# Patient Record
Sex: MALE | Race: WHITE | NOT HISPANIC OR LATINO | Employment: OTHER | URBAN - METROPOLITAN AREA
[De-identification: names, ages, dates, MRNs, and addresses within clinical notes are randomized per-mention and may not be internally consistent; named-entity substitution may affect disease eponyms.]

---

## 2017-01-04 ENCOUNTER — OFFICE VISIT (OUTPATIENT)
Dept: CARDIAC REHAB | Facility: HOSPITAL | Age: 82
End: 2017-01-04

## 2017-01-04 DIAGNOSIS — Z95.1 S/P CABG X 5: Primary | ICD-10-CM

## 2017-01-09 ENCOUNTER — OFFICE VISIT (OUTPATIENT)
Dept: CARDIAC REHAB | Facility: HOSPITAL | Age: 82
End: 2017-01-09

## 2017-01-09 DIAGNOSIS — Z95.1 S/P CABG X 5: Primary | ICD-10-CM

## 2017-01-11 ENCOUNTER — OFFICE VISIT (OUTPATIENT)
Dept: CARDIAC REHAB | Facility: HOSPITAL | Age: 82
End: 2017-01-11

## 2017-01-11 DIAGNOSIS — Z95.1 S/P CABG X 5: Primary | ICD-10-CM

## 2017-01-13 ENCOUNTER — TRANSCRIBE ORDERS (OUTPATIENT)
Dept: CARDIAC REHAB | Facility: HOSPITAL | Age: 82
End: 2017-01-13

## 2017-01-13 ENCOUNTER — OFFICE VISIT (OUTPATIENT)
Dept: CARDIAC REHAB | Facility: HOSPITAL | Age: 82
End: 2017-01-13

## 2017-01-13 DIAGNOSIS — Z95.1 S/P CABG X 5: Primary | ICD-10-CM

## 2017-01-16 ENCOUNTER — OFFICE VISIT (OUTPATIENT)
Dept: CARDIAC REHAB | Facility: HOSPITAL | Age: 82
End: 2017-01-16

## 2017-01-16 DIAGNOSIS — Z95.1 S/P CABG X 5: Primary | ICD-10-CM

## 2017-01-16 DIAGNOSIS — C34.91 ADENOCARCINOMA OF RIGHT LUNG (HCC): Primary | ICD-10-CM

## 2017-01-18 ENCOUNTER — OFFICE VISIT (OUTPATIENT)
Dept: CARDIAC REHAB | Facility: HOSPITAL | Age: 82
End: 2017-01-18

## 2017-01-18 DIAGNOSIS — Z95.1 S/P CABG X 5: Primary | ICD-10-CM

## 2017-01-20 ENCOUNTER — APPOINTMENT (OUTPATIENT)
Dept: CARDIAC REHAB | Facility: HOSPITAL | Age: 82
End: 2017-01-20

## 2017-01-20 ENCOUNTER — OFFICE VISIT (OUTPATIENT)
Dept: FAMILY MEDICINE CLINIC | Facility: CLINIC | Age: 82
End: 2017-01-20

## 2017-01-20 VITALS
OXYGEN SATURATION: 95 % | TEMPERATURE: 98.1 F | SYSTOLIC BLOOD PRESSURE: 110 MMHG | HEART RATE: 58 BPM | WEIGHT: 207.6 LBS | BODY MASS INDEX: 29.06 KG/M2 | HEIGHT: 71 IN | DIASTOLIC BLOOD PRESSURE: 68 MMHG

## 2017-01-20 DIAGNOSIS — E78.2 MIXED HYPERLIPIDEMIA: ICD-10-CM

## 2017-01-20 DIAGNOSIS — I48.20 CHRONIC ATRIAL FIBRILLATION (HCC): ICD-10-CM

## 2017-01-20 DIAGNOSIS — N40.1 BENIGN NODULAR PROSTATIC HYPERPLASIA WITH LOWER URINARY TRACT SYMPTOMS: ICD-10-CM

## 2017-01-20 DIAGNOSIS — Z13.1 DM (DIABETES MELLITUS SCREEN): ICD-10-CM

## 2017-01-20 DIAGNOSIS — E11.9 TYPE 2 DIABETES MELLITUS WITHOUT COMPLICATION, WITHOUT LONG-TERM CURRENT USE OF INSULIN (HCC): ICD-10-CM

## 2017-01-20 DIAGNOSIS — E03.4 HYPOTHYROIDISM DUE TO ACQUIRED ATROPHY OF THYROID: Primary | ICD-10-CM

## 2017-01-20 PROCEDURE — 99213 OFFICE O/P EST LOW 20 MIN: CPT | Performed by: FAMILY MEDICINE

## 2017-01-20 NOTE — PROGRESS NOTES
Chief Complaint and HPI    I have reviewed the patient's medical history in detail and updated the computerized patient record.    Subjective: Bhaskar Ibarra is a 81 y.o. male presents   here today for     Hyperlipidemia (-no muscle pain with statin); Benign Prostatic Hypertrophy (freq and urgency); and Depression (happy with Lexapro)      Review of Systems   Constitutional: Negative.    HENT: Positive for congestion, rhinorrhea and sneezing.    Eyes: Negative.    Respiratory: Negative.    Cardiovascular: Negative.    Gastrointestinal: Negative.    Endocrine: Negative.    Genitourinary: Negative.    Musculoskeletal: Negative.    Skin: Negative.    Allergic/Immunologic: Negative.    Neurological: Negative.    Hematological: Negative.    Psychiatric/Behavioral: Negative.        Physical Exam   Constitutional: He is oriented to person, place, and time. He appears well-developed and well-nourished.   Cardiovascular: Normal rate, regular rhythm, normal heart sounds and intact distal pulses.    Pulmonary/Chest: Effort normal and breath sounds normal.   Neurological: He is alert and oriented to person, place, and time.   Psychiatric: He has a normal mood and affect. His behavior is normal.   Vitals reviewed.      Procedures    Assessment:   Diagnosis Plan   1. Hypothyroidism due to acquired atrophy of thyroid  PSA    TSH    Lipid Panel With LDL / HDL Ratio    Hemoglobin A1c    CBC & Differential    Comprehensive Metabolic Panel   2. Mixed hyperlipidemia  PSA    TSH    Lipid Panel With LDL / HDL Ratio    Hemoglobin A1c    CBC & Differential    Comprehensive Metabolic Panel   3. Chronic atrial fibrillation  PSA    TSH    Lipid Panel With LDL / HDL Ratio    Hemoglobin A1c    CBC & Differential    Comprehensive Metabolic Panel   4. Benign nodular prostatic hyperplasia with lower urinary tract symptoms  PSA    TSH    Lipid Panel With LDL / HDL Ratio    Hemoglobin A1c    CBC & Differential    Comprehensive Metabolic  Panel   5. DM (diabetes mellitus screen)  PSA    TSH    Lipid Panel With LDL / HDL Ratio    Hemoglobin A1c    CBC & Differential    Comprehensive Metabolic Panel   6. Type 2 diabetes mellitus without complication, without long-term current use of insulin   Hemoglobin A1c       Plan:  Orders Placed This Encounter   Procedures   • PSA   • TSH   • Lipid Panel With LDL / HDL Ratio   • Hemoglobin A1c   • Comprehensive Metabolic Panel       Requested Prescriptions      No prescriptions requested or ordered in this encounter       All tests and consults since last visit reviewed with patient    No Follow-up on file.

## 2017-01-20 NOTE — MR AVS SNAPSHOT
Bhaskar Ibarra   1/20/2017 10:45 AM   Office Visit    Dept Phone:  [phone]   Encounter #:  [id number]    Provider:  Garry Joya MD   Department:  [facility] FAMILY AND INTERNAL MEDICINE                Your Full Care Plan              Your Updated Medication List          This list is accurate as of: 1/20/17 11:57 AM.  Always use your most recent med list.                apixaban 2.5 MG tablet tablet   Commonly known as:  ELIQUIS   Take 1 tablet by mouth every 12 (twelve) hours.       aspirin 81 MG tablet       atorvastatin 20 MG tablet   Commonly known as:  LIPITOR   TAKE 1 TABLET DAILY       carvedilol 3.125 MG tablet   Commonly known as:  COREG   Take 1 tablet by mouth 2 (Two) Times a Day.       escitalopram 20 MG tablet   Commonly known as:  LEXAPRO   TAKE 1 TABLET DAILY       furosemide 40 MG tablet   Commonly known as:  LASIX   TAKE 1 TABLET DAILY. HOLD IF BLOOD PRESSURE IS LESS THAN 100/60       levothyroxine 88 MCG tablet   Commonly known as:  SYNTHROID, LEVOTHROID   TAKE 1 TABLET DAILY       omeprazole 40 MG capsule   Commonly known as:  priLOSEC   TAKE 1 CAPSULE DAILY       sotalol 80 MG tablet   Commonly known as:  BETAPACE               We Performed the Following     CBC & Differential     Comprehensive Metabolic Panel     Hemoglobin A1c     Lipid Panel With LDL / HDL Ratio     PSA     TSH       You Were Diagnosed With        Codes Comments    Hypothyroidism due to acquired atrophy of thyroid    -  Primary ICD-10-CM: E03.4  ICD-9-CM: 244.8, 246.8     Mixed hyperlipidemia     ICD-10-CM: E78.2  ICD-9-CM: 272.2     Chronic atrial fibrillation     ICD-10-CM: I48.2  ICD-9-CM: 427.31     Benign nodular prostatic hyperplasia with lower urinary tract symptoms     ICD-10-CM: N40.1  ICD-9-CM: 600.10     DM (diabetes mellitus screen)     ICD-10-CM: Z13.1  ICD-9-CM: V77.1     Type 2 diabetes mellitus without complication, without long-term current use of insulin      ICD-10-CM: E11.9  ICD-9-CM: 250.00       Instructions     None    Patient Instructions History      Upcoming Appointments     Visit Type Date Time Department    FOLLOW UP 1/20/2017 10:45 AM [facility]    PHASE III - CARD 1/20/2017 11:15 AM [facility] CARD REHAB    PHASE III - CARD 1/23/2017 11:15 AM [facility] CARD REHAB    PHASE III - CARD 1/25/2017 11:15 AM [facility] CARD REHAB    PHASE III - CARD 1/27/2017 11:15 AM [facility] CARD REHAB    PHASE III - CARD 1/30/2017 11:15 AM [facility] CARD REHAB    PHASE III - CARD 2/1/2017 11:15 AM [facility] CARD REHAB    PHASE III - CARD 2/3/2017 11:15 AM [facility] CARD REHAB    PHASE III - CARD 2/6/2017 11:15 AM [facility] CARD REHAB    PHASE III - CARD 2/8/2017 11:15 AM [facility] CARD REHAB    PHASE III - CARD 2/10/2017 11:15 AM [facility] CARD REHAB    PHASE III - CARD 2/13/2017 11:15 AM [facility] CARD REHAB    PHASE III - CARD 2/15/2017 11:15 AM [facility] CARD REHAB    PHASE III - CARD 2/17/2017 11:15 AM [facility] CARD REHAB    PHASE III - CARD 2/20/2017 11:15 AM [facility] CARD REHAB    PHASE III - CARD 2/22/2017 11:15 AM [facility] CARD REHAB    PHASE III - CARD 2/24/2017 11:15 AM [facility] CARD REHAB    PHASE III - CARD 2/27/2017 11:15 AM [facility] CARD REHAB    PHASE III - CARD 3/1/2017 11:15 AM [facility] CARD REHAB    PHASE III - CARD 3/3/2017 11:15 AM [facility] CARD REHAB    PHASE III - CARD 3/6/2017 11:15 AM [facility] CARD REHAB    PHASE III - CARD 3/8/2017 11:15 AM [facility] CARD REHAB    PHASE III - CARD 3/10/2017 11:15 AM [facility] CARD REHAB    PHASE III - CARD 3/13/2017 11:15 AM [facility] CARD REHAB    PHASE III - CARD 3/15/2017 11:15 AM [facility] CARD REHAB    PHASE III - CARD 3/17/2017 11:15 AM  [facility] CARD REHAB    PHASE III - CARD 3/20/2017 11:15 AM  [facility] CARD REHAB    PHASE III - CARD 3/22/2017 11:15 AM  [facility] CARD REHAB    PHASE III - CARD 3/24/2017 11:15 AM  [facility] CARD REHAB    PHASE III - CARD 3/27/2017 11:15 AM  [facility] CARD REHAB    PHASE III - CARD 3/29/2017 11:15 AM  [facility] CARD REHAB    PHASE III - CARD 3/31/2017 11:15  AM [facility] CARD REHAB    PHASE III - CARD 4/3/2017 11:15 AM [facility] CARD REHAB    PHASE III - CARD 4/5/2017 11:15 AM [facility] CARD REHAB    PHASE III - CARD 4/7/2017 11:15 AM [facility] CARD REHAB    PHASE III - CARD 4/10/2017 11:15 AM [facility] CARD REHAB    PHASE III - CARD 4/12/2017 11:15 AM [facility] CARD REHAB    PHASE III - CARD 4/14/2017 11:15 AM [facility] CARD REHAB    PHASE III - CARD 4/17/2017 11:15 AM [facility] CARD REHAB    PHASE III - CARD 4/19/2017 11:15 AM [facility] CARD REHAB    PHASE III - CARD 4/21/2017 11:15 AM [facility] CARD REHAB    PHASE III - CARD 4/24/2017 11:15 AM [facility] CARD REHAB    PHASE III - CARD 4/26/2017 11:15 AM [facility] CARD REHAB    PHASE III - CARD 4/28/2017 11:15 AM [facility] CARD REHAB    PHASE III - CARD 5/1/2017 11:15 AM [facility] CARD REHAB    PHASE III - CARD 5/3/2017 11:15 AM [facility] CARD REHAB    PHASE III - CARD 5/5/2017 11:15 AM [facility] CARD REHAB    PHASE III - CARD 5/8/2017 11:15 AM [facility] CARD REHAB    PHASE III - CARD 5/10/2017 11:15 AM [facility] CARD REHAB    PHASE III - CARD 5/12/2017 11:15 AM [facility] CARD REHAB    PHASE III - CARD 5/15/2017 11:15 AM [facility] CARD REHAB    PHASE III - CARD 5/17/2017 11:15 AM [facility] CARD REHAB    PHASE III - CARD 5/19/2017 11:15 AM [facility] CARD REHAB    PHASE III - CARD 5/22/2017 11:15 AM [facility] CARD REHAB    PHASE III - CARD 5/24/2017 11:15 AM [facility] CARD REHAB    PHASE III - CARD 5/26/2017 11:15 AM [facility] CARD REHAB    PHASE III - CARD 5/31/2017 11:15 AM [facility] CARD REHAB    PHASE III - CARD 6/2/2017 11:15 AM [facility] CARD REHAB    PHASE III - CARD 6/5/2017 11:15 AM  [facility] CARD REHAB    PHASE III - CARD 6/7/2017 11:15 AM  [facility] CARD REHAB    PHASE III - CARD 6/9/2017 11:15 AM  [facility] CARD REHAB    PHASE III - CARD 6/12/2017 11:15 AM  [facility] CARD REHAB    PHASE III - CARD 6/14/2017 11:15 AM  [facility] CARD REHAB    PHASE III - CARD 6/16/2017 11:15 AM  [facility] CARD REHAB    PHASE III - CARD 6/19/2017 11:15 AM  [facility] CARD REHAB    PHASE III - CARD 6/21/2017 11:15 AM  [facility] CARD REHAB     PHASE III - CARD 2017 11:15 AM [facility] CARD REHAB    PHASE III - CARD 2017 11:15 AM [facility] CARD REHAB    PHASE III - CARD 2017 11:15 AM [facility] CARD REHAB    PHASE III - CARD 2017 11:15 AM [facility] CARD REHAB    PHASE III - CARD 7/3/2017 11:15 AM [facility] CARD REHAB    PHASE III - CARD 2017 11:15 AM [facility] CARD REHAB    PHASE III - CARD 2017 11:15 AM [facility] CARD REHAB    PHASE III - CARD 7/10/2017 11:15 AM [facility] CARD REHAB    PHASE III - CARD 2017 11:15 AM [facility] CARD REHAB    PHASE III - CARD 2017 11:15 AM [facility] CARD REHAB    PHASE III - CARD 2017 11:15 AM [facility] CARD REHAB    PHASE III - CARD 2017 11:15 AM [facility] CARD REHAB    FOLLOW UP 2017 11:05 AM [facility]    CT [facility] ABDOMEN WO CONTRAST 10/27/2017 11:30 AM BH [facility] CT      MyChart Signup     [facility] ScaleMP allows you to send messages to your doctor, view your test results, renew your prescriptions, schedule appointments, and more. To sign up, go to [employer] and click on the Sign Up Now link in the New User? box. Enter your ScaleMP Activation Code exactly as it appears below along with the last four digits of your Social Security Number and your Date of Birth () to complete the sign-up process. If you do not sign up before the expiration date, you must request a new code.    ScaleMP Activation Code: [email or web address]  Expires: 2017  5:34 AM    If you have questions, you can email [email or web address] or call [phone] to talk to our ScaleMP staff. Remember, [employer] is NOT to be used for urgent needs. For medical emergencies, dial 911.               Other Info from Your Visit           Your Appointments     2017 11:15 AM EST   PHASE III CARDIAC REHAB with GYM - CARDIAC REHAB   [facility] CARD REHAB ([place])    [address] [facility] [address]   [phone]            2017 11:15 AM EST   PHASE III CARDIAC REHAB with GYM  "- CARDIAC REHAB   [facility] REHAB ([place])    [address] [address]   [phone]            Jan 27, 2017 11:15 AM EST   PHASE III CARDIAC REHAB with GYM - CARDIAC REHAB   [facility] REHAB ([place])    [address] [address]   [phone]            Jan 30, 2017 11:15 AM EST   PHASE III CARDIAC REHAB with GYM - CARDIAC REHAB   [facility] REHAB ([place])    [address] [address]   [phone]            Feb 01, 2017 11:15 AM EST   PHASE III CARDIAC REHAB with GYM - CARDIAC REHAB   [facility] REHAB ([place])    [address] [address]   [phone]              Allergies     Latex Allergy Itching      Reason for Visit     Hyperlipidemia -no muscle pain with statin    Benign Prostatic Hypertrophy freq and urgency    Depression happy with Lexapro      Vital Signs     Blood Pressure Pulse Temperature Height Weight Oxygen Saturation    110/68 (BP Location: Left arm, Patient Position: Sitting, Cuff Size: Adult) 58 98.1 °F (36.7 °C) (Oral) 71\" (180.3 cm) 207 lb 9.6 oz (94.2 kg) 95%    Body Mass Index Smoking Status                28.95 kg/m2 Former Smoker          Problems and Diagnoses Noted     Atrial fibrillation (irregular heartbeat)    Benign nodular prostatic hyperplasia with lower urinary tract symptoms    Screening for diabetes mellitus    High cholesterol or triglycerides    Underactive thyroid    Type 2 diabetes mellitus without complication, without long-term current use of insulin            "

## 2017-01-21 LAB
ALBUMIN SERPL-MCNC: 4.3 G/DL (ref 3.5–5.2)
ALBUMIN/GLOB SERPL: 1.5 G/DL
ALP SERPL-CCNC: 108 U/L (ref 39–117)
ALT SERPL-CCNC: 15 U/L (ref 1–41)
AST SERPL-CCNC: 18 U/L (ref 1–40)
BASOPHILS # BLD AUTO: 0.01 10*3/MM3 (ref 0–0.2)
BASOPHILS NFR BLD AUTO: 0.1 % (ref 0–1.5)
BILIRUB SERPL-MCNC: 0.4 MG/DL (ref 0.1–1.2)
BUN SERPL-MCNC: 22 MG/DL (ref 8–23)
BUN/CREAT SERPL: 15 (ref 7–25)
CALCIUM SERPL-MCNC: 9.5 MG/DL (ref 8.6–10.5)
CHLORIDE SERPL-SCNC: 100 MMOL/L (ref 98–107)
CHOLEST SERPL-MCNC: 195 MG/DL (ref 0–200)
CO2 SERPL-SCNC: 27.8 MMOL/L (ref 22–29)
CREAT SERPL-MCNC: 1.47 MG/DL (ref 0.76–1.27)
DIFFERENTIAL COMMENT: NORMAL
EOSINOPHIL # BLD AUTO: 0.24 10*3/MM3 (ref 0–0.7)
EOSINOPHIL NFR BLD AUTO: 3.5 % (ref 0.3–6.2)
ERYTHROCYTE [DISTWIDTH] IN BLOOD BY AUTOMATED COUNT: 14.8 % (ref 11.5–14.5)
GLOBULIN SER CALC-MCNC: 2.9 GM/DL
GLUCOSE SERPL-MCNC: 184 MG/DL (ref 65–99)
HBA1C MFR BLD: 6.15 % (ref 4.8–5.6)
HCT VFR BLD AUTO: 43.6 % (ref 40.4–52.2)
HDLC SERPL-MCNC: 29 MG/DL (ref 40–60)
HGB BLD-MCNC: 13.7 G/DL (ref 13.7–17.6)
IMM GRANULOCYTES # BLD: 0.02 10*3/MM3 (ref 0–0.03)
IMM GRANULOCYTES NFR BLD: 0.3 % (ref 0–0.5)
LDLC SERPL CALC-MCNC: 98 MG/DL (ref 0–100)
LDLC/HDLC SERPL: 3.37 {RATIO}
LYMPHOCYTES # BLD AUTO: 2.26 10*3/MM3 (ref 0.9–4.8)
LYMPHOCYTES NFR BLD AUTO: 33.1 % (ref 19.6–45.3)
MCH RBC QN AUTO: 33.3 PG (ref 27–32.7)
MCHC RBC AUTO-ENTMCNC: 31.4 G/DL (ref 32.6–36.4)
MCV RBC AUTO: 105.8 FL (ref 79.8–96.2)
MONOCYTES # BLD AUTO: 0.57 10*3/MM3 (ref 0.2–1.2)
MONOCYTES NFR BLD AUTO: 8.4 % (ref 5–12)
NEUTROPHILS # BLD AUTO: 3.72 10*3/MM3 (ref 1.9–8.1)
NEUTROPHILS NFR BLD AUTO: 54.6 % (ref 42.7–76)
PLATELET # BLD AUTO: 295 10*3/MM3 (ref 140–500)
PLATELET BLD QL SMEAR: NORMAL
POTASSIUM SERPL-SCNC: 5.1 MMOL/L (ref 3.5–5.2)
PROT SERPL-MCNC: 7.2 G/DL (ref 6–8.5)
PSA SERPL-MCNC: 0.26 NG/ML (ref 0–4)
RBC # BLD AUTO: 4.12 10*6/MM3 (ref 4.6–6)
RBC MORPH BLD: NORMAL
SODIUM SERPL-SCNC: 142 MMOL/L (ref 136–145)
TRIGL SERPL-MCNC: 341 MG/DL (ref 0–150)
TSH SERPL DL<=0.005 MIU/L-ACNC: 4.14 MIU/ML (ref 0.27–4.2)
VLDLC SERPL CALC-MCNC: 68.2 MG/DL (ref 5–40)
WBC # BLD AUTO: 6.82 10*3/MM3 (ref 4.5–10.7)

## 2017-01-23 ENCOUNTER — OFFICE VISIT (OUTPATIENT)
Dept: CARDIAC REHAB | Facility: HOSPITAL | Age: 82
End: 2017-01-23

## 2017-01-23 ENCOUNTER — TELEPHONE (OUTPATIENT)
Dept: FAMILY MEDICINE CLINIC | Facility: CLINIC | Age: 82
End: 2017-01-23

## 2017-01-23 DIAGNOSIS — E78.3 HYPERCHYLOMICRONEMIA: ICD-10-CM

## 2017-01-23 DIAGNOSIS — Z95.1 S/P CABG X 5: Primary | ICD-10-CM

## 2017-01-23 RX ORDER — ATORVASTATIN CALCIUM 40 MG/1
40 TABLET, FILM COATED ORAL DAILY
Qty: 90 TABLET | Refills: 3 | Status: SHIPPED | OUTPATIENT
Start: 2017-01-23 | End: 2018-01-01 | Stop reason: SDUPTHER

## 2017-01-23 NOTE — TELEPHONE ENCOUNTER
----- Message from Donna Wahl sent at 1/23/2017 11:58 AM EST -----  Regarding: LAB RESULTS   Contact: [phone]  PLEASE CALL MR. MALONE ABOUT HIS LAB RESULTS FROM 01/20/2017 CAN BE FOUND IN HIS CHART.      INFORMED PT HE WILL RECEIVE A CALL WITHIN 23-48 HOURS.

## 2017-01-25 ENCOUNTER — OFFICE VISIT (OUTPATIENT)
Dept: CARDIAC REHAB | Facility: HOSPITAL | Age: 82
End: 2017-01-25

## 2017-01-25 DIAGNOSIS — Z95.1 S/P CABG X 5: Primary | ICD-10-CM

## 2017-01-26 ENCOUNTER — HOSPITAL ENCOUNTER (OUTPATIENT)
Dept: CT IMAGING | Facility: HOSPITAL | Age: 82
Discharge: HOME | End: 2017-01-26

## 2017-01-26 DIAGNOSIS — C34.91 ADENOCARCINOMA OF RIGHT LUNG (HCC): ICD-10-CM

## 2017-01-26 LAB — CREAT BLDA-MCNC: 1.4 MG/DL (ref 0.6–1.3)

## 2017-01-26 PROCEDURE — 71260 CT THORAX DX C+: CPT

## 2017-01-26 PROCEDURE — 82565 ASSAY OF CREATININE: CPT

## 2017-01-26 PROCEDURE — 0 IOPAMIDOL 61 % SOLUTION

## 2017-01-26 RX ADMIN — IOPAMIDOL 75 ML: 612 INJECTION, SOLUTION INTRAVENOUS at 09:50

## 2017-01-30 ENCOUNTER — OFFICE VISIT (OUTPATIENT)
Dept: CARDIAC REHAB | Facility: HOSPITAL | Age: 82
End: 2017-01-30

## 2017-01-30 DIAGNOSIS — Z95.1 S/P CABG X 5: Primary | ICD-10-CM

## 2017-01-31 ENCOUNTER — OFFICE VISIT (OUTPATIENT)
Dept: RADIATION ONCOLOGY | Facility: HOSPITAL | Age: 82
End: 2017-01-31

## 2017-01-31 VITALS
SYSTOLIC BLOOD PRESSURE: 121 MMHG | WEIGHT: 208 LBS | BODY MASS INDEX: 29.01 KG/M2 | OXYGEN SATURATION: 94 % | DIASTOLIC BLOOD PRESSURE: 73 MMHG | HEART RATE: 58 BPM | TEMPERATURE: 97 F | RESPIRATION RATE: 16 BRPM

## 2017-01-31 DIAGNOSIS — C34.91 ADENOCARCINOMA OF RIGHT LUNG (HCC): Primary | ICD-10-CM

## 2017-01-31 PROCEDURE — 99024 POSTOP FOLLOW-UP VISIT: CPT

## 2017-01-31 NOTE — MR AVS SNAPSHOT
Bhaskar Ibarra   2017 10:00 AM   Office Visit    Dept Phone:  [phone]   Encounter #:  [id number]    Provider:  Gilbert Whitaker MD   Department:  [facility] RADIATION ONCOLOGY                Your Full Care Plan              Your Updated Medication List          This list is accurate as of: 17 10:25 AM.  Always use your most recent med list.                apixaban 2.5 MG tablet tablet   Commonly known as:  ELIQUIS   Take 1 tablet by mouth every 12 (twelve) hours.       aspirin 81 MG tablet       atorvastatin 40 MG tablet   Commonly known as:  LIPITOR   Take 1 tablet by mouth Daily.       carvedilol 3.125 MG tablet   Commonly known as:  COREG   Take 1 tablet by mouth 2 (Two) Times a Day.       escitalopram 20 MG tablet   Commonly known as:  LEXAPRO   TAKE 1 TABLET DAILY       furosemide 40 MG tablet   Commonly known as:  LASIX   TAKE 1 TABLET DAILY. HOLD IF BLOOD PRESSURE IS LESS THAN 100/60       levothyroxine 88 MCG tablet   Commonly known as:  SYNTHROID, LEVOTHROID   TAKE 1 TABLET DAILY       omeprazole 40 MG capsule   Commonly known as:  priLOSEC   TAKE 1 CAPSULE DAILY       sotalol 80 MG tablet   Commonly known as:  BETAPACE               Instructions    Hospital scheduling will call you to schedule your CT scan.  Call the office here once this is scheduled so that we can schedule you with Dr. Whitaker 3-5 days later. [phone] ([place])     Patient Instructions History      [employer] Signup     [facility] [employer]ene[employer]ill allows you to send messages to your doctor, view your test results, renew your prescriptions, schedule appointments, and more. To sign up, go to [employer] and click on the Sign Up Now link in the New User? box. Enter your [employer]ene[employer] Activation Code exactly as it appears below along with the last four digits of your Social Security Number and your Date of Birth () to complete the sign-up process. If you do not sign up before the  expiration date, you must request a new code.    MyChart Activation Code: [email or web address]  Expires: 2/14/2017 10:25 AM    If you have questions, you can email [email or web address] or call [phone] to talk to our MyChart staff. Remember, MyChart is NOT to be used for urgent needs. For medical emergencies, dial 911.               Other Info from Your Visit           Your appointments     Date & Time Provider Appointment Department    Feb 01, 2017 11:15 AM EST [facility] - CARDIAC REHAB PHASE III CARDIAC REHAB [facility] CARD REHAB    Feb 03, 2017 11:15 AM EST [facility] - CARDIAC REHAB PHASE III CARDIAC REHAB [facility] CARD REHAB    Feb 06, 2017 11:15 AM EST [facility] - CARDIAC REHAB PHASE III CARDIAC REHAB [facility] CARD REHAB    Feb 08, 2017 11:15 AM EST [facility] - CARDIAC REHAB PHASE III CARDIAC REHAB [facility] CARD REHAB    Feb 10, 2017 11:15 AM EST [facility] - CARDIAC REHAB PHASE III CARDIAC REHAB [facility] CARD REHAB    Feb 13, 2017 11:15 AM EST [facility] - CARDIAC REHAB PHASE III CARDIAC REHAB [facility] CARD REHAB    Feb 15, 2017 11:15 AM EST [facility] - CARDIAC REHAB PHASE III CARDIAC REHAB [facility] CARD REHAB    Feb 17, 2017 11:15 AM EST [facility] - CARDIAC REHAB PHASE III CARDIAC REHAB [facility] CARD REHAB    Feb 20, 2017 11:15 AM EST [facility] - CARDIAC REHAB PHASE III CARDIAC REHAB [facility] CARD REHAB    Feb 22, 2017 11:15 AM EST [facility] - CARDIAC REHAB PHASE III CARDIAC REHAB [facility] CARD REHAB    Feb 24, 2017 11:15 AM EST [facility] - CARDIAC REHAB PHASE III CARDIAC REHAB [facility] CARD REHAB    Feb 27, 2017 11:15 AM EST [facility] - CARDIAC REHAB PHASE III CARDIAC REHAB [facility] CARD REHAB    Mar 01, 2017 11:15 AM EST [facility] - CARDIAC REHAB PHASE III CARDIAC REHAB [facility] CARD REHAB    Mar 03, 2017 11:15 AM EST [facility] - CARDIAC REHAB PHASE III CARDIAC REHAB  [facility] CARD REHAB    Mar 06, 2017 11:15 AM EST [facility] - CARDIAC REHAB PHASE III CARDIAC REHAB [facility] CARD REHAB    Mar 08, 2017 11:15 AM EST [facility] - CARDIAC REHAB PHASE III CARDIAC REHAB [facility] CARD REHAB    Mar 10, 2017 11:15 AM EST [facility] - CARDIAC REHAB PHASE III CARDIAC REHAB [facility] CARD REHAB    Mar 13, 2017 11:15 AM EDT [facility] - CARDIAC REHAB PHASE III CARDIAC REHAB [facility] CARD REHAB    Mar 15, 2017 11:15 AM EDT [facility] - CARDIAC REHAB PHASE III CARDIAC REHAB [facility] CARD REHAB    Mar 17, 2017 11:15 AM EDT [facility] - CARDIAC REHAB PHASE III CARDIAC REHAB [facility] CARD REHAB    Mar 20, 2017 11:15 AM EDT [facility] - CARDIAC REHAB PHASE III CARDIAC REHAB [facility] CARD REHAB    Mar 22, 2017 11:15 AM EDT [facility] - CARDIAC REHAB PHASE III CARDIAC REHAB [facility] CARD REHAB    Mar 24, 2017 11:15 AM EDT [facility] - CARDIAC REHAB PHASE III CARDIAC REHAB [facility] CARD REHAB    Mar 27, 2017 11:15 AM EDT [facility] - CARDIAC REHAB PHASE III CARDIAC REHAB [facility] CARD REHAB    Mar 29, 2017 11:15 AM EDT [facility] - CARDIAC REHAB PHASE III CARDIAC REHAB [facility] CARD REHAB    Mar 31, 2017 11:15 AM EDT [facility] - CARDIAC REHAB PHASE III CARDIAC REHAB [facility] CARD REHAB    Apr 03, 2017 11:15 AM EDT [facility] - CARDIAC REHAB PHASE III CARDIAC REHAB [facility] CARD REHAB    Apr 05, 2017 11:15 AM EDT [facility] - CARDIAC REHAB PHASE III CARDIAC REHAB [facility] CARD REHAB    Apr 07, 2017 11:15 AM EDT [facility] - CARDIAC REHAB PHASE III CARDIAC REHAB [facility] CARD REHAB    Apr 10, 2017 11:15 AM EDT [facility] - CARDIAC REHAB PHASE III CARDIAC REHAB [facility] CARD REHAB    Apr 12, 2017 11:15 AM EDT [facility] - CARDIAC REHAB PHASE III CARDIAC REHAB [facility] CARD REHAB    Apr 14, 2017 11:15 AM EDT G[facility] - CARDIAC  REHAB PHASE III CARDIAC REHAB [facility] CARD REHAB    Apr 17, 2017 11:15 AM EDT G[facility] - CARDIAC REHAB PHASE III CARDIAC REHAB [facility] CARD REHAB    Apr 19, 2017 11:15 AM EDT [facility] - CARDIAC REHAB PHASE III CARDIAC REHAB [facility] REHAB    Apr 21, 2017 11:15 AM EDT GYM - CARDIAC REHAB PHASE III CARDIAC REHAB [facility] CARD REHAB    Apr 24, 2017 11:15 AM EDT G[facility] - CARDIAC REHAB PHASE III CARDIAC REHAB [facility] CARD REHAB    Apr 26, 2017 11:15 AM EDT [facility] - CARDIAC REHAB PHASE III CARDIAC REHAB [facility] CARD REHAB    Apr 28, 2017 11:15 AM EDT [facility] - CARDIAC REHAB PHASE III CARDIAC REHAB [facility] CARD REHAB    May 01, 2017 11:15 AM EDT GYM - CARDIAC REHAB PHASE III CARDIAC REHAB [facility] CARD REHAB    May 03, 2017 11:15 AM EDT G[facility] - CARDIAC REHAB PHASE III CARDIAC REHAB [facility] CARD REHAB    May 05, 2017 11:15 AM EDT G[facility] - CARDIAC REHAB PHASE III CARDIAC REHAB [facility] CARD REHAB    May 08, 2017 11:15 AM EDT G[facility] - CARDIAC REHAB PHASE III CARDIAC REHAB [facility] REHAB    May 10, 2017 11:15 AM EDT GYM - CARDIAC REHAB PHASE III CARDIAC REHAB [facility] CARD REHAB    May 12, 2017 11:15 AM EDT G[facility] - CARDIAC REHAB PHASE III CARDIAC REHAB [facility] CARD REHAB    May 15, 2017 11:15 AM EDT G[facility] - CARDIAC REHAB PHASE III CARDIAC REHAB [facility] CARD REHAB    May 17, 2017 11:15 AM EDT G[facility] - CARDIAC REHAB PHASE III CARDIAC REHAB [facility] CARD REHAB    May 19, 2017 11:15 AM EDT G[facility] - CARDIAC REHAB PHASE III CARDIAC REHAB [facility] CARD REHAB    May 22, 2017 11:15 AM EDT GYM - CARDIAC REHAB PHASE III CARDIAC REHAB [facility] CARD REHAB    May 24, 2017 11:15 AM EDT [facility] - CARDIAC REHAB PHASE III CARDIAC REHAB [facility] CARD REHAB    May 26,  2017 11:15 AM EDT [facility] - CARDIAC REHAB PHASE III CARDIAC REHAB [facility] CARD REHAB    May 31, 2017 11:15 AM EDT GYM - CARDIAC REHAB PHASE III CARDIAC REHAB [facility] CARD REHAB    Jun 02, 2017 11:15 AM EDT GYM - CARDIAC REHAB PHASE III CARDIAC REHAB [facility] CARD REHAB    Jun 05, 2017 11:15 AM EDT GYM - CARDIAC REHAB PHASE III CARDIAC REHAB [facility] CARD REHAB    Jun 07, 2017 11:15 AM EDT GYM - CARDIAC REHAB PHASE III CARDIAC REHAB [facility] CARD REHAB    Jun 09, 2017 11:15 AM EDT GYM - CARDIAC REHAB PHASE III CARDIAC REHAB [facility] CARD REHAB    Jun 12, 2017 11:15 AM EDT GYM - CARDIAC REHAB PHASE III CARDIAC REHAB [facility] CARD REHAB    Jun 14, 2017 11:15 AM EDT GYM - CARDIAC REHAB PHASE III CARDIAC REHAB [facility] CARD REHAB    Jun 16, 2017 11:15 AM EDT GYM - CARDIAC REHAB PHASE III CARDIAC REHAB [facility] CARD REHAB    Jun 19, 2017 11:15 AM EDT GYM - CARDIAC REHAB PHASE III CARDIAC REHAB [facility] CARD REHAB    Jun 21, 2017 11:15 AM EDT GYM - CARDIAC REHAB PHASE III CARDIAC REHAB [facility] CARD REHAB    Jun 23, 2017 11:15 AM EDT GYM - CARDIAC REHAB PHASE III CARDIAC REHAB [facility] CARD REHAB    Jun 26, 2017 11:15 AM EDT GYM - CARDIAC REHAB PHASE III CARDIAC REHAB [facility] CARD REHAB    Jun 28, 2017 11:15 AM EDT GYM - CARDIAC REHAB PHASE III CARDIAC REHAB [facility] CARD REHAB    Jun 30, 2017 11:15 AM EDT GYM - CARDIAC REHAB PHASE III CARDIAC REHAB [facility] CARD REHAB    Jul 03, 2017 11:15 AM EDT GYM - CARDIAC REHAB PHASE III CARDIAC REHAB [facility] CARD REHAB    Jul 05, 2017 11:15 AM EDT GYM - CARDIAC REHAB PHASE III CARDIAC REHAB [facility] CARD REHAB    Jul 07, 2017 11:15 AM EDT GYM - CARDIAC REHAB PHASE III CARDIAC REHAB [facility] CARD REHAB    Jul 10, 2017 11:15 AM EDT GYM - CARDIAC REHAB PHASE III CARDIAC REHAB [facility] CARD REHAB    Jul 12, 2017 11:15 AM EDT GYM - CARDIAC REHAB PHASE III CARDIAC REHAB [facility] REHAB    Jul 14, 2017 11:15 AM EDT GYM - CARDIAC REHAB PHASE III CARDIAC REHAB [facility] REHAB    Jul 17, 2017 11:15 AM EDT GYM - CARDIAC REHAB PHASE III CARDIAC REHAB [facility] REHAB    Jul 19, 2017 11:15 AM EDT GYM - CARDIAC REHAB PHASE III CARDIAC REHAB [facility] REHAB    Jul 28, 2017 11:05 AM EDT Garry Joya MD Follow Up [facility] FAMILY AND INTERNAL MEDICINE    Arrive 15 minutes prior to appointment.    Oct 27, 2017 11:30 AM EDT SAR[facility] CT 2 CT sarai abdomen wo contrast [facility] CT    Patient may only have liquids 4 hrs prior to exam. Please arrive 60 minutes prior to exam to drink barium contrast.        [facility] REHAB  [place]  [address] [address]  [phone] [facility]  [address] [address]  [phone] [facility] FAMILY AND INTERNAL MEDICINE  [address] [address]  [phone]              Vital Signs     Blood Pressure Pulse Temperature Respirations Weight Oxygen Saturation    121/73 58 97 °F (36.1 °C) (Oral) 16 208 lb (94.3 kg) 94%    Body Mass Index Smoking Status                29.01 kg/m2 Former Smoker          Problems and Diagnoses Noted     Adenocarcinoma of right lung

## 2017-01-31 NOTE — PATIENT INSTRUCTIONS
Hospital scheduling will call you to schedule your CT scan.  Call the office here once this is scheduled so that we can schedule you with Dr. Whitaker 3-5 days later. [phone] ([facility]

## 2017-01-31 NOTE — PROGRESS NOTES
1/31/2017     Post-radiation therapy follow-up visit;  Mr. Bhaskar Ibarra   ( 81 y.o. , 1935 )   is here for follow-up at 39 days  post treatment of his T1 cancer of the RULss. His recent CT scan was done at 34 days post treatment.    Evaluation:        Subjective:  No change in his health as a result of his SBRT        Objecive:             Vitals:    01/31/17 1006   BP: 121/73   Pulse: 58   Resp: 16   Temp: 97 °F (36.1 °C)   TempSrc: Oral   SpO2: 94%   Weight: 208 lb (94.3 kg)   PainSc: 0-No pain              Physical findings of note: None           Imaging findings of note: When measured in three dimensions, the residual abnormality is now 0.5-0.7 cc vs. 1.0 cc before treatment. Good progress for only 34 days post treatment.        Recommendation: Next CT of the chest to be done at 3 months post treatment and see me 3-4 days later to discuss the report.          It was a pleasure to see Bhaskar Ibarra in follow-up today.       Gilbert Whitaker MD       No charge

## 2017-01-31 NOTE — LETTER
January 31, 2017    Bhaskar Ibarra  [address] [facility] [address]    1/31/2017     Post-radiation therapy follow-up visit;  Mr. Bhaskar Ibarra   ( 81 y.o. , 1935 )   is here for follow-up at 39 days  post treatment of his T1 cancer of the RULss. His recent CT scan was done at 34 days post treatment.    Evaluation:        Subjective:  No change in his health as a result of his SBRT         Vitals:    01/31/17 1006   BP: 121/73   Pulse: 58   Resp: 16   Temp: 97 °F (36.1 °C)   TempSrc: Oral   SpO2: 94%   Weight: 208 lb (94.3 kg)   PainSc: 0-No pain            Physical findings of note: None           Imaging findings of note: When measured in three dimensions, the residual abnormality is now 0.5-0.7 cc vs. 1.0 cc before treatment. Good progress for only 34 days post treatment.    Recommendation: Next CT of the chest to be done at 3 months post treatment and see me 3-4 days later to discuss the report.    It was a pleasure to see Bhaskar Ibarra in follow-up today.      Gilbert Whitaker MD

## 2017-02-01 ENCOUNTER — OFFICE VISIT (OUTPATIENT)
Dept: CARDIAC REHAB | Facility: HOSPITAL | Age: 82
End: 2017-02-01

## 2017-02-01 DIAGNOSIS — Z95.1 S/P CABG X 5: Primary | ICD-10-CM

## 2017-02-03 ENCOUNTER — OFFICE VISIT (OUTPATIENT)
Dept: CARDIAC REHAB | Facility: HOSPITAL | Age: 82
End: 2017-02-03

## 2017-02-03 DIAGNOSIS — Z95.1 S/P CABG X 5: Primary | ICD-10-CM

## 2017-02-06 ENCOUNTER — OFFICE VISIT (OUTPATIENT)
Dept: CARDIAC REHAB | Facility: HOSPITAL | Age: 82
End: 2017-02-06

## 2017-02-06 DIAGNOSIS — Z95.1 S/P CABG X 5: Primary | ICD-10-CM

## 2017-02-08 ENCOUNTER — OFFICE VISIT (OUTPATIENT)
Dept: CARDIAC REHAB | Facility: HOSPITAL | Age: 82
End: 2017-02-08

## 2017-02-08 DIAGNOSIS — Z95.1 S/P CABG X 5: Primary | ICD-10-CM

## 2017-02-10 ENCOUNTER — OFFICE VISIT (OUTPATIENT)
Dept: CARDIAC REHAB | Facility: HOSPITAL | Age: 82
End: 2017-02-10

## 2017-02-10 DIAGNOSIS — Z95.1 S/P CABG X 5: Primary | ICD-10-CM

## 2017-02-13 ENCOUNTER — OFFICE VISIT (OUTPATIENT)
Dept: CARDIAC REHAB | Facility: HOSPITAL | Age: 82
End: 2017-02-13

## 2017-02-13 DIAGNOSIS — Z95.1 S/P CABG X 5: Primary | ICD-10-CM

## 2017-02-15 ENCOUNTER — OFFICE VISIT (OUTPATIENT)
Dept: CARDIAC REHAB | Facility: HOSPITAL | Age: 82
End: 2017-02-15

## 2017-02-15 DIAGNOSIS — Z95.1 S/P CABG X 5: Primary | ICD-10-CM

## 2017-02-17 ENCOUNTER — OFFICE VISIT (OUTPATIENT)
Dept: CARDIAC REHAB | Facility: HOSPITAL | Age: 82
End: 2017-02-17

## 2017-02-17 DIAGNOSIS — Z95.1 S/P CABG X 5: Primary | ICD-10-CM

## 2017-02-21 RX ORDER — OMEPRAZOLE 40 MG/1
CAPSULE, DELAYED RELEASE ORAL
Qty: 90 CAPSULE | Refills: 1 | Status: SHIPPED | OUTPATIENT
Start: 2017-02-21 | End: 2017-07-23 | Stop reason: SDUPTHER

## 2017-02-28 RX ORDER — APIXABAN 2.5 MG/1
TABLET, FILM COATED ORAL
Qty: 360 TABLET | Refills: 2 | OUTPATIENT
Start: 2017-02-28

## 2017-03-01 ENCOUNTER — OFFICE VISIT (OUTPATIENT)
Dept: CARDIAC REHAB | Facility: HOSPITAL | Age: 82
End: 2017-03-01

## 2017-03-01 DIAGNOSIS — Z95.1 S/P CABG X 5: Primary | ICD-10-CM

## 2017-03-03 ENCOUNTER — OFFICE VISIT (OUTPATIENT)
Dept: CARDIAC REHAB | Facility: HOSPITAL | Age: 82
End: 2017-03-03

## 2017-03-03 ENCOUNTER — TELEPHONE (OUTPATIENT)
Dept: CARDIOLOGY | Facility: CLINIC | Age: 82
End: 2017-03-03

## 2017-03-03 DIAGNOSIS — Z95.1 S/P CABG X 5: Primary | ICD-10-CM

## 2017-03-03 NOTE — TELEPHONE ENCOUNTER
----- Message from Ebony Grossman sent at 3/2/2017 10:57 AM EST -----  Regarding: ELIQUIS RX   Contact: [phone]  Needs a rx for Eliquis bid needs 90 day supply   Send to [facility]

## 2017-03-06 ENCOUNTER — OFFICE VISIT (OUTPATIENT)
Dept: CARDIAC REHAB | Facility: HOSPITAL | Age: 82
End: 2017-03-06

## 2017-03-06 DIAGNOSIS — Z95.1 S/P CABG X 5: Primary | ICD-10-CM

## 2017-03-06 RX ORDER — LEVOTHYROXINE SODIUM 88 UG/1
TABLET ORAL
Qty: 90 TABLET | Refills: 0 | Status: SHIPPED | OUTPATIENT
Start: 2017-03-06 | End: 2017-06-02 | Stop reason: SDUPTHER

## 2017-03-08 ENCOUNTER — OFFICE VISIT (OUTPATIENT)
Dept: CARDIAC REHAB | Facility: HOSPITAL | Age: 82
End: 2017-03-08

## 2017-03-08 DIAGNOSIS — Z95.1 S/P CABG X 5: Primary | ICD-10-CM

## 2017-03-10 RX ORDER — CARVEDILOL 3.12 MG/1
TABLET ORAL
Qty: 180 TABLET | Refills: 0 | Status: SHIPPED | OUTPATIENT
Start: 2017-03-10 | End: 2017-06-08 | Stop reason: SDUPTHER

## 2017-03-11 RX ORDER — FUROSEMIDE 40 MG/1
TABLET ORAL
Qty: 90 TABLET | Refills: 1 | Status: SHIPPED | OUTPATIENT
Start: 2017-03-11 | End: 2017-07-23 | Stop reason: SDUPTHER

## 2017-03-14 ENCOUNTER — TELEPHONE (OUTPATIENT)
Dept: FAMILY MEDICINE CLINIC | Facility: CLINIC | Age: 82
End: 2017-03-14

## 2017-03-14 RX ORDER — ZOLPIDEM TARTRATE 5 MG/1
5 TABLET ORAL NIGHTLY PRN
Qty: 30 TABLET | Refills: 0 | OUTPATIENT
Start: 2017-03-14 | End: 2017-07-23

## 2017-03-14 NOTE — TELEPHONE ENCOUNTER
Patient called requesting something to help him sleep advised he may need to come in for an appointment he said it is hard for him to come in uses [facility] in [facility] call patient on cell [phone]

## 2017-03-14 NOTE — TELEPHONE ENCOUNTER
Patient called requesting something be called in to help him sleep uses [facility] in [facility] call on cell # [phone]

## 2017-03-15 ENCOUNTER — OFFICE VISIT (OUTPATIENT)
Dept: CARDIAC REHAB | Facility: HOSPITAL | Age: 82
End: 2017-03-15

## 2017-03-15 DIAGNOSIS — Z95.1 S/P CABG X 5: Primary | ICD-10-CM

## 2017-03-20 ENCOUNTER — OFFICE VISIT (OUTPATIENT)
Dept: CARDIAC REHAB | Facility: HOSPITAL | Age: 82
End: 2017-03-20

## 2017-03-20 DIAGNOSIS — Z95.1 S/P CABG X 5: Primary | ICD-10-CM

## 2017-03-22 ENCOUNTER — OFFICE VISIT (OUTPATIENT)
Dept: CARDIAC REHAB | Facility: HOSPITAL | Age: 82
End: 2017-03-22

## 2017-03-22 DIAGNOSIS — Z95.1 S/P CABG X 5: Primary | ICD-10-CM

## 2017-03-24 ENCOUNTER — OFFICE VISIT (OUTPATIENT)
Dept: CARDIAC REHAB | Facility: HOSPITAL | Age: 82
End: 2017-03-24

## 2017-03-24 DIAGNOSIS — Z95.1 S/P CABG X 5: Primary | ICD-10-CM

## 2017-03-27 ENCOUNTER — OFFICE VISIT (OUTPATIENT)
Dept: CARDIAC REHAB | Facility: HOSPITAL | Age: 82
End: 2017-03-27

## 2017-03-27 DIAGNOSIS — Z95.1 S/P CABG X 5: Primary | ICD-10-CM

## 2017-03-27 RX ORDER — ESCITALOPRAM OXALATE 20 MG/1
TABLET ORAL
Qty: 90 TABLET | Refills: 0 | Status: SHIPPED | OUTPATIENT
Start: 2017-03-27 | End: 2017-06-25 | Stop reason: SDUPTHER

## 2017-03-29 ENCOUNTER — OFFICE VISIT (OUTPATIENT)
Dept: CARDIAC REHAB | Facility: HOSPITAL | Age: 82
End: 2017-03-29

## 2017-03-29 DIAGNOSIS — Z95.1 S/P CABG X 5: Primary | ICD-10-CM

## 2017-04-03 ENCOUNTER — OFFICE VISIT (OUTPATIENT)
Dept: CARDIAC REHAB | Facility: HOSPITAL | Age: 82
End: 2017-04-03

## 2017-04-03 DIAGNOSIS — Z95.1 S/P CABG X 5: Primary | ICD-10-CM

## 2017-04-04 ENCOUNTER — HOSPITAL ENCOUNTER (OUTPATIENT)
Dept: CT IMAGING | Facility: HOSPITAL | Age: 82
Discharge: HOME | End: 2017-04-04

## 2017-04-04 DIAGNOSIS — C34.91 ADENOCARCINOMA OF RIGHT LUNG (HCC): ICD-10-CM

## 2017-04-04 LAB — CREAT BLDA-MCNC: 1.7 MG/DL (ref 0.6–1.3)

## 2017-04-04 PROCEDURE — 71260 CT THORAX DX C+: CPT

## 2017-04-04 PROCEDURE — 82565 ASSAY OF CREATININE: CPT

## 2017-04-04 PROCEDURE — 0 IOPAMIDOL 61 % SOLUTION

## 2017-04-04 RX ADMIN — IOPAMIDOL 75 ML: 612 INJECTION, SOLUTION INTRAVENOUS at 09:40

## 2017-04-05 ENCOUNTER — OFFICE VISIT (OUTPATIENT)
Dept: CARDIAC REHAB | Facility: HOSPITAL | Age: 82
End: 2017-04-05

## 2017-04-05 DIAGNOSIS — Z95.1 S/P CABG X 5: Primary | ICD-10-CM

## 2017-04-10 ENCOUNTER — OFFICE VISIT (OUTPATIENT)
Dept: RADIATION ONCOLOGY | Facility: HOSPITAL | Age: 82
End: 2017-04-10

## 2017-04-10 VITALS
WEIGHT: 208 LBS | BODY MASS INDEX: 29.01 KG/M2 | RESPIRATION RATE: 16 BRPM | DIASTOLIC BLOOD PRESSURE: 73 MMHG | TEMPERATURE: 97.7 F | HEART RATE: 57 BPM | OXYGEN SATURATION: 94 % | SYSTOLIC BLOOD PRESSURE: 125 MMHG

## 2017-04-10 DIAGNOSIS — C34.91 ADENOCARCINOMA OF RIGHT LUNG (HCC): Primary | ICD-10-CM

## 2017-04-10 PROCEDURE — 99213 OFFICE O/P EST LOW 20 MIN: CPT

## 2017-04-10 RX ORDER — OSELTAMIVIR PHOSPHATE 75 MG/1
CAPSULE ORAL
COMMUNITY
Start: 2017-02-19 | End: 2017-07-06

## 2017-04-10 NOTE — PROGRESS NOTES
4/10/2017     Post-radiation therapy follow-up visit;  Mr. Bhaskar Ibarra   ( 81 y.o. , 1935 )   is here for follow-up at 3.5 months from treatment for a small cancer of the RUL ss.  Evaluation:        Subjective: No change in his health.        Objecive:             Vitals:    04/10/17 0957   BP: 125/73   Pulse: 57   Resp: 16   Temp: 97.7 °F (36.5 °C)   TempSrc: Oral   SpO2: 94%   Weight: 208 lb (94.3 kg)   PainSc: 0-No pain              Physical findings of note: None pertinent to the purpose of this visit.           Imaging findings of note: CT of chest unchanged from last one showing stability of the nodule.    Impression: Continue remission    Recommendation: PET/CT on July 05, 2017 and return here a few days later to discuss results.        It was a pleasure to see Bhaskar Ibarra in follow-up today.       Gilbert Whitaker MD         Today, I was with Mr.Robert LINUS Ibarra  from  10:00am   until  10:17 am   of which  15  minutes was face to face  Of that face to face time, 15  minutes  was spent in counseling and coordination of care as indicated in Recommendation section, above.

## 2017-04-12 ENCOUNTER — APPOINTMENT (OUTPATIENT)
Dept: GENERAL RADIOLOGY | Facility: HOSPITAL | Age: 82
End: 2017-04-12

## 2017-04-12 ENCOUNTER — APPOINTMENT (OUTPATIENT)
Dept: CT IMAGING | Facility: HOSPITAL | Age: 82
End: 2017-04-12

## 2017-04-12 ENCOUNTER — HOSPITAL ENCOUNTER (EMERGENCY)
Facility: HOSPITAL | Age: 82
Discharge: HOME | End: 2017-04-12
Attending: EMERGENCY MEDICINE | Admitting: EMERGENCY MEDICINE

## 2017-04-12 VITALS
HEIGHT: 71 IN | HEART RATE: 51 BPM | BODY MASS INDEX: 28 KG/M2 | RESPIRATION RATE: 18 BRPM | TEMPERATURE: 99 F | SYSTOLIC BLOOD PRESSURE: 119 MMHG | WEIGHT: 200 LBS | DIASTOLIC BLOOD PRESSURE: 81 MMHG | OXYGEN SATURATION: 98 %

## 2017-04-12 DIAGNOSIS — J01.30 ACUTE SPHENOIDAL SINUSITIS, RECURRENCE NOT SPECIFIED: Primary | ICD-10-CM

## 2017-04-12 DIAGNOSIS — R51.9 SINUS HEADACHE: ICD-10-CM

## 2017-04-12 LAB
ALBUMIN SERPL-MCNC: 4.3 G/DL (ref 3.5–5.2)
ALBUMIN/GLOB SERPL: 1.1 G/DL
ALP SERPL-CCNC: 120 U/L (ref 39–117)
ALT SERPL W P-5'-P-CCNC: 16 U/L (ref 1–41)
ANION GAP SERPL CALCULATED.3IONS-SCNC: 17.5 MMOL/L
AST SERPL-CCNC: 17 U/L (ref 1–40)
BASOPHILS # BLD AUTO: 0.02 10*3/MM3 (ref 0–0.2)
BASOPHILS NFR BLD AUTO: 0.1 % (ref 0–1.5)
BILIRUB SERPL-MCNC: 0.7 MG/DL (ref 0.1–1.2)
BUN BLD-MCNC: 22 MG/DL (ref 8–23)
BUN/CREAT SERPL: 15 (ref 7–25)
CALCIUM SPEC-SCNC: 10 MG/DL (ref 8.6–10.5)
CHLORIDE SERPL-SCNC: 97 MMOL/L (ref 98–107)
CO2 SERPL-SCNC: 23.5 MMOL/L (ref 22–29)
CREAT BLD-MCNC: 1.47 MG/DL (ref 0.76–1.27)
DEPRECATED RDW RBC AUTO: 51.2 FL (ref 37–54)
EOSINOPHIL # BLD AUTO: 0.03 10*3/MM3 (ref 0–0.7)
EOSINOPHIL NFR BLD AUTO: 0.2 % (ref 0.3–6.2)
ERYTHROCYTE [DISTWIDTH] IN BLOOD BY AUTOMATED COUNT: 14.1 % (ref 11.5–14.5)
GFR SERPL CREATININE-BSD FRML MDRD: 46 ML/MIN/1.73
GLOBULIN UR ELPH-MCNC: 3.8 GM/DL
GLUCOSE BLD-MCNC: 189 MG/DL (ref 65–99)
HCT VFR BLD AUTO: 42 % (ref 40.4–52.2)
HGB BLD-MCNC: 14.1 G/DL (ref 13.7–17.6)
IMM GRANULOCYTES # BLD: 0.04 10*3/MM3 (ref 0–0.03)
IMM GRANULOCYTES NFR BLD: 0.3 % (ref 0–0.5)
INR PPP: 1.2 (ref 0.9–1.1)
LYMPHOCYTES # BLD AUTO: 1.41 10*3/MM3 (ref 0.9–4.8)
LYMPHOCYTES NFR BLD AUTO: 10.1 % (ref 19.6–45.3)
MCH RBC QN AUTO: 33.3 PG (ref 27–32.7)
MCHC RBC AUTO-ENTMCNC: 33.6 G/DL (ref 32.6–36.4)
MCV RBC AUTO: 99.3 FL (ref 79.8–96.2)
MONOCYTES # BLD AUTO: 1.06 10*3/MM3 (ref 0.2–1.2)
MONOCYTES NFR BLD AUTO: 7.6 % (ref 5–12)
NEUTROPHILS # BLD AUTO: 11.43 10*3/MM3 (ref 1.9–8.1)
NEUTROPHILS NFR BLD AUTO: 81.7 % (ref 42.7–76)
PLATELET # BLD AUTO: 259 10*3/MM3 (ref 140–500)
PMV BLD AUTO: 10.7 FL (ref 6–12)
POTASSIUM BLD-SCNC: 4 MMOL/L (ref 3.5–5.2)
PROT SERPL-MCNC: 8.1 G/DL (ref 6–8.5)
PROTHROMBIN TIME: 14.7 SECONDS (ref 11.7–14.2)
RBC # BLD AUTO: 4.23 10*6/MM3 (ref 4.6–6)
SODIUM BLD-SCNC: 138 MMOL/L (ref 136–145)
WBC NRBC COR # BLD: 13.99 10*3/MM3 (ref 4.5–10.7)

## 2017-04-12 PROCEDURE — 85025 COMPLETE CBC W/AUTO DIFF WBC: CPT | Performed by: PHYSICIAN ASSISTANT

## 2017-04-12 PROCEDURE — 70450 CT HEAD/BRAIN W/O DYE: CPT

## 2017-04-12 PROCEDURE — 99284 EMERGENCY DEPT VISIT MOD MDM: CPT

## 2017-04-12 PROCEDURE — 85610 PROTHROMBIN TIME: CPT | Performed by: PHYSICIAN ASSISTANT

## 2017-04-12 PROCEDURE — 25010000002 METHYLPREDNISOLONE PER 125 MG: Performed by: EMERGENCY MEDICINE

## 2017-04-12 PROCEDURE — 25010000002 ONDANSETRON PER 1 MG: Performed by: EMERGENCY MEDICINE

## 2017-04-12 PROCEDURE — 25010000002 MORPHINE PER 10 MG: Performed by: EMERGENCY MEDICINE

## 2017-04-12 PROCEDURE — 80053 COMPREHEN METABOLIC PANEL: CPT | Performed by: PHYSICIAN ASSISTANT

## 2017-04-12 PROCEDURE — 71020 HC CHEST PA AND LATERAL: CPT

## 2017-04-12 PROCEDURE — 96375 TX/PRO/DX INJ NEW DRUG ADDON: CPT

## 2017-04-12 PROCEDURE — 96374 THER/PROPH/DIAG INJ IV PUSH: CPT

## 2017-04-12 RX ORDER — SODIUM CHLORIDE 0.9 % (FLUSH) 0.9 %
10 SYRINGE (ML) INJECTION AS NEEDED
Status: DISCONTINUED | OUTPATIENT
Start: 2017-04-12 | End: 2017-04-12 | Stop reason: HOSPADM

## 2017-04-12 RX ORDER — ONDANSETRON 2 MG/ML
4 INJECTION INTRAMUSCULAR; INTRAVENOUS ONCE
Status: COMPLETED | OUTPATIENT
Start: 2017-04-12 | End: 2017-04-12

## 2017-04-12 RX ORDER — METHYLPREDNISOLONE SODIUM SUCCINATE 125 MG/2ML
125 INJECTION, POWDER, LYOPHILIZED, FOR SOLUTION INTRAMUSCULAR; INTRAVENOUS ONCE
Status: COMPLETED | OUTPATIENT
Start: 2017-04-12 | End: 2017-04-12

## 2017-04-12 RX ORDER — AMOXICILLIN AND CLAVULANATE POTASSIUM 875; 125 MG/1; MG/1
1 TABLET, FILM COATED ORAL 2 TIMES DAILY
Qty: 14 TABLET | Refills: 0 | Status: SHIPPED | OUTPATIENT
Start: 2017-04-12 | End: 2017-04-19

## 2017-04-12 RX ADMIN — METHYLPREDNISOLONE SODIUM SUCCINATE 125 MG: 125 INJECTION, POWDER, FOR SOLUTION INTRAMUSCULAR; INTRAVENOUS at 12:26

## 2017-04-12 RX ADMIN — MORPHINE SULFATE 4 MG: 4 INJECTION, SOLUTION INTRAMUSCULAR; INTRAVENOUS at 12:24

## 2017-04-12 RX ADMIN — ONDANSETRON 4 MG: 2 INJECTION INTRAMUSCULAR; INTRAVENOUS at 12:22

## 2017-04-12 RX ADMIN — SODIUM CHLORIDE 1000 ML: 9 INJECTION, SOLUTION INTRAVENOUS at 12:20

## 2017-04-12 NOTE — ED PROVIDER NOTES
Pt presents to the ED c/o moderate to severe frontal headache onset yesterday. He is currently in remission for lung cancer, which was treated with chemo. Upon exam, pt has mild sinus tenderness. CT Head showed nothing acute, CXR result is pending. I agree with the plan for symptom management prior to discharge. Will treat for sinusitis.    I supervised care provided by the midlevel provider.    We have discussed this patient's history, physical exam, and treatment plan.   I have reviewed the note and personally saw and examined the patient and agree with the plan of care.    Documentation assistance provided by juan Driver for Dr. Junior.  Information recorded by the scribe was done at my direction and has been verified and validated by me.     Liam Driver  04/12/17 6658       Tian Junoir MD  04/12/17 4295

## 2017-04-12 NOTE — ED PROVIDER NOTES
"EMERGENCY DEPARTMENT ENCOUNTER    CHIEF COMPLAINT  Chief Complaint: headache  History given by: patient  History limited by: N/A  Room Number: 08/08  PMD: Garry Joya MD      HPI:  Pt is a 81 y.o. male who presents with a \"pulsating\" frontal headache which was gradual in onset yesterday and radiates into his eyes. It was initially mild in intensity but is now moderate. He has taken ASA and tylenol without sx relief. He has also had nasal congestion and photophobia and denies head trauma, confusion, focal weakness, numbness, dizziness, vision changes, trouble swallowing, trouble with speech, syncope, nausea, vomiting, fevers, chills, neck pain, neck stiffness, chest pain, trouble breathing, sore throat, cough, abd pain, and pain and difficulty with urination. He states that he had similar headaches in the past before he underwent \"heart surgery\" 2 years ago. Pt has no other complaints at this time.     Duration: started yesterday  Timing: constant  Location: frontal head  Radiation: to bilateral eyes  Quality: \"pulsating\"  Intensity/Severity: moderate  Progression: worsened  Associated Symptoms: nasal congestion, photophobia  Aggravating Factors: bright lights  Alleviating Factors: none  Previous Episodes: pt had similar headaches in the past before he underwent \"heart surgery\" 2 years ago  Treatment before arrival: pt took ASA and tylenol without sx relief    MEDICAL RECORD REVIEW  Pt has hx of lung cancer, atrial fibrillation, AAA, and HTN. He is on eliquis. 2 months ago, WBC count was 6.82, BUN was 22, and creatinine was 1.47.    PAST MEDICAL HISTORY  Active Ambulatory Problems     Diagnosis Date Noted   • Coronary arteriosclerosis in native artery 03/15/2016   • Atrial fibrillation 03/15/2016   • Acute non-Q wave ST elevation myocardial infarction (STEMI) involving left anterior descending (LAD) coronary artery 03/15/2016   • Mitral valve insufficiency 03/15/2016   • S/P CABG (coronary artery bypass graft) " 03/25/2016   • S/P mitral valve repair 03/25/2016   • Depression 07/01/2016   • Hypothyroidism 07/01/2016   • Hyperlipidemia 07/01/2016   • Adenocarcinoma of right lung 12/08/2016   • Benign nodular prostatic hyperplasia with lower urinary tract symptoms 01/20/2017   • DM (diabetes mellitus screen) 01/20/2017     Resolved Ambulatory Problems     Diagnosis Date Noted   • Solitary pulmonary nodule 11/16/2016     Past Medical History:   Diagnosis Date   • Abdominal aortic aneurysm    • Acute myocardial infarction    • Acute renal failure    • Anemia    • Aneurysm    • Atrial fibrillation    • Cardiomyopathy    • Chest pain    • Coronary artery disease    • Depression    • GERD (gastroesophageal reflux disease)    • Hyperlipidemia    • Hypertension    • Hypothyroidism    • Lung cancer    • Mitral regurgitation    • Myocardial infarction    • Pleural effusion    • Pleural effusion, bilateral    • RLS (restless legs syndrome)    • Sleep apnea    • Ulcerative colitis    • Ulcerative colitis        PAST SURGICAL HISTORY  Past Surgical History:   Procedure Laterality Date   • BACK SURGERY      lumbar   • CARDIAC CATHETERIZATION     • CORONARY ANGIOPLASTY     • CORONARY ARTERY BYPASS GRAFT  07/27/2015    X 5  Dr Louis   • HERNIA REPAIR      inguinal - left   • MITRAL VALVE REPAIR/REPLACEMENT  07/27/2015    Dr Louis   • OTHER SURGICAL HISTORY      Cardiovasc Endosc W/ Video-Assist Harv Veins Coron Art Byp   • SKIN BIOPSY      benign       FAMILY HISTORY  Family History   Problem Relation Age of Onset   • Coronary artery disease Brother    • Heart attack Brother        SOCIAL HISTORY  Social History     Social History   • Marital status:      Spouse name: N/A   • Number of children: N/A   • Years of education: N/A     Occupational History   • Not on file.     Social History Main Topics   • Smoking status: Former Smoker     Years: 5.00     Types: Cigarettes   • Smokeless tobacco: Not on file      Comment: 1 cig day x  5 years   • Alcohol use No   • Drug use: No   • Sexual activity: Defer     Other Topics Concern   • Not on file     Social History Narrative       ALLERGIES  Latex    REVIEW OF SYSTEMS  Review of Systems   Constitutional: Negative for chills and fever.   HENT: Positive for congestion (nasal congestion). Negative for rhinorrhea and sore throat.    Eyes: Positive for photophobia. Negative for pain and visual disturbance.   Respiratory: Negative for cough and shortness of breath.    Cardiovascular: Negative for chest pain and palpitations.   Gastrointestinal: Negative for abdominal pain, diarrhea, nausea and vomiting.   Endocrine: Negative.    Genitourinary: Negative for difficulty urinating and dysuria.   Musculoskeletal: Negative for myalgias, neck pain and neck stiffness.   Skin: Negative.    Neurological: Positive for headaches. Negative for dizziness, syncope, facial asymmetry, speech difficulty, weakness and numbness.   Psychiatric/Behavioral: Negative.    All other systems reviewed and are negative.      PHYSICAL EXAM  ED Triage Vitals   Temp Heart Rate Resp BP SpO2   04/12/17 1023 04/12/17 1018 04/12/17 1018 04/12/17 1018 04/12/17 1018   97 °F (36.1 °C) 56 15 148/86 100 % WNL      Temp src Heart Rate Source Patient Position BP Location FiO2 (%)   04/12/17 1023 04/12/17 1018 04/12/17 1037 04/12/17 1037 --   Tympanic Monitor Lying Right arm        Physical Exam   Constitutional: He is oriented to person, place, and time and well-developed, well-nourished, and in no distress. No distress.   HENT:   Head: Normocephalic and atraumatic.   Mouth/Throat: Oropharynx is clear and moist.   Photophobia, no sinus tenderness, no temporal artery tenderness   Eyes: EOM are normal. Pupils are equal, round, and reactive to light.   Neck: Normal range of motion. Neck supple.   No meningeal signs, no nuchal rigidity   Cardiovascular: Normal rate, regular rhythm and normal heart sounds.    Pulmonary/Chest: Effort normal and breath  sounds normal. No respiratory distress. He has no wheezes. He exhibits no tenderness.   Abdominal: Soft. He exhibits no distension. There is no tenderness. There is no rebound and no guarding.   Musculoskeletal: Normal range of motion. He exhibits no edema.   Lymphadenopathy:     He has no cervical adenopathy.   Neurological: He is alert and oriented to person, place, and time. He has normal sensation and normal strength. He displays facial symmetry and normal speech. He has a normal Cerebellar Exam. He shows no pronator drift.   No visual field deficit, normal speech, normal finger to nose, normal heel to shin, no focal neuro deficits, see NIHSS for further details    Skin: Skin is warm and dry. No rash noted. No pallor.   Psychiatric: Mood, memory, affect and judgment normal.   Nursing note and vitals reviewed.      LAB RESULTS  Recent Results (from the past 24 hour(s))   Comprehensive Metabolic Panel    Collection Time: 04/12/17 11:39 AM   Result Value Ref Range    Glucose 189 (H) 65 - 99 mg/dL    BUN 22 8 - 23 mg/dL    Creatinine 1.47 (H) 0.76 - 1.27 mg/dL    Sodium 138 136 - 145 mmol/L    Potassium 4.0 3.5 - 5.2 mmol/L    Chloride 97 (L) 98 - 107 mmol/L    CO2 23.5 22.0 - 29.0 mmol/L    Calcium 10.0 8.6 - 10.5 mg/dL    Total Protein 8.1 6.0 - 8.5 g/dL    Albumin 4.30 3.50 - 5.20 g/dL    ALT (SGPT) 16 1 - 41 U/L    AST (SGOT) 17 1 - 40 U/L    Alkaline Phosphatase 120 (H) 39 - 117 U/L    Total Bilirubin 0.7 0.1 - 1.2 mg/dL    eGFR Non African Amer 46 (L) >60 mL/min/1.73    Globulin 3.8 gm/dL    A/G Ratio 1.1 g/dL    BUN/Creatinine Ratio 15.0 7.0 - 25.0    Anion Gap 17.5 mmol/L   Protime-INR    Collection Time: 04/12/17 11:39 AM   Result Value Ref Range    Protime 14.7 (H) 11.7 - 14.2 Seconds    INR 1.20 (H) 0.90 - 1.10   CBC Auto Differential    Collection Time: 04/12/17 11:39 AM   Result Value Ref Range    WBC 13.99 (H) 4.50 - 10.70 10*3/mm3    RBC 4.23 (L) 4.60 - 6.00 10*6/mm3    Hemoglobin 14.1 13.7 - 17.6  g/dL    Hematocrit 42.0 40.4 - 52.2 %    MCV 99.3 (H) 79.8 - 96.2 fL    MCH 33.3 (H) 27.0 - 32.7 pg    MCHC 33.6 32.6 - 36.4 g/dL    RDW 14.1 11.5 - 14.5 %    RDW-SD 51.2 37.0 - 54.0 fl    MPV 10.7 6.0 - 12.0 fL    Platelets 259 140 - 500 10*3/mm3    Neutrophil % 81.7 (H) 42.7 - 76.0 %    Lymphocyte % 10.1 (L) 19.6 - 45.3 %    Monocyte % 7.6 5.0 - 12.0 %    Eosinophil % 0.2 (L) 0.3 - 6.2 %    Basophil % 0.1 0.0 - 1.5 %    Immature Grans % 0.3 0.0 - 0.5 %    Neutrophils, Absolute 11.43 (H) 1.90 - 8.10 10*3/mm3    Lymphocytes, Absolute 1.41 0.90 - 4.80 10*3/mm3    Monocytes, Absolute 1.06 0.20 - 1.20 10*3/mm3    Eosinophils, Absolute 0.03 0.00 - 0.70 10*3/mm3    Basophils, Absolute 0.02 0.00 - 0.20 10*3/mm3    Immature Grans, Absolute 0.04 (H) 0.00 - 0.03 10*3/mm3       I ordered the above labs and reviewed the results    RADIOLOGY  CT Head Without Contrast         XR Chest 2 View   Final Result   No acute infiltrate. COPD change. Tortuous aorta. Follow-up   as clinical indications persist.       This report was finalized on 4/12/2017 12:57 PM by Dr. Al Vo MD.            CT head- extensive sphenoid sinus disease which is chronic (similar to 2002), no acute process, no obvious signs of metastasis       I ordered the above noted radiological studies and reviewed the images on the PACS system.  Spoke with Dr. Blanchard (radiologist) regarding CT scan results        PROCEDURES    Performed NIHSS at 1107  Interval: baseline (at presentation)  1a. Level Of Consciousness: 0-->Alert: keenly responsive  1b. LOC Questions: 0-->Answers both questions correctly  1c. LOC Commands: 0-->Performs both tasks correctly  2. Best Gaze: 0-->Normal  3. Visual: 0-->No visual loss  4. Facial Palsy: 0-->Normal symmetrical movements  5a. Motor Arm, Left: 0-->No drift: limb holds 90 (or 45) degrees for full 10 secs  5b. Motor Arm, Right: 0-->No drift: limb holds 90 (or 45) degrees for full 10 secs  6a. Motor Leg, Left: 0-->No drift:  leg holds 30 degree position for full 5 secs  6b. Motor Leg, Right: 0-->No drift: leg holds 30 degree position for full 5 secs  7. Limb Ataxia: 0-->Absent  8. Sensory: 0-->Normal: no sensory loss  9. Best Language: 0-->No aphasia: normal  10. Dysarthria: 0-->Normal  11. Extinction and Inattention (formerly Neglect): 0-->No abnormality    Total (NIH Stroke Scale): 0      COURSE & MEDICAL DECISION MAKING  Pertinent Labs and Imaging studies that were ordered and reviewed are noted above.  Results were reviewed/discussed with the patient and they were also made aware of online assess.  Pt also made aware that some labs, such as cultures, will not be resulted during ER visit and follow up with PMD is necessary.       PROGRESS AND CONSULTS    Progress Notes:    1109- Ordered blood work, PT with INR, and CT head for further evaluation. TPA not indicated. NIHSS score is 0.     1209- Pt has hx of lung cancer. Ordered CXR for further evaluation.     1210- Ordered morphine, solumedrol, zofran, and IV fluids for headache.     1214- Reviewed pt's history and workup with Dr. Junior.  After a bedside evaluation; Dr Junior agrees with the plan of care.     1325- Family now at bedside. Rechecked pt. He is feeling better after ED treatments. Discussed with pt and family about all pertinent results including CT head findings and CXR findings. Informed pt that sx are possibly due to sinusitis for which he will be prescribed nasal spray and abx. RTER warnings given.    The patient's history, physical exam, and lab findings were discussed with the physician, who also performed a face to face history and physical exam.  I discussed all results and noted any abnormalities with patient and family.  Discussed absoute need to recheck abnormalities with their family physician and referred ENT specialist if sx persist.  I answered any of the patient/family's questions.  Discussed plan for discharge, as there is no emergent indication for  "admission.  Pt and family are agreeable and understand need for follow up and repeat testing. They are aware that discharge does not mean that nothing is wrong but it indicates no emergency is present and they must continue care with their family physician.  Pt is discharged with instructions to follow up with primary care doctor to have their blood pressure rechecked.       MEDICATIONS GIVEN IN ER  Medications   sodium chloride 0.9 % flush 10 mL (not administered)   morphine injection 4 mg (4 mg Intravenous Given 4/12/17 1224)   ondansetron (ZOFRAN) injection 4 mg (4 mg Intravenous Given 4/12/17 1222)   sodium chloride 0.9 % bolus 1,000 mL (0 mL Intravenous Stopped 4/12/17 1250)   methylPREDNISolone sodium succinate (SOLU-Medrol) injection 125 mg (125 mg Intravenous Given 4/12/17 1226)       /73 (BP Location: Right arm, Patient Position: Lying)  Pulse 52  Temp 97 °F (36.1 °C) (Tympanic)   Resp 18  Ht 71\" (180.3 cm)  Wt 200 lb (90.7 kg)  SpO2 98%  BMI 27.89 kg/m2      DIAGNOSIS  Final diagnoses:   Acute sphenoidal sinusitis, recurrence not specified   Sinus headache       FOLLOW UP   Garry Joya MD  [address] [facility] [address] [facility] [address]  [facility] [id number] [phone]          Timo Nixon MD  [address]  [facility] [address]  [phone]            RX     Medication List      New Prescriptions          amoxicillin-clavulanate 875-125 MG per tablet   Commonly known as:  AUGMENTIN   Take 1 tablet by mouth 2 (Two) Times a Day for 7 days.       budesonide 32 MCG/ACT nasal spray   Commonly known as:  RINOCORT AQUA   2 sprays into each nostril Daily.         Stop          oseltamivir 75 MG capsule   Commonly known as:  TAMIFLU               I personally scribed for Trisha Worthington PA-C on 4/12/2017 at 1:49 PM.  Electronically signed by Carlos Mckeon on 4/12/2017 at time 1:49 PM       Carlos Mckeon  04/12/17 7879       Trisha Worthington PA-C  04/12/17 4683    "

## 2017-04-12 NOTE — ED TRIAGE NOTES
Chief Complaint   Patient presents with   • Headache     onset yesterday worse last night c/o across the forehead headache

## 2017-04-12 NOTE — DISCHARGE INSTRUCTIONS
PLEASE READ AND REVIEW ALL DISCHARGE INSTRUCTIONS.     Please follow up with your primary care physician for any further evaluation/treatment and further management of your blood pressure.     Recheck in emergency department for any worsening and/or concerning symptoms including facial droop, confusion, weakness, increased headache.     Take all prescribed medicine as written and continue chronic medication.    Follow up with ENT, Dr. Nixon, if symptoms persist.

## 2017-04-13 ENCOUNTER — TELEPHONE (OUTPATIENT)
Dept: SOCIAL WORK | Facility: HOSPITAL | Age: 82
End: 2017-04-13

## 2017-04-24 ENCOUNTER — OFFICE VISIT (OUTPATIENT)
Dept: CARDIAC REHAB | Facility: HOSPITAL | Age: 82
End: 2017-04-24

## 2017-04-24 DIAGNOSIS — Z95.1 S/P CABG X 5: Primary | ICD-10-CM

## 2017-04-26 ENCOUNTER — OFFICE VISIT (OUTPATIENT)
Dept: CARDIAC REHAB | Facility: HOSPITAL | Age: 82
End: 2017-04-26

## 2017-04-26 DIAGNOSIS — Z95.1 S/P CABG X 5: Primary | ICD-10-CM

## 2017-05-16 ENCOUNTER — OFFICE VISIT (OUTPATIENT)
Dept: CARDIAC REHAB | Facility: HOSPITAL | Age: 82
End: 2017-05-16

## 2017-05-16 DIAGNOSIS — Z95.1 S/P CABG X 5: Primary | ICD-10-CM

## 2017-06-01 ENCOUNTER — OFFICE VISIT (OUTPATIENT)
Dept: CARDIAC REHAB | Facility: HOSPITAL | Age: 82
End: 2017-06-01

## 2017-06-01 DIAGNOSIS — Z95.1 S/P CABG X 5: Primary | ICD-10-CM

## 2017-06-02 RX ORDER — LEVOTHYROXINE SODIUM 88 UG/1
TABLET ORAL
Qty: 90 TABLET | Refills: 0 | Status: SHIPPED | OUTPATIENT
Start: 2017-06-02 | End: 2017-07-23 | Stop reason: SDUPTHER

## 2017-06-08 ENCOUNTER — OFFICE VISIT (OUTPATIENT)
Dept: CARDIAC REHAB | Facility: HOSPITAL | Age: 82
End: 2017-06-08

## 2017-06-08 DIAGNOSIS — Z95.1 S/P CABG X 5: Primary | ICD-10-CM

## 2017-06-08 RX ORDER — CARVEDILOL 3.12 MG/1
TABLET ORAL
Qty: 180 TABLET | Refills: 0 | Status: SHIPPED | OUTPATIENT
Start: 2017-06-08 | End: 2017-07-23 | Stop reason: SDUPTHER

## 2017-06-09 ENCOUNTER — APPOINTMENT (OUTPATIENT)
Dept: CARDIAC REHAB | Facility: HOSPITAL | Age: 82
End: 2017-06-09

## 2017-06-12 ENCOUNTER — APPOINTMENT (OUTPATIENT)
Dept: CARDIAC REHAB | Facility: HOSPITAL | Age: 82
End: 2017-06-12

## 2017-06-13 ENCOUNTER — OFFICE VISIT (OUTPATIENT)
Dept: CARDIAC REHAB | Facility: HOSPITAL | Age: 82
End: 2017-06-13

## 2017-06-13 DIAGNOSIS — Z95.1 S/P CABG X 5: Primary | ICD-10-CM

## 2017-06-14 ENCOUNTER — APPOINTMENT (OUTPATIENT)
Dept: CARDIAC REHAB | Facility: HOSPITAL | Age: 82
End: 2017-06-14

## 2017-06-16 ENCOUNTER — APPOINTMENT (OUTPATIENT)
Dept: CARDIAC REHAB | Facility: HOSPITAL | Age: 82
End: 2017-06-16

## 2017-06-19 ENCOUNTER — APPOINTMENT (OUTPATIENT)
Dept: CARDIAC REHAB | Facility: HOSPITAL | Age: 82
End: 2017-06-19

## 2017-06-20 ENCOUNTER — APPOINTMENT (OUTPATIENT)
Dept: CARDIAC REHAB | Facility: HOSPITAL | Age: 82
End: 2017-06-20

## 2017-06-20 ENCOUNTER — OFFICE VISIT (OUTPATIENT)
Dept: CARDIAC REHAB | Facility: HOSPITAL | Age: 82
End: 2017-06-20

## 2017-06-20 DIAGNOSIS — Z95.1 S/P CABG X 5: Primary | ICD-10-CM

## 2017-06-21 ENCOUNTER — APPOINTMENT (OUTPATIENT)
Dept: CARDIAC REHAB | Facility: HOSPITAL | Age: 82
End: 2017-06-21

## 2017-06-22 ENCOUNTER — APPOINTMENT (OUTPATIENT)
Dept: CARDIAC REHAB | Facility: HOSPITAL | Age: 82
End: 2017-06-22

## 2017-06-23 ENCOUNTER — APPOINTMENT (OUTPATIENT)
Dept: CARDIAC REHAB | Facility: HOSPITAL | Age: 82
End: 2017-06-23

## 2017-06-26 ENCOUNTER — APPOINTMENT (OUTPATIENT)
Dept: CARDIAC REHAB | Facility: HOSPITAL | Age: 82
End: 2017-06-26

## 2017-06-26 RX ORDER — ESCITALOPRAM OXALATE 20 MG/1
TABLET ORAL
Qty: 90 TABLET | Refills: 0 | Status: SHIPPED | OUTPATIENT
Start: 2017-06-26 | End: 2017-07-23 | Stop reason: SDUPTHER

## 2017-06-27 ENCOUNTER — APPOINTMENT (OUTPATIENT)
Dept: CARDIAC REHAB | Facility: HOSPITAL | Age: 82
End: 2017-06-27

## 2017-06-28 ENCOUNTER — APPOINTMENT (OUTPATIENT)
Dept: CARDIAC REHAB | Facility: HOSPITAL | Age: 82
End: 2017-06-28

## 2017-06-29 ENCOUNTER — APPOINTMENT (OUTPATIENT)
Dept: CARDIAC REHAB | Facility: HOSPITAL | Age: 82
End: 2017-06-29

## 2017-06-30 ENCOUNTER — APPOINTMENT (OUTPATIENT)
Dept: CARDIAC REHAB | Facility: HOSPITAL | Age: 82
End: 2017-06-30

## 2017-07-03 ENCOUNTER — APPOINTMENT (OUTPATIENT)
Dept: CARDIAC REHAB | Facility: HOSPITAL | Age: 82
End: 2017-07-03

## 2017-07-05 ENCOUNTER — HOSPITAL ENCOUNTER (OUTPATIENT)
Dept: PET IMAGING | Facility: HOSPITAL | Age: 82
Discharge: HOME | End: 2017-07-05

## 2017-07-05 ENCOUNTER — APPOINTMENT (OUTPATIENT)
Dept: CARDIAC REHAB | Facility: HOSPITAL | Age: 82
End: 2017-07-05

## 2017-07-05 DIAGNOSIS — C34.91 ADENOCARCINOMA OF RIGHT LUNG (HCC): ICD-10-CM

## 2017-07-05 PROCEDURE — 0 FLUDEOXYGLUCOSE F18 SOLUTION

## 2017-07-05 PROCEDURE — 78815 PET IMAGE W/CT SKULL-THIGH: CPT

## 2017-07-05 PROCEDURE — A9552 F18 FDG: HCPCS

## 2017-07-05 RX ADMIN — FLUDEOXYGLUCOSE F18 1 DOSE: 300 INJECTION INTRAVENOUS at 08:27

## 2017-07-07 ENCOUNTER — APPOINTMENT (OUTPATIENT)
Dept: CARDIAC REHAB | Facility: HOSPITAL | Age: 82
End: 2017-07-07

## 2017-07-10 ENCOUNTER — APPOINTMENT (OUTPATIENT)
Dept: CARDIAC REHAB | Facility: HOSPITAL | Age: 82
End: 2017-07-10

## 2017-07-12 ENCOUNTER — APPOINTMENT (OUTPATIENT)
Dept: CARDIAC REHAB | Facility: HOSPITAL | Age: 82
End: 2017-07-12

## 2017-07-13 ENCOUNTER — TELEPHONE (OUTPATIENT)
Dept: RADIATION ONCOLOGY | Facility: HOSPITAL | Age: 82
End: 2017-07-13

## 2017-07-14 ENCOUNTER — APPOINTMENT (OUTPATIENT)
Dept: CARDIAC REHAB | Facility: HOSPITAL | Age: 82
End: 2017-07-14

## 2017-07-17 ENCOUNTER — APPOINTMENT (OUTPATIENT)
Dept: CARDIAC REHAB | Facility: HOSPITAL | Age: 82
End: 2017-07-17

## 2017-07-17 DIAGNOSIS — C34.31 MALIGNANT NEOPLASM OF LOWER LOBE OF RIGHT LUNG (HCC): Primary | ICD-10-CM

## 2017-07-19 ENCOUNTER — TELEPHONE (OUTPATIENT)
Dept: FAMILY MEDICINE CLINIC | Facility: CLINIC | Age: 82
End: 2017-07-19

## 2017-07-19 ENCOUNTER — APPOINTMENT (OUTPATIENT)
Dept: CARDIAC REHAB | Facility: HOSPITAL | Age: 82
End: 2017-07-19

## 2017-07-19 NOTE — TELEPHONE ENCOUNTER
----- Message from Orquidea Robles sent at 7/19/2017  9:07 AM EDT -----  LSD   =NOT IN EPIC     -    WOULD LIKE TO SPEAK WITH DR. LIZZ COOL- NECK PAIN, has apt. 7-28-17 -  His wife having surgery today unable to come in     [phone]  - [facility]  [phone]

## 2017-07-21 ENCOUNTER — TELEPHONE (OUTPATIENT)
Dept: FAMILY MEDICINE CLINIC | Facility: CLINIC | Age: 82
End: 2017-07-21

## 2017-07-21 NOTE — TELEPHONE ENCOUNTER
Tried to call and inform patient that the PA for his orphenadrine was approved but there was no answer and no voicemail set up

## 2017-07-23 ENCOUNTER — APPOINTMENT (OUTPATIENT)
Dept: CT IMAGING | Facility: HOSPITAL | Age: 82
End: 2017-07-23

## 2017-07-23 ENCOUNTER — APPOINTMENT (OUTPATIENT)
Dept: GENERAL RADIOLOGY | Facility: HOSPITAL | Age: 82
End: 2017-07-23

## 2017-07-23 ENCOUNTER — HOSPITAL ENCOUNTER (OUTPATIENT)
Facility: HOSPITAL | Age: 82
Setting detail: OBSERVATION
Discharge: HOME | End: 2017-07-28
Attending: EMERGENCY MEDICINE | Admitting: FAMILY MEDICINE

## 2017-07-23 DIAGNOSIS — S60.222A CONTUSION OF LEFT HAND, INITIAL ENCOUNTER: ICD-10-CM
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
## 2017-07-23 DIAGNOSIS — S80.02XA CONTUSION OF LEFT KNEE, INITIAL ENCOUNTER: ICD-10-CM

## 2017-07-23 DIAGNOSIS — R53.1 GENERALIZED WEAKNESS: ICD-10-CM

## 2017-07-23 DIAGNOSIS — R41.0 CONFUSION: Primary | ICD-10-CM

## 2017-07-23 DIAGNOSIS — R27.0 ATAXIA: ICD-10-CM

## 2017-07-23 DIAGNOSIS — S00.93XA CONTUSION OF HEAD, UNSPECIFIED PART OF HEAD, INITIAL ENCOUNTER: ICD-10-CM

## 2017-07-23 LAB
ALBUMIN SERPL-MCNC: 3.9 G/DL (ref 3.5–5.2)
ALBUMIN/GLOB SERPL: 1 G/DL
ALP SERPL-CCNC: 103 U/L (ref 39–117)
ALT SERPL W P-5'-P-CCNC: 22 U/L (ref 1–41)
AMPHET+METHAMPHET UR QL: NEGATIVE
ANION GAP SERPL CALCULATED.3IONS-SCNC: 18.3 MMOL/L
AST SERPL-CCNC: 16 U/L (ref 1–40)
BARBITURATES UR QL SCN: NEGATIVE
BASOPHILS # BLD AUTO: 0.02 10*3/MM3 (ref 0–0.2)
BASOPHILS NFR BLD AUTO: 0.2 % (ref 0–1.5)
BENZODIAZ UR QL SCN: NEGATIVE
BILIRUB SERPL-MCNC: 0.6 MG/DL (ref 0.1–1.2)
BILIRUB UR QL STRIP: NEGATIVE
BUN BLD-MCNC: 23 MG/DL (ref 8–23)
BUN/CREAT SERPL: 14.8 (ref 7–25)
CALCIUM SPEC-SCNC: 9.4 MG/DL (ref 8.6–10.5)
CANNABINOIDS SERPL QL: NEGATIVE
CHLORIDE SERPL-SCNC: 98 MMOL/L (ref 98–107)
CK MB SERPL-CCNC: 2.6 NG/ML
CK SERPL-CCNC: 191 U/L (ref 20–200)
CLARITY UR: CLEAR
CO2 SERPL-SCNC: 23.7 MMOL/L (ref 22–29)
COCAINE UR QL: NEGATIVE
COLOR UR: YELLOW
CREAT BLD-MCNC: 1.55 MG/DL (ref 0.76–1.27)
DEPRECATED RDW RBC AUTO: 51 FL (ref 37–54)
EOSINOPHIL # BLD AUTO: 0.14 10*3/MM3 (ref 0–0.7)
EOSINOPHIL NFR BLD AUTO: 1.4 % (ref 0.3–6.2)
ERYTHROCYTE [DISTWIDTH] IN BLOOD BY AUTOMATED COUNT: 13.7 % (ref 11.5–14.5)
GFR SERPL CREATININE-BSD FRML MDRD: 43 ML/MIN/1.73
GLOBULIN UR ELPH-MCNC: 4.1 GM/DL
GLUCOSE BLD-MCNC: 171 MG/DL (ref 65–99)
GLUCOSE UR STRIP-MCNC: NEGATIVE MG/DL
HCT VFR BLD AUTO: 39.8 % (ref 40.4–52.2)
HGB BLD-MCNC: 12.7 G/DL (ref 13.7–17.6)
HGB UR QL STRIP.AUTO: NEGATIVE
IMM GRANULOCYTES # BLD: 0.03 10*3/MM3 (ref 0–0.03)
IMM GRANULOCYTES NFR BLD: 0.3 % (ref 0–0.5)
INR PPP: 1.27 (ref 0.9–1.1)
KETONES UR QL STRIP: NEGATIVE
LEUKOCYTE ESTERASE UR QL STRIP.AUTO: NEGATIVE
LYMPHOCYTES # BLD AUTO: 2.02 10*3/MM3 (ref 0.9–4.8)
LYMPHOCYTES NFR BLD AUTO: 20.2 % (ref 19.6–45.3)
MAGNESIUM SERPL-MCNC: 2.3 MG/DL (ref 1.6–2.4)
MCH RBC QN AUTO: 32.7 PG (ref 27–32.7)
MCHC RBC AUTO-ENTMCNC: 31.9 G/DL (ref 32.6–36.4)
MCV RBC AUTO: 102.6 FL (ref 79.8–96.2)
METHADONE UR QL SCN: NEGATIVE
MONOCYTES # BLD AUTO: 0.82 10*3/MM3 (ref 0.2–1.2)
MONOCYTES NFR BLD AUTO: 8.2 % (ref 5–12)
NEUTROPHILS # BLD AUTO: 6.99 10*3/MM3 (ref 1.9–8.1)
NEUTROPHILS NFR BLD AUTO: 69.7 % (ref 42.7–76)
NITRITE UR QL STRIP: NEGATIVE
NRBC BLD MANUAL-RTO: 0 /100 WBC (ref 0–0)
OPIATES UR QL: NEGATIVE
OXYCODONE UR QL SCN: NEGATIVE
PH UR STRIP.AUTO: 5.5 [PH] (ref 5–8)
PLATELET # BLD AUTO: 359 10*3/MM3 (ref 140–500)
PMV BLD AUTO: 9.5 FL (ref 6–12)
POTASSIUM BLD-SCNC: 4 MMOL/L (ref 3.5–5.2)
PROT SERPL-MCNC: 8 G/DL (ref 6–8.5)
PROT UR QL STRIP: NEGATIVE
PROTHROMBIN TIME: 15.4 SECONDS (ref 11.7–14.2)
RBC # BLD AUTO: 3.88 10*6/MM3 (ref 4.6–6)
SODIUM BLD-SCNC: 140 MMOL/L (ref 136–145)
SP GR UR STRIP: 1.02 (ref 1–1.03)
T4 FREE SERPL-MCNC: 1.67 NG/DL (ref 0.93–1.7)
TROPONIN T SERPL-MCNC: <0.01 NG/ML (ref 0–0.03)
TSH SERPL DL<=0.05 MIU/L-ACNC: 5.94 MIU/ML (ref 0.27–4.2)
UROBILINOGEN UR QL STRIP: NORMAL
WBC NRBC COR # BLD: 10.02 10*3/MM3 (ref 4.5–10.7)

## 2017-07-23 PROCEDURE — 71020 HC CHEST PA AND LATERAL: CPT

## 2017-07-23 PROCEDURE — 85025 COMPLETE CBC W/AUTO DIFF WBC: CPT | Performed by: EMERGENCY MEDICINE

## 2017-07-23 PROCEDURE — 70450 CT HEAD/BRAIN W/O DYE: CPT

## 2017-07-23 PROCEDURE — 80307 DRUG TEST PRSMV CHEM ANLYZR: CPT | Performed by: EMERGENCY MEDICINE

## 2017-07-23 PROCEDURE — 72125 CT NECK SPINE W/O DYE: CPT

## 2017-07-23 PROCEDURE — G0378 HOSPITAL OBSERVATION PER HR: HCPCS

## 2017-07-23 PROCEDURE — 85610 PROTHROMBIN TIME: CPT | Performed by: EMERGENCY MEDICINE

## 2017-07-23 PROCEDURE — 83735 ASSAY OF MAGNESIUM: CPT | Performed by: EMERGENCY MEDICINE

## 2017-07-23 PROCEDURE — 93005 ELECTROCARDIOGRAM TRACING: CPT | Performed by: EMERGENCY MEDICINE

## 2017-07-23 PROCEDURE — 82553 CREATINE MB FRACTION: CPT | Performed by: EMERGENCY MEDICINE

## 2017-07-23 PROCEDURE — 80053 COMPREHEN METABOLIC PANEL: CPT | Performed by: EMERGENCY MEDICINE

## 2017-07-23 PROCEDURE — 93010 ELECTROCARDIOGRAM REPORT: CPT | Performed by: INTERNAL MEDICINE

## 2017-07-23 PROCEDURE — 81003 URINALYSIS AUTO W/O SCOPE: CPT | Performed by: EMERGENCY MEDICINE

## 2017-07-23 PROCEDURE — 82550 ASSAY OF CK (CPK): CPT | Performed by: EMERGENCY MEDICINE

## 2017-07-23 PROCEDURE — 84484 ASSAY OF TROPONIN QUANT: CPT | Performed by: EMERGENCY MEDICINE

## 2017-07-23 PROCEDURE — 73560 X-RAY EXAM OF KNEE 1 OR 2: CPT

## 2017-07-23 PROCEDURE — 99285 EMERGENCY DEPT VISIT HI MDM: CPT

## 2017-07-23 PROCEDURE — 84443 ASSAY THYROID STIM HORMONE: CPT | Performed by: EMERGENCY MEDICINE

## 2017-07-23 PROCEDURE — 84439 ASSAY OF FREE THYROXINE: CPT | Performed by: EMERGENCY MEDICINE

## 2017-07-23 PROCEDURE — 73130 X-RAY EXAM OF HAND: CPT

## 2017-07-23 RX ORDER — OMEPRAZOLE 40 MG/1
40 CAPSULE, DELAYED RELEASE ORAL DAILY
COMMUNITY
End: 2017-08-20 | Stop reason: SDUPTHER

## 2017-07-23 RX ORDER — SOTALOL HYDROCHLORIDE 80 MG/1
40 TABLET ORAL 2 TIMES DAILY
COMMUNITY
End: 2017-10-30 | Stop reason: SDUPTHER

## 2017-07-23 RX ORDER — PANTOPRAZOLE SODIUM 40 MG/1
40 TABLET, DELAYED RELEASE ORAL EVERY MORNING
Status: DISCONTINUED | OUTPATIENT
Start: 2017-07-24 | End: 2017-07-28 | Stop reason: HOSPADM

## 2017-07-23 RX ORDER — SOTALOL HYDROCHLORIDE 80 MG/1
80 TABLET ORAL
Status: DISCONTINUED | OUTPATIENT
Start: 2017-07-23 | End: 2017-07-28 | Stop reason: HOSPADM

## 2017-07-23 RX ORDER — FUROSEMIDE 40 MG/1
40 TABLET ORAL DAILY
COMMUNITY
End: 2017-10-11 | Stop reason: SDUPTHER

## 2017-07-23 RX ORDER — LEVOTHYROXINE SODIUM 88 UG/1
88 TABLET ORAL DAILY
COMMUNITY
End: 2017-08-31 | Stop reason: SDUPTHER

## 2017-07-23 RX ORDER — HYDROCODONE BITARTRATE AND ACETAMINOPHEN 5; 325 MG/1; MG/1
1 TABLET ORAL EVERY 4 HOURS PRN
Status: DISCONTINUED | OUTPATIENT
Start: 2017-07-23 | End: 2017-07-28 | Stop reason: HOSPADM

## 2017-07-23 RX ORDER — ESCITALOPRAM OXALATE 20 MG/1
20 TABLET ORAL DAILY
COMMUNITY
End: 2017-10-18 | Stop reason: SDUPTHER

## 2017-07-23 RX ORDER — CARVEDILOL 3.12 MG/1
3.12 TABLET ORAL EVERY 12 HOURS SCHEDULED
Status: DISCONTINUED | OUTPATIENT
Start: 2017-07-23 | End: 2017-07-28 | Stop reason: HOSPADM

## 2017-07-23 RX ORDER — CARVEDILOL 3.12 MG/1
3.12 TABLET ORAL 2 TIMES DAILY WITH MEALS
COMMUNITY
End: 2017-10-14 | Stop reason: SDUPTHER

## 2017-07-23 RX ADMIN — HYDROCODONE BITARTRATE AND ACETAMINOPHEN 1 TABLET: 5; 325 TABLET ORAL at 22:53

## 2017-07-23 RX ADMIN — SOTALOL HYDROCHLORIDE 80 MG: 80 TABLET ORAL at 19:58

## 2017-07-23 RX ADMIN — APIXABAN 2.5 MG: 2.5 TABLET, FILM COATED ORAL at 21:32

## 2017-07-23 RX ADMIN — CARVEDILOL 3.12 MG: 3.12 TABLET, FILM COATED ORAL at 21:32

## 2017-07-23 NOTE — PROGRESS NOTES
Clinical Pharmacy Services: Medication History    Bhaskar Ibarra is a 81 y.o. male presenting to [facility] Emergency Department with chief complaint of: Fall, altered mental status     Past Medical History:  Past Medical History:   Diagnosis Date   • Abdominal aortic aneurysm    • Acute myocardial infarction    • Acute renal failure    • Anemia    • Aneurysm    • Atrial fibrillation    • Cardiomyopathy    • Chest pain    • Coronary artery disease     July 2015-    • Depression    • GERD (gastroesophageal reflux disease)    • Hyperlipidemia    • Hypertension    • Hypothyroidism    • Lung cancer    • Mitral regurgitation    • Myocardial infarction    • Pleural effusion    • Pleural effusion, bilateral    • RLS (restless legs syndrome)    • Sleep apnea    • Ulcerative colitis    • Ulcerative colitis        Allergies   Allergen Reactions   • Latex Itching       Medication information was obtained from: Patients reported medication list (unable to verify with pharmacy since it is closed)     Pharmacy and Phone Number: [facility] [phone]    Medication notes: All information was gathered from patient provided medication list. Patient list stated he was taking atorvastatin 20, however, documentation shows an increase of 20mg to 40mg on 1/23/2017.     Current Outpatient Medications:    Prior to Admission medications    Medication Sig Start Date End Date Taking? Authorizing Provider   apixaban (ELIQUIS) 2.5 MG tablet tablet Take 1 tablet by mouth Every 12 (Twelve) Hours. 3/2/17  Yes Garry Joya MD   aspirin 81 MG tablet Take 81 mg by mouth Daily. 1/5/16  Yes Historical Provider, MD   atorvastatin (LIPITOR) 40 MG tablet Take 1 tablet by mouth Daily. 1/23/17  Yes Garry Joya MD   carvedilol (COREG) 3.125 MG tablet Take 3.125 mg by mouth 2 (Two) Times a Day With Meals.   Yes Historical Provider, MD   escitalopram (LEXAPRO) 20 MG tablet Take 20 mg by mouth Daily.   Yes Historical Provider, MD   furosemide  (LASIX) 40 MG tablet Take 40 mg by mouth Daily. Hold if BP is below 100/60   Yes Historical Provider, MD   levothyroxine (SYNTHROID, LEVOTHROID) 88 MCG tablet Take 88 mcg by mouth Daily.   Yes Historical Provider, MD   NON FORMULARY Pt states he takes a muscle relaxer but is unsure of the name   Yes Historical Provider, MD   omeprazole (priLOSEC) 40 MG capsule Take 40 mg by mouth Daily.   Yes Historical Provider, MD   sotalol (BETAPACE) 80 MG tablet Take 40 mg by mouth 2 (Two) Times a Day. Pt takes 1/2 tablet (40mg) by mouth twice daily   Yes Historical Provider, MD       This medication list is complete to the best of my knowledge as of 7/23/2017    Please call if questions.     Walter Ac, Student Pharmacist

## 2017-07-23 NOTE — PLAN OF CARE
Problem: Patient Care Overview (Adult)  Goal: Plan of Care Review  Outcome: Ongoing (interventions implemented as appropriate)    07/23/17 1848   Coping/Psychosocial Response Interventions   Plan Of Care Reviewed With patient;family   Outcome Evaluation   Outcome Summary/Follow up Plan On unit at 1646.  notified, orders in system for meds and labs. Doctor will see in the morning, MRI ordered of head, Daughter will sign MRI sheet. No pain or sycmope noted. No confusion noted.        Goal: Adult Individualization and Mutuality  Outcome: Ongoing (interventions implemented as appropriate)  Goal: Discharge Needs Assessment  Outcome: Ongoing (interventions implemented as appropriate)    07/23/17 1700 07/23/17 1848   Discharge Needs Assessment   Concerns To Be Addressed --  no discharge needs identified   Self-Care   Equipment Currently Used at Home cane, quad --          Problem: Fall Risk (Adult)  Goal: Identify Related Risk Factors and Signs and Symptoms  Outcome: Ongoing (interventions implemented as appropriate)    07/23/17 1848   Fall Risk   Fall Risk: Related Risk Factors age-related changes   Fall Risk: Signs and Symptoms presence of risk factors       Goal: Absence of Falls  Outcome: Ongoing (interventions implemented as appropriate)    07/23/17 1848   Fall Risk (Adult)   Absence of Falls making progress toward outcome

## 2017-07-23 NOTE — ED NOTES
Report called and given Omer SHAH on [facility] room 586, updated on pt current status, discussed completed ER orders, and admitting diagnosis.  Rn had no questions at this time.  Pt and daughter updated. Pt had no questions for this RN at this time.     Yarely Cuba RN  07/23/17 5475

## 2017-07-23 NOTE — NURSING NOTE
Dr. oJya was contacted by nurse:  JANINE 1.)Regular diet, 2.)MRI of brain, 3.)BMP and CBC in morning, 4.)Give Pt's current meds of Coreg, Eliquis, Prilosec, and Betapace and all others will be added in the morning, 5.)He will see patient in the morning.

## 2017-07-23 NOTE — ED NOTES
Family member states that pt got up around 2300 last night and fell several times. Pt went back to bed and this am caregiver noticed that he was staggering when ambulating and also c/o left knee pain, head pain and finger pain. Family member also noticed that pt was confused.      Zoey Cardenas RN  07/23/17 3998

## 2017-07-23 NOTE — ED PROVIDER NOTES
"EMERGENCY DEPARTMENT ENCOUNTER       CHIEF COMPLAINT  Chief Complaint: Altered Mental Status  History given by: Patient, Family (daughter)  History limited by: Patient's mental status change   Room Number: 23/23  PMD: Garry Joya MD      HPI:  HPI Comments: Per family, pt was last known at his mental/neurological baseline at about 14:00 yesterday. Pt is currently on muscle relaxers to treat for neck pain. Pt reportedly lost his balance and fell multiple times last night at about 23:00. Pt sustained blow to head during the falls. Pt's caretaker and daughter noticed this AM that pt has been confused and has had a \"staggering gait\". It is unknown when pt's sx began. Pt is currently on Eliquis and 81 mg ASA due to h/o A-Fib. Pt denies LOC during the falls, nausea, vomiting, CP, dyspnea, and abd pain. There are no other complaints at this time.     Patient is a 81 y.o. male presenting with altered mental status.   Altered Mental Status   Presenting symptoms: confusion    Severity:  Moderate  Most recent episode:  Today  Episode history:  Multiple  Duration: time of onset of sx is unknown; however, pt was last known well at about  14:00 yesterday.  Timing:  Intermittent  Progression:  Waxing and waning  Context: head injury (with recent falls last night)    Associated symptoms: no abdominal pain, no nausea and no vomiting          PAST MEDICAL HISTORY  Active Ambulatory Problems     Diagnosis Date Noted   • Coronary arteriosclerosis in native artery 03/15/2016   • Atrial fibrillation 03/15/2016   • Acute non-Q wave ST elevation myocardial infarction (STEMI) involving left anterior descending (LAD) coronary artery 03/15/2016   • Mitral valve insufficiency 03/15/2016   • S/P CABG (coronary artery bypass graft) 03/25/2016   • S/P mitral valve repair 03/25/2016   • Depression 07/01/2016   • Hypothyroidism 07/01/2016   • Hyperlipidemia 07/01/2016   • Adenocarcinoma of right lung 12/08/2016   • Benign nodular prostatic " hyperplasia with lower urinary tract symptoms 01/20/2017   • DM (diabetes mellitus screen) 01/20/2017     Resolved Ambulatory Problems     Diagnosis Date Noted   • Solitary pulmonary nodule 11/16/2016     Past Medical History:   Diagnosis Date   • Abdominal aortic aneurysm    • Acute myocardial infarction    • Acute renal failure    • Anemia    • Aneurysm    • Atrial fibrillation    • Cardiomyopathy    • Chest pain    • Coronary artery disease    • Depression    • GERD (gastroesophageal reflux disease)    • Hyperlipidemia    • Hypertension    • Hypothyroidism    • Lung cancer    • Mitral regurgitation    • Myocardial infarction    • Pleural effusion    • Pleural effusion, bilateral    • RLS (restless legs syndrome)    • Sleep apnea    • Ulcerative colitis    • Ulcerative colitis          PAST SURGICAL HISTORY  Past Surgical History:   Procedure Laterality Date   • BACK SURGERY      lumbar   • CARDIAC CATHETERIZATION     • CORONARY ANGIOPLASTY     • CORONARY ARTERY BYPASS GRAFT  07/27/2015    X 5  Dr Louis   • HERNIA REPAIR      inguinal - left   • MITRAL VALVE REPAIR/REPLACEMENT  07/27/2015    Dr Louis   • OTHER SURGICAL HISTORY      Cardiovasc Endosc W/ Video-Assist Harv Veins Coron Art Byp   • SKIN BIOPSY      benign         FAMILY HISTORY  Family History   Problem Relation Age of Onset   • Coronary artery disease Brother    • Heart attack Brother          SOCIAL HISTORY  Social History     Social History   • Marital status:      Spouse name: N/A   • Number of children: N/A   • Years of education: N/A     Occupational History   • Not on file.     Social History Main Topics   • Smoking status: Former Smoker     Years: 5.00     Types: Cigarettes   • Smokeless tobacco: Not on file      Comment: 1 cig day x 5 years   • Alcohol use No   • Drug use: No   • Sexual activity: Defer     Other Topics Concern   • Not on file     Social History Narrative         ALLERGIES  Latex        REVIEW OF SYSTEMS  Review of  "Systems   Unable to perform ROS: Mental status change   Respiratory: Negative for shortness of breath.    Cardiovascular: Negative for chest pain.   Gastrointestinal: Negative for abdominal pain, nausea and vomiting.   Musculoskeletal: Positive for neck pain (pt is currently on muscle relaxers).        Head injury   Neurological: Negative for syncope.        \"staggering gait\"   Psychiatric/Behavioral: Positive for confusion.            PHYSICAL EXAM  ED Triage Vitals   Temp Heart Rate Resp BP SpO2   07/23/17 1140 07/23/17 1140 07/23/17 1140 07/23/17 1149 07/23/17 1140   98.1 °F (36.7 °C) 64 16 119/72 96 % WNL      Temp src Heart Rate Source Patient Position BP Location FiO2 (%)   07/23/17 1140 07/23/17 1149 07/23/17 1149 07/23/17 1149 --   Tympanic Monitor Sitting Right arm        Physical Exam   Constitutional: He is oriented to person, place, and time. No distress.   HENT:   Mouth/Throat: Mucous membranes are dry.   Eyes: EOM are normal. Pupils are equal, round, and reactive to light.   Pale conjunctiva   Neck: Normal range of motion. Neck supple.   Cardiovascular: Normal rate, regular rhythm and normal heart sounds.    Pulmonary/Chest: Effort normal and breath sounds normal. No respiratory distress. He has no decreased breath sounds. He has no wheezes. He has no rhonchi. He has no rales.   Abdominal: Soft. There is no tenderness. There is no rebound and no guarding.   Musculoskeletal:   No C-Spine tenderness.   Neurological: He is alert and oriented to person, place, and time.   Mild left pronator drift. Pt has normal strength to the BLEs. Pt is alert and oriented x3, but at times, pt is confused and slow to respond.    Skin: Skin is warm and dry. Bruising (with swelling to the left 5th finger) noted.   Contusion to the left forehead. There is contusion and swelling present to the inferior left knee.    Psychiatric: Mood and affect normal.   Nursing note and vitals reviewed.          LAB RESULTS  Recent Results " (from the past 24 hour(s))   Comprehensive Metabolic Panel    Collection Time: 07/23/17 12:04 PM   Result Value Ref Range    Glucose 171 (H) 65 - 99 mg/dL    BUN 23 8 - 23 mg/dL    Creatinine 1.55 (H) 0.76 - 1.27 mg/dL    Sodium 140 136 - 145 mmol/L    Potassium 4.0 3.5 - 5.2 mmol/L    Chloride 98 98 - 107 mmol/L    CO2 23.7 22.0 - 29.0 mmol/L    Calcium 9.4 8.6 - 10.5 mg/dL    Total Protein 8.0 6.0 - 8.5 g/dL    Albumin 3.90 3.50 - 5.20 g/dL    ALT (SGPT) 22 1 - 41 U/L    AST (SGOT) 16 1 - 40 U/L    Alkaline Phosphatase 103 39 - 117 U/L    Total Bilirubin 0.6 0.1 - 1.2 mg/dL    eGFR Non African Amer 43 (L) >60 mL/min/1.73    Globulin 4.1 gm/dL    A/G Ratio 1.0 g/dL    BUN/Creatinine Ratio 14.8 7.0 - 25.0    Anion Gap 18.3 mmol/L   Protime-INR    Collection Time: 07/23/17 12:04 PM   Result Value Ref Range    Protime 15.4 (H) 11.7 - 14.2 Seconds    INR 1.27 (H) 0.90 - 1.10   CK    Collection Time: 07/23/17 12:04 PM   Result Value Ref Range    Creatine Kinase 191 20 - 200 U/L   CK-MB    Collection Time: 07/23/17 12:04 PM   Result Value Ref Range    CKMB 2.60 <=10.40 ng/mL   Troponin    Collection Time: 07/23/17 12:04 PM   Result Value Ref Range    Troponin T <0.010 0.000 - 0.030 ng/mL   Magnesium    Collection Time: 07/23/17 12:04 PM   Result Value Ref Range    Magnesium 2.3 1.6 - 2.4 mg/dL   T4, Free    Collection Time: 07/23/17 12:04 PM   Result Value Ref Range    Free T4 1.67 0.93 - 1.70 ng/dL   TSH    Collection Time: 07/23/17 12:04 PM   Result Value Ref Range    TSH 5.940 (H) 0.270 - 4.200 mIU/mL   CBC Auto Differential    Collection Time: 07/23/17 12:04 PM   Result Value Ref Range    WBC 10.02 4.50 - 10.70 10*3/mm3    RBC 3.88 (L) 4.60 - 6.00 10*6/mm3    Hemoglobin 12.7 (L) 13.7 - 17.6 g/dL    Hematocrit 39.8 (L) 40.4 - 52.2 %    .6 (H) 79.8 - 96.2 fL    MCH 32.7 27.0 - 32.7 pg    MCHC 31.9 (L) 32.6 - 36.4 g/dL    RDW 13.7 11.5 - 14.5 %    RDW-SD 51.0 37.0 - 54.0 fl    MPV 9.5 6.0 - 12.0 fL    Platelets  359 140 - 500 10*3/mm3    Neutrophil % 69.7 42.7 - 76.0 %    Lymphocyte % 20.2 19.6 - 45.3 %    Monocyte % 8.2 5.0 - 12.0 %    Eosinophil % 1.4 0.3 - 6.2 %    Basophil % 0.2 0.0 - 1.5 %    Immature Grans % 0.3 0.0 - 0.5 %    Neutrophils, Absolute 6.99 1.90 - 8.10 10*3/mm3    Lymphocytes, Absolute 2.02 0.90 - 4.80 10*3/mm3    Monocytes, Absolute 0.82 0.20 - 1.20 10*3/mm3    Eosinophils, Absolute 0.14 0.00 - 0.70 10*3/mm3    Basophils, Absolute 0.02 0.00 - 0.20 10*3/mm3    Immature Grans, Absolute 0.03 0.00 - 0.03 10*3/mm3    nRBC 0.0 0.0 - 0.0 /100 WBC   Urinalysis With / Culture If Indicated    Collection Time: 07/23/17  1:00 PM   Result Value Ref Range    Color, UA Yellow Yellow, Straw    Appearance, UA Clear Clear    pH, UA 5.5 5.0 - 8.0    Specific Gravity, UA 1.022 1.005 - 1.030    Glucose, UA Negative Negative    Ketones, UA Negative Negative    Bilirubin, UA Negative Negative    Blood, UA Negative Negative    Protein, UA Negative Negative    Leuk Esterase, UA Negative Negative    Nitrite, UA Negative Negative    Urobilinogen, UA 0.2 E.U./dL 0.2 - 1.0 E.U./dL   Urine Drug Screen    Collection Time: 07/23/17  1:00 PM   Result Value Ref Range    Amphet/Methamphet, Screen Negative Negative    Barbiturates Screen, Urine Negative Negative    Benzodiazepine Screen, Urine Negative Negative    Cocaine Screen, Urine Negative Negative    Opiate Screen Negative Negative    THC, Screen, Urine Negative Negative    Methadone Screen, Urine Negative Negative    Oxycodone Screen, Urine Negative Negative       Ordered the above labs and reviewed the results.        RADIOLOGY  XR Chest 2 View   The lungs are somewhat hyperinflated and clear. There is mild  cardiomegaly with a prosthetic mitral valve in place and no acute  abnormality is seen. Interpreted by radiologist. Independently viewed by me.         XR Hand 3+ View Left   There is severe diffuse osteoporosis. There are severe  degenerative changes at the base of the thumb.  No definite fracture is  seen with particular reference to the 5th finger. Interpreted by radiologist. Independently viewed by me.         XR Knee 1 or 2 View Left   There is severe diffuse osteoporosis. Very minimal  degenerative change is present. There is no evidence of acute fracture  or joint effusion. Interpreted by radiologist. Independently viewed by me.         CT Head Without Contrast   Final Result           No acute intracranial hemorrhage or hydrocephalus. No acute cervical   fracture identified; degenerative changes of the cervical spine. If   there is further clinical concern, MRI could be considered for further   evaluation.       Discussed by telephone with Dr. Pierre at time of interpretation, 1987    07/23/2017.       This report was finalized on 7/23/2017 12:36 PM by Dr. Al Vo MD.    Interpreted by radiologist. Discussed with radiologist. Independently viewed by me.         CT Cervical Spine Without Contrast   Final Result           No acute intracranial hemorrhage or hydrocephalus. No acute cervical   fracture identified; degenerative changes of the cervical spine. If   there is further clinical concern, MRI could be considered for further   evaluation.       Discussed by telephone with Dr. Pierre at time of interpretation, [id number]    07/23/2017.       This report was finalized on 7/23/2017 12:36 PM by Dr. Al Vo MD.    Interpreted by radiologist. Discussed with radiologist. Independently viewed by me.                Ordered the above noted radiological studies. Reviewed by me in PACS.       PROCEDURES  Procedures    EKG:    EKG time: 12:05 PM  Rhythm/Rate: NSR rate 59  No Acute Ischemia  Non-Specific ST-T changes    Unchanged compared to prior on 2015    Interpreted Contemporaneously by me.  Independently viewed by me            PROGRESS AND CONSULTS  ED Course     11:57 AM:  Ordered blood work, Free T4/TSH, CK, UDS, UA, left knee X-Ray, left hand X-Ray, CXR, CT  C-Spine, CT Head, and EKG for further evaluation. Pt is not a candidate for tPA as the time of onset of pt's symptoms is unknown. Per daughter/family, pt was last known well/at his neurological/mental baseline at about 14:00 yesterday.    1:58 PM:  Placed call to GUSTAVO Tam, to discuss admission for further evaluation. Decision time to admit: Now.     2:01 PM:  Rechecked pt. Pt is resting comfortably and appears in no acute distress. Informed pt and family that pt's UA is unremarkable. Troponin is negative.  CXR, left hand X-Ray, and left knee X-Ray are all negative acute. CT Head shows no acute intracranial hemorrhage. CT C-Spine shows no acute fracture. Need for admission for further evaluation and treatment. Pt and family agree with plan.     2:20 PM:  Discussed case with GUSTAVO aTm. He will admit pt to a telemetry bed.           MEDICAL DECISION MAKING      MDM  Number of Diagnoses or Management Options  Ataxia:   Confusion:   Contusion of head, unspecified part of head, initial encounter:   Contusion of left hand, initial encounter:   Contusion of left knee, initial encounter:   Fall, initial encounter:      Amount and/or Complexity of Data Reviewed  Clinical lab tests: ordered and reviewed (Troponin is negative. )  Tests in the radiology section of CPT®: reviewed and ordered (CT Head shows no acute intracranial hemorrhage. )  Tests in the medicine section of CPT®: reviewed and ordered (EKG interpreted.   )  Discussion of test results with the performing providers: yes (CT C-Spine and CT Head results d/w radiologist.   )  Obtain history from someone other than the patient: yes (Daughter provides significant hx. )  Discuss the patient with other providers: yes (Case d/w GUSTAVO Tam, who will admit pt to a telemetry bed.   )    Patient Progress  Patient progress: stable             DIAGNOSIS  Final diagnoses:   Confusion   Ataxia   Contusion of head, unspecified part of head, initial  encounter   Contusion of left knee, initial encounter   Contusion of left hand, initial encounter   Fall, initial encounter         DISPOSITION    Pt admitted to telemetry.    ADMISSION    Discussed treatment plan and reason for admission with pt/family and admitting physician.  Pt/family voiced understanding of the plan for admission for further testing/treatment as needed.                 Latest Documented Vital Signs:  As of 3:10 PM  BP- 113/58 HR- 53 Temp- 98.1 °F (36.7 °C) (Tympanic) O2 sat- 94%        --  Documentation assistance provided by juan Mckeon for Dr. Ignacio MD.  Information recorded by the scribe was done at my direction and has been verified and validated by me.                      Valerie Mckeon  07/23/17 5754       Clemente Pierre MD  07/23/17 3745

## 2017-07-23 NOTE — ED NOTES
Pt taken to Ct scan and will go to xray after. Pt advices urine sample needed.     Yarely Cuba RN  07/23/17 1079

## 2017-07-24 ENCOUNTER — APPOINTMENT (OUTPATIENT)
Dept: MRI IMAGING | Facility: HOSPITAL | Age: 82
End: 2017-07-24
Attending: FAMILY MEDICINE

## 2017-07-24 LAB
ANION GAP SERPL CALCULATED.3IONS-SCNC: 17 MMOL/L
BUN BLD-MCNC: 25 MG/DL (ref 8–23)
BUN/CREAT SERPL: 17.1 (ref 7–25)
CALCIUM SPEC-SCNC: 9 MG/DL (ref 8.6–10.5)
CHLORIDE SERPL-SCNC: 98 MMOL/L (ref 98–107)
CO2 SERPL-SCNC: 25 MMOL/L (ref 22–29)
CREAT BLD-MCNC: 1.46 MG/DL (ref 0.76–1.27)
DEPRECATED RDW RBC AUTO: 53 FL (ref 37–54)
ERYTHROCYTE [DISTWIDTH] IN BLOOD BY AUTOMATED COUNT: 13.7 % (ref 11.5–14.5)
GFR SERPL CREATININE-BSD FRML MDRD: 46 ML/MIN/1.73
GLUCOSE BLD-MCNC: 135 MG/DL (ref 65–99)
HCT VFR BLD AUTO: 38.7 % (ref 40.4–52.2)
HGB BLD-MCNC: 12.2 G/DL (ref 13.7–17.6)
MCH RBC QN AUTO: 33.3 PG (ref 27–32.7)
MCHC RBC AUTO-ENTMCNC: 31.5 G/DL (ref 32.6–36.4)
MCV RBC AUTO: 105.7 FL (ref 79.8–96.2)
PLATELET # BLD AUTO: 362 10*3/MM3 (ref 140–500)
PMV BLD AUTO: 10 FL (ref 6–12)
POTASSIUM BLD-SCNC: 3.8 MMOL/L (ref 3.5–5.2)
RBC # BLD AUTO: 3.66 10*6/MM3 (ref 4.6–6)
SODIUM BLD-SCNC: 140 MMOL/L (ref 136–145)
WBC NRBC COR # BLD: 11.21 10*3/MM3 (ref 4.5–10.7)

## 2017-07-24 PROCEDURE — A9577 INJ MULTIHANCE: HCPCS | Performed by: FAMILY MEDICINE

## 2017-07-24 PROCEDURE — 85027 COMPLETE CBC AUTOMATED: CPT | Performed by: FAMILY MEDICINE

## 2017-07-24 PROCEDURE — G0378 HOSPITAL OBSERVATION PER HR: HCPCS

## 2017-07-24 PROCEDURE — 0 GADOBENATE DIMEGLUMINE 529 MG/ML SOLUTION: Performed by: FAMILY MEDICINE

## 2017-07-24 PROCEDURE — 99213 OFFICE O/P EST LOW 20 MIN: CPT | Performed by: FAMILY MEDICINE

## 2017-07-24 PROCEDURE — 70553 MRI BRAIN STEM W/O & W/DYE: CPT

## 2017-07-24 PROCEDURE — 80048 BASIC METABOLIC PNL TOTAL CA: CPT | Performed by: FAMILY MEDICINE

## 2017-07-24 RX ORDER — CARVEDILOL 3.12 MG/1
3.12 TABLET ORAL 2 TIMES DAILY WITH MEALS
Status: DISCONTINUED | OUTPATIENT
Start: 2017-07-24 | End: 2017-07-24 | Stop reason: SDUPTHER

## 2017-07-24 RX ORDER — PANTOPRAZOLE SODIUM 40 MG/1
40 TABLET, DELAYED RELEASE ORAL EVERY MORNING
Status: DISCONTINUED | OUTPATIENT
Start: 2017-07-24 | End: 2017-07-24 | Stop reason: SDUPTHER

## 2017-07-24 RX ORDER — ATORVASTATIN CALCIUM 20 MG/1
40 TABLET, FILM COATED ORAL DAILY
Status: DISCONTINUED | OUTPATIENT
Start: 2017-07-24 | End: 2017-07-28 | Stop reason: HOSPADM

## 2017-07-24 RX ORDER — FUROSEMIDE 40 MG/1
40 TABLET ORAL DAILY
Status: DISCONTINUED | OUTPATIENT
Start: 2017-07-24 | End: 2017-07-28 | Stop reason: HOSPADM

## 2017-07-24 RX ORDER — ASPIRIN 81 MG/1
81 TABLET, CHEWABLE ORAL ONCE
Status: COMPLETED | OUTPATIENT
Start: 2017-07-24 | End: 2017-07-24

## 2017-07-24 RX ORDER — SODIUM CHLORIDE 0.9 % (FLUSH) 0.9 %
1-10 SYRINGE (ML) INJECTION AS NEEDED
Status: DISCONTINUED | OUTPATIENT
Start: 2017-07-24 | End: 2017-07-28 | Stop reason: HOSPADM

## 2017-07-24 RX ORDER — LEVOTHYROXINE SODIUM 88 UG/1
88 TABLET ORAL EVERY MORNING
Status: DISCONTINUED | OUTPATIENT
Start: 2017-07-24 | End: 2017-07-28 | Stop reason: HOSPADM

## 2017-07-24 RX ORDER — SOTALOL HYDROCHLORIDE 80 MG/1
40 TABLET ORAL 2 TIMES DAILY
Status: DISCONTINUED | OUTPATIENT
Start: 2017-07-24 | End: 2017-07-24 | Stop reason: SDUPTHER

## 2017-07-24 RX ORDER — ESCITALOPRAM OXALATE 20 MG/1
20 TABLET ORAL DAILY
Status: DISCONTINUED | OUTPATIENT
Start: 2017-07-24 | End: 2017-07-28 | Stop reason: HOSPADM

## 2017-07-24 RX ADMIN — FUROSEMIDE 40 MG: 40 TABLET ORAL at 16:49

## 2017-07-24 RX ADMIN — GADOBENATE DIMEGLUMINE 18 ML: 529 INJECTION, SOLUTION INTRAVENOUS at 08:51

## 2017-07-24 RX ADMIN — APIXABAN 2.5 MG: 2.5 TABLET, FILM COATED ORAL at 21:04

## 2017-07-24 RX ADMIN — LEVOTHYROXINE SODIUM 88 MCG: 88 TABLET ORAL at 14:56

## 2017-07-24 RX ADMIN — HYDROCODONE BITARTRATE AND ACETAMINOPHEN 1 TABLET: 5; 325 TABLET ORAL at 14:39

## 2017-07-24 RX ADMIN — ATORVASTATIN CALCIUM 40 MG: 20 TABLET, FILM COATED ORAL at 21:04

## 2017-07-24 RX ADMIN — PANTOPRAZOLE SODIUM 40 MG: 40 TABLET, DELAYED RELEASE ORAL at 06:38

## 2017-07-24 RX ADMIN — CARVEDILOL 3.12 MG: 3.12 TABLET, FILM COATED ORAL at 08:17

## 2017-07-24 RX ADMIN — ESCITALOPRAM 20 MG: 20 TABLET, FILM COATED ORAL at 14:56

## 2017-07-24 RX ADMIN — ASPIRIN 81 MG: 81 TABLET, CHEWABLE ORAL at 14:56

## 2017-07-24 RX ADMIN — HYDROCODONE BITARTRATE AND ACETAMINOPHEN 1 TABLET: 5; 325 TABLET ORAL at 03:10

## 2017-07-24 RX ADMIN — SOTALOL HYDROCHLORIDE 80 MG: 80 TABLET ORAL at 08:17

## 2017-07-24 RX ADMIN — APIXABAN 2.5 MG: 2.5 TABLET, FILM COATED ORAL at 08:17

## 2017-07-24 NOTE — PLAN OF CARE
Problem: Fall Risk (Adult)  Goal: Absence of Falls  Outcome: Ongoing (interventions implemented as appropriate)  Goal: Identify Related Risk Factors and Signs and Symptoms  Outcome: Ongoing (interventions implemented as appropriate)  Goal: Absence of Falls  Outcome: Ongoing (interventions implemented as appropriate)

## 2017-07-24 NOTE — PLAN OF CARE
Problem: Fall Risk (Adult)  Goal: Absence of Falls  Patient c/o pain in Left knee and fifth finger, medicated with PO medications. Patient is confused and hallucinating at times. MRI to be done today. Will continue to monitor.

## 2017-07-24 NOTE — PROGRESS NOTES
Discharge Planning Assessment  [facility]     Patient Name: Bhaskar Ibarra  MRN: [id number]  Today's Date: 7/24/2017    Admit Date: 7/23/2017          Discharge Needs Assessment       07/24/17 1613    Living Environment    Lives With spouse   wife with Alzheimer's disease    Living Arrangements house   [place] with a basement. 2 steps to enter            Discharge Plan       07/24/17 1614    Case Management/Social Work Plan    Plan Home    Additional Comments Patient  says it is Okay  to call his daughter to discuss  discharge needs . Spoke with daughterMalka--[phone]. She states that patient  lives with his wife in a house. She hopes he can come home on discharge. Daughter  understands that patient  is  in observation  and that moon notice was given. Murray notice was given on 7-23. Daughter states patient has used [facility] in the past and would be agreeable to using [facility] if HH is indicated on discharge. Daughter has a caregiver who  helps her 4 days a week. She says patient cares for wife at night.   Patient has not been seen by physical therapy. CCP to follow for therapy assessment and will assist with options...........  BRYAN Andrews        Discharge Placement     No information found                Demographic Summary       07/24/17 1610    Referral Information    Admission Type observation    Arrived From home or self-care    Referral Source admission list    Contact Information    Comments Patient states it is OK to contact daughter,Malka Minor--[phone]    Primary Care Physician Information    Name Garry Joya MD            Functional Status       07/24/17 1612    Functional Status Prior    Ambulation 2-->assistive person    Transferring 2-->assistive person    Toileting 2-->assistive person    Bathing 2-->assistive person    Dressing 2-->assistive person    Eating 2-->assistive person    Cognitive/Perceptual/Developmental    Developmental Stage (Eriksson's Stages of Development)  Stage 8 (65 years-death/Late Adulthood) Integrity vs. Despair            Psychosocial     None            Abuse/Neglect     None            Legal     None            Substance Abuse     None            Patient Forms     None          BRYAN Andrews

## 2017-07-24 NOTE — PLAN OF CARE
Problem: Patient Care Overview (Adult)  Goal: Plan of Care Review  Outcome: Ongoing (interventions implemented as appropriate)    07/24/17 1737   Coping/Psychosocial Response Interventions   Plan Of Care Reviewed With patient;son   Outcome Evaluation   Outcome Summary/Follow up Plan VSS Pt has been noted to have delusional behavior, despite being fully oriented when asked orientation questions. Does not grasp concept of calling out to get up. Will continue to monitor.   Patient Care Overview   Progress no change       Goal: Adult Individualization and Mutuality  Outcome: Ongoing (interventions implemented as appropriate)    07/24/17 1737   Individualization   Patient Specific Preferences Daughter can not visit, takes care of pt's wife.       Goal: Discharge Needs Assessment  Outcome: Ongoing (interventions implemented as appropriate)    07/23/17 1700 07/23/17 1848 07/24/17 1737   Discharge Needs Assessment   Concerns To Be Addressed --  no discharge needs identified --    Discharge Disposition --  --  still a patient   Self-Care   Equipment Currently Used at Home cane, quad --  --          Problem: Fall Risk (Adult)  Goal: Identify Related Risk Factors and Signs and Symptoms  Outcome: Ongoing (interventions implemented as appropriate)  Goal: Absence of Falls  Outcome: Ongoing (interventions implemented as appropriate)    07/24/17 1737   Fall Risk (Adult)   Absence of Falls making progress toward outcome       Goal: Identify Related Risk Factors and Signs and Symptoms  Outcome: Ongoing (interventions implemented as appropriate)    07/24/17 1737   Fall Risk   Fall Risk: Related Risk Factors age-related changes;confusion/agitation;gait/mobility problems;history of falls   Fall Risk: Signs and Symptoms presence of risk factors       Goal: Absence of Falls  Outcome: Ongoing (interventions implemented as appropriate)    07/24/17 1737   Fall Risk (Adult)   Absence of Falls making progress toward outcome         Problem:  Confusion, Acute (Adult)  Goal: Identify Related Risk Factors and Signs and Symptoms  Outcome: Ongoing (interventions implemented as appropriate)    07/24/17 1737   Confusion, Acute   Related Risk Factors (Acute Confusion) advanced age;medication effects;mobility decreased;pain   Signs and Symptoms (Acute Confusion) acute onset of symptoms;disorientation;emotional/behavioral disturbances;memory disturbed;perceptions disturbed;thought process diminished/disorganized;understanding disturbed       Goal: Cognitive/Functional Impairments Minimized  Outcome: Ongoing (interventions implemented as appropriate)    07/24/17 1737   Confusion, Acute (Adult)   Cognitive/Functional Impairments Minimized making progress toward outcome       Goal: Safety  Outcome: Ongoing (interventions implemented as appropriate)    07/24/17 1737   Confusion, Acute (Adult)   Safety making progress toward outcome

## 2017-07-25 ENCOUNTER — APPOINTMENT (OUTPATIENT)
Dept: NEUROLOGY | Facility: HOSPITAL | Age: 82
End: 2017-07-25
Attending: PSYCHIATRY & NEUROLOGY

## 2017-07-25 ENCOUNTER — APPOINTMENT (OUTPATIENT)
Dept: MRI IMAGING | Facility: HOSPITAL | Age: 82
End: 2017-07-25
Attending: PSYCHIATRY & NEUROLOGY

## 2017-07-25 LAB
ERYTHROCYTE [SEDIMENTATION RATE] IN BLOOD: 75 MM/HR (ref 0–20)
FOLATE SERPL-MCNC: >20 NG/ML (ref 4.78–24.2)
TSH SERPL DL<=0.05 MIU/L-ACNC: 6.26 MIU/ML (ref 0.27–4.2)
VIT B12 BLD-MCNC: 740 PG/ML (ref 211–946)

## 2017-07-25 PROCEDURE — 82607 VITAMIN B-12: CPT | Performed by: FAMILY MEDICINE

## 2017-07-25 PROCEDURE — 95816 EEG AWAKE AND DROWSY: CPT | Performed by: PSYCHIATRY & NEUROLOGY

## 2017-07-25 PROCEDURE — G0378 HOSPITAL OBSERVATION PER HR: HCPCS

## 2017-07-25 PROCEDURE — 85652 RBC SED RATE AUTOMATED: CPT | Performed by: FAMILY MEDICINE

## 2017-07-25 PROCEDURE — G8978 MOBILITY CURRENT STATUS: HCPCS

## 2017-07-25 PROCEDURE — G8980 MOBILITY D/C STATUS: HCPCS

## 2017-07-25 PROCEDURE — 99204 OFFICE O/P NEW MOD 45 MIN: CPT | Performed by: PSYCHIATRY & NEUROLOGY

## 2017-07-25 PROCEDURE — 82746 ASSAY OF FOLIC ACID SERUM: CPT | Performed by: FAMILY MEDICINE

## 2017-07-25 PROCEDURE — 84443 ASSAY THYROID STIM HORMONE: CPT | Performed by: FAMILY MEDICINE

## 2017-07-25 PROCEDURE — G8979 MOBILITY GOAL STATUS: HCPCS

## 2017-07-25 PROCEDURE — 95816 EEG AWAKE AND DROWSY: CPT

## 2017-07-25 PROCEDURE — 70544 MR ANGIOGRAPHY HEAD W/O DYE: CPT

## 2017-07-25 PROCEDURE — 97161 PT EVAL LOW COMPLEX 20 MIN: CPT

## 2017-07-25 PROCEDURE — 99213 OFFICE O/P EST LOW 20 MIN: CPT | Performed by: FAMILY MEDICINE

## 2017-07-25 RX ADMIN — APIXABAN 2.5 MG: 2.5 TABLET, FILM COATED ORAL at 08:19

## 2017-07-25 RX ADMIN — ATORVASTATIN CALCIUM 40 MG: 20 TABLET, FILM COATED ORAL at 21:24

## 2017-07-25 RX ADMIN — HYDROCODONE BITARTRATE AND ACETAMINOPHEN 1 TABLET: 5; 325 TABLET ORAL at 13:54

## 2017-07-25 RX ADMIN — SOTALOL HYDROCHLORIDE 80 MG: 80 TABLET ORAL at 08:19

## 2017-07-25 RX ADMIN — CARVEDILOL 3.12 MG: 3.12 TABLET, FILM COATED ORAL at 08:19

## 2017-07-25 RX ADMIN — CARVEDILOL 3.12 MG: 3.12 TABLET, FILM COATED ORAL at 23:47

## 2017-07-25 RX ADMIN — FUROSEMIDE 40 MG: 40 TABLET ORAL at 08:19

## 2017-07-25 RX ADMIN — ESCITALOPRAM 20 MG: 20 TABLET, FILM COATED ORAL at 08:19

## 2017-07-25 RX ADMIN — PANTOPRAZOLE SODIUM 40 MG: 40 TABLET, DELAYED RELEASE ORAL at 06:55

## 2017-07-25 RX ADMIN — LEVOTHYROXINE SODIUM 88 MCG: 88 TABLET ORAL at 06:55

## 2017-07-25 NOTE — CONSULTS
"NEUROLOGY CONSULTATION        PATIENT Bhaskar Ibarra    1935   MRN [id number]   ADMIT DATE 2017   LENGTH OF STAY 0 days   ATTENDING Garry Joya MD       CHIEF COMPLAINT: Fall (fall last night around 2300; injury to head, knee, L 5th finger) and Altered Mental Status      DIAGNOSIS: Ataxia [R27.0]  Confusion [R41.0]  Fall, initial encounter [W19.XXXA]  Contusion of left hand, initial encounter [S60.222A]  Contusion of left knee, initial encounter [S80.02XA]  Contusion of head, unspecified part of head, initial encounter [S00.93XA]      HISTORY OF PRESENT ILLNESS:    This is an 81-year-old man who at baseline is cognitively intact according to the family.  I talked to the daughter on the phone and in person eventually.  She says it's patient lives  with his wife who is demented and he spends most this time caring for her.  Saturday morning he got up to go to the bathroom.  He did not turn on the lights because he awakened his wife.  He lost his balance for unclear reasons and fell and he had trouble getting up.  We did get up and shortly after that their caretaker came to the house.  He was confused as to who she was.  What she was his disorder even know she was -American and the daughter is .  Daughter finds this quite surprising.  He was tremulous.  Looking for a credit card had his hand but couldn't find it.  Her came over and brought him to the emergency room.  Says that since then he's been continues quite firmly has no history of confusion.  We does care for his wife.  He pays all the bills as a car or verbal personal hygiene also takes care of his wife with caretaker.    He did fall he had a bump on his head so she's pretty confident and he hit his head.  Came to the ER he was substituting the wrong words utterances.  His son came into the hospital yesterday thought he was at home and was talking about how \"they remodeled his house and \"    As noted he is often quite well " oriented but at other times will be confused as to his situation.  At any significant behavioral problem.  He is weak he complained of neck pain that seems to have resolved.      PAST MEDICAL HISTORY:   Past Medical History:   Diagnosis Date   • Abdominal aortic aneurysm    • Acute myocardial infarction    • Acute renal failure    • Anemia    • Aneurysm    • Atrial fibrillation    • Cardiomyopathy    • Chest pain    • Coronary artery disease     July 2015-    • Depression    • GERD (gastroesophageal reflux disease)    • Hyperlipidemia    • Hypertension    • Hypothyroidism    • Lung cancer    • Mitral regurgitation    • Myocardial infarction    • Pleural effusion    • Pleural effusion, bilateral    • RLS (restless legs syndrome)    • Sleep apnea    • Ulcerative colitis    • Ulcerative colitis        PAST SURGICAL HISTORY:  Past Surgical History:   Procedure Laterality Date   • BACK SURGERY      lumbar   • CARDIAC CATHETERIZATION     • CORONARY ANGIOPLASTY     • CORONARY ARTERY BYPASS GRAFT  07/27/2015    X 5  Dr Louis   • HERNIA REPAIR      inguinal - left   • MITRAL VALVE REPAIR/REPLACEMENT  07/27/2015    Dr Louis   • OTHER SURGICAL HISTORY      Cardiovasc Endosc W/ Video-Assist Harv Veins Coron Art Byp   • SKIN BIOPSY      benign       CURRENT MEDICATIONS:  Scheduled Medications:  atorvastatin 40 mg Oral Daily   carvedilol 3.125 mg Oral Q12H   escitalopram 20 mg Oral Daily   furosemide 40 mg Oral Daily   levothyroxine 88 mcg Oral QAM   pantoprazole 40 mg Oral QAM   sotalol 80 mg Oral Q24H     Infusions:   hold 1 each     PRN Medications:  hold  •  HYDROcodone-acetaminophen  •  sodium chloride    SOCIAL HISTORY:  Social History     Social History   • Marital status:      Spouse name: N/A   • Number of children: N/A   • Years of education: N/A     Social History Main Topics   • Smoking status: Former Smoker     Years: 5.00     Types: Cigarettes   • Smokeless tobacco: None      Comment: 1 cig day x 5 years  "  • Alcohol use No   • Drug use: No   • Sexual activity: Defer     Other Topics Concern   • None     Social History Narrative       FAMILY HISTORY:   I have reviewed this patient's family history and it is not significant to the present illness.      REVIEW OF SYSTEMS: 14 system review performed and all other systems are negative except for Fall (fall last night around 2300; injury to head, knee, L 5th finger) and Altered Mental Status         PHYSICAL EXAM:  GENERAL: Reveals a man/woman who appears his/her stated age, in no acute distress.  VITAL SIGNS: /68 (BP Location: Left arm, Patient Position: Lying)  Pulse 51  Temp 99.6 °F (37.6 °C) (Oral)   Resp 20  Ht 71\" (180.3 cm)  Wt 185 lb (83.9 kg)  SpO2 94%  BMI 25.8 kg/m2  NECK: Carotids are equal without bruits.   HEART: Regular rate and rhythm with no significant murmur or gallop.   EXTREMITIES: Nonedematous. Pulses are intact. There is no rash. There is no hepatosplenomegaly. No respiratory distress. There is no lymphadenopathy. There are no signs of cutaneous embolization.  NEUROLOGIC: Awake, alert and oriented x3.  The president.  The present before trunk.  Simple calculations.  He could repeat sentences and follow commands.  Can name objects.  He was not hospital for a week words only been a couple of days. . There is no aphasia or dysarthria.  Visual fields are full. Disks are flat. Cranial nerves II through XII are intact. Power is normal in all 4 extremities. Tone is normal. Reflexes are 2 and symmetric in the upper and absent in the lower extremities. Toes are downgoing. Sensations intact pinprick and joint position sense in all 4 extremities. Cerebellar function is normal.  Her only gotten up to walk he was unsteady in did have a tendency to fall to the right.    DIAGNOSTIC DATA: Labs reviewed and revealed a mildly elevated TSH at 6.3.  CV was elevated at 105 B12 and folic acid level were normal.  Sedimentation rate was elevated at 75 his " "white count was slightly elevated at 11.2.    Radiology results reviewed and revealed no significant abnormalities.  Review the images of the MRI.  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      IMPRESSION:  Family's chief concern is that he is confused as never been a problem in the past.  It is possible that this is a concussion with long confusion related to his age and hospitalization.  However the family's concern there may be more than that to it.     I am concerned that he is falling to the right and we saw nothing on MRI.  Ulcerative colitis puts him at risk for dural sinus thrombosis which could cause the falling to the right.  Therefore I'm going to get an MRV that will included a repeat DWI to be sure we didn't miss a small cerebral infarct.      Also we should consider the possibility of an unwitnessed seizure and I think an EEG is indicated.      He also have some lab information alightly suggesting an inflammatory or infectious process.  Specifically we have an elevated sedimentation rate for age and a slightly elevated white count. Therefore a Lumbar puncture should be performed.      I am Concerned about the elevated MCV and would consider heme\" consult to evaluate this since no clear etiology is indicated.    I think the above evaluation is reasonable though I suspect we will not find any other neurologic process and I suspect when he leaves the Hospital he will slowly improve.      Thank you for allowing me to see this patient.    Lennox Nascimento MD  7/25/2017  1:37 PM  "

## 2017-07-25 NOTE — PLAN OF CARE
Problem: Patient Care Overview (Adult)  Goal: Plan of Care Review  Outcome: Ongoing (interventions implemented as appropriate)    07/25/17 0648 07/25/17 1728   Coping/Psychosocial Response Interventions   Plan Of Care Reviewed With --  patient;daughter;spouse;son   Outcome Evaluation   Outcome Summary/Follow up Plan --  Pt vss. Pt has had several tests today, including MRA of neck, EEG, and full evaluation with Neuro. Pt will be getting a lumbar puncture on Thursday. Pt is accepting his admission a little more and is less anxious to get out of bed alone. Will continue to monitor.   Patient Care Overview   Progress no change --        Goal: Adult Individualization and Mutuality  Outcome: Ongoing (interventions implemented as appropriate)    07/25/17 1728   Individualization   Patient Specific Preferences Pt has delusions of situation, but can accurately state all orientation questions.       Goal: Discharge Needs Assessment  Outcome: Ongoing (interventions implemented as appropriate)    07/23/17 1848 07/24/17 1737 07/25/17 1600   Discharge Needs Assessment   Concerns To Be Addressed no discharge needs identified --  --    Discharge Disposition --  still a patient --    Self-Care   Equipment Currently Used at Home --  --  cane, quad         Problem: Fall Risk (Adult)  Goal: Identify Related Risk Factors and Signs and Symptoms  Outcome: Ongoing (interventions implemented as appropriate)    07/25/17 1728   Fall Risk   Fall Risk: Related Risk Factors age-related changes;confusion/agitation;gait/mobility problems;history of falls;environment unfamiliar   Fall Risk: Signs and Symptoms presence of risk factors       Goal: Absence of Falls  Outcome: Ongoing (interventions implemented as appropriate)    07/25/17 1728   Fall Risk (Adult)   Absence of Falls making progress toward outcome         Problem: Confusion, Acute (Adult)  Goal: Identify Related Risk Factors and Signs and Symptoms  Outcome: Ongoing (interventions  implemented as appropriate)    07/25/17 1728   Confusion, Acute   Related Risk Factors (Acute Confusion) advanced age;medication effects;cognitive impairment;mobility decreased;pain   Signs and Symptoms (Acute Confusion) emotional/behavioral disturbances;acute onset of symptoms;disorientation;memory disturbed;perceptions disturbed;reasoning/planning disturbed;thought process diminished/disorganized;understanding disturbed       Goal: Cognitive/Functional Impairments Minimized  Outcome: Ongoing (interventions implemented as appropriate)    07/25/17 1728   Confusion, Acute (Adult)   Cognitive/Functional Impairments Minimized making progress toward outcome       Goal: Safety  Outcome: Ongoing (interventions implemented as appropriate)    07/25/17 1728   Confusion, Acute (Adult)   Safety making progress toward outcome

## 2017-07-25 NOTE — PLAN OF CARE
Problem: Patient Care Overview (Adult)  Goal: Plan of Care Review    07/25/17 1633   Coping/Psychosocial Response Interventions   Plan Of Care Reviewed With patient   Outcome Evaluation   Outcome Summary/Follow up Plan pt presents post episode of confusion. Denies any s/s, able to perform bed mobility w/ supervision and short amb w/ CGA, 1 slight LOB while turning but pt able to restabilize, reports slight numbness in B ft, no other significant strength or ROM deficits noted. Pt desires to return home, unsure of HHC or OP PT, encouraged to use FWW and continue some sort of therapy post DC. May benefit from skilled Pt to increase amb safety and functional mobility. Will cont to tx daily until DC.          Problem: Inpatient Physical Therapy  Goal: Bed Mobility Goal LTG- PT    07/25/17 1633   Bed Mobility PT LTG   Bed Mobility PT LTG, Date Established 07/25/17   Bed Mobility PT LTG, Time to Achieve 1 wk   Bed Mobility PT LTG, Activity Type all bed mobility   Bed Mobility PT LTG, Galt Level independent       Goal: Transfer Training Goal 1 LTG- PT    07/25/17 1633   Transfer Training PT LTG   Transfer Training PT LTG, Date Established 07/25/17   Transfer Training PT LTG, Time to Achieve 1 wk   Transfer Training PT LTG, Activity Type all transfers   Transfer Training PT LTG, Galt Level independent   Transfer Training PT LTG, Assist Device walker, rolling       Goal: Gait Training Goal LTG- PT    07/25/17 1633   Gait Training PT LTG   Gait Training Goal PT LTG, Date Established 07/25/17   Gait Training Goal PT LTG, Time to Achieve 1 wk   Gait Training Goal PT LTG, Galt Level conditional independence   Gait Training Goal PT LTG, Assist Device walker, rolling   Gait Training Goal PT LTG, Distance to Achieve 450

## 2017-07-25 NOTE — PROGRESS NOTES
"Bhaskar Ibarra  81 y.o.   LOS: 0 days   Patient Care Team:  Garry Joya MD as PCP - General (Family Medicine)    Chief Complaint: confusion  I have reviewed the patient's medical history in detail and updated the computerized patient record.    Subjective     Patient Complaints: none    History taken from: patient chart    Interval History:  Can't find cbc and sed rate from this am.Neuro consult pending  Review of Systems:     Review of Systems  {Review of systems could not be obtained due to  patient confusion.      Objective     Lab Results (last 24 hours)     Procedure Component Value Units Date/Time    TSH [[id number]]  (Abnormal) Collected:  07/25/17 0510    Specimen:  Blood Updated:  07/25/17 0633     TSH 6.260 (H) mIU/mL     Vitamin B12 [[id number]]  (Normal) Collected:  07/25/17 0510    Specimen:  Blood Updated:  07/25/17 0646     Vitamin B-12 740 pg/mL     Folate [[id number]]  (Normal) Collected:  07/25/17 0510    Specimen:  Blood Updated:  07/25/17 0646     Folate >20.00 ng/mL     Sedimentation Rate [[id number]] Collected:  07/25/17 0839    Specimen:  Blood Updated:  07/25/17 0849          Vital Signs  Temp:  [97.8 °F (36.6 °C)-98.2 °F (36.8 °C)] 98.1 °F (36.7 °C)  Heart Rate:  [50-64] 51  Resp:  [16-18] 16  BP: (103-137)/(56-70) 137/70    Flowsheet Rows         First Filed Value    Admission Height  71\" (180.3 cm) Documented at 07/23/2017 1203    Admission Weight  185 lb (83.9 kg) Documented at 07/23/2017 1203            Intake/Output Summary (Last 24 hours) at 07/25/17 0901  Last data filed at 07/24/17 1825   Gross per 24 hour   Intake                5 ml   Output                0 ml   Net                5 ml     Physical Exam:  General Appearance alert, appears stated age and cooperative  Lungs clear to auscultation, respirations regular, respirations even and respirations unlabored  Heart regular rhythm & normal rate, normal S1, S2, no murmur, no eloina, no rub and no click  Neurologic Mental " Status orientated to person, place, time and situation and poor details     Results Review:    I reviewed the patient's new clinical results.    Medication Review:   Latex  Current Facility-Administered Medications   Medication Dose Route Frequency Provider Last Rate Last Dose   • apixaban (ELIQUIS) tablet 2.5 mg  2.5 mg Oral Q12H Garry Joya MD   2.5 mg at 07/25/17 0819   • atorvastatin (LIPITOR) tablet 40 mg  40 mg Oral Daily Garry Joya MD   40 mg at 07/24/17 2104   • carvedilol (COREG) tablet 3.125 mg  3.125 mg Oral Q12H Garry Joya MD   3.125 mg at 07/25/17 0819   • escitalopram (LEXAPRO) tablet 20 mg  20 mg Oral Daily Garry Joya MD   20 mg at 07/25/17 0819   • furosemide (LASIX) tablet 40 mg  40 mg Oral Daily Garry Joya MD   40 mg at 07/25/17 0819   • HYDROcodone-acetaminophen (NORCO) 5-325 MG per tablet 1 tablet  1 tablet Oral Q4H PRN Garry Joya MD   1 tablet at 07/24/17 1439   • levothyroxine (SYNTHROID, LEVOTHROID) tablet 88 mcg  88 mcg Oral SHAHANA Joya MD   88 mcg at 07/25/17 0655   • pantoprazole (PROTONIX) EC tablet 40 mg  40 mg Oral SHAHANA Joya MD   40 mg at 07/25/17 0655   • sodium chloride 0.9 % flush 1-10 mL  1-10 mL Intravenous PRN Garry Joya MD       • sotalol (BETAPACE) tablet 80 mg  80 mg Oral Q24H Garry Joya MD   80 mg at 07/25/17 0819     Prescriptions Prior to Admission   Medication Sig Dispense Refill Last Dose   • apixaban (ELIQUIS) 2.5 MG tablet tablet Take 1 tablet by mouth Every 12 (Twelve) Hours. 360 tablet 0    • aspirin 81 MG tablet Take 81 mg by mouth Daily.   Taking   • atorvastatin (LIPITOR) 40 MG tablet Take 1 tablet by mouth Daily. 90 tablet 3 Taking   • carvedilol (COREG) 3.125 MG tablet Take 3.125 mg by mouth 2 (Two) Times a Day With Meals.      • escitalopram (LEXAPRO) 20 MG tablet Take 20 mg by mouth Daily.      • furosemide (LASIX) 40 MG tablet Take 40 mg by mouth Daily. Hold if BP is below 100/60      •  levothyroxine (SYNTHROID, LEVOTHROID) 88 MCG tablet Take 88 mcg by mouth Daily.      • omeprazole (priLOSEC) 40 MG capsule Take 40 mg by mouth Daily.      • sotalol (BETAPACE) 80 MG tablet Take 40 mg by mouth 2 (Two) Times a Day. Pt takes 1/2 tablet (40mg) by mouth twice daily          Assessment:    Active Problems:    Confusion          Plan:Continue current treatment plans .Unable to reach [facility] to ndiscuss confudion [place] statue of lung cancer    Garry Joya MD  07/25/17  9:01 AM

## 2017-07-25 NOTE — PLAN OF CARE
Problem: Patient Care Overview (Adult)  Goal: Plan of Care Review  Outcome: Ongoing (interventions implemented as appropriate)    07/25/17 0648   Coping/Psychosocial Response Interventions   Plan Of Care Reviewed With patient   Patient Care Overview   Progress no change         Problem: Fall Risk (Adult)  Goal: Absence of Falls  Outcome: Ongoing (interventions implemented as appropriate)    07/25/17 0648   Fall Risk (Adult)   Absence of Falls making progress toward outcome

## 2017-07-25 NOTE — THERAPY EVALUATION
Acute Care - Physical Therapy Initial Evaluation  [facility]     Patient Name: Bhaskar Ibarra  : 1935  MRN: [id number]  Today's Date: 2017   Onset of Illness/Injury or Date of Surgery Date: (P) 17  Date of Referral to PT: (P) 17  Referring Physician: Joya (P)      Admit Date: 2017     Visit Dx:    ICD-10-CM ICD-9-CM   1. Confusion R41.0 298.9   2. Ataxia R27.0 781.3   3. Contusion of head, unspecified part of head, initial encounter S00.93XA 920   4. Contusion of left knee, initial encounter S80.02XA 924.11   5. Contusion of left hand, initial encounter S60.222A 923.20   6. Fall, initial encounter W19.XXXA E888.9   7. Generalized weakness R53.1 780.79     Patient Active Problem List   Diagnosis   • Coronary arteriosclerosis in native artery   • Atrial fibrillation   • Acute non-Q wave ST elevation myocardial infarction (STEMI) involving left anterior descending (LAD) coronary artery   • Mitral valve insufficiency   • S/P CABG (coronary artery bypass graft)   • S/P mitral valve repair   • Depression   • Hypothyroidism   • Hyperlipidemia   • Adenocarcinoma of right lung   • Benign nodular prostatic hyperplasia with lower urinary tract symptoms   • DM (diabetes mellitus screen)   • Confusion     Past Medical History:   Diagnosis Date   • Abdominal aortic aneurysm    • Acute myocardial infarction    • Acute renal failure    • Anemia    • Aneurysm    • Atrial fibrillation    • Cardiomyopathy    • Chest pain    • Coronary artery disease     2015-    • Depression    • GERD (gastroesophageal reflux disease)    • Hyperlipidemia    • Hypertension    • Hypothyroidism    • Lung cancer    • Mitral regurgitation    • Myocardial infarction    • Pleural effusion    • Pleural effusion, bilateral    • RLS (restless legs syndrome)    • Sleep apnea    • Ulcerative colitis    • Ulcerative colitis      Past Surgical History:   Procedure Laterality Date   • BACK SURGERY      lumbar   • CARDIAC  CATHETERIZATION     • CORONARY ANGIOPLASTY     • CORONARY ARTERY BYPASS GRAFT  07/27/2015    X 5  Dr Louis   • HERNIA REPAIR      inguinal - left   • MITRAL VALVE REPAIR/REPLACEMENT  07/27/2015    Dr Louis   • OTHER SURGICAL HISTORY      Cardiovasc Endosc W/ Video-Assist Harv Veins Coron Art Byp   • SKIN BIOPSY      benign          PT ASSESSMENT (last 72 hours)      PT Evaluation       07/25/17 1600 07/24/17 1613    Rehab Evaluation    Document Type (P)  evaluation  -KS     Subjective Information (P)  no complaints;agree to therapy  -KS     Patient Effort, Rehab Treatment (P)  good  -KS     Symptoms Noted During/After Treatment (P)  none  -KS     General Information    Patient Profile Review (P)  yes  -KS     Onset of Illness/Injury or Date of Surgery Date (P)  07/23/17  -KS     Referring Physician (P)  Mahnaz  -KS     General Observations (P)  pt supine in bed  -KS     Pertinent History Of Current Problem (P)  episode of confusion and fall  -KS     Precautions/Limitations (P)  fall precautions  -KS     Prior Level of Function (P)  independent:  -KS     Equipment Currently Used at Home (P)  cane, quad  -KS     Plans/Goals Discussed With (P)  patient  -KS     Risks Reviewed (P)  patient:  -KS     Benefits Reviewed (P)  patient:  -KS     Living Environment    Lives With (P)  spouse  -KS spouse   wife with Alzheimer's disease  -SG    Living Arrangements (P)  house  -KS house   ranch with a basement. 2 steps to enter  -SG    Home Accessibility (P)  no concerns  -KS     Clinical Impression    Date of Referral to PT (P)  07/25/17  -KS     PT Diagnosis (P)  generalized weakness  -KS     Criteria for Skilled Therapeutic Interventions Met (P)  yes  -KS     Pathology/Pathophysiology Noted (Describe Specifically for Each System) (P)  musculoskeletal  -KS     Impairments Found (describe specific impairments) (P)  gait, locomotion, and balance  -KS     Rehab Potential (P)  good, to achieve stated therapy goals  -KS      Pain Assessment    Pain Assessment (P)  No/denies pain  -KS     Vision Assessment/Intervention    Visual Impairment (P)  WFL  -KS     Cognitive Assessment/Intervention    Current Cognitive/Communication Assessment (P)  functional  -KS     Orientation Status (P)  oriented x 4  -KS     Follows Commands/Answers Questions (P)  100% of the time;needs cueing;able to follow multi-step instructions;needs repetition  -KS     Personal Safety (P)  mild impairment  -KS     Personal Safety Interventions (P)  fall prevention program maintained;gait belt;nonskid shoes/slippers when out of bed  -KS     ROM (Range of Motion)    General ROM (P)  no range of motion deficits identified  -KS     MMT (Manual Muscle Testing)    General MMT Assessment (P)  no strength deficits identified  -KS     Bed Mobility, Assessment/Treatment    Bed Mobility, Assistive Device (P)  bed rails  -KS     Bed Mobility, Roll Right, [place] (P)  nonverbal cues required (demo/gesture);verbal cues required;supervision required  -KS     Bed Mobility, Scoot/Bridge, [place] (P)  supervision required  -KS     Bed Mob, Supine to Sit, [place] (P)  supervision required;nonverbal cues required (demo/gesture);verbal cues required  -KS     Bed Mobility, Comment (P)  impulsive and attempted to reach out and grab therapist's arms to assist in getting OOB  -KS     Transfer Assessment/Treatment    Transfers, Sit-Stand [place] (P)  supervision required;conditional independence;verbal cues required;nonverbal cues required (demo/gesture)  -KS     Transfers, Stand-Sit [place] (P)  supervision required;verbal cues required;nonverbal cues required (demo/gesture);conditional independence  -KS     Transfers, Sit-Stand-Sit, Assist Device (P)  rolling walker  -KS     Gait Assessment/Treatment    Gait, [place] Level (P)  conditional independence;verbal cues required;nonverbal cues required (demo/gesture);contact guard assist  -KS     Gait, Assistive Device  "(P)  rolling walker  -KS     Gait, Distance (Feet) (P)  60  -KS     Gait, Gait Pattern Analysis (P)  swing-through gait  -KS     Gait, Safety Issues (P)  balance decreased during turns  -KS     Gait, Comment (P)  1 slight LOB at while turning, pt able to restabilize   -KS     Stairs Assessment/Treatment    Stairs, Comment (P)  not necessary  -KS     Sensory Assessment/Intervention    Sensory Impairment (P)  numbness   c/o numbness in B ft that \"comes and goes\"  -KS     Positioning and Restraints    Pre-Treatment Position (P)  in bed  -KS     Post Treatment Position (P)  chair  -KS     In Chair (P)  sitting;exit alarm on;encouraged to call for assist;call light within reach  -KS       07/23/17 1700       General Information    Equipment Currently Used at Home cane, quad  -     Living Environment    Lives With spouse  -TS     Living Arrangements house  -TS     Home Accessibility no concerns  -TS     Stair Railings at Home none  -TS       User Key  (r) = Recorded By, (t) = Taken By, (c) = Cosigned By    Initials Name Provider Type     TIARRA AndrewsW      Shawn Henson RN Registered Nurse    DAYAN Ortega, PT Student PT Student          Physical Therapy Education     Title: PT OT SLP Therapies (Done)     Topic: Physical Therapy (Done)     Point: Mobility training (Done)    Learning Progress Summary    Learner Readiness Method Response Comment Documented by Status   Patient Acceptance E [facility] 07/25/17 1632 Done               Point: Home exercise program (Done)    Learning Progress Summary    Learner Readiness Method Response Comment Documented by Status   Patient Acceptance E [facility] 07/25/17 1632 Done               Point: Body mechanics (Done)    Learning Progress Summary    Learner Readiness Method Response Comment Documented by Status   Patient Acceptance E [facility] 07/25/17 1632 Done               Point: Precautions (Done)    Learning Progress Summary    Learner Readiness Method " Response Comment Documented by Status   Patient Acceptance E VIVIAN  KS 07/25/17 1632 Done                      User Key     Initials Effective Dates Name Provider Type Discipline    KS 05/08/17 -  Iris Ortega, PT Student PT Student PT                PT Recommendation and Plan  Anticipated Discharge Disposition: (P) home, home with outpatient services, home with home health (unsure at this point )  Planned Therapy Interventions: (P) balance training, bed mobility training, gait training, home exercise program, patient/family education, strengthening, transfer training  PT Frequency: (P) daily  Plan of Care Review  Plan Of Care Reviewed With: (P) patient  Outcome Summary/Follow up Plan: (P) pt presents post episode of confusion. Denies any s/s, able to perform bed mobility w/ supervision and short amb w/ CGA, 1 slight LOB while turning but pt able to restabilize, reports slight numbness in B ft, no other significant strength or ROM deficits noted. Pt desires to return home, unsure of HHC or OP PT, encouraged to use FWW and continue some sort of therapy post DC. May benefit from skilled Pt to increase amb safety and functional mobility. Will cont to tx daily until DC.           IP PT Goals       07/25/17 1633          Bed Mobility PT LTG    Bed Mobility PT LTG, Date Established (P)  07/25/17  -KS      Bed Mobility PT LTG, Time to Achieve (P)  1 wk  -KS      Bed Mobility PT LTG, Activity Type (P)  all bed mobility  -KS      Bed Mobility PT LTG, [place] Level (P)  independent  -KS      Transfer Training PT LTG    Transfer Training PT LTG, Date Established (P)  07/25/17  -KS      Transfer Training PT LTG, Time to Achieve (P)  1 wk  -KS      Transfer Training PT LTG, Activity Type (P)  all transfers  -KS      Transfer Training PT LTG, [place] Level (P)  independent  -KS      Transfer Training PT LTG, Assist Device (P)  walker, rolling  -KS      Gait Training PT LTG    Gait Training Goal PT LTG, Date  Established (P)  07/25/17  -KS      Gait Training Goal PT LTG, Time to Achieve (P)  1 wk  -KS      Gait Training Goal PT LTG, [place] Level (P)  conditional independence  -KS      Gait Training Goal PT LTG, Assist Device (P)  walker, rolling  -KS      Gait Training Goal PT LTG, Distance to Achieve (P)  450  -KS        User Key  (r) = Recorded By, (t) = Taken By, (c) = Cosigned By    Initials Name Provider Type    DAYAN Ortega PT Student PT Student                Outcome Measures       07/25/17 1600          How much help from another person do you currently need...    Turning from your back to your side while in flat bed without using bedrails? (P)  4  -KS      Moving from lying on back to sitting on the side of a flat bed without bedrails? (P)  4  -KS      Moving to and from a bed to a chair (including a wheelchair)? (P)  4  -KS      Standing up from a chair using your arms (e.g., wheelchair, bedside chair)? (P)  4  -KS      Climbing 3-5 steps with a railing? (P)  3  -KS      To walk in hospital room? (P)  4  -KS      AM-PAC 6 Clicks Score (P)  23  -KS      Functional Assessment    Outcome Measure Options (P)  AM-PAC 6 Clicks Basic Mobility (PT)  -KS        User Key  (r) = Recorded By, (t) = Taken By, (c) = Cosigned By    Initials Name Provider Type    DAYAN Ortega PT Student PT Student           Time Calculation:         PT Charges       07/25/17 1638          Time Calculation    Start Time (P)  1605  -KS      Stop Time (P)  1623  -KS      Time Calculation (min) (P)  18 min  -KS      PT Received On (P)  07/25/17  -KS      PT - Next Appointment (P)  07/26/17  -KS      PT Goal Re-Cert Due Date (P)  08/01/17  -KS        User Key  (r) = Recorded By, (t) = Taken By, (c) = Cosigned By    Initials Name Provider Type    DAYAN Ortega PT Student PT Student          Therapy Charges for Today     Code Description Service Date Service Provider Modifiers Qty    59566487662 HC PT THER SUPP EA  15 MIN 7/25/2017 Iris Ortega PT Student GP 1    17127767583 HC PT EVAL LOW COMPLEXITY 2 7/25/2017 Iris Ortega PT Student GP 1          PT G-Codes  Outcome Measure Options: (P) AM-PAC 6 Clicks Basic Mobility (PT)  Functional Limitation: (P) Mobility: Walking and moving around  Mobility: Walking and Moving Around Current Status (): (P) At least 1 percent but less than 20 percent impaired, limited or restricted  Mobility: Walking and Moving Around Goal Status (): (P) At least 1 percent but less than 20 percent impaired, limited or restricted  Mobility: Walking and Moving Around Discharge Status (): (P) At least 1 percent but less than 20 percent impaired, limited or restricted      Iris Ortega PT Student  7/25/2017

## 2017-07-26 LAB
ALBUMIN SERPL-MCNC: 3.7 G/DL (ref 3.5–5.2)
ALBUMIN/GLOB SERPL: 1 G/DL
ALP SERPL-CCNC: 94 U/L (ref 39–117)
ALT SERPL W P-5'-P-CCNC: 20 U/L (ref 1–41)
ANION GAP SERPL CALCULATED.3IONS-SCNC: 15.3 MMOL/L
AST SERPL-CCNC: 23 U/L (ref 1–40)
BASOPHILS # BLD AUTO: 0.03 10*3/MM3 (ref 0–0.2)
BASOPHILS NFR BLD AUTO: 0.3 % (ref 0–1.5)
BILIRUB SERPL-MCNC: 0.4 MG/DL (ref 0.1–1.2)
BILIRUB UR QL STRIP: NEGATIVE
BUN BLD-MCNC: 26 MG/DL (ref 8–23)
BUN/CREAT SERPL: 17.7 (ref 7–25)
CALCIUM SPEC-SCNC: 8.9 MG/DL (ref 8.6–10.5)
CHLORIDE SERPL-SCNC: 99 MMOL/L (ref 98–107)
CLARITY UR: CLEAR
CO2 SERPL-SCNC: 24.7 MMOL/L (ref 22–29)
COLOR UR: YELLOW
CREAT BLD-MCNC: 1.47 MG/DL (ref 0.76–1.27)
CRP SERPL-MCNC: 5.21 MG/DL (ref 0–0.5)
DEPRECATED RDW RBC AUTO: 51.7 FL (ref 37–54)
EOSINOPHIL # BLD AUTO: 0.27 10*3/MM3 (ref 0–0.7)
EOSINOPHIL NFR BLD AUTO: 2.8 % (ref 0.3–6.2)
ERYTHROCYTE [DISTWIDTH] IN BLOOD BY AUTOMATED COUNT: 13.6 % (ref 11.5–14.5)
ERYTHROCYTE [SEDIMENTATION RATE] IN BLOOD: 65 MM/HR (ref 0–20)
GFR SERPL CREATININE-BSD FRML MDRD: 46 ML/MIN/1.73
GLOBULIN UR ELPH-MCNC: 3.7 GM/DL
GLUCOSE BLD-MCNC: 123 MG/DL (ref 65–99)
GLUCOSE UR STRIP-MCNC: NEGATIVE MG/DL
HCT VFR BLD AUTO: 38.3 % (ref 40.4–52.2)
HGB BLD-MCNC: 12.2 G/DL (ref 13.7–17.6)
HGB UR QL STRIP.AUTO: NEGATIVE
IMM GRANULOCYTES # BLD: 0.03 10*3/MM3 (ref 0–0.03)
IMM GRANULOCYTES NFR BLD: 0.3 % (ref 0–0.5)
KETONES UR QL STRIP: NEGATIVE
LEUKOCYTE ESTERASE UR QL STRIP.AUTO: NEGATIVE
LYMPHOCYTES # BLD AUTO: 2.59 10*3/MM3 (ref 0.9–4.8)
LYMPHOCYTES NFR BLD AUTO: 26.5 % (ref 19.6–45.3)
MCH RBC QN AUTO: 33.3 PG (ref 27–32.7)
MCHC RBC AUTO-ENTMCNC: 31.9 G/DL (ref 32.6–36.4)
MCV RBC AUTO: 104.6 FL (ref 79.8–96.2)
MONOCYTES # BLD AUTO: 0.98 10*3/MM3 (ref 0.2–1.2)
MONOCYTES NFR BLD AUTO: 10 % (ref 5–12)
NEUTROPHILS # BLD AUTO: 5.89 10*3/MM3 (ref 1.9–8.1)
NEUTROPHILS NFR BLD AUTO: 60.1 % (ref 42.7–76)
NITRITE UR QL STRIP: NEGATIVE
PH UR STRIP.AUTO: 5.5 [PH] (ref 5–8)
PLATELET # BLD AUTO: 357 10*3/MM3 (ref 140–500)
PMV BLD AUTO: 10.2 FL (ref 6–12)
POTASSIUM BLD-SCNC: 3.6 MMOL/L (ref 3.5–5.2)
PROT SERPL-MCNC: 7.4 G/DL (ref 6–8.5)
PROT UR QL STRIP: NEGATIVE
RBC # BLD AUTO: 3.66 10*6/MM3 (ref 4.6–6)
SODIUM BLD-SCNC: 139 MMOL/L (ref 136–145)
SP GR UR STRIP: 1.02 (ref 1–1.03)
UROBILINOGEN UR QL STRIP: NORMAL
WBC NRBC COR # BLD: 9.79 10*3/MM3 (ref 4.5–10.7)

## 2017-07-26 PROCEDURE — G0378 HOSPITAL OBSERVATION PER HR: HCPCS

## 2017-07-26 PROCEDURE — 86140 C-REACTIVE PROTEIN: CPT | Performed by: FAMILY MEDICINE

## 2017-07-26 PROCEDURE — 99213 OFFICE O/P EST LOW 20 MIN: CPT | Performed by: FAMILY MEDICINE

## 2017-07-26 PROCEDURE — G8987 SELF CARE CURRENT STATUS: HCPCS | Performed by: OCCUPATIONAL THERAPIST

## 2017-07-26 PROCEDURE — 97110 THERAPEUTIC EXERCISES: CPT

## 2017-07-26 PROCEDURE — 85652 RBC SED RATE AUTOMATED: CPT | Performed by: FAMILY MEDICINE

## 2017-07-26 PROCEDURE — G8988 SELF CARE GOAL STATUS: HCPCS | Performed by: OCCUPATIONAL THERAPIST

## 2017-07-26 PROCEDURE — 85025 COMPLETE CBC W/AUTO DIFF WBC: CPT | Performed by: FAMILY MEDICINE

## 2017-07-26 PROCEDURE — 97165 OT EVAL LOW COMPLEX 30 MIN: CPT | Performed by: OCCUPATIONAL THERAPIST

## 2017-07-26 PROCEDURE — 80053 COMPREHEN METABOLIC PANEL: CPT | Performed by: FAMILY MEDICINE

## 2017-07-26 PROCEDURE — 81003 URINALYSIS AUTO W/O SCOPE: CPT | Performed by: FAMILY MEDICINE

## 2017-07-26 RX ADMIN — ESCITALOPRAM 20 MG: 20 TABLET, FILM COATED ORAL at 08:18

## 2017-07-26 RX ADMIN — LEVOTHYROXINE SODIUM 88 MCG: 88 TABLET ORAL at 06:39

## 2017-07-26 RX ADMIN — FUROSEMIDE 40 MG: 40 TABLET ORAL at 08:18

## 2017-07-26 RX ADMIN — SOTALOL HYDROCHLORIDE 80 MG: 80 TABLET ORAL at 08:18

## 2017-07-26 RX ADMIN — PANTOPRAZOLE SODIUM 40 MG: 40 TABLET, DELAYED RELEASE ORAL at 06:39

## 2017-07-26 RX ADMIN — CARVEDILOL 3.12 MG: 3.12 TABLET, FILM COATED ORAL at 08:18

## 2017-07-26 RX ADMIN — CARVEDILOL 3.12 MG: 3.12 TABLET, FILM COATED ORAL at 20:33

## 2017-07-26 RX ADMIN — ATORVASTATIN CALCIUM 40 MG: 20 TABLET, FILM COATED ORAL at 20:33

## 2017-07-26 NOTE — PLAN OF CARE
Problem: Patient Care Overview (Adult)  Goal: Plan of Care Review    07/26/17 1514   Coping/Psychosocial Response Interventions   Plan Of Care Reviewed With patient   Outcome Evaluation   Outcome Summary/Follow up Plan Pt presents with generalized weakness and balance deficits. Pt may benefit from OT to address deficits.         Problem: Inpatient Occupational Therapy  Goal: Transfer Training Goal 1 LTG- OT    07/26/17 1514   Transfer Training OT LTG   Transfer Training OT LTG, Date Established 07/26/17   Transfer Training OT LTG, Time to Achieve 1 wk   Transfer Training OT LTG, Activity Type sit to stand/stand to sit;toilet   Transfer Training OT LTG, [place] Level supervision required   Transfer Training OT LTG, Assist Device walker, rolling       Goal: Strength Goal LTG- OT    07/26/17 1514   Strength OT LTG   Strength Goal OT LTG, Date Established 07/26/17   Strength Goal OT LTG, Time to Achieve 1 wk   Strength Goal OT LTG, Measure to Achieve Pt to increase UE strenght to 4/5 to assist during functional transfers.       Goal: ADL Goal LTG- OT    07/26/17 1514   ADL OT LTG   ADL OT LTG, Date Established 07/26/17   ADL OT LTG, Time to Achieve 1 wk   ADL OT LTG, Activity Type ADL skills   ADL OT LTG, [place] Level standby assist;assistive device

## 2017-07-26 NOTE — PLAN OF CARE
Problem: Patient Care Overview (Adult)  Goal: Plan of Care Review  Outcome: Ongoing (interventions implemented as appropriate)    07/26/17 1332   Coping/Psychosocial Response Interventions   Plan Of Care Reviewed With patient   Outcome Evaluation   Outcome Summary/Follow up Plan Pt tolerated treatment well w/ mild c/o pain in L knee. Able to ambulate 160ft CGA w/ no LOB. Performed seated ther ex w/ no new concerns. PT will continue to address functional mobility deficits.   Patient Care Overview   Progress progress toward functional goals as expected

## 2017-07-26 NOTE — PROGRESS NOTES
Bhaskar Ibarra  81 y.o.   LOS: 0 days   Patient Care Team:  Garry Joya MD as PCP - General (Family Medicine)    Chief Complaint:  confusion  I have reviewed the patient's medical history in detail and updated the computerized patient record.    Subjective     Patient Complaints: none    History taken from: patient chart RN    Interval History:     Neuro w/u continuing stiil most consistent with dementia                                                                                                                                                                                                                                                                                                                                                                                                                                                                                                                                                                                                                                                                                                                                           Review of Systems:   Review of Systems  {All systems were reviewed and negative except for:  Behavioral/Psych: positive for  sleep disturbance      Objective     Lab Results (last 24 hours)     Procedure Component Value Units Date/Time    CBC & Differential [[id number]] Collected:  07/26/17 0619    Specimen:  Blood Updated:  07/26/17 0711    Narrative:       The following orders were created for panel order CBC & Differential.  Procedure                               Abnormality         Status                     ---------                               -----------         ------                     CBC Auto Differential[[id number]]        Abnormal            Final result                 Please view results for these tests on the individual orders.    CBC Auto Differential [[id number]]  (Abnormal) Collected:   07/26/17 0619    Specimen:  Blood Updated:  07/26/17 0711     WBC 9.79 10*3/mm3      RBC 3.66 (L) 10*6/mm3      Hemoglobin 12.2 (L) g/dL      Hematocrit 38.3 (L) %      .6 (H) fL      MCH 33.3 (H) pg      MCHC 31.9 (L) g/dL      RDW 13.6 %      RDW-SD 51.7 fl      MPV 10.2 fL      Platelets 357 10*3/mm3      Neutrophil % 60.1 %      Lymphocyte % 26.5 %      Monocyte % 10.0 %      Eosinophil % 2.8 %      Basophil % 0.3 %      Immature Grans % 0.3 %      Neutrophils, Absolute 5.89 10*3/mm3      Lymphocytes, Absolute 2.59 10*3/mm3      Monocytes, Absolute 0.98 10*3/mm3      Eosinophils, Absolute 0.27 10*3/mm3      Basophils, Absolute 0.03 10*3/mm3      Immature Grans, Absolute 0.03 10*3/mm3     C-reactive Protein [[id number]]  (Abnormal) Collected:  07/26/17 0619    Specimen:  Blood Updated:  07/26/17 0737     C-Reactive Protein 5.21 (H) mg/dL     Comprehensive Metabolic Panel [[id number]]  (Abnormal) Collected:  07/26/17 0619    Specimen:  Blood Updated:  07/26/17 0737     Glucose 123 (H) mg/dL      BUN 26 (H) mg/dL      Creatinine 1.47 (H) mg/dL      Sodium 139 mmol/L      Potassium 3.6 mmol/L      Chloride 99 mmol/L      CO2 24.7 mmol/L      Calcium 8.9 mg/dL      Total Protein 7.4 g/dL      Albumin 3.70 g/dL      ALT (SGPT) 20 U/L      AST (SGOT) 23 U/L      Alkaline Phosphatase 94 U/L      Total Bilirubin 0.4 mg/dL      eGFR Non African Amer 46 (L) mL/min/1.73      Globulin 3.7 gm/dL      A/G Ratio 1.0 g/dL      BUN/Creatinine Ratio 17.7     Anion Gap 15.3 mmol/L     Narrative:       The MDRD GFR formula is only valid for adults with stable renal function between ages 18 and 70.    Sedimentation Rate [[id number]]  (Abnormal) Collected:  07/26/17 0619    Specimen:  Blood Updated:  07/26/17 0751     Sed Rate 65 (H) mm/hr           Vital Signs  Temp:  [97.8 °F (36.6 °C)-99.6 °F (37.6 °C)] 98 °F (36.7 °C)  Heart Rate:  [49-82] 61  Resp:  [18-20] 18  BP: (120-137)/(68-83) 137/71    Flowsheet Rows         First  "Filed Value    Admission Height  71\" (180.3 cm) Documented at 07/23/2017 1203    Admission Weight  185 lb (83.9 kg) Documented at 07/23/2017 1203            Intake/Output Summary (Last 24 hours) at 07/26/17 0915  Last data filed at 07/25/17 1017   Gross per 24 hour   Intake              240 ml   Output                0 ml   Net              240 ml     Physical Exam:  General Appearance alert, appears stated age and cooperative  Back no kyphosis present, no scoliosis present, no skin lesions, erythema, or scars, no tenderness to percussion or palpation and range of motion normal  Lungs clear  Heart rsr  MS oriented x3 but poor details  Results Review:    I reviewed the patient's new clinical results.    Medication Review:   Latex  Current Facility-Administered Medications   Medication Dose Route Frequency Provider Last Rate Last Dose   • atorvastatin (LIPITOR) tablet 40 mg  40 mg Oral Daily Garry Joya MD   40 mg at 07/25/17 2124   • carvedilol (COREG) tablet 3.125 mg  3.125 mg Oral Q12H Garry Joya MD   3.125 mg at 07/26/17 0818   • escitalopram (LEXAPRO) tablet 20 mg  20 mg Oral Daily Garry Joya MD   20 mg at 07/26/17 0818   • furosemide (LASIX) tablet 40 mg  40 mg Oral Daily Garry Joya MD   40 mg at 07/26/17 0818   • Hold medication  1 each Does not apply Continuous PRN Lennox Nascimento MD       • HYDROcodone-acetaminophen (NORCO) 5-325 MG per tablet 1 tablet  1 tablet Oral Q4H PRN Garry Joya MD   1 tablet at 07/25/17 1354   • levothyroxine (SYNTHROID, LEVOTHROID) tablet 88 mcg  88 mcg Oral QAM Garry Joya MD   88 mcg at 07/26/17 0639   • pantoprazole (PROTONIX) EC tablet 40 mg  40 mg Oral QAM Garry Joya MD   40 mg at 07/26/17 0639   • sodium chloride 0.9 % flush 1-10 mL  1-10 mL Intravenous PRN Garry Joya MD       • sotalol (BETAPACE) tablet 80 mg  80 mg Oral Q24H Garry Joya MD   80 mg at 07/26/17 0818     Prescriptions Prior to Admission "   Medication Sig Dispense Refill Last Dose   • apixaban (ELIQUIS) 2.5 MG tablet tablet Take 1 tablet by mouth Every 12 (Twelve) Hours. 360 tablet 0    • aspirin 81 MG tablet Take 81 mg by mouth Daily.   Taking   • atorvastatin (LIPITOR) 40 MG tablet Take 1 tablet by mouth Daily. 90 tablet 3 Taking   • carvedilol (COREG) 3.125 MG tablet Take 3.125 mg by mouth 2 (Two) Times a Day With Meals.      • escitalopram (LEXAPRO) 20 MG tablet Take 20 mg by mouth Daily.      • furosemide (LASIX) 40 MG tablet Take 40 mg by mouth Daily. Hold if BP is below 100/60      • levothyroxine (SYNTHROID, LEVOTHROID) 88 MCG tablet Take 88 mcg by mouth Daily.      • omeprazole (priLOSEC) 40 MG capsule Take 40 mg by mouth Daily.      • sotalol (BETAPACE) 80 MG tablet Take 40 mg by mouth 2 (Two) Times a Day. Pt takes 1/2 tablet (40mg) by mouth twice daily          Assessment:    Active Problems:    Confusion  probably dementia        Plan:Continue current treatment plans Continue dementia evaluation ? LTC for therapy    Garry Joya MD  07/26/17  9:15 AM

## 2017-07-26 NOTE — THERAPY EVALUATION
Acute Care - Occupational Therapy Initial Evaluation  [facility]     Patient Name: Bhaskar Ibarra  : 1935  MRN: [id number]  Today's Date: 2017  Onset of Illness/Injury or Date of Surgery Date: 17  Date of Referral to OT: 17  Referring Physician: Mahnaz    Admit Date: 2017       ICD-10-CM ICD-9-CM   1. Confusion R41.0 298.9   2. Ataxia R27.0 781.3   3. Contusion of head, unspecified part of head, initial encounter S00.93XA 920   4. Contusion of left knee, initial encounter S80.02XA 924.11   5. Contusion of left hand, initial encounter S60.222A 923.20   6. Fall, initial encounter W19.XXXA E888.9   7. Generalized weakness R53.1 780.79     Patient Active Problem List   Diagnosis   • Coronary arteriosclerosis in native artery   • Atrial fibrillation   • Acute non-Q wave ST elevation myocardial infarction (STEMI) involving left anterior descending (LAD) coronary artery   • Mitral valve insufficiency   • S/P CABG (coronary artery bypass graft)   • S/P mitral valve repair   • Depression   • Hypothyroidism   • Hyperlipidemia   • Adenocarcinoma of right lung   • Benign nodular prostatic hyperplasia with lower urinary tract symptoms   • DM (diabetes mellitus screen)   • Confusion     Past Medical History:   Diagnosis Date   • Abdominal aortic aneurysm    • Acute myocardial infarction    • Acute renal failure    • Anemia    • Aneurysm    • Atrial fibrillation    • Cardiomyopathy    • Chest pain    • Coronary artery disease     2015-    • Depression    • GERD (gastroesophageal reflux disease)    • Hyperlipidemia    • Hypertension    • Hypothyroidism    • Lung cancer    • Mitral regurgitation    • Myocardial infarction    • Pleural effusion    • Pleural effusion, bilateral    • RLS (restless legs syndrome)    • Sleep apnea    • Ulcerative colitis    • Ulcerative colitis      Past Surgical History:   Procedure Laterality Date   • BACK SURGERY      lumbar   • CARDIAC CATHETERIZATION     •  CORONARY ANGIOPLASTY     • CORONARY ARTERY BYPASS GRAFT  07/27/2015    X 5  Dr Louis   • HERNIA REPAIR      inguinal - left   • MITRAL VALVE REPAIR/REPLACEMENT  07/27/2015    Dr Louis   • OTHER SURGICAL HISTORY      Cardiovasc Endosc W/ Video-Assist Harv Veins Coron Art Byp   • SKIN BIOPSY      benign          OT ASSESSMENT FLOWSHEET (last 72 hours)      OT Evaluation       07/26/17 1508 07/26/17 1138 07/26/17 0818 07/25/17 1600 07/24/17 1613    Rehab Evaluation    Document Type evaluation  -SO therapy note (daily note)  -SD  evaluation  -EJ (r) KS (t) EJ (c)     Subjective Information no complaints;agree to therapy  -SO agree to therapy;complains of;pain  -SD  no complaints;agree to therapy  -EJ (r) KS (t) EJ (c)     Patient Effort, Rehab Treatment good  -SO good  -SD  good  -EJ (r) KS (t) EJ (c)     Symptoms Noted During/After Treatment none  -SO   none  -EJ (r) KS (t) EJ (c)     General Information    Patient Profile Review yes  -SO   yes  -EJ (r) KS (t) EJ (c)     Onset of Illness/Injury or Date of Surgery Date    07/23/17  -EJ (r) KS (t) EJ (c)     Referring Physician Mahnaz  -SO   Mahnaz  -EJ (r) KS (t) EJ (c)     General Observations Pt supine in bed asleep  -SO   pt supine in bed  -EJ (r) KS (t) EJ (c)     Pertinent History Of Current Problem Ataxia, fall  -SO   episode of confusion and fall  -EJ (r) KS (t) EJ (c)     Precautions/Limitations fall precautions  -SO fall precautions  -SD  fall precautions  -EJ (r) KS (t) EJ (c)     Prior Level of Function independent:;ADL's  -SO   independent:  -EJ (r) KS (t) EJ (c)     Equipment Currently Used at Home cane, quad  -SO   cane, quad  -EJ (r) KS (t) EJ (c)     Plans/Goals Discussed With patient;agreed upon  -SO   patient  -EJ (r) KS (t) EJ (c)     Risks Reviewed    patient:  -EJ (r) KS (t) EJ (c)     Benefits Reviewed    patient:  -EJ (r) KS (t) EJ (c)     Living Environment    Lives With spouse  -SO   spouse  -EJ (r) KS (t) EJ (c) spouse   wife with  Alzheimer's disease  -SG    Living Arrangements house  -SO   house  -EJ (r) KS (t) EJ (c) house   ranch with a basement. 2 steps to enter  -SG    Home Accessibility    no concerns  -EJ (r) KS (t) EJ (c)     Clinical Impression    Date of Referral to OT 07/26/17  -SO        Criteria for Skilled Therapeutic Interventions Met yes  -SO        Rehab Potential good, to achieve stated therapy goals  -SO        Therapy Frequency 3-5 times/wk  -SO        Anticipated Discharge Disposition skilled nursing facility;assisted living;home with home health   Pending progress  -SO        Pain Assessment    Pain Assessment No/denies pain  -SO 0-10  -SD  No/denies pain  -EJ (r) KS (t) EJ (c)     Pain Score  2  -SD       Pain Type  Acute pain  -SD       Pain Location  Knee  -SD       Pain Orientation  Left  -SD       Vision Assessment/Intervention    Visual Impairment    WFL  -EJ (r) KS (t) EJ (c)     Cognitive Assessment/Intervention    Current Cognitive/Communication Assessment impaired  -SO functional  -SD  functional  -EJ (r) KS (t) EJ (c)     Orientation Status oriented x 4  -SO oriented x 4  -SD  oriented x 4  -EJ (r) KS (t) EJ (c)     Follows Commands/Answers Questions 100% of the time  -% of the time  -SD  100% of the time;needs cueing;able to follow multi-step instructions;needs repetition  -EJ (r) KS (t) EJ (c)     Personal Safety decreased insight to deficits  -SO WNL/WFL  -SD  mild impairment  -EJ (r) KS (t) EJ (c)     Personal Safety Interventions fall prevention program maintained;gait belt  -SO fall prevention program maintained;gait belt;nonskid shoes/slippers when out of bed  -SD  fall prevention program maintained;gait belt;nonskid shoes/slippers when out of bed  -EJ (r) KS (t) EJ (c)     ROM (Range of Motion)    General ROM    no range of motion deficits identified  -EJ (r) KS (t) EJ (c)     General ROM Detail BUE WFL  -SO        MMT (Manual Muscle Testing)    General MMT Assessment    no strength deficits  identified  -EJ (r) KS (t) EJ (c)     General MMT Assessment Detail Generalized weakness BUE 3+/5  -SO        Muscle Tone Assessment    Muscle Tone Assessment   Bilateral Lower Extremities  -OS      Bilateral Lower Extremities Muscle Tone Assessment   mildly decreased tone  -OS      Bed Mobility, Assessment/Treatment    Bed Mobility, Assistive Device    bed rails  -EJ (r) KS (t) EJ (c)     Bed Mobility, Roll Right, [place]    nonverbal cues required (demo/gesture);verbal cues required;supervision required  -EJ (r) KS (t) EJ (c)     Bed Mobility, Scoot/Bridge, [place]    supervision required  -EJ (r) KS (t) EJ (c)     Bed Mob, Supine to Sit, [place] supervision required  -SO supervision required  -SD  nonverbal cues required (demo/gesture);verbal cues required;contact guard assist  -EJ (r) KS (t) EJ (c)     Bed Mob, Sit to Supine, [place] not tested  -SO not tested   up in chair  -SD       Bed Mobility, Comment    impulsive and attempted to reach out and grab therapist's arms to assist in getting OOB  -EJ (r) KS (t) EJ (c)     Transfer Assessment/Treatment    Transfers, Sit-Stand [place] contact guard assist  -SO stand by assist;contact guard assist  -SD  conditional independence;verbal cues required;nonverbal cues required (demo/gesture);contact guard assist  -EJ (r) KS (t) EJ (c)     Transfers, Stand-Sit [place] contact guard assist  -SO stand by assist  -SD  verbal cues required;nonverbal cues required (demo/gesture);conditional independence;contact guard assist  -EJ (r) KS (t) EJ (c)     Transfers, Sit-Stand-Sit, Assist Device rolling walker  -SO rolling walker  -SD  rolling walker  -EJ (r) KS (t) EJ (c)     Toilet Transfer, [place] contact guard assist  -SO        Toilet Transfer, Assistive Device --   Grab bars  -SO        Functional Mobility    Functional Mobility- Ind. Level contact guard assist;minimum assist (75% patient effort);verbal cues required;nonverbal cues required  "(demo/gesture)  -SO        Functional Mobility- Device rolling walker  -SO        Functional Mobility-Distance (Feet) 20  -SO        Functional Mobility- Safety Issues balance decreased during turns  -SO        Functional Mobility- Comment Multiple small LOB walking to bathroom  -SO        Stairs Assessment/Treatment    Stairs, Comment    not necessary  -EJ (r) KS (t) EJ (c)     Lower Body Dressing Assessment/Training    LB Dressing Assess/Train, Clothing Type doffing:;donning:;slipper socks  -SO        LB Dressing Assess/Train, Position sitting;edge of bed  -SO        LB Dressing Assess/Train, [place] minimum assist (75% patient effort);verbal cues required  -SO        LB Dressing Assess/Train, Impairments decreased flexibility  -SO        Therapy Exercises    Bilateral Lower Extremities  AROM:;10 reps;ankle pumps/circles;LAQ;hip flexion  -SD       Sensory Assessment/Intervention    Sensory Impairment    numbness   c/o numbness in B ft that \"comes and goes\"  -EJ (r) KS (t) EJ (c)     Positioning and Restraints    Pre-Treatment Position in bed  -SO in bed  -SD  in bed  -EJ (r) KS (t) EJ (c)     Post Treatment Position chair  -SO chair  -SD  chair  -EJ (r) KS (t) EJ (c)     In Chair sitting;call light within reach;encouraged to call for assist;exit alarm on  -SO reclined;call light within reach;encouraged to call for assist;exit alarm on  -SD  sitting;exit alarm on;encouraged to call for assist;call light within reach  -EJ (r) KS (t) EJ (c)       07/24/17 1612 07/23/17 1700             General Information    Equipment Currently Used at Home  cane, quad  -TS       Living Environment    Lives With  spouse  -TS       Living Arrangements  house  -TS       Home Accessibility  no concerns  -TS       Stair Railings at Home  none  -TS       Functional Level Prior    Ambulation 2-->assistive person  -SG 2-->assistive person  -TS       Transferring 2-->assistive person  -SG 2-->assistive person  -TS       Toileting " 2-->assistive person  -SG 2-->assistive person  -TS       Bathing 2-->assistive person  -SG 2-->assistive person  -TS       Dressing 2-->assistive person  -SG 2-->assistive person  -TS       Eating 2-->assistive person  -SG 2-->assistive person  -TS       Communication  0-->understands/communicates without difficulty  -TS       Swallowing  0-->swallows foods/liquids without difficulty  -TS         User Key  (r) = Recorded By, (t) = Taken By, (c) = Cosigned By    Initials Name Effective Dates    SO Antonieta De La Fuente, OTR 04/13/15 -     OS Cindi Norwood, RN 06/16/16 -     EJ Ruba Arroyo, PT 04/21/17 -     SG Tanika Barrientos, CSW 02/18/16 -     SD Whitney Vasquez, PTA 01/27/16 -     TS Shawn Henson, RN 06/27/16 -     KS Iris Ortega, PT Student 05/08/17 -            Occupational Therapy Education     Title: PT OT SLP Therapies (Active)     Topic: Occupational Therapy (Active)     Point: ADL training (Done)    Description: Instruct learner(s) on proper safety adaptation and remediation techniques during self care or transfers.   Instruct in proper use of assistive devices.    Learning Progress Summary    Learner Readiness Method Response Comment Documented by Status   Patient Acceptance E VU Concern for pt to d/c home with balance deficits SO 07/26/17 1513 Done                      User Key     Initials Effective Dates Name Provider Type Discipline    SO 04/13/15 -  Antonieta De La Fuente, OTR Occupational Therapist OT                  OT Recommendation and Plan  Anticipated Discharge Disposition: skilled nursing facility, assisted living, home with home health (Pending progress)  Therapy Frequency: 3-5 times/wk  Plan of Care Review  Plan Of Care Reviewed With: patient  Outcome Summary/Follow up Plan: Pt presents with generalized weakness and balance deficits. Pt may benefit from OT to address deficits.          OT Goals       07/26/17 1514          Transfer Training OT LTG    Transfer Training  OT LTG, Date Established 07/26/17  -SO      Transfer Training OT LTG, Time to Achieve 1 wk  -SO      Transfer Training OT LTG, Activity Type sit to stand/stand to sit;toilet  -SO      Transfer Training OT LTG, [place] Level supervision required  -SO      Transfer Training OT LTG, Assist Device walker, rolling  -SO      Strength OT LTG    Strength Goal OT LTG, Date Established 07/26/17  -SO      Strength Goal OT LTG, Time to Achieve 1 wk  -SO      Strength Goal OT LTG, Measure to Achieve Pt to increase UE strenght to 4/5 to assist during functional transfers.  -SO      ADL OT LTG    ADL OT LTG, Date Established 07/26/17  -SO      ADL OT LTG, Time to Achieve 1 wk  -SO      ADL OT LTG, Activity Type ADL skills  -SO      ADL OT LTG, [place] Level standby assist;assistive device  -SO        User Key  (r) = Recorded By, (t) = Taken By, (c) = Cosigned By    Initials Name Provider Type    SO Antonieta De La Fuente, OTR Occupational Therapist                Outcome Measures       07/26/17 1500 07/26/17 1300 07/25/17 1600    How much help from another person do you currently need...    Turning from your back to your side while in flat bed without using bedrails?  4  -SD 4  -EJ (r) KS (t) EJ (c)    Moving from lying on back to sitting on the side of a flat bed without bedrails?  4  -SD 4  -EJ (r) KS (t) EJ (c)    Moving to and from a bed to a chair (including a wheelchair)?  3  -SD 4  -EJ (r) KS (t) EJ (c)    Standing up from a chair using your arms (e.g., wheelchair, bedside chair)?  4  -SD 4  -EJ (r) KS (t) EJ (c)    Climbing 3-5 steps with a railing?  3  -SD 3  -EJ (r) KS (t) EJ (c)    To walk in hospital room?  3  -SD 4  -EJ (r) KS (t) EJ (c)    AM-PAC 6 Clicks Score  21  -SD 23  -EJ (r) KS (t)    How much help from another is currently needed...    Putting on and taking off regular lower body clothing? 3  -SO      Bathing (including washing, rinsing, and drying) 3  -SO      Toileting (which includes using toilet  bed pan or urinal) 3  -SO      Putting on and taking off regular upper body clothing 3  -SO      Taking care of personal grooming (such as brushing teeth) 3  -SO      Eating meals 4  -SO      Score 19  -SO      Functional Assessment    Outcome Measure Options AM-PAC 6 Clicks Daily Activity (OT)  -SO AM-PAC 6 Clicks Basic Mobility (PT)  -SD AM-PAC 6 Clicks Basic Mobility (PT)  -EJ (r) KS (t) EJ (c)      User Key  (r) = Recorded By, (t) = Taken By, (c) = Cosigned By    Initials Name Provider Type    SO Antonieta De La Fuente, OTR Occupational Therapist    RUSH Arroyo, PT Physical Therapist    SD Whitney Vasquez, PTA Technician    DAYAN Ortega, PT Student PT Student          Time Calculation:   OT Start Time: 1430  OT Stop Time: 1440  OT Time Calculation (min): 10 min    Therapy Charges for Today     Code Description Service Date Service Provider Modifiers Qty    19124206755 HC OT SELFCARE CURRENT 7/26/2017 JEFFERSON Noonan CK 1    51800378387 HC OT SELFCARE PROJECTED 7/26/2017 JEFFERSON Noonan, CJ 1    28298892011 HC OT EVAL LOW COMPLEXITY 2 7/26/2017 JEFFERSON Noonan 1          OT G-codes  Functional Limitation: Self care  Self Care Current Status (): At least 40 percent but less than 60 percent impaired, limited or restricted  Self Care Goal Status (): At least 20 percent but less than 40 percent impaired, limited or restricted    Antonieta De La Fuente OTR  7/26/2017

## 2017-07-26 NOTE — THERAPY TREATMENT NOTE
Acute Care - Physical Therapy Treatment Note  [facility]     Patient Name: Bhaskar Ibarra  : 1935  MRN: [id number]  Today's Date: 2017  Onset of Illness/Injury or Date of Surgery Date: 17  Date of Referral to PT: 17  Referring Physician: Mahnaz    Admit Date: 2017    Visit Dx:    ICD-10-CM ICD-9-CM   1. Confusion R41.0 298.9   2. Ataxia R27.0 781.3   3. Contusion of head, unspecified part of head, initial encounter S00.93XA 920   4. Contusion of left knee, initial encounter S80.02XA 924.11   5. Contusion of left hand, initial encounter S60.222A 923.20   6. Fall, initial encounter W19.XXXA E888.9   7. Generalized weakness R53.1 780.79     Patient Active Problem List   Diagnosis   • Coronary arteriosclerosis in native artery   • Atrial fibrillation   • Acute non-Q wave ST elevation myocardial infarction (STEMI) involving left anterior descending (LAD) coronary artery   • Mitral valve insufficiency   • S/P CABG (coronary artery bypass graft)   • S/P mitral valve repair   • Depression   • Hypothyroidism   • Hyperlipidemia   • Adenocarcinoma of right lung   • Benign nodular prostatic hyperplasia with lower urinary tract symptoms   • DM (diabetes mellitus screen)   • Confusion               Adult Rehabilitation Note       17 1138          Rehab Assessment/Intervention    Discipline physical therapy assistant  -SD      Document Type therapy note (daily note)  -SD      Subjective Information agree to therapy;complains of;pain  -SD      Patient Effort, Rehab Treatment good  -SD      Precautions/Limitations fall precautions  -SD      Recorded by [SD] Whitney Vasquez PTA      Pain Assessment    Pain Assessment 0-10  -SD      Pain Score 2  -SD      Pain Type Acute pain  -SD      Pain Location Knee  -SD      Pain Orientation Left  -SD      Recorded by [SD] Whitney Vasquez PTA      Cognitive Assessment/Intervention    Current Cognitive/Communication Assessment functional  -SD       Orientation Status oriented x 4  -SD      Follows Commands/Answers Questions 100% of the time  -SD      Personal Safety WNL/WFL  -SD      Personal Safety Interventions fall prevention program maintained;gait belt;nonskid shoes/slippers when out of bed  -SD      Recorded by [SD] Whitney Vasquez PTA      Bed Mobility, Assessment/Treatment    Bed Mob, Supine to Sit, [place] supervision required  -SD      Bed Mob, Sit to Supine, [place] not tested   up in chair  -SD      Recorded by [SD] Whitney Vasquez PTA      Transfer Assessment/Treatment    Transfers, Sit-Stand [place] stand by assist;contact guard assist  -SD      Transfers, Stand-Sit [place] stand by assist  -SD      Transfers, Sit-Stand-Sit, Assist Device rolling walker  -SD      Recorded by [SD] Whitney Vasquez PTA      Gait Assessment/Treatment    Gait, [place] Level contact guard assist  -SD      Gait, Assistive Device rolling walker  -SD      Gait, Distance (Feet) 160  -SD      Gait, Gait Deviations anushka decreased;forward flexed posture;step length decreased  -SD      Gait, Safety Issues step length decreased  -SD      Gait, Impairments strength decreased  -SD      Recorded by [SD] Whitney Vasquez PTA      Therapy Exercises    Bilateral Lower Extremities AROM:;10 reps;ankle pumps/circles;LAQ;hip flexion  -SD      Recorded by [SD] Whitney Vasquez PTA      Positioning and Restraints    Pre-Treatment Position in bed  -SD      Post Treatment Position chair  -SD      In Chair reclined;call light within reach;encouraged to call for assist;exit alarm on  -SD      Recorded by [SD] Whitney Vasquez PTA        User Key  (r) = Recorded By, (t) = Taken By, (c) = Cosigned By    Initials Name Effective Dates    SD Whitney Vasquez PTA 01/27/16 -                 IP PT Goals       07/25/17 1633          Bed Mobility PT LTG    Bed Mobility PT LTG, Date Established 07/25/17  -EJ (r) KS (t) EJ (c)      Bed Mobility PT  LTG, Time to Achieve 1 wk  -EJ (r) KS (t) EJ (c)      Bed Mobility PT LTG, Activity Type all bed mobility  -EJ (r) KS (t) EJ (c)      Bed Mobility PT LTG, [place] Level independent  -EJ (r) KS (t) EJ (c)      Transfer Training PT LTG    Transfer Training PT LTG, Date Established 07/25/17  -EJ (r) KS (t) EJ (c)      Transfer Training PT LTG, Time to Achieve 1 wk  -EJ (r) KS (t) EJ (c)      Transfer Training PT LTG, Activity Type all transfers  -EJ (r) KS (t) EJ (c)      Transfer Training PT LTG, [place] Level independent  -EJ (r) KS (t) EJ (c)      Transfer Training PT LTG, Assist Device walker, rolling  -EJ (r) KS (t) EJ (c)      Gait Training PT LTG    Gait Training Goal PT LTG, Date Established 07/25/17  -EJ (r) KS (t) EJ (c)      Gait Training Goal PT LTG, Time to Achieve 1 wk  -EJ (r) KS (t) EJ (c)      Gait Training Goal PT LTG, [place] Level conditional independence  -EJ (r) KS (t) EJ (c)      Gait Training Goal PT LTG, Assist Device walker, rolling  -EJ (r) KS (t) EJ (c)      Gait Training Goal PT LTG, Distance to Achieve 450  -EJ (r) KS (t) EJ (c)        User Key  (r) = Recorded By, (t) = Taken By, (c) = Cosigned By    Initials Name Provider Type    RUSH Arroyo, PT Physical Therapist    DAYAN Ortega, PT Student PT Student          Physical Therapy Education     Title: PT OT SLP Therapies (Done)     Topic: Physical Therapy (Done)     Point: Mobility training (Done)    Learning Progress Summary    Learner Readiness Method Response Comment Documented by Status   Patient Acceptance E VU SD 07/26/17 1332 Done    Acceptance E VU KS 07/25/17 1632 Done               Point: Home exercise program (Done)    Learning Progress Summary    Learner Readiness Method Response Comment Documented by Status   Patient Acceptance E VU SD 07/26/17 1332 Done    Acceptance E VU KS 07/25/17 1632 Done               Point: Body mechanics (Done)    Learning Progress Summary    Learner Readiness Method  Response Comment Documented by Status   Patient Acceptance E VU  SD 07/26/17 1332 Done    Acceptance E   KS 07/25/17 1632 Done               Point: Precautions (Done)    Learning Progress Summary    Learner Readiness Method Response Comment Documented by Status   Patient Acceptance E VU  SD 07/26/17 1332 Done    Acceptance E [facility] 07/25/17 1632 Done                      User Key     Initials Effective Dates Name Provider Type Discipline    SD 01/27/16 -  Whitney Vasquez, PTA Technician PT    KS 05/08/17 -  Iris Ortega, PT Student PT Student PT                    PT Recommendation and Plan  Anticipated Discharge Disposition: home, home with outpatient services, home with home health (unsure at this point )  Planned Therapy Interventions: balance training, bed mobility training, gait training, home exercise program, patient/family education, strengthening, transfer training  PT Frequency: daily  Plan of Care Review  Plan Of Care Reviewed With: patient  Progress: progress toward functional goals as expected  Outcome Summary/Follow up Plan: Pt tolerated treatment well w/ mild c/o pain in L knee. Able to ambulate 160ft CGA w/ no LOB. Performed seated ther ex w/ no new concerns. PT will continue to address functional mobility deficits.          Outcome Measures       07/26/17 1300 07/25/17 1600       How much help from another person do you currently need...    Turning from your back to your side while in flat bed without using bedrails? 4  -SD 4  -EJ (r) KS (t) EJ (c)     Moving from lying on back to sitting on the side of a flat bed without bedrails? 4  -SD 4  -EJ (r) KS (t) EJ (c)     Moving to and from a bed to a chair (including a wheelchair)? 3  -SD 4  -EJ (r) KS (t) EJ (c)     Standing up from a chair using your arms (e.g., wheelchair, bedside chair)? 4  -SD 4  -EJ (r) KS (t) EJ (c)     Climbing 3-5 steps with a railing? 3  -SD 3  -EJ (r) KS (t) EJ (c)     To walk in hospital room? 3  -SD 4  -EJ (r) KS  (t) EJ (c)     AM-PAC 6 Clicks Score 21  -SD 23  -EJ (r) KS (t)     Functional Assessment    Outcome Measure Options AM-PAC 6 Clicks Basic Mobility (PT)  -SD AM-PAC 6 Clicks Basic Mobility (PT)  -EJ (r) KS (t) EJ (c)       User Key  (r) = Recorded By, (t) = Taken By, (c) = Cosigned By    Initials Name Provider Type    EJ Ruba Arroyo, PT Physical Therapist    ONELIA Vasquez, PTA Technician    DAYAN Ortega, PT Student PT Student           Time Calculation:         PT Charges       07/26/17 1334          Time Calculation    Start Time 1138  -SD      Stop Time 1150  -SD      Time Calculation (min) 12 min  -SD      PT Received On 07/26/17  -SD      PT - Next Appointment 07/27/17  -SD        User Key  (r) = Recorded By, (t) = Taken By, (c) = Cosigned By    Initials Name Provider Type    ONELIA Vasquez, ASAD Technician          Therapy Charges for Today     Code Description Service Date Service Provider Modifiers Qty    20451177187 HC PT THER PROC EA 15 MIN 7/26/2017 Whitney Vasquez PTA GP 1          PT G-Codes  Outcome Measure Options: AM-PAC 6 Clicks Basic Mobility (PT)  Functional Limitation: Mobility: Walking and moving around  Mobility: Walking and Moving Around Current Status (): At least 1 percent but less than 20 percent impaired, limited or restricted  Mobility: Walking and Moving Around Goal Status (): At least 1 percent but less than 20 percent impaired, limited or restricted  Mobility: Walking and Moving Around Discharge Status (): At least 1 percent but less than 20 percent impaired, limited or restricted    Whitney Vasquez PTA  7/26/2017

## 2017-07-26 NOTE — PLAN OF CARE
Problem: Patient Care Overview (Adult)  Goal: Plan of Care Review  Outcome: Ongoing (interventions implemented as appropriate)    07/26/17 0423   Outcome Evaluation   Outcome Summary/Follow up Plan still confused but easily directed, vss, will continue to monitor   Patient Care Overview   Progress no change         Problem: Fall Risk (Adult)  Goal: Absence of Falls  Outcome: Ongoing (interventions implemented as appropriate)    07/26/17 0423   Fall Risk (Adult)   Absence of Falls making progress toward outcome         Problem: Confusion, Acute (Adult)  Goal: Cognitive/Functional Impairments Minimized  Outcome: Ongoing (interventions implemented as appropriate)    07/26/17 0423   Confusion, Acute (Adult)   Cognitive/Functional Impairments Minimized making progress toward outcome       Goal: Safety  Outcome: Ongoing (interventions implemented as appropriate)    07/26/17 0423   Confusion, Acute (Adult)   Safety making progress toward outcome

## 2017-07-27 ENCOUNTER — APPOINTMENT (OUTPATIENT)
Dept: GENERAL RADIOLOGY | Facility: HOSPITAL | Age: 82
End: 2017-07-27
Attending: PSYCHIATRY & NEUROLOGY

## 2017-07-27 LAB
ALBUMIN SERPL-MCNC: 3.9 G/DL (ref 3.5–5.2)
ALBUMIN/GLOB SERPL: 1.1 G/DL
ALP SERPL-CCNC: 95 U/L (ref 39–117)
ALT SERPL W P-5'-P-CCNC: 21 U/L (ref 1–41)
ANION GAP SERPL CALCULATED.3IONS-SCNC: 16.6 MMOL/L
APPEARANCE CSF: CLEAR
AST SERPL-CCNC: 28 U/L (ref 1–40)
BASOPHILS # BLD AUTO: 0.04 10*3/MM3 (ref 0–0.2)
BASOPHILS NFR BLD AUTO: 0.4 % (ref 0–1.5)
BILIRUB SERPL-MCNC: 0.4 MG/DL (ref 0.1–1.2)
BUN BLD-MCNC: 26 MG/DL (ref 8–23)
BUN/CREAT SERPL: 19.7 (ref 7–25)
CALCIUM SPEC-SCNC: 8.9 MG/DL (ref 8.6–10.5)
CHLORIDE SERPL-SCNC: 101 MMOL/L (ref 98–107)
CO2 SERPL-SCNC: 24.4 MMOL/L (ref 22–29)
COLOR CSF: COLORLESS
CREAT BLD-MCNC: 1.32 MG/DL (ref 0.76–1.27)
CRYPTOC AG TITR CSF: NEGATIVE {TITER}
DEPRECATED RDW RBC AUTO: 51.8 FL (ref 37–54)
EOSINOPHIL # BLD AUTO: 0.26 10*3/MM3 (ref 0–0.7)
EOSINOPHIL NFR BLD AUTO: 2.5 % (ref 0.3–6.2)
ERYTHROCYTE [DISTWIDTH] IN BLOOD BY AUTOMATED COUNT: 13.8 % (ref 11.5–14.5)
ERYTHROCYTE [SEDIMENTATION RATE] IN BLOOD: 78 MM/HR (ref 0–20)
GFR SERPL CREATININE-BSD FRML MDRD: 52 ML/MIN/1.73
GLOBULIN UR ELPH-MCNC: 3.6 GM/DL
GLUCOSE BLD-MCNC: 116 MG/DL (ref 65–99)
GLUCOSE CSF-MCNC: 74 MG/DL (ref 40–70)
HCT VFR BLD AUTO: 37.8 % (ref 40.4–52.2)
HGB BLD-MCNC: 12 G/DL (ref 13.7–17.6)
IMM GRANULOCYTES # BLD: 0.04 10*3/MM3 (ref 0–0.03)
IMM GRANULOCYTES NFR BLD: 0.4 % (ref 0–0.5)
LYMPHOCYTES # BLD AUTO: 2.57 10*3/MM3 (ref 0.9–4.8)
LYMPHOCYTES NFR BLD AUTO: 25 % (ref 19.6–45.3)
MCH RBC QN AUTO: 33.1 PG (ref 27–32.7)
MCHC RBC AUTO-ENTMCNC: 31.7 G/DL (ref 32.6–36.4)
MCV RBC AUTO: 104.4 FL (ref 79.8–96.2)
METHOD: ABNORMAL
MONOCYTES # BLD AUTO: 1.18 10*3/MM3 (ref 0.2–1.2)
MONOCYTES NFR BLD AUTO: 11.5 % (ref 5–12)
NEUTROPHILS # BLD AUTO: 6.21 10*3/MM3 (ref 1.9–8.1)
NEUTROPHILS NFR BLD AUTO: 60.2 % (ref 42.7–76)
NUC CELL # CSF MANUAL: 3 /MM3 (ref 0–5)
PLATELET # BLD AUTO: 388 10*3/MM3 (ref 140–500)
PMV BLD AUTO: 10.2 FL (ref 6–12)
POTASSIUM BLD-SCNC: 3.5 MMOL/L (ref 3.5–5.2)
PROT CSF-MCNC: 69 MG/DL (ref 15–45)
PROT SERPL-MCNC: 7.5 G/DL (ref 6–8.5)
RBC # BLD AUTO: 3.62 10*6/MM3 (ref 4.6–6)
RBC # CSF MANUAL: 186 /MM3 (ref 0–0)
SODIUM BLD-SCNC: 142 MMOL/L (ref 136–145)
TUBE # CSF: 1
WBC NRBC COR # BLD: 10.3 10*3/MM3 (ref 4.5–10.7)

## 2017-07-27 PROCEDURE — G0378 HOSPITAL OBSERVATION PER HR: HCPCS

## 2017-07-27 PROCEDURE — 86592 SYPHILIS TEST NON-TREP QUAL: CPT | Performed by: PSYCHIATRY & NEUROLOGY

## 2017-07-27 PROCEDURE — 89050 BODY FLUID CELL COUNT: CPT | Performed by: PSYCHIATRY & NEUROLOGY

## 2017-07-27 PROCEDURE — 87070 CULTURE OTHR SPECIMN AEROBIC: CPT | Performed by: PSYCHIATRY & NEUROLOGY

## 2017-07-27 PROCEDURE — 87327 CRYPTOCOCCUS NEOFORM AG IA: CPT | Performed by: PSYCHIATRY & NEUROLOGY

## 2017-07-27 PROCEDURE — 87205 SMEAR GRAM STAIN: CPT | Performed by: PSYCHIATRY & NEUROLOGY

## 2017-07-27 PROCEDURE — 99224 PR SBSQ OBSERVATION CARE/DAY 15 MINUTES: CPT | Performed by: FAMILY MEDICINE

## 2017-07-27 PROCEDURE — 82945 GLUCOSE OTHER FLUID: CPT | Performed by: PSYCHIATRY & NEUROLOGY

## 2017-07-27 PROCEDURE — 83916 OLIGOCLONAL BANDS: CPT | Performed by: PSYCHIATRY & NEUROLOGY

## 2017-07-27 PROCEDURE — 99213 OFFICE O/P EST LOW 20 MIN: CPT | Performed by: NURSE PRACTITIONER

## 2017-07-27 PROCEDURE — 80053 COMPREHEN METABOLIC PANEL: CPT | Performed by: FAMILY MEDICINE

## 2017-07-27 PROCEDURE — 85652 RBC SED RATE AUTOMATED: CPT | Performed by: FAMILY MEDICINE

## 2017-07-27 PROCEDURE — 82040 ASSAY OF SERUM ALBUMIN: CPT | Performed by: PSYCHIATRY & NEUROLOGY

## 2017-07-27 PROCEDURE — 82042 OTHER SOURCE ALBUMIN QUAN EA: CPT | Performed by: PSYCHIATRY & NEUROLOGY

## 2017-07-27 PROCEDURE — 97110 THERAPEUTIC EXERCISES: CPT

## 2017-07-27 PROCEDURE — 87015 SPECIMEN INFECT AGNT CONCNTJ: CPT | Performed by: PSYCHIATRY & NEUROLOGY

## 2017-07-27 PROCEDURE — 77003 FLUOROGUIDE FOR SPINE INJECT: CPT

## 2017-07-27 PROCEDURE — 85025 COMPLETE CBC W/AUTO DIFF WBC: CPT | Performed by: FAMILY MEDICINE

## 2017-07-27 PROCEDURE — 84157 ASSAY OF PROTEIN OTHER: CPT | Performed by: PSYCHIATRY & NEUROLOGY

## 2017-07-27 RX ORDER — LIDOCAINE HYDROCHLORIDE 10 MG/ML
10 INJECTION, SOLUTION INFILTRATION; PERINEURAL ONCE
Status: COMPLETED | OUTPATIENT
Start: 2017-07-27 | End: 2017-07-27

## 2017-07-27 RX ADMIN — SOTALOL HYDROCHLORIDE 80 MG: 80 TABLET ORAL at 08:23

## 2017-07-27 RX ADMIN — CARVEDILOL 3.12 MG: 3.12 TABLET, FILM COATED ORAL at 08:23

## 2017-07-27 RX ADMIN — ATORVASTATIN CALCIUM 40 MG: 20 TABLET, FILM COATED ORAL at 21:42

## 2017-07-27 RX ADMIN — ESCITALOPRAM 20 MG: 20 TABLET, FILM COATED ORAL at 08:23

## 2017-07-27 RX ADMIN — LEVOTHYROXINE SODIUM 88 MCG: 88 TABLET ORAL at 06:37

## 2017-07-27 RX ADMIN — PANTOPRAZOLE SODIUM 40 MG: 40 TABLET, DELAYED RELEASE ORAL at 06:37

## 2017-07-27 RX ADMIN — LIDOCAINE HYDROCHLORIDE 4 ML: 10 INJECTION, SOLUTION INFILTRATION; PERINEURAL at 11:03

## 2017-07-27 RX ADMIN — CARVEDILOL 3.12 MG: 3.12 TABLET, FILM COATED ORAL at 21:42

## 2017-07-27 RX ADMIN — FUROSEMIDE 40 MG: 40 TABLET ORAL at 08:23

## 2017-07-27 NOTE — PROGRESS NOTES
Continued Stay Note  [facility]     Patient Name: Bhaskar Ibarra  MRN: [id number]  Today's Date: 7/27/2017    Admit Date: 7/23/2017          Discharge Plan       07/27/17 1100    Case Management/Social Work Plan    Additional Comments Spoke with dgt Malka iMnor.  Plan is for patient to return home with spouse and dgt to assist as needed.  Has had [facility] in past.  Remains in obs status.                Discharge Codes     None            Jackie Bolaños RN

## 2017-07-27 NOTE — PLAN OF CARE
Problem: Patient Care Overview (Adult)  Goal: Plan of Care Review  Outcome: Ongoing (interventions implemented as appropriate)    07/27/17 0839   Coping/Psychosocial Response Interventions   Plan Of Care Reviewed With patient   Outcome Evaluation   Outcome Summary/Follow up Plan Pt agreeable to perform bed ther ex this date for LE strengthening. No c/o pain. PT will continue to address functional mobility deficits.   Patient Care Overview   Progress progress toward functional goals as expected

## 2017-07-27 NOTE — PLAN OF CARE
Problem: Patient Care Overview (Adult)  Goal: Plan of Care Review  Outcome: Ongoing (interventions implemented as appropriate)    07/27/17 0515   Coping/Psychosocial Response Interventions   Plan Of Care Reviewed With patient   Outcome Evaluation   Outcome Summary/Follow up Plan Pt with some disorientation to situation and time. Generalized weakness noticed. No complaints of pain, no changes in neuro status. Will continue to monitor.    Patient Care Overview   Progress progress toward functional goals as expected         Problem: Fall Risk (Adult)  Goal: Absence of Falls  Outcome: Ongoing (interventions implemented as appropriate)    Problem: Confusion, Acute (Adult)  Goal: Identify Related Risk Factors and Signs and Symptoms  Outcome: Ongoing (interventions implemented as appropriate)  Goal: Cognitive/Functional Impairments Minimized  Outcome: Ongoing (interventions implemented as appropriate)  Goal: Safety  Outcome: Ongoing (interventions implemented as appropriate)

## 2017-07-27 NOTE — NURSING NOTE
Education sheet and consent signed by daughter/POA.  Patient AA & O X3.  Waiting for lab to draw 2 red top tubes.

## 2017-07-27 NOTE — PROGRESS NOTES
NEUROLOGY    CHIEF COMPLAINT: Fall    The patient denies any new complaints. LP performed this morning. MRV and EEG completed.    PHYSICAL EXAMINATION:  GENERAL: 81-year-old male resting in bed in no acute distress.  VITAL SIGNS: Temperature 98.3, Pulse 51, Respirations 18, Blood pressure 142/75  NECK: Carotids equal without bruits.  HEART: Regular rate and rhythm without murmur.  EXTREMITIES: Nonedematous.  SKIN: No rash.  ABDOMEN: No hepatosplenomegaly.  NEUROLOGIC: Awake, alert, and oriented x3. He is able to name the President. He is able to perform simple calculations. He could repeat sentences and follow commands. Correctly names objects. There is no aphasia or dysarthria. Visual fields are full. Cranial nerves II through XII intact. Power is normal in all 4 extremities. Tone is normal. Reflexes are 2 and symmetric in the upper extremities and absent in the lower extremities. Toes are downgoing. Sensation intact to pinprick and light touch in all 4 extremities. Cerebellar function is normal.     IMPRESSION: MRV was negative for a dural sinus thrombosis and repeat DWI does not show a cerebral infarct. His EEG was normal. The patient likely has a mild dementia at baseline and it is possible that he has a concussion with long confusion related to his age and hospitalization. Will await LP results and, if normal, no further neurologic work up is necessary. His confusion will likely slowly improve once he leaves the hospital.     Thank you for allowing me to see this patient.     GEOFF See

## 2017-07-27 NOTE — PLAN OF CARE
Problem: Patient Care Overview (Adult)  Goal: Plan of Care Review  Outcome: Ongoing (interventions implemented as appropriate)    07/27/17 1522   Coping/Psychosocial Response Interventions   Plan Of Care Reviewed With patient   Outcome Evaluation   Outcome Summary/Follow up Plan Pt had a Lumbar puncture today and tolerated it well. Pt was A & O x 4 today with times of confusion. VSS, will continue to monitor.   Patient Care Overview   Progress progress towards functional goals is fair         Problem: Fall Risk (Adult)  Goal: Identify Related Risk Factors and Signs and Symptoms  Outcome: Ongoing (interventions implemented as appropriate)  Goal: Absence of Falls  Outcome: Ongoing (interventions implemented as appropriate)    07/27/17 1522   Fall Risk (Adult)   Absence of Falls making progress toward outcome       Goal: Identify Related Risk Factors and Signs and Symptoms  Outcome: Ongoing (interventions implemented as appropriate)    07/27/17 1522   Fall Risk   Fall Risk: Related Risk Factors confusion/agitation;depression/anxiety;fatigue/slow reaction;gait/mobility problems;history of falls;environment unfamiliar   Fall Risk: Signs and Symptoms presence of risk factors       Goal: Absence of Falls  Outcome: Ongoing (interventions implemented as appropriate)    07/27/17 1522   Fall Risk (Adult)   Absence of Falls making progress toward outcome         Problem: Confusion, Acute (Adult)  Goal: Identify Related Risk Factors and Signs and Symptoms  Outcome: Ongoing (interventions implemented as appropriate)    07/27/17 1522   Confusion, Acute   Related Risk Factors (Acute Confusion) cognitive impairment   Signs and Symptoms (Acute Confusion) thought process diminished/disorganized;memory disturbed       Goal: Cognitive/Functional Impairments Minimized  Outcome: Ongoing (interventions implemented as appropriate)    07/27/17 1522   Confusion, Acute (Adult)   Cognitive/Functional Impairments Minimized making progress toward  outcome       Goal: Safety  Outcome: Ongoing (interventions implemented as appropriate)    07/27/17 1522   Confusion, Acute (Adult)   Safety making progress toward outcome

## 2017-07-27 NOTE — SIGNIFICANT NOTE
07/27/17 1057   Rehab Treatment   Treatment Not Performed (off floor for testing.)   Recommendation   OT - Next Appointment 07/28/17

## 2017-07-28 VITALS
BODY MASS INDEX: 25.9 KG/M2 | DIASTOLIC BLOOD PRESSURE: 72 MMHG | SYSTOLIC BLOOD PRESSURE: 111 MMHG | OXYGEN SATURATION: 94 % | RESPIRATION RATE: 18 BRPM | HEART RATE: 62 BPM | WEIGHT: 185 LBS | TEMPERATURE: 98.6 F | HEIGHT: 71 IN

## 2017-07-28 PROBLEM — F07.81 POST CONCUSSION SYNDROME: Status: ACTIVE | Noted: 2017-07-28

## 2017-07-28 PROBLEM — M81.0 OSTEOPOROSIS: Chronic | Status: ACTIVE | Noted: 2017-07-28

## 2017-07-28 PROBLEM — E55.9 VITAMIN D DEFICIENCY: Status: ACTIVE | Noted: 2017-07-28

## 2017-07-28 LAB
25(OH)D3 SERPL-MCNC: 19.3 NG/ML (ref 30–100)
ALBUMIN CSF-MCNC: 36 MG/DL (ref 11–48)
ALBUMIN SERPL-MCNC: 3.9 G/DL (ref 3.5–4.7)
ERYTHROCYTE [SEDIMENTATION RATE] IN BLOOD: 67 MM/HR (ref 0–20)
IGG CSF-MCNC: 3.5 MG/DL (ref 0–8.6)
IGG SERPL-MCNC: 868 MG/DL (ref 700–1600)
IGG SYNTH RATE SER+CSF CALC-MRATE: -3.4 MG/DAY
OLIGOCLONAL BANDS.IT SER+CSF QL: (no result)

## 2017-07-28 PROCEDURE — 99217 PR OBSERVATION CARE DISCHARGE MANAGEMENT: CPT | Performed by: FAMILY MEDICINE

## 2017-07-28 PROCEDURE — G0378 HOSPITAL OBSERVATION PER HR: HCPCS

## 2017-07-28 PROCEDURE — 85652 RBC SED RATE AUTOMATED: CPT | Performed by: FAMILY MEDICINE

## 2017-07-28 PROCEDURE — 82306 VITAMIN D 25 HYDROXY: CPT | Performed by: FAMILY MEDICINE

## 2017-07-28 RX ADMIN — SOTALOL HYDROCHLORIDE 80 MG: 80 TABLET ORAL at 09:25

## 2017-07-28 RX ADMIN — FUROSEMIDE 40 MG: 40 TABLET ORAL at 09:25

## 2017-07-28 RX ADMIN — ESCITALOPRAM 20 MG: 20 TABLET, FILM COATED ORAL at 09:25

## 2017-07-28 RX ADMIN — PANTOPRAZOLE SODIUM 40 MG: 40 TABLET, DELAYED RELEASE ORAL at 06:41

## 2017-07-28 RX ADMIN — CARVEDILOL 3.12 MG: 3.12 TABLET, FILM COATED ORAL at 09:25

## 2017-07-28 RX ADMIN — APIXABAN 2.5 MG: 2.5 TABLET, FILM COATED ORAL at 11:24

## 2017-07-28 RX ADMIN — LEVOTHYROXINE SODIUM 88 MCG: 88 TABLET ORAL at 06:41

## 2017-07-28 NOTE — PLAN OF CARE
Problem: Patient Care Overview (Adult)  Goal: Plan of Care Review  Outcome: Ongoing (interventions implemented as appropriate)    07/28/17 0543   Coping/Psychosocial Response Interventions   Plan Of Care Reviewed With patient   Outcome Evaluation   Outcome Summary/Follow up Plan Pt drowsy, however arousable overnight. Oriented x3, occasional disorientation to time. Denies pain. Neuro checks remain stable. Will continue to monitor.    Patient Care Overview   Progress no change         Problem: Fall Risk (Adult)  Goal: Absence of Falls  Outcome: Ongoing (interventions implemented as appropriate)    Problem: Confusion, Acute (Adult)  Goal: Safety  Outcome: Ongoing (interventions implemented as appropriate)

## 2017-07-28 NOTE — PROGRESS NOTES
Bhaskar Ibarra  81 y.o.   LOS: 0 days   Patient Care Team:  Garry Joya MD as PCP - General (Family Medicine)    Chief Complaint:  confusion  I have reviewed the patient's medical history in detail and updated the computerized patient record.    Subjective         History taken from: patient chart family    Interval History:  Alltests nl suggesting mild dementis and ? Concussion. LP done and neuro interpretation pending  Review of Systems:   Review of Systems  {All systems were reviewed and negative except for:  Gastrointestinal: postitive for  constipation    Objective     Lab Results (last 24 hours)     Procedure Component Value Units Date/Time    CBC & Differential [[id number]] Collected:  07/27/17 0510    Specimen:  Blood Updated:  07/27/17 0714    Narrative:       The following orders were created for panel order CBC & Differential.  Procedure                               Abnormality         Status                     ---------                               -----------         ------                     CBC Auto Differential[[id number]]        Abnormal            Final result                 Please view results for these tests on the individual orders.    CBC Auto Differential [[id number]]  (Abnormal) Collected:  07/27/17 0510    Specimen:  Blood Updated:  07/27/17 0714     WBC 10.30 10*3/mm3      RBC 3.62 (L) 10*6/mm3      Hemoglobin 12.0 (L) g/dL      Hematocrit 37.8 (L) %      .4 (H) fL      MCH 33.1 (H) pg      MCHC 31.7 (L) g/dL      RDW 13.8 %      RDW-SD 51.8 fl      MPV 10.2 fL      Platelets 388 10*3/mm3      Neutrophil % 60.2 %      Lymphocyte % 25.0 %      Monocyte % 11.5 %      Eosinophil % 2.5 %      Basophil % 0.4 %      Immature Grans % 0.4 %      Neutrophils, Absolute 6.21 10*3/mm3      Lymphocytes, Absolute 2.57 10*3/mm3      Monocytes, Absolute 1.18 10*3/mm3      Eosinophils, Absolute 0.26 10*3/mm3      Basophils, Absolute 0.04 10*3/mm3      Immature Grans, Absolute 0.04 (H)  10*3/mm3     Comprehensive Metabolic Panel [[id number]]  (Abnormal) Collected:  07/27/17 0510    Specimen:  Blood Updated:  07/27/17 0729     Glucose 116 (H) mg/dL      BUN 26 (H) mg/dL      Creatinine 1.32 (H) mg/dL      Sodium 142 mmol/L      Potassium 3.5 mmol/L      Chloride 101 mmol/L      CO2 24.4 mmol/L      Calcium 8.9 mg/dL      Total Protein 7.5 g/dL      Albumin 3.90 g/dL      ALT (SGPT) 21 U/L      AST (SGOT) 28 U/L      Alkaline Phosphatase 95 U/L      Total Bilirubin 0.4 mg/dL      eGFR Non African Amer 52 (L) mL/min/1.73      Globulin 3.6 gm/dL      A/G Ratio 1.1 g/dL      BUN/Creatinine Ratio 19.7     Anion Gap 16.6 mmol/L     Narrative:       The MDRD GFR formula is only valid for adults with stable renal function between ages 18 and 70.    Sedimentation Rate [[id number]]  (Abnormal) Collected:  07/27/17 0510    Specimen:  Blood Updated:  07/27/17 0831     Sed Rate 78 (H) mm/hr     VDRL, CSF [[id number]] Collected:  07/27/17 1129    Specimen:  Cerebrospinal Fluid from Lumbar Puncture Updated:  07/27/17 1138    Oligoclonal Banding [[id number]] Collected:  07/27/17 1129    Specimen:  Cerebrospinal Fluid from Lumbar Puncture Updated:  07/27/17 1138    CSF IgG Index [[id number]] Collected:  07/27/17 1129    Specimen:  Cerebrospinal Fluid from Lumbar Puncture Updated:  07/27/17 1138    Cell Count With Differential, CSF Use tube: 1 [[id number]] Collected:  07/27/17 1129    Specimen:  Cerebrospinal Fluid from Lumbar Puncture Updated:  07/27/17 1212    Narrative:       The following orders were created for panel order Cell Count With Differential, CSF Use tube: 1.  Procedure                               Abnormality         Status                     ---------                               -----------         ------                     Cell Count, CSF[[id number]]              Abnormal            Final result                 Please view results for these tests on the individual orders.    Cell Count, CSF  "[[id number]]  (Abnormal) Collected:  07/27/17 1129    Specimen:  Cerebrospinal Fluid from Lumbar Puncture Updated:  07/27/17 1212     Color, CSF Colorless     Appearance, CSF Clear     RBC,  (H) /mm3      Nucleated Cells, CSF 3 /mm3      Tube Number, CSF 1     Method: UF 1000i Automated Method    Narrative:       Differential not indicated.    Cryptococcal AG, CSF [[id number]]  (Normal) Collected:  07/27/17 1129    Specimen:  Cerebrospinal Fluid from Lumbar Puncture Updated:  07/27/17 1221     Cryptococcal Antigen, CSF Negative    Glucose, CSF [[id number]]  (Abnormal) Collected:  07/27/17 1129    Specimen:  Cerebrospinal Fluid from Lumbar Puncture Updated:  07/27/17 1232     Glucose, CSF 74 (H) mg/dL     Protein, CSF [[id number]]  (Abnormal) Collected:  07/27/17 1129    Specimen:  Cerebrospinal Fluid from Lumbar Puncture Updated:  07/27/17 1232     Protein, Total (CSF) 69.0 (H) mg/dL     Culture, CSF [[id number]] Collected:  07/27/17 1129    Specimen:  Cerebrospinal Fluid from Lumbar Puncture Updated:  07/27/17 1323     Gram Stain Result No WBCs or organisms seen          Vital Signs  Temp:  [97.6 °F (36.4 °C)-98.3 °F (36.8 °C)] 97.6 °F (36.4 °C)  Heart Rate:  [51-58] 51  Resp:  [16-18] 18  BP: ()/(59-83) 99/60    Flowsheet Rows         First Filed Value    Admission Height  71\" (180.3 cm) Documented at 07/23/2017 1203    Admission Weight  185 lb (83.9 kg) Documented at 07/23/2017 1203            Intake/Output Summary (Last 24 hours) at 07/27/17 2034  Last data filed at 07/27/17 1200   Gross per 24 hour   Intake              220 ml   Output              350 ml   Net             -130 ml     Physical Exam:  General Appearance alert, appears stated age and cooperative  Lungs clear to auscultation, respirations regular, respirations even and respirations unlabored  Heart regular rhythm & normal rate, normal S1, S2, no murmur, no eloina, no rub and no click  Abdomen normal bowel sounds, no masses, no " hepatomegaly, no splenomegaly, soft non-tender, no guarding and no rebound tenderness  Extremities moves extremities well, no edema, no cyanosis and no redness  Neurologic Mental Status orientated to person, place, time and situation     Results Review:    I reviewed the patient's new clinical results.    Medication Review:   Latex  Current Facility-Administered Medications   Medication Dose Route Frequency Provider Last Rate Last Dose   • atorvastatin (LIPITOR) tablet 40 mg  40 mg Oral Daily Garry Joya MD   40 mg at 07/26/17 2033   • carvedilol (COREG) tablet 3.125 mg  3.125 mg Oral Q12H Garry Joya MD   3.125 mg at 07/27/17 0823   • escitalopram (LEXAPRO) tablet 20 mg  20 mg Oral Daily Garry Joya MD   20 mg at 07/27/17 0823   • furosemide (LASIX) tablet 40 mg  40 mg Oral Daily Garry Joya MD   40 mg at 07/27/17 0823   • Hold medication  1 each Does not apply Continuous PRN Lennox Nascimento MD       • HYDROcodone-acetaminophen (NORCO) 5-325 MG per tablet 1 tablet  1 tablet Oral Q4H PRN Garry Joya MD   1 tablet at 07/25/17 1354   • levothyroxine (SYNTHROID, LEVOTHROID) tablet 88 mcg  88 mcg Oral QAM Garry Joya MD   88 mcg at 07/27/17 0637   • pantoprazole (PROTONIX) EC tablet 40 mg  40 mg Oral QAGETACHEW Joya MD   40 mg at 07/27/17 0637   • sodium chloride 0.9 % flush 1-10 mL  1-10 mL Intravenous PRN Garry Joya MD       • sotalol (BETAPACE) tablet 80 mg  80 mg Oral Q24H Garry Joya MD   80 mg at 07/27/17 0823     Prescriptions Prior to Admission   Medication Sig Dispense Refill Last Dose   • apixaban (ELIQUIS) 2.5 MG tablet tablet Take 1 tablet by mouth Every 12 (Twelve) Hours. 360 tablet 0    • aspirin 81 MG tablet Take 81 mg by mouth Daily.   Taking   • atorvastatin (LIPITOR) 40 MG tablet Take 1 tablet by mouth Daily. 90 tablet 3 Taking   • carvedilol (COREG) 3.125 MG tablet Take 3.125 mg by mouth 2 (Two) Times a Day With Meals.      • escitalopram  (LEXAPRO) 20 MG tablet Take 20 mg by mouth Daily.      • furosemide (LASIX) 40 MG tablet Take 40 mg by mouth Daily. Hold if BP is below 100/60      • levothyroxine (SYNTHROID, LEVOTHROID) 88 MCG tablet Take 88 mcg by mouth Daily.      • omeprazole (priLOSEC) 40 MG capsule Take 40 mg by mouth Daily.      • sotalol (BETAPACE) 80 MG tablet Take 40 mg by mouth 2 (Two) Times a Day. Pt takes 1/2 tablet (40mg) by mouth twice daily          Assessment:    Active Problems:    Confusion  mild dementia  concussion        Plan:Continue current treatment plans .Recheck cbc and BMP in am. Discharge home if neuro deems LP normal  Garry Joya MD  07/27/17  8:34 PM

## 2017-07-28 NOTE — DISCHARGE SUMMARY
Date of Discharge:  7/28/2017  Presenting Problem/History of Present Illness  Ataxia [R27.0]  Confusion [R41.0]  Fall, initial encounter [W19.XXXA]  Contusion of left hand, initial encounter [S60.222A]  Contusion of left knee, initial encounter [S80.02XA]  Contusion of head, unspecified part of head, initial encounter [S00.93XA]    Discharge Diagnosis: 1)fall with head trauma  2)Mild dementia with concussion  3) nsevere osteoporosis  4) vitamin d deficiency  5)hypothyroid  6Hyperlipidemia  7) GERD  8)depression    Hospital Course  Patient is a 81 y.o. male presented withhistory of a fall with head trauma. Admitted due to confusion. Pt asha by neurology and had a 2nd MRI and MRV with no evidence of a cva. He had a LP to rule out an occult infection-results were normal.X RAYS INCIDENTALY SHOWEE SEVERE OSTEOPOROSIS Allagree correct diagnosi is mild dementia made worse by a concussion..      Procedures Performed         Consults:   Consults     Date and Time Order Name Status Description    7/25/2017 0707 Inpatient Consult to Neurology      7/23/2017 1358 Family Medicine Consult Completed           Pertinent Test Results:   Lab Results (last 7 days)     Procedure Component Value Units Date/Time    CBC & Differential [[id number]] Collected:  07/23/17 1204    Specimen:  Blood Updated:  07/23/17 1214    Narrative:       The following orders were created for panel order CBC & Differential.  Procedure                               Abnormality         Status                     ---------                               -----------         ------                     CBC Auto Differential[[id number]]        Abnormal            Final result                 Please view results for these tests on the individual orders.    CBC Auto Differential [[id number]]  (Abnormal) Collected:  07/23/17 1204    Specimen:  Blood Updated:  07/23/17 1214     WBC 10.02 10*3/mm3      RBC 3.88 (L) 10*6/mm3      Hemoglobin 12.7 (L) g/dL      Hematocrit  39.8 (L) %      .6 (H) fL      MCH 32.7 pg      MCHC 31.9 (L) g/dL      RDW 13.7 %      RDW-SD 51.0 fl      MPV 9.5 fL      Platelets 359 10*3/mm3      Neutrophil % 69.7 %      Lymphocyte % 20.2 %      Monocyte % 8.2 %      Eosinophil % 1.4 %      Basophil % 0.2 %      Immature Grans % 0.3 %      Neutrophils, Absolute 6.99 10*3/mm3      Lymphocytes, Absolute 2.02 10*3/mm3      Monocytes, Absolute 0.82 10*3/mm3      Eosinophils, Absolute 0.14 10*3/mm3      Basophils, Absolute 0.02 10*3/mm3      Immature Grans, Absolute 0.03 10*3/mm3      nRBC 0.0 /100 WBC     Protime-INR [[id number]]  (Abnormal) Collected:  07/23/17 1204    Specimen:  Blood Updated:  07/23/17 1226     Protime 15.4 (H) Seconds      INR 1.27 (H)    Comprehensive Metabolic Panel [[id number]]  (Abnormal) Collected:  07/23/17 1204    Specimen:  Blood Updated:  07/23/17 1242     Glucose 171 (H) mg/dL      BUN 23 mg/dL      Creatinine 1.55 (H) mg/dL      Sodium 140 mmol/L      Potassium 4.0 mmol/L      Chloride 98 mmol/L      CO2 23.7 mmol/L      Calcium 9.4 mg/dL      Total Protein 8.0 g/dL      Albumin 3.90 g/dL      ALT (SGPT) 22 U/L      AST (SGOT) 16 U/L      Alkaline Phosphatase 103 U/L      Total Bilirubin 0.6 mg/dL      eGFR Non African Amer 43 (L) mL/min/1.73      Globulin 4.1 gm/dL      A/G Ratio 1.0 g/dL      BUN/Creatinine Ratio 14.8     Anion Gap 18.3 mmol/L     Narrative:       The MDRD GFR formula is only valid for adults with stable renal function between ages 18 and 70.    CK [[id number]]  (Normal) Collected:  07/23/17 1204    Specimen:  Blood Updated:  07/23/17 1242     Creatine Kinase 191 U/L     Magnesium [[id number]]  (Normal) Collected:  07/23/17 1204    Specimen:  Blood Updated:  07/23/17 1242     Magnesium 2.3 mg/dL     T4, Free [[id number]]  (Normal) Collected:  07/23/17 1204    Specimen:  Blood Updated:  07/23/17 1248     Free T4 1.67 ng/dL     CK-MB [[id number]]  (Normal) Collected:  07/23/17 1204    Specimen:  Blood Updated:   07/23/17 1248     CKMB 2.60 ng/mL     Troponin [[id number]]  (Normal) Collected:  07/23/17 1204    Specimen:  Blood Updated:  07/23/17 1248     Troponin T <0.010 ng/mL     Narrative:       Troponin T Reference Ranges:  Less than 0.03 ng/mL:    Negative for AMI  0.03 to 0.09 ng/mL:      Indeterminant for AMI  Greater than 0.09 ng/mL: Positive for AMI    TSH [[id number]]  (Abnormal) Collected:  07/23/17 1204    Specimen:  Blood Updated:  07/23/17 1248     TSH 5.940 (H) mIU/mL     Urinalysis With / Culture If Indicated [[id number]]  (Normal) Collected:  07/23/17 1300    Specimen:  Urine from Urine, Clean Catch Updated:  07/23/17 1323     Color, UA Yellow     Appearance, UA Clear     pH, UA 5.5     Specific Gravity, UA 1.022     Glucose, UA Negative     Ketones, UA Negative     Bilirubin, UA Negative     Blood, UA Negative     Protein, UA Negative     Leuk Esterase, UA Negative     Nitrite, UA Negative     Urobilinogen, UA 0.2 E.U./dL    Narrative:       Urine microscopic not indicated.    Urine Drug Screen [[id number]]  (Normal) Collected:  07/23/17 1300    Specimen:  Urine from Urine, Clean Catch Updated:  07/23/17 1334     Amphet/Methamphet, Screen Negative     Barbiturates Screen, Urine Negative     Benzodiazepine Screen, Urine Negative     Cocaine Screen, Urine Negative     Opiate Screen Negative     THC, Screen, Urine Negative     Methadone Screen, Urine Negative     Oxycodone Screen, Urine Negative    Narrative:       Negative Thresholds For Drugs Screened:     Amphetamines               500 ng/ml   Barbiturates               200 ng/ml   Benzodiazepines            100 ng/ml   Cocaine                    300 ng/ml   Methadone                  300 ng/ml   Opiates                    300 ng/ml   Oxycodone                  100 ng/ml   THC                        50 ng/ml    The Normal Value for all drugs tested is negative. This report includes final unconfirmed screening results to be used for medical treatment purposes  only. Unconfirmed results must not be used for non-medical purposes such as employment or legal testing. Clinical consideration should be applied to any drug of abuse test, particulary when unconfirmed results are used.    CBC (No Diff) [[id number]]  (Abnormal) Collected:  07/24/17 0343    Specimen:  Blood Updated:  07/24/17 0424     WBC 11.21 (H) 10*3/mm3      RBC 3.66 (L) 10*6/mm3      Hemoglobin 12.2 (L) g/dL      Hematocrit 38.7 (L) %      .7 (H) fL      MCH 33.3 (H) pg      MCHC 31.5 (L) g/dL      RDW 13.7 %      RDW-SD 53.0 fl      MPV 10.0 fL      Platelets 362 10*3/mm3     Basic Metabolic Panel [[id number]]  (Abnormal) Collected:  07/24/17 0343    Specimen:  Blood Updated:  07/24/17 0447     Glucose 135 (H) mg/dL      BUN 25 (H) mg/dL      Creatinine 1.46 (H) mg/dL      Sodium 140 mmol/L      Potassium 3.8 mmol/L      Chloride 98 mmol/L      CO2 25.0 mmol/L      Calcium 9.0 mg/dL      eGFR Non African Amer 46 (L) mL/min/1.73      BUN/Creatinine Ratio 17.1     Anion Gap 17.0 mmol/L     Narrative:       The MDRD GFR formula is only valid for adults with stable renal function between ages 18 and 70.    TSH [[id number]]  (Abnormal) Collected:  07/25/17 0510    Specimen:  Blood Updated:  07/25/17 0633     TSH 6.260 (H) mIU/mL     Vitamin B12 [[id number]]  (Normal) Collected:  07/25/17 0510    Specimen:  Blood Updated:  07/25/17 0646     Vitamin B-12 740 pg/mL     Folate [[id number]]  (Normal) Collected:  07/25/17 0510    Specimen:  Blood Updated:  07/25/17 0646     Folate >20.00 ng/mL     Sedimentation Rate [[id number]]  (Abnormal) Collected:  07/25/17 0839    Specimen:  Blood Updated:  07/25/17 0912     Sed Rate 75 (H) mm/hr     CBC & Differential [[id number]] Collected:  07/26/17 0619    Specimen:  Blood Updated:  07/26/17 0711    Narrative:       The following orders were created for panel order CBC & Differential.  Procedure                               Abnormality         Status                      ---------                               -----------         ------                     CBC Auto Differential[[id number]]        Abnormal            Final result                 Please view results for these tests on the individual orders.    CBC Auto Differential [[id number]]  (Abnormal) Collected:  07/26/17 0619    Specimen:  Blood Updated:  07/26/17 0711     WBC 9.79 10*3/mm3      RBC 3.66 (L) 10*6/mm3      Hemoglobin 12.2 (L) g/dL      Hematocrit 38.3 (L) %      .6 (H) fL      MCH 33.3 (H) pg      MCHC 31.9 (L) g/dL      RDW 13.6 %      RDW-SD 51.7 fl      MPV 10.2 fL      Platelets 357 10*3/mm3      Neutrophil % 60.1 %      Lymphocyte % 26.5 %      Monocyte % 10.0 %      Eosinophil % 2.8 %      Basophil % 0.3 %      Immature Grans % 0.3 %      Neutrophils, Absolute 5.89 10*3/mm3      Lymphocytes, Absolute 2.59 10*3/mm3      Monocytes, Absolute 0.98 10*3/mm3      Eosinophils, Absolute 0.27 10*3/mm3      Basophils, Absolute 0.03 10*3/mm3      Immature Grans, Absolute 0.03 10*3/mm3     C-reactive Protein [[id number]]  (Abnormal) Collected:  07/26/17 0619    Specimen:  Blood Updated:  07/26/17 0737     C-Reactive Protein 5.21 (H) mg/dL     Comprehensive Metabolic Panel [[id number]]  (Abnormal) Collected:  07/26/17 0619    Specimen:  Blood Updated:  07/26/17 0737     Glucose 123 (H) mg/dL      BUN 26 (H) mg/dL      Creatinine 1.47 (H) mg/dL      Sodium 139 mmol/L      Potassium 3.6 mmol/L      Chloride 99 mmol/L      CO2 24.7 mmol/L      Calcium 8.9 mg/dL      Total Protein 7.4 g/dL      Albumin 3.70 g/dL      ALT (SGPT) 20 U/L      AST (SGOT) 23 U/L      Alkaline Phosphatase 94 U/L      Total Bilirubin 0.4 mg/dL      eGFR Non African Amer 46 (L) mL/min/1.73      Globulin 3.7 gm/dL      A/G Ratio 1.0 g/dL      BUN/Creatinine Ratio 17.7     Anion Gap 15.3 mmol/L     Narrative:       The MDRD GFR formula is only valid for adults with stable renal function between ages 18 and 70.    Sedimentation Rate [[id number]]   (Abnormal) Collected:  07/26/17 0619    Specimen:  Blood Updated:  07/26/17 0751     Sed Rate 65 (H) mm/hr     Urinalysis With / Microscopic If Indicated [[id number]]  (Normal) Collected:  07/26/17 1225    Specimen:  Urine from Urine, Clean Catch Updated:  07/26/17 1407     Color, UA Yellow     Appearance, UA Clear     pH, UA 5.5     Specific Gravity, UA 1.024     Glucose, UA Negative     Ketones, UA Negative     Bilirubin, UA Negative     Blood, UA Negative     Protein, UA Negative     Leuk Esterase, UA Negative     Nitrite, UA Negative     Urobilinogen, UA 0.2 E.U./dL    Narrative:       Urine microscopic not indicated.    CBC & Differential [[id number]] Collected:  07/27/17 0510    Specimen:  Blood Updated:  07/27/17 0714    Narrative:       The following orders were created for panel order CBC & Differential.  Procedure                               Abnormality         Status                     ---------                               -----------         ------                     CBC Auto Differential[[id number]]        Abnormal            Final result                 Please view results for these tests on the individual orders.    CBC Auto Differential [[id number]]  (Abnormal) Collected:  07/27/17 0510    Specimen:  Blood Updated:  07/27/17 0714     WBC 10.30 10*3/mm3      RBC 3.62 (L) 10*6/mm3      Hemoglobin 12.0 (L) g/dL      Hematocrit 37.8 (L) %      .4 (H) fL      MCH 33.1 (H) pg      MCHC 31.7 (L) g/dL      RDW 13.8 %      RDW-SD 51.8 fl      MPV 10.2 fL      Platelets 388 10*3/mm3      Neutrophil % 60.2 %      Lymphocyte % 25.0 %      Monocyte % 11.5 %      Eosinophil % 2.5 %      Basophil % 0.4 %      Immature Grans % 0.4 %      Neutrophils, Absolute 6.21 10*3/mm3      Lymphocytes, Absolute 2.57 10*3/mm3      Monocytes, Absolute 1.18 10*3/mm3      Eosinophils, Absolute 0.26 10*3/mm3      Basophils, Absolute 0.04 10*3/mm3      Immature Grans, Absolute 0.04 (H) 10*3/mm3     Comprehensive Metabolic  Panel [[id number]]  (Abnormal) Collected:  07/27/17 0510    Specimen:  Blood Updated:  07/27/17 0729     Glucose 116 (H) mg/dL      BUN 26 (H) mg/dL      Creatinine 1.32 (H) mg/dL      Sodium 142 mmol/L      Potassium 3.5 mmol/L      Chloride 101 mmol/L      CO2 24.4 mmol/L      Calcium 8.9 mg/dL      Total Protein 7.5 g/dL      Albumin 3.90 g/dL      ALT (SGPT) 21 U/L      AST (SGOT) 28 U/L      Alkaline Phosphatase 95 U/L      Total Bilirubin 0.4 mg/dL      eGFR Non African Amer 52 (L) mL/min/1.73      Globulin 3.6 gm/dL      A/G Ratio 1.1 g/dL      BUN/Creatinine Ratio 19.7     Anion Gap 16.6 mmol/L     Narrative:       The MDRD GFR formula is only valid for adults with stable renal function between ages 18 and 70.    Sedimentation Rate [[id number]]  (Abnormal) Collected:  07/27/17 0510    Specimen:  Blood Updated:  07/27/17 0831     Sed Rate 78 (H) mm/hr     VDRL, CSF [[id number]] Collected:  07/27/17 1129    Specimen:  Cerebrospinal Fluid from Lumbar Puncture Updated:  07/27/17 1138    Oligoclonal Banding [[id number]] Collected:  07/27/17 1129    Specimen:  Cerebrospinal Fluid from Lumbar Puncture Updated:  07/27/17 1138    CSF IgG Index [[id number]] Collected:  07/27/17 1129    Specimen:  Cerebrospinal Fluid from Lumbar Puncture Updated:  07/27/17 1138    Cell Count With Differential, CSF Use tube: 1 [[id number]] Collected:  07/27/17 1129    Specimen:  Cerebrospinal Fluid from Lumbar Puncture Updated:  07/27/17 1212    Narrative:       The following orders were created for panel order Cell Count With Differential, CSF Use tube: 1.  Procedure                               Abnormality         Status                     ---------                               -----------         ------                     Cell Count, CSF[[id number]]              Abnormal            Final result                 Please view results for these tests on the individual orders.    Cell Count, CSF [[id number]]  (Abnormal) Collected:  07/27/17  1129    Specimen:  Cerebrospinal Fluid from Lumbar Puncture Updated:  07/27/17 1212     Color, CSF Colorless     Appearance, CSF Clear     RBC,  (H) /mm3      Nucleated Cells, CSF 3 /mm3      Tube Number, CSF 1     Method: UF 1000i Automated Method    Narrative:       Differential not indicated.    Cryptococcal AG, CSF [[id number]]  (Normal) Collected:  07/27/17 1129    Specimen:  Cerebrospinal Fluid from Lumbar Puncture Updated:  07/27/17 1221     Cryptococcal Antigen, CSF Negative    Glucose, CSF [[id number]]  (Abnormal) Collected:  07/27/17 1129    Specimen:  Cerebrospinal Fluid from Lumbar Puncture Updated:  07/27/17 1232     Glucose, CSF 74 (H) mg/dL     Protein, CSF [[id number]]  (Abnormal) Collected:  07/27/17 1129    Specimen:  Cerebrospinal Fluid from Lumbar Puncture Updated:  07/27/17 1232     Protein, Total (CSF) 69.0 (H) mg/dL     Vitamin D 25 Hydroxy [[id number]]  (Abnormal) Collected:  07/28/17 0508    Specimen:  Blood Updated:  07/28/17 0619     25 Hydroxy, Vitamin D 19.3 (L) ng/ml     Narrative:       Reference Range for Total Vitamin D 25(OH)     Deficiency    <20.0 ng/mL   Insufficiency 21-29 ng/mL   Sufficiency    ng/mL  Toxicity      >100 ng/ml         Sedimentation Rate [[id number]]  (Abnormal) Collected:  07/28/17 0508    Specimen:  Blood Updated:  07/28/17 0703     Sed Rate 67 (H) mm/hr     Culture, CSF [[id number]]  (Normal) Collected:  07/27/17 1129    Specimen:  Cerebrospinal Fluid from Lumbar Puncture Updated:  07/28/17 0743     CSF Culture No growth at 24 hours     Gram Stain Result No WBCs or organisms seen              Condition on Discharge:  Good      Physical Exam at Discharge  General Appearance:  awake, alert, oriented, in no acute distress  Lungs:  Normal expansion.  Clear to auscultation.  No rales, rhonchi, or wheezing.  Heart:  Heart sounds are normal.  Regular rate and rhythm without murmur, gallop or rub.  Abdomen:  Soft, non-tender, normal bowel sounds; no bruits,  "organomegaly or masses.  Extremities: Extremities warm to touch, pink, with no edema.  Neurologic:  Alert and oriented x 3, gait normal., reflexes normal and symmetric, strength and  sensation grossly normal    Vital Signs  Temp:  [97.6 °F (36.4 °C)-98.4 °F (36.9 °C)] 98.4 °F (36.9 °C)  Heart Rate:  [51-64] 62  Resp:  [16-18] 18  BP: ()/(60-83) 115/73    Flowsheet Rows         First Filed Value    Admission Height  71\" (180.3 cm) Documented at 07/23/2017 1203    Admission Weight  185 lb (83.9 kg) Documented at 07/23/2017 1203              Discharge Disposition  Home      Discharge Medications   Bhaskar Ibarra   Home Medication Instructions ELVER:[id number]    Printed on:07/28/17 0923   Medication Information                      apixaban (ELIQUIS) 2.5 MG tablet tablet  Take 1 tablet by mouth Every 12 (Twelve) Hours.             aspirin 81 MG tablet  Take 81 mg by mouth Daily.             atorvastatin (LIPITOR) 40 MG tablet  Take 1 tablet by mouth Daily.             carvedilol (COREG) 3.125 MG tablet  Take 3.125 mg by mouth 2 (Two) Times a Day With Meals.             escitalopram (LEXAPRO) 20 MG tablet  Take 20 mg by mouth Daily.             furosemide (LASIX) 40 MG tablet  Take 40 mg by mouth Daily. Hold if BP is below 100/60             levothyroxine (SYNTHROID, LEVOTHROID) 88 MCG tablet  Take 88 mcg by mouth Daily.             omeprazole (priLOSEC) 40 MG capsule  Take 40 mg by mouth Daily.             sotalol (BETAPACE) 80 MG tablet  Take 40 mg by mouth 2 (Two) Times a Day. Pt takes 1/2 tablet (40mg) by mouth twice daily                 Discharge Diet:   Diet Instructions     Diet: Regular; Thin Liquids, No Restrictions       Discharge Diet:  Regular   Fluid Consistency:  Thin Liquids, No Restrictions                 Activity at Discharge:     Follow-up Appointments  Future Appointments  Date Time Provider Department Center   7/28/2017 11:00 AM MD [facility] None   7/28/2017 1:00 PM GYM " - CARDIAC REHAB [facility]   7/31/2017 1:00 PM [facility] - CARDIAC REHAB [facility]   8/1/2017 1:00 PM [facility] - CARDIAC REHAB [facility]   8/2/2017 1:00 PM [facility] - CARDIAC REHAB [facility]   8/3/2017 1:00 PM G[facility] - CARDIAC REHAB  [facility]   8/4/2017 1:00 PM [facility] - CARDIAC REHAB [facility]   8/7/2017 1:00 PM G[facility] - CARDIAC REHAB [facility]   8/8/2017 1:00 PM [facility] - CARDIAC REHAB [facility]   8/9/2017 1:00 PM G[facility] - CARDIAC REHAB [facility]   8/10/2017 1:00 PM G[facility] - CARDIAC REHAB [facility]   8/11/2017 1:00 PM G[facility] - CARDIAC REHAB [facility]   8/14/2017 1:00 PM G[facility] - CARDIAC REHAB [facility]   8/15/2017 1:00 PM [facility] - CARDIAC REHAB [facility]   8/16/2017 1:00 PM [facility] - CARDIAC REHAB [facility]   8/17/2017 1:00 PM [facility] - CARDIAC REHAB [facility]   8/18/2017 1:00 PM G[facility] - CARDIAC REHAB  [facility]   8/21/2017 1:00 PM G[facility] - CARDIAC REHAB  [facility]   8/22/2017 1:00 PM G[facility] - CARDIAC REHAB  [facility]   8/23/2017 1:00 PM [facility] - CARDIAC REHAB  [facility]   8/24/2017 1:00 PM G[facility] - CARDIAC REHAB  [facility]   8/25/2017 1:00 PM [facility] - CARDIAC REHAB [facility]   8/28/2017 1:00 PM [facility] - CARDIAC REHAB [facility]   8/29/2017 1:00 PM G[facility] - CARDIAC REHAB  [facility]   10/5/2017 1:00 PM G[facility] - CARDIAC REHAB  [facility]   10/27/2017 11:30 AM [facility] CT 2 [facility]   11/17/2017 10:45 AM [facility] CT 2  [facility]     Additional Instructions for the Follow-ups that You Need to Schedule     Discharge Follow-up with PCP    As directed    Follow Up Details:  Dr. Joya 2 weeks                 Test Results Pending at Discharge   Order Current Status    CSF IgG Index In process    Oligoclonal Banding In process    VDRL, CSF In process    Culture, CSF Preliminary result      Pt will be started on OscalD 500bid and vitD50,000 q week     Garry Joya MD  07/28/17  9:23 AM

## 2017-07-30 LAB
BACTERIA SPEC AEROBE CULT: NORMAL
GRAM STN SPEC: NORMAL

## 2017-07-31 LAB — REAGIN AB CSF QL: NON REACTIVE

## 2017-08-08 ENCOUNTER — OFFICE VISIT (OUTPATIENT)
Dept: CARDIAC REHAB | Facility: HOSPITAL | Age: 82
End: 2017-08-08

## 2017-08-08 DIAGNOSIS — Z95.1 S/P CABG X 5: Primary | ICD-10-CM

## 2017-08-10 ENCOUNTER — OFFICE VISIT (OUTPATIENT)
Dept: CARDIAC REHAB | Facility: HOSPITAL | Age: 82
End: 2017-08-10

## 2017-08-10 DIAGNOSIS — Z95.1 S/P CABG X 5: Primary | ICD-10-CM

## 2017-08-16 ENCOUNTER — OFFICE VISIT (OUTPATIENT)
Dept: FAMILY MEDICINE CLINIC | Facility: CLINIC | Age: 82
End: 2017-08-16

## 2017-08-16 VITALS
HEART RATE: 77 BPM | WEIGHT: 197 LBS | TEMPERATURE: 98.3 F | HEIGHT: 71 IN | BODY MASS INDEX: 27.58 KG/M2 | DIASTOLIC BLOOD PRESSURE: 70 MMHG | OXYGEN SATURATION: 96 % | SYSTOLIC BLOOD PRESSURE: 110 MMHG

## 2017-08-16 DIAGNOSIS — R07.81 RIB PAIN: ICD-10-CM

## 2017-08-16 DIAGNOSIS — S06.0X0D CONCUSSION, WITHOUT LOSS OF CONSCIOUSNESS, SUBSEQUENT ENCOUNTER: Primary | ICD-10-CM

## 2017-08-16 PROCEDURE — 99213 OFFICE O/P EST LOW 20 MIN: CPT | Performed by: FAMILY MEDICINE

## 2017-08-16 RX ORDER — ERGOCALCIFEROL 1.25 MG/1
CAPSULE ORAL
Refills: 1 | COMMUNITY
Start: 2017-07-28 | End: 2018-08-07

## 2017-08-16 RX ORDER — AZELASTINE 1 MG/ML
2 SPRAY, METERED NASAL 2 TIMES DAILY
Qty: 30 ML | Refills: 12 | Status: SHIPPED | OUTPATIENT
Start: 2017-08-16 | End: 2019-02-04 | Stop reason: ALTCHOICE

## 2017-08-16 RX ORDER — ORPHENADRINE CITRATE 100 MG/1
TABLET, EXTENDED RELEASE ORAL
Refills: 1 | COMMUNITY
Start: 2017-07-20 | End: 2018-08-07

## 2017-08-16 NOTE — PROGRESS NOTES
"Chief Complaint   Patient presents with   • Follow-up     hospital follow up in \[facility]\"" for a fall with concussion   • Chest Pain     L rib pain since fall   • Allergies     keeps awake       Subjective.../HPI  Patient present today with    I have reviewed the patient's medical history in detail and updated the computerized patient record.        Family History   Problem Relation Age of Onset   • Coronary artery disease Brother    • Heart attack Brother        Social History     Social History   • Marital status:      Spouse name: N/A   • Number of children: N/A   • Years of education: N/A     Occupational History   • Not on file.     Social History Main Topics   • Smoking status: Former Smoker     Years: 5.00     Types: Cigarettes   • Smokeless tobacco: Never Used      Comment: 1 cig day x 5 years   • Alcohol use No   • Drug use: No   • Sexual activity: Defer     Other Topics Concern   • Not on file     Social History Narrative   • No narrative on file       Review of Systems:   Review of Systems   Constitutional: Negative.    HENT: Negative.    Eyes: Negative.    Respiratory: Negative.    Cardiovascular: Negative.    Gastrointestinal: Negative.    Endocrine: Negative.    Genitourinary: Negative.    Musculoskeletal: Negative.    Skin: Negative.    Allergic/Immunologic: Negative.    Neurological: Negative.    Hematological: Negative.    Psychiatric/Behavioral: Negative.          Physical Exam      Vital Signs     Vitals:    17 1154   BP: 110/70   BP Location: Right arm   Patient Position: Sitting   Cuff Size: Adult   Pulse: 77   Temp: 98.3 °F (36.8 °C)   TempSrc: Oral   SpO2: 96%   Weight: 197 lb (89.4 kg)   Height: 71\" (180.3 cm)          Results Review:      {VANCEWS:20307}      Requested Prescriptions     Signed Prescriptions Disp Refills   • azelastine (ASTELIN) 0.1 % nasal spray 30 mL 12     Si sprays into each nostril 2 (Two) Times a Day. Use in each nostril as directed         Current " Outpatient Prescriptions:   •  apixaban (ELIQUIS) 2.5 MG tablet tablet, Take 1 tablet by mouth Every 12 (Twelve) Hours., Disp: 360 tablet, Rfl: 0  •  aspirin 81 MG tablet, Take 81 mg by mouth Daily., Disp: , Rfl:   •  atorvastatin (LIPITOR) 40 MG tablet, Take 1 tablet by mouth Daily., Disp: 90 tablet, Rfl: 3  •  carvedilol (COREG) 3.125 MG tablet, Take 3.125 mg by mouth 2 (Two) Times a Day With Meals., Disp: , Rfl:   •  azelastine (ASTELIN) 0.1 % nasal spray, 2 sprays into each nostril 2 (Two) Times a Day. Use in each nostril as directed, Disp: 30 mL, Rfl: 12  •  escitalopram (LEXAPRO) 20 MG tablet, Take 20 mg by mouth Daily., Disp: , Rfl:   •  furosemide (LASIX) 40 MG tablet, Take 40 mg by mouth Daily. Hold if BP is below 100/60, Disp: , Rfl:   •  levothyroxine (SYNTHROID, LEVOTHROID) 88 MCG tablet, Take 88 mcg by mouth Daily., Disp: , Rfl:   •  omeprazole (priLOSEC) 40 MG capsule, TAKE 1 CAPSULE DAILY (MUST MAKE APPOINTMENT), Disp: 90 capsule, Rfl: 1  •  orphenadrine (NORFLEX) 100 MG 12 hr tablet, TAKE ONE TABLET TWICE DAILY AS NEEDED, Disp: , Rfl: 1  •  sotalol (BETAPACE) 80 MG tablet, Take 40 mg by mouth 2 (Two) Times a Day. Pt takes 1/2 tablet (40mg) by mouth twice daily, Disp: , Rfl:   •  vitamin D (ERGOCALCIFEROL) 24051 units capsule capsule, TAKE ONE CAPSULE BY MOUTH ONCE A WEEK, Disp: , Rfl: 1    Procedures    Assessment/Plan     There are no diagnoses linked to this encounter.     No Follow-up on file.    Garry Joya M.D  08/21/17  3:19 PM

## 2017-08-21 RX ORDER — OMEPRAZOLE 40 MG/1
CAPSULE, DELAYED RELEASE ORAL
Qty: 90 CAPSULE | Refills: 1 | Status: SHIPPED | OUTPATIENT
Start: 2017-08-21 | End: 2018-03-11 | Stop reason: SDUPTHER

## 2017-08-21 NOTE — PROGRESS NOTES
Subjective.../ HPI  I have reviewed the patient's medical history in detail and updated the computerized patient record.    Subjective: Bhaskar Ibarra is a 81 y.o. male presents here today for-    Follow-up (hospital follow up in [facility] for a fall with concussion); Chest Pain (L rib pain since fall); and Allergies (keeps awake)      Family History   Problem Relation Age of Onset   • Coronary artery disease Brother    • Heart attack Brother        Social History     Social History   • Marital status:      Spouse name: N/A   • Number of children: N/A   • Years of education: N/A     Occupational History   • Not on file.     Social History Main Topics   • Smoking status: Former Smoker     Years: 5.00     Types: Cigarettes   • Smokeless tobacco: Never Used      Comment: 1 cig day x 5 years   • Alcohol use No   • Drug use: No   • Sexual activity: Defer     Other Topics Concern   • Not on file     Social History Narrative   • No narrative on file       Review of Systems   Constitutional: Negative.    HENT: Negative.    Eyes: Negative.    Respiratory: Negative.    Cardiovascular: Negative.    Gastrointestinal: Negative.    Endocrine: Negative.    Genitourinary: Negative.    Musculoskeletal: Negative.    Skin: Negative.    Allergic/Immunologic: Negative.    Neurological: Negative.    Hematological: Negative.    Psychiatric/Behavioral: Negative.        Physical Exam   Constitutional: He is oriented to person, place, and time. He appears well-developed and well-nourished.   HENT:   Head: Normocephalic.   Eyes: Pupils are equal, round, and reactive to light.   Neck: Normal range of motion.   Cardiovascular: Normal rate, regular rhythm and normal heart sounds.    Pulmonary/Chest: Effort normal.   Abdominal: Soft. Bowel sounds are normal.   Musculoskeletal: Normal range of motion.   Neurological: He is alert and oriented to person, place, and time.   Skin: Skin is warm and dry.       Procedures    Bhaskar was seen  today for follow-up, chest pain and allergies.    Diagnoses and all orders for this visit:    Concussion, without loss of consciousness, subsequent encounter    Rib pain    Other orders  -     azelastine (ASTELIN) 0.1 % nasal spray; 2 sprays into each nostril 2 (Two) Times a Day. Use in each nostril as directed      Requested Prescriptions     Signed Prescriptions Disp Refills   • azelastine (ASTELIN) 0.1 % nasal spray 30 mL 12     Si sprays into each nostril 2 (Two) Times a Day. Use in each nostril as directed       Results Review:    REVIEWED AND DISCUSSED CLINICAL RESULTS WITH PATIENT    No Follow-up on file.

## 2017-08-29 RX ORDER — APIXABAN 2.5 MG/1
TABLET, FILM COATED ORAL
Qty: 360 TABLET | Refills: 0 | Status: SHIPPED | OUTPATIENT
Start: 2017-08-29 | End: 2018-04-19 | Stop reason: SDUPTHER

## 2017-08-30 ENCOUNTER — APPOINTMENT (OUTPATIENT)
Dept: CARDIAC REHAB | Facility: HOSPITAL | Age: 82
End: 2017-08-30

## 2017-08-31 ENCOUNTER — APPOINTMENT (OUTPATIENT)
Dept: CARDIAC REHAB | Facility: HOSPITAL | Age: 82
End: 2017-08-31

## 2017-08-31 ENCOUNTER — OFFICE VISIT (OUTPATIENT)
Dept: CARDIAC REHAB | Facility: HOSPITAL | Age: 82
End: 2017-08-31

## 2017-08-31 DIAGNOSIS — Z95.1 S/P CABG X 5: Primary | ICD-10-CM

## 2017-08-31 RX ORDER — LEVOTHYROXINE SODIUM 88 UG/1
TABLET ORAL
Qty: 90 TABLET | Refills: 1 | Status: SHIPPED | OUTPATIENT
Start: 2017-08-31 | End: 2018-04-08 | Stop reason: SDUPTHER

## 2017-09-01 ENCOUNTER — APPOINTMENT (OUTPATIENT)
Dept: CARDIAC REHAB | Facility: HOSPITAL | Age: 82
End: 2017-09-01

## 2017-09-05 ENCOUNTER — APPOINTMENT (OUTPATIENT)
Dept: CARDIAC REHAB | Facility: HOSPITAL | Age: 82
End: 2017-09-05

## 2017-09-06 ENCOUNTER — APPOINTMENT (OUTPATIENT)
Dept: CARDIAC REHAB | Facility: HOSPITAL | Age: 82
End: 2017-09-06

## 2017-09-07 ENCOUNTER — APPOINTMENT (OUTPATIENT)
Dept: CARDIAC REHAB | Facility: HOSPITAL | Age: 82
End: 2017-09-07

## 2017-09-08 ENCOUNTER — APPOINTMENT (OUTPATIENT)
Dept: CARDIAC REHAB | Facility: HOSPITAL | Age: 82
End: 2017-09-08

## 2017-09-11 ENCOUNTER — APPOINTMENT (OUTPATIENT)
Dept: CARDIAC REHAB | Facility: HOSPITAL | Age: 82
End: 2017-09-11

## 2017-09-12 ENCOUNTER — APPOINTMENT (OUTPATIENT)
Dept: CARDIAC REHAB | Facility: HOSPITAL | Age: 82
End: 2017-09-12

## 2017-09-12 ENCOUNTER — OFFICE VISIT (OUTPATIENT)
Dept: CARDIAC REHAB | Facility: HOSPITAL | Age: 82
End: 2017-09-12

## 2017-09-12 DIAGNOSIS — Z95.1 S/P CABG X 5: Primary | ICD-10-CM

## 2017-09-13 ENCOUNTER — APPOINTMENT (OUTPATIENT)
Dept: CARDIAC REHAB | Facility: HOSPITAL | Age: 82
End: 2017-09-13

## 2017-09-14 ENCOUNTER — APPOINTMENT (OUTPATIENT)
Dept: CARDIAC REHAB | Facility: HOSPITAL | Age: 82
End: 2017-09-14

## 2017-09-14 ENCOUNTER — OFFICE VISIT (OUTPATIENT)
Dept: CARDIAC REHAB | Facility: HOSPITAL | Age: 82
End: 2017-09-14

## 2017-09-14 DIAGNOSIS — Z95.1 S/P CABG X 5: Primary | ICD-10-CM

## 2017-09-15 ENCOUNTER — APPOINTMENT (OUTPATIENT)
Dept: CARDIAC REHAB | Facility: HOSPITAL | Age: 82
End: 2017-09-15

## 2017-09-18 ENCOUNTER — APPOINTMENT (OUTPATIENT)
Dept: CARDIAC REHAB | Facility: HOSPITAL | Age: 82
End: 2017-09-18

## 2017-09-19 ENCOUNTER — APPOINTMENT (OUTPATIENT)
Dept: CARDIAC REHAB | Facility: HOSPITAL | Age: 82
End: 2017-09-19

## 2017-09-20 ENCOUNTER — APPOINTMENT (OUTPATIENT)
Dept: CARDIAC REHAB | Facility: HOSPITAL | Age: 82
End: 2017-09-20

## 2017-09-21 ENCOUNTER — APPOINTMENT (OUTPATIENT)
Dept: CARDIAC REHAB | Facility: HOSPITAL | Age: 82
End: 2017-09-21

## 2017-09-22 ENCOUNTER — APPOINTMENT (OUTPATIENT)
Dept: CARDIAC REHAB | Facility: HOSPITAL | Age: 82
End: 2017-09-22

## 2017-09-25 ENCOUNTER — APPOINTMENT (OUTPATIENT)
Dept: CARDIAC REHAB | Facility: HOSPITAL | Age: 82
End: 2017-09-25

## 2017-09-26 ENCOUNTER — APPOINTMENT (OUTPATIENT)
Dept: CARDIAC REHAB | Facility: HOSPITAL | Age: 82
End: 2017-09-26

## 2017-09-27 ENCOUNTER — APPOINTMENT (OUTPATIENT)
Dept: CARDIAC REHAB | Facility: HOSPITAL | Age: 82
End: 2017-09-27

## 2017-09-28 ENCOUNTER — APPOINTMENT (OUTPATIENT)
Dept: CARDIAC REHAB | Facility: HOSPITAL | Age: 82
End: 2017-09-28

## 2017-09-28 ENCOUNTER — OFFICE VISIT (OUTPATIENT)
Dept: CARDIAC REHAB | Facility: HOSPITAL | Age: 82
End: 2017-09-28

## 2017-09-28 DIAGNOSIS — Z95.1 S/P CABG X 5: Primary | ICD-10-CM

## 2017-09-29 ENCOUNTER — APPOINTMENT (OUTPATIENT)
Dept: CARDIAC REHAB | Facility: HOSPITAL | Age: 82
End: 2017-09-29

## 2017-10-02 ENCOUNTER — APPOINTMENT (OUTPATIENT)
Dept: CARDIAC REHAB | Facility: HOSPITAL | Age: 82
End: 2017-10-02

## 2017-10-03 ENCOUNTER — APPOINTMENT (OUTPATIENT)
Dept: CARDIAC REHAB | Facility: HOSPITAL | Age: 82
End: 2017-10-03

## 2017-10-04 ENCOUNTER — APPOINTMENT (OUTPATIENT)
Dept: CARDIAC REHAB | Facility: HOSPITAL | Age: 82
End: 2017-10-04

## 2017-10-05 ENCOUNTER — OFFICE VISIT (OUTPATIENT)
Dept: CARDIAC REHAB | Facility: HOSPITAL | Age: 82
End: 2017-10-05

## 2017-10-05 ENCOUNTER — APPOINTMENT (OUTPATIENT)
Dept: CARDIAC REHAB | Facility: HOSPITAL | Age: 82
End: 2017-10-05

## 2017-10-05 DIAGNOSIS — Z95.1 S/P CABG X 5: Primary | ICD-10-CM

## 2017-10-06 ENCOUNTER — APPOINTMENT (OUTPATIENT)
Dept: CARDIAC REHAB | Facility: HOSPITAL | Age: 82
End: 2017-10-06

## 2017-10-09 ENCOUNTER — APPOINTMENT (OUTPATIENT)
Dept: CARDIAC REHAB | Facility: HOSPITAL | Age: 82
End: 2017-10-09

## 2017-10-10 ENCOUNTER — APPOINTMENT (OUTPATIENT)
Dept: CARDIAC REHAB | Facility: HOSPITAL | Age: 82
End: 2017-10-10

## 2017-10-11 ENCOUNTER — APPOINTMENT (OUTPATIENT)
Dept: CARDIAC REHAB | Facility: HOSPITAL | Age: 82
End: 2017-10-11

## 2017-10-11 RX ORDER — FUROSEMIDE 40 MG/1
TABLET ORAL
Qty: 90 TABLET | Refills: 1 | Status: SHIPPED | OUTPATIENT
Start: 2017-10-11 | End: 2018-04-08 | Stop reason: SDUPTHER

## 2017-10-12 ENCOUNTER — APPOINTMENT (OUTPATIENT)
Dept: CARDIAC REHAB | Facility: HOSPITAL | Age: 82
End: 2017-10-12

## 2017-10-13 ENCOUNTER — APPOINTMENT (OUTPATIENT)
Dept: CARDIAC REHAB | Facility: HOSPITAL | Age: 82
End: 2017-10-13

## 2017-10-14 RX ORDER — CARVEDILOL 3.12 MG/1
TABLET ORAL
Qty: 180 TABLET | Refills: 0 | Status: SHIPPED | OUTPATIENT
Start: 2017-10-14 | End: 2018-01-28 | Stop reason: SDUPTHER

## 2017-10-16 ENCOUNTER — APPOINTMENT (OUTPATIENT)
Dept: CARDIAC REHAB | Facility: HOSPITAL | Age: 82
End: 2017-10-16

## 2017-10-17 ENCOUNTER — APPOINTMENT (OUTPATIENT)
Dept: CARDIAC REHAB | Facility: HOSPITAL | Age: 82
End: 2017-10-17

## 2017-10-18 ENCOUNTER — APPOINTMENT (OUTPATIENT)
Dept: CARDIAC REHAB | Facility: HOSPITAL | Age: 82
End: 2017-10-18

## 2017-10-18 ENCOUNTER — TELEPHONE (OUTPATIENT)
Dept: RADIATION ONCOLOGY | Facility: HOSPITAL | Age: 82
End: 2017-10-18

## 2017-10-18 RX ORDER — ESCITALOPRAM OXALATE 20 MG/1
TABLET ORAL
Qty: 90 TABLET | Refills: 0 | Status: SHIPPED | OUTPATIENT
Start: 2017-10-18 | End: 2018-01-09 | Stop reason: SDUPTHER

## 2017-10-18 NOTE — TELEPHONE ENCOUNTER
Patient called stating that he is a former patient of Dr. Whitaker and was wondering if he needed to come into the office for a followup appointment.  He states he is scheduled for a CT scan ordered by Dr. Colorado on 11/17/2017, does the patient need a followup appointment with Dr. Colorado after that?

## 2017-10-19 ENCOUNTER — APPOINTMENT (OUTPATIENT)
Dept: CARDIAC REHAB | Facility: HOSPITAL | Age: 82
End: 2017-10-19

## 2017-10-19 ENCOUNTER — TELEPHONE (OUTPATIENT)
Dept: RADIATION ONCOLOGY | Facility: HOSPITAL | Age: 82
End: 2017-10-19

## 2017-10-20 ENCOUNTER — APPOINTMENT (OUTPATIENT)
Dept: CARDIAC REHAB | Facility: HOSPITAL | Age: 82
End: 2017-10-20

## 2017-10-23 ENCOUNTER — APPOINTMENT (OUTPATIENT)
Dept: CARDIAC REHAB | Facility: HOSPITAL | Age: 82
End: 2017-10-23

## 2017-10-24 ENCOUNTER — APPOINTMENT (OUTPATIENT)
Dept: CARDIAC REHAB | Facility: HOSPITAL | Age: 82
End: 2017-10-24

## 2017-10-25 ENCOUNTER — APPOINTMENT (OUTPATIENT)
Dept: CARDIAC REHAB | Facility: HOSPITAL | Age: 82
End: 2017-10-25

## 2017-10-26 ENCOUNTER — APPOINTMENT (OUTPATIENT)
Dept: CARDIAC REHAB | Facility: HOSPITAL | Age: 82
End: 2017-10-26

## 2017-10-27 ENCOUNTER — APPOINTMENT (OUTPATIENT)
Dept: CARDIAC REHAB | Facility: HOSPITAL | Age: 82
End: 2017-10-27

## 2017-10-27 ENCOUNTER — HOSPITAL ENCOUNTER (OUTPATIENT)
Dept: CT IMAGING | Facility: HOSPITAL | Age: 82
Discharge: HOME | End: 2017-10-27
Attending: SURGERY | Admitting: SURGERY

## 2017-10-27 DIAGNOSIS — I71.60: ICD-10-CM

## 2017-10-27 PROCEDURE — 74150 CT ABDOMEN W/O CONTRAST: CPT

## 2017-10-27 PROCEDURE — 71250 CT THORAX DX C-: CPT

## 2017-10-30 ENCOUNTER — APPOINTMENT (OUTPATIENT)
Dept: CARDIAC REHAB | Facility: HOSPITAL | Age: 82
End: 2017-10-30

## 2017-10-30 RX ORDER — SOTALOL HYDROCHLORIDE 80 MG/1
40 TABLET ORAL 2 TIMES DAILY
Qty: 90 TABLET | Refills: 2 | Status: SHIPPED | OUTPATIENT
Start: 2017-10-30 | End: 2018-07-05 | Stop reason: SDUPTHER

## 2017-10-31 ENCOUNTER — APPOINTMENT (OUTPATIENT)
Dept: CARDIAC REHAB | Facility: HOSPITAL | Age: 82
End: 2017-10-31

## 2017-11-01 ENCOUNTER — APPOINTMENT (OUTPATIENT)
Dept: CARDIAC REHAB | Facility: HOSPITAL | Age: 82
End: 2017-11-01

## 2017-11-02 ENCOUNTER — APPOINTMENT (OUTPATIENT)
Dept: CARDIAC REHAB | Facility: HOSPITAL | Age: 82
End: 2017-11-02

## 2017-11-03 ENCOUNTER — APPOINTMENT (OUTPATIENT)
Dept: CARDIAC REHAB | Facility: HOSPITAL | Age: 82
End: 2017-11-03

## 2017-11-06 ENCOUNTER — APPOINTMENT (OUTPATIENT)
Dept: CARDIAC REHAB | Facility: HOSPITAL | Age: 82
End: 2017-11-06

## 2017-11-07 ENCOUNTER — APPOINTMENT (OUTPATIENT)
Dept: CARDIAC REHAB | Facility: HOSPITAL | Age: 82
End: 2017-11-07

## 2017-11-08 ENCOUNTER — APPOINTMENT (OUTPATIENT)
Dept: CARDIAC REHAB | Facility: HOSPITAL | Age: 82
End: 2017-11-08

## 2017-11-09 ENCOUNTER — APPOINTMENT (OUTPATIENT)
Dept: CARDIAC REHAB | Facility: HOSPITAL | Age: 82
End: 2017-11-09

## 2017-11-10 ENCOUNTER — APPOINTMENT (OUTPATIENT)
Dept: CARDIAC REHAB | Facility: HOSPITAL | Age: 82
End: 2017-11-10

## 2017-11-13 ENCOUNTER — APPOINTMENT (OUTPATIENT)
Dept: CARDIAC REHAB | Facility: HOSPITAL | Age: 82
End: 2017-11-13

## 2017-11-14 ENCOUNTER — APPOINTMENT (OUTPATIENT)
Dept: CARDIAC REHAB | Facility: HOSPITAL | Age: 82
End: 2017-11-14

## 2017-11-15 ENCOUNTER — APPOINTMENT (OUTPATIENT)
Dept: CARDIAC REHAB | Facility: HOSPITAL | Age: 82
End: 2017-11-15

## 2017-11-16 ENCOUNTER — APPOINTMENT (OUTPATIENT)
Dept: CARDIAC REHAB | Facility: HOSPITAL | Age: 82
End: 2017-11-16

## 2017-11-17 ENCOUNTER — APPOINTMENT (OUTPATIENT)
Dept: CARDIAC REHAB | Facility: HOSPITAL | Age: 82
End: 2017-11-17

## 2017-11-20 ENCOUNTER — APPOINTMENT (OUTPATIENT)
Dept: CARDIAC REHAB | Facility: HOSPITAL | Age: 82
End: 2017-11-20

## 2017-11-21 ENCOUNTER — APPOINTMENT (OUTPATIENT)
Dept: CARDIAC REHAB | Facility: HOSPITAL | Age: 82
End: 2017-11-21

## 2017-11-22 ENCOUNTER — APPOINTMENT (OUTPATIENT)
Dept: CARDIAC REHAB | Facility: HOSPITAL | Age: 82
End: 2017-11-22

## 2017-11-27 ENCOUNTER — APPOINTMENT (OUTPATIENT)
Dept: CARDIAC REHAB | Facility: HOSPITAL | Age: 82
End: 2017-11-27

## 2017-11-28 ENCOUNTER — APPOINTMENT (OUTPATIENT)
Dept: CARDIAC REHAB | Facility: HOSPITAL | Age: 82
End: 2017-11-28

## 2017-11-29 ENCOUNTER — APPOINTMENT (OUTPATIENT)
Dept: CARDIAC REHAB | Facility: HOSPITAL | Age: 82
End: 2017-11-29

## 2017-11-30 ENCOUNTER — APPOINTMENT (OUTPATIENT)
Dept: CARDIAC REHAB | Facility: HOSPITAL | Age: 82
End: 2017-11-30

## 2017-12-01 ENCOUNTER — APPOINTMENT (OUTPATIENT)
Dept: CARDIAC REHAB | Facility: HOSPITAL | Age: 82
End: 2017-12-01

## 2017-12-04 ENCOUNTER — APPOINTMENT (OUTPATIENT)
Dept: CARDIAC REHAB | Facility: HOSPITAL | Age: 82
End: 2017-12-04

## 2017-12-05 ENCOUNTER — OFFICE VISIT (OUTPATIENT)
Dept: CARDIAC REHAB | Facility: HOSPITAL | Age: 82
End: 2017-12-05

## 2017-12-05 ENCOUNTER — APPOINTMENT (OUTPATIENT)
Dept: CARDIAC REHAB | Facility: HOSPITAL | Age: 82
End: 2017-12-05

## 2017-12-05 DIAGNOSIS — Z95.1 S/P CABG X 5: Primary | ICD-10-CM

## 2017-12-06 ENCOUNTER — APPOINTMENT (OUTPATIENT)
Dept: CARDIAC REHAB | Facility: HOSPITAL | Age: 82
End: 2017-12-06

## 2017-12-07 ENCOUNTER — APPOINTMENT (OUTPATIENT)
Dept: CARDIAC REHAB | Facility: HOSPITAL | Age: 82
End: 2017-12-07

## 2017-12-08 ENCOUNTER — APPOINTMENT (OUTPATIENT)
Dept: CARDIAC REHAB | Facility: HOSPITAL | Age: 82
End: 2017-12-08

## 2017-12-11 ENCOUNTER — APPOINTMENT (OUTPATIENT)
Dept: CARDIAC REHAB | Facility: HOSPITAL | Age: 82
End: 2017-12-11

## 2017-12-12 ENCOUNTER — APPOINTMENT (OUTPATIENT)
Dept: CARDIAC REHAB | Facility: HOSPITAL | Age: 82
End: 2017-12-12

## 2017-12-13 ENCOUNTER — APPOINTMENT (OUTPATIENT)
Dept: CARDIAC REHAB | Facility: HOSPITAL | Age: 82
End: 2017-12-13

## 2017-12-14 ENCOUNTER — APPOINTMENT (OUTPATIENT)
Dept: CARDIAC REHAB | Facility: HOSPITAL | Age: 82
End: 2017-12-14

## 2017-12-15 ENCOUNTER — APPOINTMENT (OUTPATIENT)
Dept: CARDIAC REHAB | Facility: HOSPITAL | Age: 82
End: 2017-12-15

## 2017-12-18 ENCOUNTER — APPOINTMENT (OUTPATIENT)
Dept: CARDIAC REHAB | Facility: HOSPITAL | Age: 82
End: 2017-12-18

## 2017-12-19 ENCOUNTER — APPOINTMENT (OUTPATIENT)
Dept: CARDIAC REHAB | Facility: HOSPITAL | Age: 82
End: 2017-12-19

## 2017-12-20 ENCOUNTER — APPOINTMENT (OUTPATIENT)
Dept: CARDIAC REHAB | Facility: HOSPITAL | Age: 82
End: 2017-12-20

## 2017-12-21 ENCOUNTER — APPOINTMENT (OUTPATIENT)
Dept: CARDIAC REHAB | Facility: HOSPITAL | Age: 82
End: 2017-12-21

## 2017-12-22 ENCOUNTER — APPOINTMENT (OUTPATIENT)
Dept: CARDIAC REHAB | Facility: HOSPITAL | Age: 82
End: 2017-12-22

## 2017-12-26 ENCOUNTER — APPOINTMENT (OUTPATIENT)
Dept: CARDIAC REHAB | Facility: HOSPITAL | Age: 82
End: 2017-12-26

## 2017-12-27 ENCOUNTER — APPOINTMENT (OUTPATIENT)
Dept: CARDIAC REHAB | Facility: HOSPITAL | Age: 82
End: 2017-12-27

## 2017-12-28 ENCOUNTER — APPOINTMENT (OUTPATIENT)
Dept: CARDIAC REHAB | Facility: HOSPITAL | Age: 82
End: 2017-12-28

## 2017-12-29 ENCOUNTER — APPOINTMENT (OUTPATIENT)
Dept: CARDIAC REHAB | Facility: HOSPITAL | Age: 82
End: 2017-12-29

## 2018-01-01 DIAGNOSIS — E78.3 HYPERCHYLOMICRONEMIA: ICD-10-CM

## 2018-01-02 ENCOUNTER — APPOINTMENT (OUTPATIENT)
Dept: CARDIAC REHAB | Facility: HOSPITAL | Age: 83
End: 2018-01-02

## 2018-01-02 RX ORDER — ATORVASTATIN CALCIUM 40 MG/1
TABLET, FILM COATED ORAL
Qty: 90 TABLET | Refills: 3 | Status: SHIPPED | OUTPATIENT
Start: 2018-01-02 | End: 2018-05-11 | Stop reason: SDUPTHER

## 2018-01-03 ENCOUNTER — APPOINTMENT (OUTPATIENT)
Dept: CARDIAC REHAB | Facility: HOSPITAL | Age: 83
End: 2018-01-03

## 2018-01-03 ENCOUNTER — TRANSCRIBE ORDERS (OUTPATIENT)
Dept: CARDIAC REHAB | Facility: HOSPITAL | Age: 83
End: 2018-01-03

## 2018-01-03 DIAGNOSIS — Z95.1 S/P CABG X 5: Primary | ICD-10-CM

## 2018-01-04 ENCOUNTER — APPOINTMENT (OUTPATIENT)
Dept: CARDIAC REHAB | Facility: HOSPITAL | Age: 83
End: 2018-01-04

## 2018-01-05 ENCOUNTER — APPOINTMENT (OUTPATIENT)
Dept: CARDIAC REHAB | Facility: HOSPITAL | Age: 83
End: 2018-01-05

## 2018-01-08 ENCOUNTER — APPOINTMENT (OUTPATIENT)
Dept: CARDIAC REHAB | Facility: HOSPITAL | Age: 83
End: 2018-01-08

## 2018-01-09 ENCOUNTER — APPOINTMENT (OUTPATIENT)
Dept: CARDIAC REHAB | Facility: HOSPITAL | Age: 83
End: 2018-01-09

## 2018-01-10 ENCOUNTER — APPOINTMENT (OUTPATIENT)
Dept: CARDIAC REHAB | Facility: HOSPITAL | Age: 83
End: 2018-01-10

## 2018-01-10 RX ORDER — ESCITALOPRAM OXALATE 20 MG/1
TABLET ORAL
Qty: 90 TABLET | Refills: 0 | Status: SHIPPED | OUTPATIENT
Start: 2018-01-10 | End: 2018-04-10 | Stop reason: SDUPTHER

## 2018-01-11 ENCOUNTER — APPOINTMENT (OUTPATIENT)
Dept: CARDIAC REHAB | Facility: HOSPITAL | Age: 83
End: 2018-01-11

## 2018-01-12 ENCOUNTER — APPOINTMENT (OUTPATIENT)
Dept: CARDIAC REHAB | Facility: HOSPITAL | Age: 83
End: 2018-01-12

## 2018-01-29 RX ORDER — CARVEDILOL 3.12 MG/1
TABLET ORAL
Qty: 60 TABLET | Refills: 0 | Status: SHIPPED | OUTPATIENT
Start: 2018-01-29 | End: 2018-04-12 | Stop reason: SDUPTHER

## 2018-01-29 RX ORDER — CARVEDILOL 3.12 MG/1
TABLET ORAL
Qty: 180 TABLET | Refills: 0 | Status: SHIPPED | OUTPATIENT
Start: 2018-01-29 | End: 2018-01-29 | Stop reason: SDUPTHER

## 2018-02-02 RX ORDER — ROPINIROLE 0.5 MG/1
0.5 TABLET, FILM COATED ORAL NIGHTLY
Qty: 60 TABLET | Refills: 3 | Status: SHIPPED | OUTPATIENT
Start: 2018-02-02 | End: 2018-03-16 | Stop reason: SDUPTHER

## 2018-03-12 ENCOUNTER — APPOINTMENT (OUTPATIENT)
Dept: CARDIAC REHAB | Facility: HOSPITAL | Age: 83
End: 2018-03-12

## 2018-03-12 RX ORDER — OMEPRAZOLE 40 MG/1
CAPSULE, DELAYED RELEASE ORAL
Qty: 90 CAPSULE | Refills: 1 | Status: SHIPPED | OUTPATIENT
Start: 2018-03-12 | End: 2018-09-07 | Stop reason: SDUPTHER

## 2018-03-13 ENCOUNTER — APPOINTMENT (OUTPATIENT)
Dept: CARDIAC REHAB | Facility: HOSPITAL | Age: 83
End: 2018-03-13

## 2018-03-14 ENCOUNTER — APPOINTMENT (OUTPATIENT)
Dept: CARDIAC REHAB | Facility: HOSPITAL | Age: 83
End: 2018-03-14

## 2018-03-15 ENCOUNTER — APPOINTMENT (OUTPATIENT)
Dept: CARDIAC REHAB | Facility: HOSPITAL | Age: 83
End: 2018-03-15

## 2018-03-16 ENCOUNTER — APPOINTMENT (OUTPATIENT)
Dept: CARDIAC REHAB | Facility: HOSPITAL | Age: 83
End: 2018-03-16

## 2018-03-16 RX ORDER — ROPINIROLE 0.5 MG/1
TABLET, FILM COATED ORAL
Qty: 60 TABLET | Refills: 3 | Status: SHIPPED | OUTPATIENT
Start: 2018-03-16 | End: 2018-06-26 | Stop reason: SDUPTHER

## 2018-04-09 RX ORDER — LEVOTHYROXINE SODIUM 88 UG/1
TABLET ORAL
Qty: 90 TABLET | Refills: 1 | Status: SHIPPED | OUTPATIENT
Start: 2018-04-09 | End: 2018-10-09 | Stop reason: SDUPTHER

## 2018-04-09 RX ORDER — FUROSEMIDE 40 MG/1
TABLET ORAL
Qty: 90 TABLET | Refills: 1 | Status: SHIPPED | OUTPATIENT
Start: 2018-04-09 | End: 2018-09-21 | Stop reason: SDUPTHER

## 2018-04-10 ENCOUNTER — OFFICE VISIT (OUTPATIENT)
Dept: INTERNAL MEDICINE | Facility: CLINIC | Age: 83
End: 2018-04-10

## 2018-04-10 VITALS
WEIGHT: 206.4 LBS | HEART RATE: 59 BPM | TEMPERATURE: 97.3 F | SYSTOLIC BLOOD PRESSURE: 122 MMHG | BODY MASS INDEX: 28.9 KG/M2 | HEIGHT: 71 IN | RESPIRATION RATE: 16 BRPM | OXYGEN SATURATION: 95 % | DIASTOLIC BLOOD PRESSURE: 80 MMHG

## 2018-04-10 DIAGNOSIS — Z00.00 WELL ADULT HEALTH CHECK: Primary | ICD-10-CM

## 2018-04-10 PROCEDURE — 99214 OFFICE O/P EST MOD 30 MIN: CPT | Performed by: NURSE PRACTITIONER

## 2018-04-10 RX ORDER — ESCITALOPRAM OXALATE 20 MG/1
TABLET ORAL
Qty: 90 TABLET | Refills: 0 | Status: SHIPPED | OUTPATIENT
Start: 2018-04-10 | End: 2018-07-11 | Stop reason: SDUPTHER

## 2018-04-10 NOTE — PROGRESS NOTES
Subjective   Bhaskar Ibarra is a 82 y.o. male.     Chief Complaint   Patient presents with   • Follow-up     Pt states he is here for a routine follow up         Mr. Ibarra patient to our practice.  He has a history of coronary atheriosclerosis, A. fib, depression, hypothyroid, hyperlipidemia, and postconcussion syndrome.  He presents today simply for a well check.  He reports he is feeling no pain or discomfort.  Her fever, no headache, no joint pain, no confustion, and no bowel or bladder symptoms.  He remains quite active.  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  He reports he is in good spirits with no specific concerns           The following portions of the patient's history were reviewed and updated as appropriate: allergies, current medications, past social history and problem list.    Review of Systems   Constitutional: Negative.    HENT: Negative.    Eyes: Negative.    Respiratory: Negative.    Endocrine: Negative.    Genitourinary: Negative.    Allergic/Immunologic: Negative.    Neurological: Negative.        Objective   Vitals:    04/10/18 1002   BP: 122/80   Pulse: 59   Resp: 16   Temp: 97.3 °F (36.3 °C)   SpO2: 95%     Physical Exam   Constitutional: He is oriented to person, place, and time. He appears well-developed and well-nourished.   HENT:   Head: Normocephalic.   Right Ear: External ear normal.   Left Ear: External ear normal.   Nose: Nose normal.   Mouth/Throat: Oropharynx is clear and moist.   Eyes: Conjunctivae and EOM are normal. Pupils are equal, round, and reactive to light.   Neck: Normal range of motion.   Cardiovascular: Normal rate, regular rhythm and normal heart sounds.    Pulmonary/Chest: Effort normal and breath sounds normal.   Abdominal: Soft. Bowel sounds are normal.   Musculoskeletal: Normal range of motion.   Neurological: He is alert and oriented to person, place, and time.   Skin: Skin is warm and dry. Capillary refill takes more than 3  seconds.   Psychiatric: He has a normal mood and affect. His behavior is normal. Judgment and thought content normal.       Assessment/Plan   Problem List Items Addressed This Visit     Well adult health check - Primary    Relevant Orders    Lipid panel    CBC w AUTO Differential    Comprehensive metabolic panel      Other Visit Diagnoses    None.            Patient here for well check. No immediate health concerns identified. Will draw basic labs while patient is here.

## 2018-04-11 LAB
ALBUMIN SERPL-MCNC: 4.1 G/DL (ref 3.5–5.2)
ALBUMIN/GLOB SERPL: 1.3 G/DL
ALP SERPL-CCNC: 111 U/L (ref 39–117)
ALT SERPL-CCNC: 17 U/L (ref 1–41)
AST SERPL-CCNC: 13 U/L (ref 1–40)
BASOPHILS # BLD AUTO: 0.03 10*3/MM3 (ref 0–0.2)
BASOPHILS NFR BLD AUTO: 0.3 % (ref 0–1.5)
BILIRUB SERPL-MCNC: 0.5 MG/DL (ref 0.1–1.2)
BUN SERPL-MCNC: 17 MG/DL (ref 8–23)
BUN/CREAT SERPL: 12.2 (ref 7–25)
CALCIUM SERPL-MCNC: 9.5 MG/DL (ref 8.6–10.5)
CHLORIDE SERPL-SCNC: 101 MMOL/L (ref 98–107)
CHOLEST SERPL-MCNC: 201 MG/DL (ref 0–200)
CO2 SERPL-SCNC: 27.9 MMOL/L (ref 22–29)
CREAT SERPL-MCNC: 1.39 MG/DL (ref 0.76–1.27)
EOSINOPHIL # BLD AUTO: 0.18 10*3/MM3 (ref 0–0.7)
EOSINOPHIL NFR BLD AUTO: 1.8 % (ref 0.3–6.2)
ERYTHROCYTE [DISTWIDTH] IN BLOOD BY AUTOMATED COUNT: 14.8 % (ref 11.5–14.5)
GFR SERPLBLD CREATININE-BSD FMLA CKD-EPI: 49 ML/MIN/1.73
GFR SERPLBLD CREATININE-BSD FMLA CKD-EPI: 59 ML/MIN/1.73
GLOBULIN SER CALC-MCNC: 3.1 GM/DL
GLUCOSE SERPL-MCNC: 179 MG/DL (ref 65–99)
HCT VFR BLD AUTO: 43.9 % (ref 40.4–52.2)
HDLC SERPL-MCNC: 27 MG/DL (ref 40–60)
HGB BLD-MCNC: 13.5 G/DL (ref 13.7–17.6)
IMM GRANULOCYTES # BLD: 0.04 10*3/MM3 (ref 0–0.03)
IMM GRANULOCYTES NFR BLD: 0.4 % (ref 0–0.5)
LDLC SERPL CALC-MCNC: ABNORMAL MG/DL
LYMPHOCYTES # BLD AUTO: 2.81 10*3/MM3 (ref 0.9–4.8)
LYMPHOCYTES NFR BLD AUTO: 28.7 % (ref 19.6–45.3)
MCH RBC QN AUTO: 32.7 PG (ref 27–32.7)
MCHC RBC AUTO-ENTMCNC: 30.8 G/DL (ref 32.6–36.4)
MCV RBC AUTO: 106.3 FL (ref 79.8–96.2)
MONOCYTES # BLD AUTO: 0.93 10*3/MM3 (ref 0.2–1.2)
MONOCYTES NFR BLD AUTO: 9.5 % (ref 5–12)
NEUTROPHILS # BLD AUTO: 5.79 10*3/MM3 (ref 1.9–8.1)
NEUTROPHILS NFR BLD AUTO: 59.3 % (ref 42.7–76)
PLATELET # BLD AUTO: 321 10*3/MM3 (ref 140–500)
POTASSIUM SERPL-SCNC: 5 MMOL/L (ref 3.5–5.2)
PROT SERPL-MCNC: 7.2 G/DL (ref 6–8.5)
RBC # BLD AUTO: 4.13 10*6/MM3 (ref 4.6–6)
SODIUM SERPL-SCNC: 141 MMOL/L (ref 136–145)
TRIGL SERPL-MCNC: 453 MG/DL (ref 0–150)
VLDLC SERPL CALC-MCNC: ABNORMAL MG/DL
WBC # BLD AUTO: 9.78 10*3/MM3 (ref 4.5–10.7)

## 2018-04-12 RX ORDER — CARVEDILOL 3.12 MG/1
TABLET ORAL
Qty: 180 TABLET | Refills: 0 | Status: SHIPPED | OUTPATIENT
Start: 2018-04-12 | End: 2018-09-07 | Stop reason: SDUPTHER

## 2018-04-19 RX ORDER — APIXABAN 2.5 MG/1
TABLET, FILM COATED ORAL
Qty: 360 TABLET | Refills: 0 | Status: SHIPPED | OUTPATIENT
Start: 2018-04-19 | End: 2018-04-20 | Stop reason: SDUPTHER

## 2018-05-03 ENCOUNTER — TELEPHONE (OUTPATIENT)
Dept: INTERNAL MEDICINE | Facility: CLINIC | Age: 83
End: 2018-05-03

## 2018-05-03 RX ORDER — FENOFIBRATE 145 MG/1
145 TABLET, COATED ORAL DAILY
Qty: 90 TABLET | Refills: 3 | Status: SHIPPED | OUTPATIENT
Start: 2018-05-03 | End: 2019-06-24 | Stop reason: SDUPTHER

## 2018-05-09 ENCOUNTER — TELEPHONE (OUTPATIENT)
Dept: INTERNAL MEDICINE | Facility: CLINIC | Age: 83
End: 2018-05-09

## 2018-05-11 DIAGNOSIS — E78.3 HYPERCHYLOMICRONEMIA: ICD-10-CM

## 2018-05-11 RX ORDER — ATORVASTATIN CALCIUM 40 MG/1
40 TABLET, FILM COATED ORAL DAILY
Qty: 90 TABLET | Refills: 3 | Status: SHIPPED | OUTPATIENT
Start: 2018-05-11 | End: 2018-08-15 | Stop reason: ALTCHOICE

## 2018-05-17 ENCOUNTER — TELEPHONE (OUTPATIENT)
Dept: INTERNAL MEDICINE | Facility: CLINIC | Age: 83
End: 2018-05-17

## 2018-05-24 ENCOUNTER — TELEPHONE (OUTPATIENT)
Dept: INTERNAL MEDICINE | Facility: CLINIC | Age: 83
End: 2018-05-24

## 2018-06-26 RX ORDER — ROPINIROLE 0.5 MG/1
TABLET, FILM COATED ORAL
Qty: 60 TABLET | Refills: 3 | Status: SHIPPED | OUTPATIENT
Start: 2018-06-26 | End: 2020-08-10

## 2018-07-01 ENCOUNTER — HOSPITAL ENCOUNTER (EMERGENCY)
Facility: HOSPITAL | Age: 83
End: 2018-07-01

## 2018-07-05 RX ORDER — SOTALOL HYDROCHLORIDE 80 MG/1
TABLET ORAL
Qty: 90 TABLET | Refills: 0 | Status: SHIPPED | OUTPATIENT
Start: 2018-07-05 | End: 2018-11-07 | Stop reason: SDUPTHER

## 2018-07-11 RX ORDER — ESCITALOPRAM OXALATE 20 MG/1
TABLET ORAL
Qty: 90 TABLET | Refills: 0 | Status: SHIPPED | OUTPATIENT
Start: 2018-07-11 | End: 2018-10-09 | Stop reason: SDUPTHER

## 2018-08-07 ENCOUNTER — OFFICE VISIT (OUTPATIENT)
Dept: INTERNAL MEDICINE | Age: 83
End: 2018-08-07

## 2018-08-07 VITALS
WEIGHT: 201.8 LBS | TEMPERATURE: 96.7 F | BODY MASS INDEX: 28.25 KG/M2 | DIASTOLIC BLOOD PRESSURE: 70 MMHG | HEIGHT: 71 IN | SYSTOLIC BLOOD PRESSURE: 124 MMHG | OXYGEN SATURATION: 97 % | HEART RATE: 76 BPM

## 2018-08-07 DIAGNOSIS — M81.0 OSTEOPOROSIS, UNSPECIFIED OSTEOPOROSIS TYPE, UNSPECIFIED PATHOLOGICAL FRACTURE PRESENCE: Chronic | ICD-10-CM

## 2018-08-07 DIAGNOSIS — R73.01 IMPAIRED FASTING GLUCOSE: ICD-10-CM

## 2018-08-07 DIAGNOSIS — C34.91 ADENOCARCINOMA OF RIGHT LUNG (HCC): ICD-10-CM

## 2018-08-07 DIAGNOSIS — E03.4 HYPOTHYROIDISM DUE TO ACQUIRED ATROPHY OF THYROID: Primary | ICD-10-CM

## 2018-08-07 DIAGNOSIS — I48.20 CHRONIC ATRIAL FIBRILLATION (HCC): ICD-10-CM

## 2018-08-07 DIAGNOSIS — E78.2 MIXED HYPERLIPIDEMIA: ICD-10-CM

## 2018-08-07 DIAGNOSIS — I71.40 ABDOMINAL AORTIC ANEURYSM (AAA) WITHOUT RUPTURE (HCC): ICD-10-CM

## 2018-08-07 DIAGNOSIS — Z79.01 CHRONIC ANTICOAGULATION: ICD-10-CM

## 2018-08-07 DIAGNOSIS — R91.1 PULMONARY NODULE: ICD-10-CM

## 2018-08-07 DIAGNOSIS — E55.9 VITAMIN D DEFICIENCY: ICD-10-CM

## 2018-08-07 PROBLEM — Z13.1 ENCOUNTER FOR SCREENING FOR DIABETES MELLITUS: Status: RESOLVED | Noted: 2017-01-20 | Resolved: 2018-08-07

## 2018-08-07 PROCEDURE — 99214 OFFICE O/P EST MOD 30 MIN: CPT | Performed by: NURSE PRACTITIONER

## 2018-08-07 NOTE — PROGRESS NOTES
[facility]      Bhaskar Ibarra / 82 y.o. / male  08/07/2018      ASSESSMENT & PLAN:    Problem List Items Addressed This Visit        Cardiovascular and Mediastinum    Atrial fibrillation (CMS/HCC)    Relevant Medications    carvedilol (COREG) 3.125 MG tablet    sotalol (BETAPACE) 80 MG tablet    Other Relevant Orders    Comprehensive Metabolic Panel    Hyperlipidemia    Relevant Medications    fenofibrate (TRICOR) 145 MG tablet    atorvastatin (LIPITOR) 40 MG tablet    Other Relevant Orders    Comprehensive Metabolic Panel    Lipid Panel With / Chol / HDL Ratio    AAA (abdominal aortic aneurysm) (CMS/HCC)    Relevant Orders    CT Chest With Contrast       Respiratory    Pulmonary nodule    Relevant Orders    CT Chest With Contrast       Digestive    Vitamin D deficiency    Relevant Orders    Vitamin D 25 Hydroxy       Endocrine    Hypothyroidism - Primary    Relevant Medications    levothyroxine (SYNTHROID, LEVOTHROID) 88 MCG tablet    carvedilol (COREG) 3.125 MG tablet    sotalol (BETAPACE) 80 MG tablet    Other Relevant Orders    TSH Rfx On Abnormal To Free T4    Impaired fasting glucose    Relevant Orders    Comprehensive Metabolic Panel    Hemoglobin A1c    Urinalysis With Culture If Indicated - Urine, Clean Catch       Musculoskeletal and Integument    RESOLVED: Osteoporosis (Chronic)    Relevant Orders    Vitamin D 25 Hydroxy       Other    Adenocarcinoma of right lung (CMS/HCC)    Relevant Orders    CT Chest With Contrast      Other Visit Diagnoses     Chronic anticoagulation        Relevant Orders    Comprehensive Metabolic Panel    CBC & Differential        Orders Placed This Encounter   Procedures   • CT Chest With Contrast   • Comprehensive Metabolic Panel   • Hemoglobin A1c   • Lipid Panel With / Chol / HDL Ratio   • TSH Rfx On Abnormal To Free T4   • Urinalysis With Culture If Indicated - Urine, Clean Catch   • Vitamin D 25 Hydroxy   • CBC & Differential     No orders of the defined types  "were placed in this encounter.      Summary/Discussion:    1. Hypothyroidism due to acquired atrophy of thyroid    - TSH Rfx On Abnormal To Free T4    2. Vitamin D deficiency    - Vitamin D 25 Hydroxy    3. Mixed hyperlipidemia    - Comprehensive Metabolic Panel  - Lipid Panel With / Chol / HDL Ratio    4. Chronic atrial fibrillation (CMS/HCC)    - Comprehensive Metabolic Panel    5. Osteoporosis, unspecified osteoporosis type, unspecified pathological fracture presence    - Vitamin D 25 Hydroxy    6. Adenocarcinoma of right lung (CMS/HCC)    - CT Chest With Contrast    7. Impaired fasting glucose    - Comprehensive Metabolic Panel  - Hemoglobin A1c  - Urinalysis With Culture If Indicated - Urine, Clean Catch    8. Abdominal aortic aneurysm (AAA) without rupture (CMS/HCC)    - CT Chest With Contrast    9. Chronic anticoagulation    - Comprehensive Metabolic Panel  - CBC & Differential    10. Pulmonary nodule    - CT Chest With Contrast        Return in about 6 months (around 2/7/2019) for Next scheduled follow up.    ____________________________________________________________________    VITALS:    Visit Vitals  /70   Pulse 76   Temp 96.7 °F (35.9 °C)   Ht 180.3 cm (71\")   Wt 91.5 kg (201 lb 12.8 oz)   SpO2 97%   BMI 28.15 kg/m²       BP Readings from Last 3 Encounters:   08/07/18 124/70   04/10/18 122/80   08/16/17 110/70     Wt Readings from Last 3 Encounters:   08/07/18 91.5 kg (201 lb 12.8 oz)   04/10/18 93.6 kg (206 lb 6.4 oz)   08/16/17 89.4 kg (197 lb)      Body mass index is 28.15 kg/m².    CC: Main reason(s) for today's visit: Establish Care (new pt to establish care); Atrial Fibrillation; Hyperlipidemia; and Hypothyroidism      HPI:    Patient is a 82 y.o. male who is here to establish care. Previous patient of Dr. Joya.     History of hyperlipidemia and hypertriglyceridemia, currently on Lipitor and fenofibrate.     History of lung cancer 2 years ago, s/p radiation treatment. Patient had CT " scan done beginning of 2017 that demonstrated stable right pulmonary nodule. He also has history of AAA, was being monitored by Dr. Reddy but has not had imaging since beginning of 2017.     Hx Atrial fibrillation, on Eliquis, managed by cardiology.       Patient Care Team:  Katherine Lester APRN as PCP - General (Internal Medicine)  Al Payne Jr., MD (Cardiology)  Lokesh Reddy MD as Consulting Physician (Vascular Surgery)  ____________________________________________________________________      REVIEW OF SYSTEMS    Review of Systems   Constitutional: Negative for activity change, appetite change, chills, fatigue and fever.   Respiratory: Negative for shortness of breath.    Cardiovascular: Negative for chest pain, palpitations and leg swelling.   Endocrine: Negative for cold intolerance and heat intolerance.   Neurological: Negative for dizziness.         PHYSICAL EXAMINATION    Physical Exam   Constitutional: He is oriented to person, place, and time. Vital signs are normal. He appears well-developed and well-nourished. He is cooperative. He does not appear ill. No distress.   Cardiovascular: Normal rate, S1 normal, S2 normal and normal heart sounds.  An irregular rhythm present.   No murmur heard.  Pulmonary/Chest: Effort normal and breath sounds normal. He has no decreased breath sounds. He has no wheezes. He has no rhonchi. He has no rales.   Neurological: He is alert and oriented to person, place, and time.   Skin: Skin is warm, dry and intact.   Psychiatric: He has a normal mood and affect. His speech is normal and behavior is normal. Judgment and thought content normal. Cognition and memory are normal.   Nursing note and vitals reviewed.      REVIEWED DATA:    Labs:   Lab Results   Component Value Date     04/10/2018    K 5.0 04/10/2018    AST 13 04/10/2018    ALT 17 04/10/2018    BUN 17 04/10/2018    CREATININE 1.39 (H) 04/10/2018    CREATININE 1.32 (H) 07/27/2017    CREATININE  1.47 (H) 07/26/2017    EGFRIFNONA 49 (L) 04/10/2018    EGFRIFAFRI 59 (L) 04/10/2018       Lab Results   Component Value Date    GLUCOSE 116 (H) 07/27/2017    GLUCOSE 123 (H) 07/26/2017    GLUCOSE 135 (H) 07/24/2017    HGBA1C 6.15 (H) 01/20/2017    HGBA1C 6.2 (H) 07/27/2015       Lab Results   Component Value Date    LDL CANCELED 04/10/2018    LDL 98 01/20/2017    LDL Comment 07/01/2016    HDL 27 (L) 04/10/2018    TRIG 453 (H) 04/10/2018       Lab Results   Component Value Date    TSH 6.260 (H) 07/25/2017    FREET4 1.67 07/23/2017          Lab Results   Component Value Date    WBC 9.78 04/10/2018    HGB 13.5 (L) 04/10/2018    HGB 12.0 (L) 07/27/2017    HGB 12.2 (L) 07/26/2017     04/10/2018         Imaging:        Medical Tests:        Summary of old records / correspondence / consultant report:        Request outside records:        ______________________________________________________________________    ALLERGIES  Allergies   Allergen Reactions   • Latex Itching        MEDICATIONS  Current Outpatient Prescriptions   Medication Sig Dispense Refill   • apixaban (ELIQUIS) 2.5 MG tablet tablet Take 1 tablet by mouth Every 12 (Twelve) Hours. 30 tablet 0   • aspirin 81 MG tablet Take 81 mg by mouth Daily.     • atorvastatin (LIPITOR) 40 MG tablet Take 1 tablet by mouth Daily. 90 tablet 3   • carvedilol (COREG) 3.125 MG tablet TAKE 1 TABLET TWICE A DAY (NEED TO BE SEEN) 180 tablet 0   • escitalopram (LEXAPRO) 20 MG tablet TAKE 1 TABLET DAILY 90 tablet 0   • fenofibrate (TRICOR) 145 MG tablet Take 1 tablet by mouth Daily. 90 tablet 3   • furosemide (LASIX) 40 MG tablet TAKE 1 TABLET DAILY HOLD IF BLOOD PRESSURE IS LESS THAN 100/60 90 tablet 1   • levothyroxine (SYNTHROID, LEVOTHROID) 88 MCG tablet TAKE 1 TABLET DAILY (NEED APPOINTMENT IN JANUARY) 90 tablet 1   • omeprazole (priLOSEC) 40 MG capsule TAKE 1 CAPSULE DAILY (MUST MAKE APPOINTMENT) 90 capsule 1   • rOPINIRole (REQUIP) 0.5 MG tablet TAKE TWO TABLETS BY  MOUTH ONE HOUR BEFORE BEDTIME 60 tablet 3   • sotalol (BETAPACE) 80 MG tablet TAKE ONE-HALF (1/2) TABLET TWICE A DAY 90 tablet 0   • azelastine (ASTELIN) 0.1 % nasal spray 2 sprays into each nostril 2 (Two) Times a Day. Use in each nostril as directed 30 mL 12     No current facility-administered medications for this visit.        PFSH:     The following portions of the patient's history were reviewed and updated as appropriate: Allergies / Current Medications / Past Medical History / Surgical History / Social History / Family History    PROBLEM LIST   Patient Active Problem List   Diagnosis   • Coronary arteriosclerosis in native artery   • Atrial fibrillation (CMS/HCC)   • Acute non-Q wave ST elevation myocardial infarction (STEMI) involving left anterior descending (LAD) coronary artery   • Mitral valve insufficiency   • S/P CABG (coronary artery bypass graft)   • S/P mitral valve repair   • Depression   • Hypothyroidism   • Hyperlipidemia   • Pulmonary nodule   • Adenocarcinoma of right lung (CMS/HCC)   • Benign nodular prostatic hyperplasia with lower urinary tract symptoms   • Confusion   • Post concussion syndrome   • Vitamin D deficiency   • Well adult health check   • Impaired fasting glucose   • AAA (abdominal aortic aneurysm) (CMS/HCC)       PAST MEDICAL HISTORY  Past Medical History:   Diagnosis Date   • Abdominal aortic aneurysm (CMS/HCC)    • Acute myocardial infarction    • Acute renal failure (CMS/HCC)    • Anemia    • Aneurysm (CMS/HCC)    • Atrial fibrillation (CMS/HCC)    • Cardiomyopathy (CMS/HCC)    • Chest pain    • Coronary artery disease     July 2015-    • Depression    • GERD (gastroesophageal reflux disease)    • Hyperlipidemia    • Hypertension    • Hypothyroidism    • Lung cancer (CMS/HCC) 2016    radiation    • Mitral regurgitation    • Myocardial infarction    • Pleural effusion    • Pleural effusion, bilateral    • RLS (restless legs syndrome)    • Sleep apnea    • Ulcerative colitis  (CMS/[facility])    • Ulcerative colitis (CMS/[facility])        SURGICAL HISTORY  Past Surgical History:   Procedure Laterality Date   • BACK SURGERY      lumbar   • CARDIAC CATHETERIZATION     • CORONARY ANGIOPLASTY     • CORONARY ARTERY BYPASS GRAFT  07/27/2015    X 5  Dr Louis   • HERNIA REPAIR      inguinal - left   • MITRAL VALVE REPAIR/REPLACEMENT  07/27/2015    Dr Louis   • OTHER SURGICAL HISTORY      Cardiovasc Endosc W/ Video-Assist Harv Veins Coron Art Byp   • SKIN BIOPSY      benign       SOCIAL HISTORY  Social History     Social History   • Marital status:      Social History Main Topics   • Smoking status: Former Smoker     Years: 5.00     Types: Cigarettes   • Smokeless tobacco: Never Used      Comment: 1 cig day x 5 years   • Alcohol use No   • Drug use: No   • Sexual activity: Defer     Other Topics Concern   • Not on file       FAMILY HISTORY  Family History   Problem Relation Age of Onset   • Coronary artery disease Brother    • Heart attack Brother          **[employer] Disclaimer:   Much of this encounter note is an electronic transcription/translation of spoken language to printed text. The electronic translation of spoken language may permit erroneous, or at times, nonsensical words or phrases to be inadvertently transcribed. Although I have reviewed the note for such errors, some may still exist.

## 2018-08-09 ENCOUNTER — TELEPHONE (OUTPATIENT)
Dept: INTERNAL MEDICINE | Facility: CLINIC | Age: 83
End: 2018-08-09

## 2018-08-13 LAB
25(OH)D3+25(OH)D2 SERPL-MCNC: 23.4 NG/ML (ref 30–100)
ALBUMIN SERPL-MCNC: 4.4 G/DL (ref 3.5–5.2)
ALBUMIN/GLOB SERPL: 1.5 G/DL
ALP SERPL-CCNC: 97 U/L (ref 39–117)
ALT SERPL-CCNC: 10 U/L (ref 1–41)
AST SERPL-CCNC: 10 U/L (ref 1–40)
BASOPHILS # BLD AUTO: 0.02 10*3/MM3 (ref 0–0.2)
BASOPHILS NFR BLD AUTO: 0.3 % (ref 0–1.5)
BILIRUB SERPL-MCNC: 0.4 MG/DL (ref 0.1–1.2)
BUN SERPL-MCNC: 21 MG/DL (ref 8–23)
BUN/CREAT SERPL: 13.8 (ref 7–25)
CALCIUM SERPL-MCNC: 9 MG/DL (ref 8.6–10.5)
CHLORIDE SERPL-SCNC: 100 MMOL/L (ref 98–107)
CHOLEST SERPL-MCNC: 172 MG/DL (ref 0–200)
CHOLEST/HDLC SERPL: 5.06 {RATIO}
CO2 SERPL-SCNC: 25.5 MMOL/L (ref 22–29)
CREAT SERPL-MCNC: 1.52 MG/DL (ref 0.76–1.27)
EOSINOPHIL # BLD AUTO: 0.13 10*3/MM3 (ref 0–0.7)
EOSINOPHIL NFR BLD AUTO: 1.7 % (ref 0.3–6.2)
ERYTHROCYTE [DISTWIDTH] IN BLOOD BY AUTOMATED COUNT: 14.2 % (ref 11.5–14.5)
GLOBULIN SER CALC-MCNC: 2.9 GM/DL
GLUCOSE SERPL-MCNC: 134 MG/DL (ref 65–99)
HBA1C MFR BLD: 6.37 % (ref 4.8–5.6)
HCT VFR BLD AUTO: 42.7 % (ref 40.4–52.2)
HDLC SERPL-MCNC: 34 MG/DL (ref 40–60)
HGB BLD-MCNC: 13 G/DL (ref 13.7–17.6)
IMM GRANULOCYTES # BLD: 0.02 10*3/MM3 (ref 0–0.03)
IMM GRANULOCYTES NFR BLD: 0.3 % (ref 0–0.5)
LDLC SERPL CALC-MCNC: 100 MG/DL (ref 0–100)
LYMPHOCYTES # BLD AUTO: 2.54 10*3/MM3 (ref 0.9–4.8)
LYMPHOCYTES NFR BLD AUTO: 33.6 % (ref 19.6–45.3)
MCH RBC QN AUTO: 32.2 PG (ref 27–32.7)
MCHC RBC AUTO-ENTMCNC: 30.4 G/DL (ref 32.6–36.4)
MCV RBC AUTO: 105.7 FL (ref 79.8–96.2)
MONOCYTES # BLD AUTO: 0.6 10*3/MM3 (ref 0.2–1.2)
MONOCYTES NFR BLD AUTO: 7.9 % (ref 5–12)
NEUTROPHILS # BLD AUTO: 4.25 10*3/MM3 (ref 1.9–8.1)
NEUTROPHILS NFR BLD AUTO: 56.2 % (ref 42.7–76)
PLATELET # BLD AUTO: 341 10*3/MM3 (ref 140–500)
POTASSIUM SERPL-SCNC: 4.7 MMOL/L (ref 3.5–5.2)
PROT SERPL-MCNC: 7.3 G/DL (ref 6–8.5)
RBC # BLD AUTO: 4.04 10*6/MM3 (ref 4.6–6)
SODIUM SERPL-SCNC: 141 MMOL/L (ref 136–145)
TRIGL SERPL-MCNC: 190 MG/DL (ref 0–150)
TSH SERPL DL<=0.005 MIU/L-ACNC: 2.23 MIU/ML (ref 0.27–4.2)
UNABLE TO VOID: NORMAL
VLDLC SERPL CALC-MCNC: 38 MG/DL (ref 5–40)
WBC # BLD AUTO: 7.56 10*3/MM3 (ref 4.5–10.7)

## 2018-08-15 DIAGNOSIS — E78.5 HYPERLIPIDEMIA, UNSPECIFIED HYPERLIPIDEMIA TYPE: Primary | ICD-10-CM

## 2018-08-15 DIAGNOSIS — D53.9 MACROCYTIC ANEMIA: ICD-10-CM

## 2018-08-20 ENCOUNTER — HOSPITAL ENCOUNTER (OUTPATIENT)
Dept: CT IMAGING | Facility: HOSPITAL | Age: 83
Discharge: HOME | End: 2018-08-20
Admitting: NURSE PRACTITIONER

## 2018-08-20 LAB — CREAT BLDA-MCNC: 1.4 MG/DL (ref 0.6–1.3)

## 2018-08-20 PROCEDURE — 71260 CT THORAX DX C+: CPT

## 2018-08-20 PROCEDURE — 82565 ASSAY OF CREATININE: CPT

## 2018-08-20 PROCEDURE — 25010000002 IOPAMIDOL 61 % SOLUTION: Performed by: NURSE PRACTITIONER

## 2018-08-20 RX ADMIN — IOPAMIDOL 85 ML: 612 INJECTION, SOLUTION INTRAVENOUS at 09:51

## 2018-08-23 DIAGNOSIS — Z85.118 HISTORY OF LUNG CANCER: Primary | ICD-10-CM

## 2018-08-23 DIAGNOSIS — C34.90 MALIGNANT NEOPLASM OF LUNG, UNSPECIFIED LATERALITY, UNSPECIFIED PART OF LUNG (HCC): ICD-10-CM

## 2018-08-23 LAB
BASOPHILS # BLD AUTO: 0 X10E3/UL (ref 0–0.2)
BASOPHILS NFR BLD AUTO: 0 %
EOSINOPHIL # BLD AUTO: 0.2 X10E3/UL (ref 0–0.4)
EOSINOPHIL NFR BLD AUTO: 2 %
ERYTHROCYTE [DISTWIDTH] IN BLOOD BY AUTOMATED COUNT: 14.4 % (ref 12.3–15.4)
FOLATE SERPL-MCNC: 13.99 NG/ML (ref 4.78–24.2)
HCT VFR BLD AUTO: 37.7 % (ref 37.5–51)
HGB BLD-MCNC: 12.6 G/DL (ref 13–17.7)
IMM GRANULOCYTES # BLD: 0 X10E3/UL (ref 0–0.1)
IMM GRANULOCYTES NFR BLD: 0 %
LYMPHOCYTES # BLD AUTO: 2 X10E3/UL (ref 0.7–3.1)
LYMPHOCYTES NFR BLD AUTO: 31 %
Lab: ABNORMAL
MCH RBC QN AUTO: 33.2 PG (ref 26.6–33)
MCHC RBC AUTO-ENTMCNC: 33.4 G/DL (ref 31.5–35.7)
MCV RBC AUTO: 100 FL (ref 79–97)
METHYLMALONATE SERPL-SCNC: 460 NMOL/L (ref 0–378)
MONOCYTES # BLD AUTO: 0.6 X10E3/UL (ref 0.1–0.9)
MONOCYTES NFR BLD AUTO: 8 %
NEUTROPHILS # BLD AUTO: 3.8 X10E3/UL (ref 1.4–7)
NEUTROPHILS NFR BLD AUTO: 59 %
PATH INTERP BLD-IMP: ABNORMAL
PATH REV BLD -IMP: ABNORMAL
PATHOLOGIST NAME: ABNORMAL
PLATELET # BLD AUTO: 308 X10E3/UL (ref 150–379)
RBC # BLD AUTO: 3.79 X10E6/UL (ref 4.14–5.8)
VIT B12 SERPL-MCNC: 427 PG/ML (ref 211–946)
WBC # BLD AUTO: 6.6 X10E3/UL (ref 3.4–10.8)

## 2018-08-29 ENCOUNTER — APPOINTMENT (OUTPATIENT)
Dept: PET IMAGING | Facility: HOSPITAL | Age: 83
End: 2018-08-29
Attending: THORACIC SURGERY (CARDIOTHORACIC VASCULAR SURGERY)

## 2018-09-04 ENCOUNTER — OFFICE VISIT (OUTPATIENT)
Dept: SURGERY | Facility: CLINIC | Age: 83
End: 2018-09-04

## 2018-09-04 VITALS
HEART RATE: 61 BPM | BODY MASS INDEX: 28.14 KG/M2 | HEIGHT: 71 IN | WEIGHT: 201 LBS | SYSTOLIC BLOOD PRESSURE: 128 MMHG | DIASTOLIC BLOOD PRESSURE: 63 MMHG | OXYGEN SATURATION: 96 %

## 2018-09-04 DIAGNOSIS — C34.91 ADENOCARCINOMA OF RIGHT LUNG (HCC): ICD-10-CM

## 2018-09-04 DIAGNOSIS — R91.1 PULMONARY NODULE: Primary | ICD-10-CM

## 2018-09-04 PROCEDURE — 99213 OFFICE O/P EST LOW 20 MIN: CPT | Performed by: THORACIC SURGERY (CARDIOTHORACIC VASCULAR SURGERY)

## 2018-09-04 RX ORDER — ATORVASTATIN CALCIUM 40 MG/1
40 TABLET, FILM COATED ORAL NIGHTLY
COMMUNITY
Start: 2018-08-23 | End: 2019-02-14 | Stop reason: SDUPTHER

## 2018-09-04 NOTE — PROGRESS NOTES
Subjective   Patient ID: Bhaskar Ibarra is a 82 y.o. male is being seen for consultation today at the request of Katherine TELLEZ    History of Present Illness  Dear Colleague,  Bhaskar Ibarra was seen in our office today for evaluation of a right lower lobe lung nodule.  Patient is well known to me.  2 years ago he was found to have a right lower lobe nodule that was PET negative.  He had a CT directed needle biopsy which was positive for adenocarcinoma.  Because of his age and overall condition we elected to treat this with stereotactic radiation therapy.  He successfully completed this treatment.  He has recently had a CT scan which shows radiation changes with new nodule or changes.  He is here for further evaluation.    He reports no cough or hemoptysis.  There has been no hoarseness or change in his voice.  He has no pleuritic pain.  There is no unexplained weight loss.  He does get short of breath with exertion.    CT PET scan was ordered for further evaluation but was refused by insurance.  He is here today to discuss other options.    The following portions of the patient's history were reviewed and updated as appropriate: allergies, current medications, past family history, past medical history, past social history, past surgical history and problem list.  Review of Systems   Constitution: Negative.   HENT: Positive for congestion.    Eyes: Negative.    Cardiovascular: Negative.    Respiratory: Positive for cough.    Endocrine: Negative.    Hematologic/Lymphatic: Negative.    Skin: Negative.    Musculoskeletal: Negative.    Gastrointestinal: Negative.    Genitourinary: Negative.    Neurological: Negative.    Psychiatric/Behavioral: Negative.      Patient Active Problem List   Diagnosis   • Coronary arteriosclerosis in native artery   • Atrial fibrillation (CMS/HCC)   • Acute non-Q wave ST elevation myocardial infarction (STEMI) involving left anterior descending (LAD) coronary artery   • Mitral valve  insufficiency   • S/P CABG (coronary artery bypass graft)   • S/P mitral valve repair   • Depression   • Hypothyroidism   • Hyperlipidemia   • Pulmonary nodule   • Adenocarcinoma of right lung (CMS/HCC)   • Benign nodular prostatic hyperplasia with lower urinary tract symptoms   • Confusion   • Post concussion syndrome   • Vitamin D deficiency   • Well adult health check   • Impaired fasting glucose   • AAA (abdominal aortic aneurysm) (CMS/HCC)     Past Medical History:   Diagnosis Date   • Abdominal aortic aneurysm (CMS/HCC)    • Acute myocardial infarction    • Acute renal failure (CMS/HCC)    • Anemia    • Aneurysm (CMS/HCC)    • Atrial fibrillation (CMS/HCC)    • Cardiomyopathy (CMS/HCC)    • Chest pain    • Coronary artery disease     July 2015-    • Depression    • GERD (gastroesophageal reflux disease)    • Hyperlipidemia    • Hypertension    • Hypothyroidism    • Lung cancer (CMS/HCC) 2016    radiation    • Mitral regurgitation    • Myocardial infarction    • Pleural effusion    • Pleural effusion, bilateral    • RLS (restless legs syndrome)    • Sleep apnea    • Ulcerative colitis (CMS/HCC)    • Ulcerative colitis (CMS/HCC)      Past Surgical History:   Procedure Laterality Date   • BACK SURGERY      lumbar   • CARDIAC CATHETERIZATION     • CORONARY ANGIOPLASTY     • CORONARY ARTERY BYPASS GRAFT  07/27/2015    X 5  Dr Louis   • HERNIA REPAIR      inguinal - left   • MITRAL VALVE REPAIR/REPLACEMENT  07/27/2015    Dr Louis   • OTHER SURGICAL HISTORY      Cardiovasc Endosc W/ Video-Assist [facility] Veins Coron Art Byp   • SKIN BIOPSY      benign     Family History   Problem Relation Age of Onset   • Coronary artery disease Brother    • Heart attack Brother      Social History     Social History   • Marital status:      Spouse name: N/A   • Number of children: N/A   • Years of education: N/A     Occupational History   • Not on file.     Social History Main Topics   • Smoking status: Former Smoker      Years: 5.00     Types: Cigarettes   • Smokeless tobacco: Never Used      Comment: 1 cig day x 5 years   • Alcohol use No   • Drug use: No   • Sexual activity: Defer     Other Topics Concern   • Not on file     Social History Narrative   • No narrative on file       Current Outpatient Prescriptions:   •  apixaban (ELIQUIS) 2.5 MG tablet tablet, Take 1 tablet by mouth Every 12 (Twelve) Hours., Disp: 30 tablet, Rfl: 0  •  aspirin 81 MG tablet, Take 81 mg by mouth Daily., Disp: , Rfl:   •  atorvastatin (LIPITOR) 40 MG tablet, , Disp: , Rfl:   •  azelastine (ASTELIN) 0.1 % nasal spray, 2 sprays into each nostril 2 (Two) Times a Day. Use in each nostril as directed, Disp: 30 mL, Rfl: 12  •  carvedilol (COREG) 3.125 MG tablet, TAKE 1 TABLET TWICE A DAY (NEED TO BE SEEN), Disp: 180 tablet, Rfl: 0  •  escitalopram (LEXAPRO) 20 MG tablet, TAKE 1 TABLET DAILY, Disp: 90 tablet, Rfl: 0  •  fenofibrate (TRICOR) 145 MG tablet, Take 1 tablet by mouth Daily., Disp: 90 tablet, Rfl: 3  •  furosemide (LASIX) 40 MG tablet, TAKE 1 TABLET DAILY HOLD IF BLOOD PRESSURE IS LESS THAN 100/60, Disp: 90 tablet, Rfl: 1  •  levothyroxine (SYNTHROID, LEVOTHROID) 88 MCG tablet, TAKE 1 TABLET DAILY (NEED APPOINTMENT IN JANUARY), Disp: 90 tablet, Rfl: 1  •  omeprazole (priLOSEC) 40 MG capsule, TAKE 1 CAPSULE DAILY (MUST MAKE APPOINTMENT), Disp: 90 capsule, Rfl: 1  •  rOPINIRole (REQUIP) 0.5 MG tablet, TAKE TWO TABLETS BY MOUTH ONE HOUR BEFORE BEDTIME, Disp: 60 tablet, Rfl: 3  •  sotalol (BETAPACE) 80 MG tablet, TAKE ONE-HALF (1/2) TABLET TWICE A DAY, Disp: 90 tablet, Rfl: 0  Allergies   Allergen Reactions   • Latex Itching        Objective   Vitals:    09/04/18 1007   BP: 128/63   Pulse: 61   SpO2: 96%     Physical Exam   Constitutional: He is oriented to person, place, and time. He appears well-developed and well-nourished.   HENT:   Head: Normocephalic.   Eyes: Pupils are equal, round, and reactive to light. Conjunctivae, EOM and lids are normal.    Neck: Trachea normal and normal range of motion. Neck supple. No hepatojugular reflux and no JVD present. Carotid bruit is not present. No thyroid mass and no thyromegaly present.   Cardiovascular: Normal rate, regular rhythm, S1 normal, S2 normal, normal heart sounds and normal pulses.   No extrasystoles are present. PMI is not displaced.    Pulmonary/Chest: Effort normal and breath sounds normal.   Abdominal: Soft. Normal appearance and bowel sounds are normal. He exhibits no mass. There is no hepatosplenomegaly. There is no tenderness. No hernia.   Musculoskeletal: Normal range of motion.   Neurological: He is alert and oriented to person, place, and time. He has normal strength and normal reflexes. No cranial nerve deficit or sensory deficit. He displays a negative Romberg sign.   Skin: Skin is warm, dry and intact.   Psychiatric: He has a normal mood and affect. His speech is normal and behavior is normal. Judgment and thought content normal. Cognition and memory are normal.     Independent Review of Radiographic Studies:    CT scan of the chest performed August 20, 2018 was independently reviewed.  There are radiation changes in the right lower lobe.  There is atelectasis associated with these changes.  This area appears more nodular along its superior margin.  There are no other infiltrates nodules or masses.  There is no pleural effusion.  I see no suspicious hilar or mediastinal adenopathy.      Assessment/Plan      This area is suspicious for recurrent cancer.  I have recommended CT PET scan to help with staging and to help make a decision about how to proceed in treatment.  Insurance company has elected to refuse CT PET scan.  I have discussed 2 alternative options with the patient.  One would be to proceed on with a CT directed needle biopsy.  The second would be wait 3 months and repeat a CT of the chest at that time.  The pros and cons of each of these options were discussed in detail.  The patient  wishes to go home and discuss this with his daughter before making a final decision.  We will await his final decision.  I will keep you informed of his progress.    Diagnoses and all orders for this visit:    Pulmonary nodule    Adenocarcinoma of right lung (CMS/HCC)    Other orders  -     atorvastatin (LIPITOR) 40 MG tablet;

## 2018-09-07 ENCOUNTER — TELEPHONE (OUTPATIENT)
Dept: INTERNAL MEDICINE | Age: 83
End: 2018-09-07

## 2018-09-07 RX ORDER — OMEPRAZOLE 40 MG/1
40 CAPSULE, DELAYED RELEASE ORAL DAILY
Qty: 90 CAPSULE | Refills: 2 | Status: SHIPPED | OUTPATIENT
Start: 2018-09-07 | End: 2019-06-07 | Stop reason: SDUPTHER

## 2018-09-07 RX ORDER — CARVEDILOL 3.12 MG/1
3.12 TABLET ORAL 2 TIMES DAILY WITH MEALS
Qty: 180 TABLET | Refills: 1 | Status: SHIPPED | OUTPATIENT
Start: 2018-09-07 | End: 2018-09-11 | Stop reason: SDUPTHER

## 2018-09-07 NOTE — TELEPHONE ENCOUNTER
Pt needing refill on Coreg 90 day supply. [facility] notified last night to refill.  Pt's # [phone]  [facility]  Thanks SP

## 2018-09-07 NOTE — TELEPHONE ENCOUNTER
Pt called back needing Omeprazole 40 mg last filled by previous pcp. 90 day supply.  [facility] pharmacy  Pt's # [phone]  Thanks SP

## 2018-09-11 ENCOUNTER — TELEPHONE (OUTPATIENT)
Dept: INTERNAL MEDICINE | Age: 83
End: 2018-09-11

## 2018-09-11 DIAGNOSIS — R91.1 LUNG NODULE: Primary | ICD-10-CM

## 2018-09-11 RX ORDER — CARVEDILOL 3.12 MG/1
3.12 TABLET ORAL 2 TIMES DAILY WITH MEALS
Qty: 30 TABLET | Refills: 0 | Status: SHIPPED | OUTPATIENT
Start: 2018-09-11 | End: 2019-03-04 | Stop reason: SDUPTHER

## 2018-09-11 NOTE — TELEPHONE ENCOUNTER
Patient is out of Carvedilol 3.125MG. This medication was sent to his mail order on 9/7/18 but he is currently out. He wanted to see if a few days worth of the medicaiton can be sent to his local pharmacy today since he is currently out of the medication? Local pharmacy is [facility] [phone].

## 2018-09-18 ENCOUNTER — OFFICE VISIT (OUTPATIENT)
Dept: CARDIAC SURGERY | Facility: CLINIC | Age: 83
End: 2018-09-18

## 2018-09-18 VITALS
OXYGEN SATURATION: 96 % | HEIGHT: 71 IN | SYSTOLIC BLOOD PRESSURE: 113 MMHG | BODY MASS INDEX: 28 KG/M2 | WEIGHT: 200 LBS | TEMPERATURE: 98.2 F | DIASTOLIC BLOOD PRESSURE: 68 MMHG | RESPIRATION RATE: 20 BRPM | HEART RATE: 55 BPM

## 2018-09-18 DIAGNOSIS — I71.40 ABDOMINAL AORTIC ANEURYSM (AAA) WITHOUT RUPTURE (HCC): ICD-10-CM

## 2018-09-18 DIAGNOSIS — C34.91 ADENOCARCINOMA OF RIGHT LUNG (HCC): ICD-10-CM

## 2018-09-18 DIAGNOSIS — I71.21 ASCENDING AORTIC ANEURYSM (HCC): ICD-10-CM

## 2018-09-18 DIAGNOSIS — I48.20 CHRONIC ATRIAL FIBRILLATION (HCC): ICD-10-CM

## 2018-09-18 DIAGNOSIS — I21.02: Primary | ICD-10-CM

## 2018-09-18 DIAGNOSIS — R91.1 PULMONARY NODULE: ICD-10-CM

## 2018-09-18 DIAGNOSIS — Z98.890 S/P MITRAL VALVE REPAIR: ICD-10-CM

## 2018-09-18 DIAGNOSIS — Z95.1 S/P CABG (CORONARY ARTERY BYPASS GRAFT): ICD-10-CM

## 2018-09-18 PROCEDURE — 99204 OFFICE O/P NEW MOD 45 MIN: CPT | Performed by: THORACIC SURGERY (CARDIOTHORACIC VASCULAR SURGERY)

## 2018-09-21 ENCOUNTER — TELEPHONE (OUTPATIENT)
Dept: INTERNAL MEDICINE | Age: 83
End: 2018-09-21

## 2018-09-21 RX ORDER — FUROSEMIDE 40 MG/1
40 TABLET ORAL DAILY
Qty: 90 TABLET | Refills: 1 | Status: SHIPPED | OUTPATIENT
Start: 2018-09-21 | End: 2019-03-14 | Stop reason: SDUPTHER

## 2018-09-21 NOTE — TELEPHONE ENCOUNTER
Another physician prescribed Furosemide 40 MG 1x/day and needs a 90 day prescription sent to [facility]. The patient states they will not send us anything because they need a prescription from Katherine.

## 2018-09-23 PROBLEM — I71.21 ASCENDING AORTIC ANEURYSM: Status: ACTIVE | Noted: 2018-09-23

## 2018-09-23 NOTE — PROGRESS NOTES
9/23/2018    Seen on 9/18/18    Reason for consultation:     Evaluation of thoracic aortic aneurysm    Chief Complaint     Same    History of Present Illness:       Dear Dr. Lucero, Bhaskar DUPREE III, * and Colleagues,  It was nice to see Bhaskar BARRIGA Stacey in consultation at your request. He is a 82 y.o. male with a history of lung cancer in 2016 who underwent radiation Tx who was found incidentally a 4.5 cm ascending aortic aneurysm in a follow up chest CT. He denies chest or upper back pain, syncope, TIA, LE edema, orthopnea or PND. He denies bony pains. His chest CT showed a 4.5 cm ascending aortic dilatation without dissection or ulceration, a 4.1 cm AAA and a possible enlarging prior neoplastic site. He has 2 brothers with AAA, one of them with a repair.    Patient Active Problem List   Diagnosis   • Coronary arteriosclerosis in native artery   • Atrial fibrillation (CMS/HCC)   • Acute non-Q wave ST elevation myocardial infarction (STEMI) involving left anterior descending (LAD) coronary artery   • Mitral valve insufficiency   • S/P CABG (coronary artery bypass graft)   • S/P mitral valve repair   • Depression   • Hypothyroidism   • Hyperlipidemia   • Pulmonary nodule   • Adenocarcinoma of right lung (CMS/HCC)   • Benign nodular prostatic hyperplasia with lower urinary tract symptoms   • Confusion   • Post concussion syndrome   • Vitamin D deficiency   • Well adult health check   • Impaired fasting glucose   • AAA (abdominal aortic aneurysm) (CMS/HCC)   • Ascending aortic aneurysm (CMS/HCC)       Past Medical History:   Diagnosis Date   • Abdominal aortic aneurysm (CMS/HCC)    • Acute myocardial infarction    • Acute renal failure (CMS/HCC)    • Anemia    • Aneurysm (CMS/HCC)    • Atrial fibrillation (CMS/HCC)    • Cardiomyopathy (CMS/HCC)    • Chest pain    • Coronary artery disease     July 2015-    • Depression    • GERD (gastroesophageal reflux disease)    • Hyperlipidemia    • Hypertension    • Hypothyroidism     • Lung cancer (CMS/HCC) 2016    radiation    • Mitral regurgitation    • Myocardial infarction    • Pleural effusion    • Pleural effusion, bilateral    • RLS (restless legs syndrome)    • Sleep apnea    • Ulcerative colitis (CMS/HCC)    • Ulcerative colitis (CMS/HCC)        Past Surgical History:   Procedure Laterality Date   • BACK SURGERY      lumbar   • CARDIAC CATHETERIZATION     • CORONARY ANGIOPLASTY     • CORONARY ARTERY BYPASS GRAFT  07/27/2015    X 5  Dr Louis   • HERNIA REPAIR      inguinal - left   • MITRAL VALVE REPAIR/REPLACEMENT  07/27/2015    Dr Louis   • OTHER SURGICAL HISTORY      Cardiovasc Endosc W/ Video-Assist [facility] Veins Coron Art Byp   • SKIN BIOPSY      benign       Allergies   Allergen Reactions   • Latex Itching         Current Outpatient Prescriptions:   •  apixaban (ELIQUIS) 2.5 MG tablet tablet, Take 1 tablet by mouth Every 12 (Twelve) Hours., Disp: 30 tablet, Rfl: 0  •  aspirin 81 MG tablet, Take 81 mg by mouth Daily., Disp: , Rfl:   •  atorvastatin (LIPITOR) 40 MG tablet, 40 mg Every Night., Disp: , Rfl:   •  azelastine (ASTELIN) 0.1 % nasal spray, 2 sprays into each nostril 2 (Two) Times a Day. Use in each nostril as directed, Disp: 30 mL, Rfl: 12  •  carvedilol (COREG) 3.125 MG tablet, Take 1 tablet by mouth 2 (Two) Times a Day With Meals., Disp: 30 tablet, Rfl: 0  •  escitalopram (LEXAPRO) 20 MG tablet, TAKE 1 TABLET DAILY, Disp: 90 tablet, Rfl: 0  •  fenofibrate (TRICOR) 145 MG tablet, Take 1 tablet by mouth Daily., Disp: 90 tablet, Rfl: 3  •  levothyroxine (SYNTHROID, LEVOTHROID) 88 MCG tablet, TAKE 1 TABLET DAILY (NEED APPOINTMENT IN JANUARY), Disp: 90 tablet, Rfl: 1  •  omeprazole (priLOSEC) 40 MG capsule, Take 1 capsule by mouth Daily., Disp: 90 capsule, Rfl: 2  •  rOPINIRole (REQUIP) 0.5 MG tablet, TAKE TWO TABLETS BY MOUTH ONE HOUR BEFORE BEDTIME, Disp: 60 tablet, Rfl: 3  •  sotalol (BETAPACE) 80 MG tablet, TAKE ONE-HALF (1/2) TABLET TWICE A DAY, Disp: 90 tablet, Rfl:  0  •  furosemide (LASIX) 40 MG tablet, Take 1 tablet by mouth Daily., Disp: 90 tablet, Rfl: 1    Social History     Social History   • Marital status:      Spouse name: N/A   • Number of children: N/A   • Years of education: N/A     Occupational History   • Not on file.     Social History Main Topics   • Smoking status: Former Smoker     Years: 5.00     Types: Cigarettes   • Smokeless tobacco: Never Used      Comment: 1 cig day x 5 years   • Alcohol use No   • Drug use: No   • Sexual activity: Defer     Other Topics Concern   • Not on file     Social History Narrative   • No narrative on file       Family History   Problem Relation Age of Onset   • Coronary artery disease Brother    • Heart attack Brother      Review of Systems   Constitutional: Positive for fatigue. Negative for unexpected weight change.   Respiratory: Negative for chest tightness and shortness of breath.    Cardiovascular: Negative for chest pain, palpitations and leg swelling.   Musculoskeletal: Negative for arthralgias, back pain and myalgias.   Neurological: Negative for dizziness and syncope.   All other systems reviewed and are negative.      Physical Exam:    Vital Signs:  Weight: 90.7 kg (200 lb)   Body mass index is 27.89 kg/m².  Temp: 98.2 °F (36.8 °C)   Heart Rate: 55   BP: 113/68     Physical Exam   Constitutional: He is oriented to person, place, and time. He appears well-developed and well-nourished.   HENT:   Head: Normocephalic and atraumatic.   Nose: Nose normal.   Mouth/Throat: Oropharynx is clear and moist.   Eyes: Pupils are equal, round, and reactive to light. Conjunctivae are normal.   Neck: Normal range of motion. Neck supple. No JVD present. No thyromegaly present.   Cardiovascular: Normal rate, regular rhythm, normal heart sounds and intact distal pulses.  PMI is not displaced.  Exam reveals no gallop and no friction rub.    No murmur heard.  Pulses:       Radial pulses are 2+ on the right side, and 2+ on the left  side.        Dorsalis pedis pulses are 2+ on the right side, and 2+ on the left side.   Pulmonary/Chest: Effort normal and breath sounds normal. He has no rales.   Abdominal: Soft. Bowel sounds are normal. He exhibits no distension and no mass. There is no tenderness.   Musculoskeletal: Normal range of motion. He exhibits no tenderness or deformity.   Lymphadenopathy:     He has no cervical adenopathy.   Neurological: He is alert and oriented to person, place, and time. He has normal reflexes.   Skin: Skin is warm and dry. No rash noted. No erythema.   Psychiatric: He has a normal mood and affect. His behavior is normal.   No groin or neck adenomegalies.  Sternal wound well healed.    Relevant studies (other than HPI)        Assessment:     Problem List Items Addressed This Visit        Cardiovascular and Mediastinum    Atrial fibrillation (CMS/HCC)    Acute non-Q wave ST elevation myocardial infarction (STEMI) involving left anterior descending (LAD) coronary artery - Primary    AAA (abdominal aortic aneurysm) (CMS/HCC)    Ascending aortic aneurysm (CMS/HCC)       Respiratory    Pulmonary nodule       Other    S/P CABG (coronary artery bypass graft)    S/P mitral valve repair    Adenocarcinoma of right lung (CMS/HCC)          1. 4.5 cm ascending aortic aneurysm, uncomplicated  2. 4.1 cm AAA, followed by Dr. Reddy  3. S/P CABG/MVR, no angina  4. RLL lung carcinoma, S/P radiation. ? Enlarging vs. scarring    Recommendation/Plan:     I recommend a chest CT and office visit for his TAA. Oncologic follow up as planned.    Thank you for allowing me to participate in his care.    Regards,    Jarek Corona M.D.

## 2018-09-26 ENCOUNTER — HOSPITAL ENCOUNTER (OUTPATIENT)
Dept: CT IMAGING | Facility: HOSPITAL | Age: 83
Discharge: HOME | End: 2018-09-26
Attending: THORACIC SURGERY (CARDIOTHORACIC VASCULAR SURGERY) | Admitting: RADIOLOGY

## 2018-09-26 VITALS
HEIGHT: 71 IN | WEIGHT: 200 LBS | BODY MASS INDEX: 28 KG/M2 | OXYGEN SATURATION: 99 % | SYSTOLIC BLOOD PRESSURE: 115 MMHG | TEMPERATURE: 97.7 F | HEART RATE: 54 BPM | RESPIRATION RATE: 16 BRPM | DIASTOLIC BLOOD PRESSURE: 63 MMHG

## 2018-09-26 DIAGNOSIS — R91.1 LUNG NODULE: ICD-10-CM

## 2018-09-26 LAB
INR PPP: 0.9 (ref 0.8–1.2)
PROTHROMBIN TIME: 11.1 SECONDS (ref 12.8–15.2)

## 2018-09-26 PROCEDURE — 77012 CT SCAN FOR NEEDLE BIOPSY: CPT

## 2018-09-26 PROCEDURE — 85610 PROTHROMBIN TIME: CPT

## 2018-09-26 PROCEDURE — 88305 TISSUE EXAM BY PATHOLOGIST: CPT | Performed by: THORACIC SURGERY (CARDIOTHORACIC VASCULAR SURGERY)

## 2018-09-26 RX ORDER — LIDOCAINE HYDROCHLORIDE 10 MG/ML
20 INJECTION, SOLUTION INFILTRATION; PERINEURAL ONCE
Status: COMPLETED | OUTPATIENT
Start: 2018-09-26 | End: 2018-09-26

## 2018-09-26 RX ORDER — SODIUM CHLORIDE 0.9 % (FLUSH) 0.9 %
1-10 SYRINGE (ML) INJECTION AS NEEDED
Status: DISCONTINUED | OUTPATIENT
Start: 2018-09-26 | End: 2018-09-27 | Stop reason: HOSPADM

## 2018-09-26 RX ADMIN — LIDOCAINE HYDROCHLORIDE 20 ML: 10 INJECTION, SOLUTION INFILTRATION; PERINEURAL at 13:21

## 2018-09-27 ENCOUNTER — TELEPHONE (OUTPATIENT)
Dept: INTERVENTIONAL RADIOLOGY/VASCULAR | Facility: HOSPITAL | Age: 83
End: 2018-09-27

## 2018-09-27 LAB
CYTO UR: NORMAL
LAB AP CASE REPORT: NORMAL
LAB AP CLINICAL INFORMATION: NORMAL
LAB AP INTRADEPARTMENTAL CONSULT: NORMAL
PATH REPORT.FINAL DX SPEC: NORMAL
PATH REPORT.GROSS SPEC: NORMAL

## 2018-10-03 ENCOUNTER — OFFICE VISIT (OUTPATIENT)
Dept: SURGERY | Facility: CLINIC | Age: 83
End: 2018-10-03

## 2018-10-03 VITALS
OXYGEN SATURATION: 96 % | SYSTOLIC BLOOD PRESSURE: 141 MMHG | WEIGHT: 200 LBS | HEART RATE: 58 BPM | HEIGHT: 71 IN | BODY MASS INDEX: 28 KG/M2 | DIASTOLIC BLOOD PRESSURE: 60 MMHG

## 2018-10-03 DIAGNOSIS — C34.91 ADENOCARCINOMA OF RIGHT LUNG (HCC): Primary | ICD-10-CM

## 2018-10-03 PROCEDURE — 99212 OFFICE O/P EST SF 10 MIN: CPT | Performed by: THORACIC SURGERY (CARDIOTHORACIC VASCULAR SURGERY)

## 2018-10-03 NOTE — PROGRESS NOTES
Subjective   Patient ID: Bhaskar Ibarra is a 82 y.o. male is here today for follow-up.    History of Present Illness  Dear Colleague,  Bhaskar Ibarra was seen in our office today for follow-up of an adenocarcinoma of the lung treated with stereotactic radiation therapy.  Recent CT scan suggested nodularity in the area of treatment which raised the possibility of recurrent cancer.  Patient is undergone a CT directed needle biopsy.  He is here today to discuss the results.  There were no problems with biopsy.  He reports no pleuritic pain.  No increase in shortness of breath.  No hemoptysis.    The following portions of the patient's history were reviewed and updated as appropriate: allergies, current medications, past family history, past medical history, past social history, past surgical history and problem list.  Review of Systems   Constitution: Negative.   HENT: Negative.    Eyes: Negative.    Cardiovascular: Negative.    Respiratory: Negative.    Endocrine: Negative.    Hematologic/Lymphatic: Negative.    Skin: Negative.    Musculoskeletal: Negative.    Gastrointestinal: Negative.    Genitourinary: Negative.    Neurological: Negative.    Psychiatric/Behavioral: Negative.      Patient Active Problem List   Diagnosis   • Coronary arteriosclerosis in native artery   • Atrial fibrillation (CMS/HCC)   • Acute non-Q wave ST elevation myocardial infarction (STEMI) involving left anterior descending (LAD) coronary artery   • Mitral valve insufficiency   • S/P CABG (coronary artery bypass graft)   • S/P mitral valve repair   • Depression   • Hypothyroidism   • Hyperlipidemia   • Pulmonary nodule   • Adenocarcinoma of right lung (CMS/HCC)   • Benign nodular prostatic hyperplasia with lower urinary tract symptoms   • Confusion   • Post concussion syndrome   • Vitamin D deficiency   • Well adult health check   • Impaired fasting glucose   • AAA (abdominal aortic aneurysm) (CMS/HCC)   • Ascending aortic aneurysm  (CMS/HCC)     Past Medical History:   Diagnosis Date   • Abdominal aortic aneurysm (CMS/HCC)    • Acute myocardial infarction (CMS/HCC)    • Acute renal failure (CMS/HCC)    • Anemia    • Aneurysm (CMS/HCC)    • Atrial fibrillation (CMS/HCC)    • Cardiomyopathy (CMS/HCC)    • Chest pain    • Coronary artery disease     July 2015-    • Depression    • GERD (gastroesophageal reflux disease)    • Hyperlipidemia    • Hypertension    • Hypothyroidism    • Lung cancer (CMS/HCC) 2016    radiation    • Mitral regurgitation    • Myocardial infarction (CMS/HCC)    • Pleural effusion    • Pleural effusion, bilateral    • RLS (restless legs syndrome)    • Sleep apnea    • Ulcerative colitis (CMS/HCC)    • Ulcerative colitis (CMS/HCC)      Past Surgical History:   Procedure Laterality Date   • BACK SURGERY      lumbar   • CARDIAC CATHETERIZATION     • CORONARY ANGIOPLASTY     • CORONARY ARTERY BYPASS GRAFT  07/27/2015    X 5  Dr Louis   • HERNIA REPAIR      inguinal - left   • MITRAL VALVE REPAIR/REPLACEMENT  07/27/2015    Dr Louis   • NASAL SEPTUM SURGERY      benign growth removed   • OTHER SURGICAL HISTORY      Cardiovasc Endosc W/ Video-Assist [facility] Veins Coron Art Byp   • PROSTATE SURGERY     • SKIN BIOPSY      benign     Family History   Problem Relation Age of Onset   • Coronary artery disease Brother    • Heart attack Brother      Social History     Social History   • Marital status:      Spouse name: N/A   • Number of children: N/A   • Years of education: N/A     Occupational History   • Not on file.     Social History Main Topics   • Smoking status: Former Smoker     Years: 5.00     Types: Cigarettes   • Smokeless tobacco: Never Used      Comment: 1 cig day x 5 years   • Alcohol use No   • Drug use: No   • Sexual activity: Defer     Other Topics Concern   • Not on file     Social History Narrative   • No narrative on file       Current Outpatient Prescriptions:   •  apixaban (ELIQUIS) 2.5 MG tablet tablet,  Take 1 tablet by mouth Every 12 (Twelve) Hours., Disp: 30 tablet, Rfl: 0  •  aspirin 81 MG tablet, Take 81 mg by mouth Daily., Disp: , Rfl:   •  atorvastatin (LIPITOR) 40 MG tablet, 40 mg Every Night., Disp: , Rfl:   •  azelastine (ASTELIN) 0.1 % nasal spray, 2 sprays into each nostril 2 (Two) Times a Day. Use in each nostril as directed, Disp: 30 mL, Rfl: 12  •  carvedilol (COREG) 3.125 MG tablet, Take 1 tablet by mouth 2 (Two) Times a Day With Meals., Disp: 30 tablet, Rfl: 0  •  escitalopram (LEXAPRO) 20 MG tablet, TAKE 1 TABLET DAILY, Disp: 90 tablet, Rfl: 0  •  fenofibrate (TRICOR) 145 MG tablet, Take 1 tablet by mouth Daily., Disp: 90 tablet, Rfl: 3  •  furosemide (LASIX) 40 MG tablet, Take 1 tablet by mouth Daily., Disp: 90 tablet, Rfl: 1  •  levothyroxine (SYNTHROID, LEVOTHROID) 88 MCG tablet, TAKE 1 TABLET DAILY (NEED APPOINTMENT IN JANUARY), Disp: 90 tablet, Rfl: 1  •  omeprazole (priLOSEC) 40 MG capsule, Take 1 capsule by mouth Daily., Disp: 90 capsule, Rfl: 2  •  rOPINIRole (REQUIP) 0.5 MG tablet, TAKE TWO TABLETS BY MOUTH ONE HOUR BEFORE BEDTIME, Disp: 60 tablet, Rfl: 3  •  sotalol (BETAPACE) 80 MG tablet, TAKE ONE-HALF (1/2) TABLET TWICE A DAY, Disp: 90 tablet, Rfl: 0  Allergies   Allergen Reactions   • Latex Itching        Objective   Vitals:    10/03/18 1040   BP: 141/60   Pulse: 58   SpO2: 96%     Physical Exam   Constitutional: He is oriented to person, place, and time. He appears well-developed and well-nourished.   HENT:   Head: Normocephalic.   Eyes: Pupils are equal, round, and reactive to light. Conjunctivae, EOM and lids are normal.   Neck: Trachea normal and normal range of motion. Neck supple. No hepatojugular reflux and no JVD present. Carotid bruit is not present. No thyroid mass and no thyromegaly present.   Cardiovascular: Normal rate, regular rhythm, S1 normal, S2 normal, normal heart sounds and normal pulses.   No extrasystoles are present. PMI is not displaced.    Pulmonary/Chest:  "Effort normal and breath sounds normal.   Abdominal: Soft. Normal appearance and bowel sounds are normal. He exhibits no mass. There is no hepatosplenomegaly. There is no tenderness. No hernia.   Musculoskeletal: Normal range of motion.   Neurological: He is alert and oriented to person, place, and time. He has normal strength and normal reflexes. No cranial nerve deficit or sensory deficit. He displays a negative Romberg sign.   Skin: Skin is warm, dry and intact.   Psychiatric: He has a normal mood and affect. His speech is normal and behavior is normal. Judgment and thought content normal. Cognition and memory are normal.     Pathology:    Final Diagnosis   \"LUNG, RIGHT LOWER LOBE\", NEEDLE BIOPSY:               BENIGN LUNG TISSUE WITH FIBROSIS/SCAR.               NO NEOPLASM OR GRANULOMA.     THM/pkm  IHC/a/TDJ       Assessment/Plan     Biopsies showed no evidence of malignancy.  I suspect that these are just radiation changes.  We will continue to follow him closely.  I've asked him to return in 3 months with a repeat CT of the chest.  I will keep you informed of his progress.    Diagnoses and all orders for this visit:    Adenocarcinoma of right lung (CMS/HCC)  -     CT Chest Without Contrast; Future            "

## 2018-10-09 RX ORDER — LEVOTHYROXINE SODIUM 88 UG/1
88 TABLET ORAL DAILY
Qty: 90 TABLET | Refills: 1 | Status: SHIPPED | OUTPATIENT
Start: 2018-10-09 | End: 2019-03-23 | Stop reason: SDUPTHER

## 2018-10-09 RX ORDER — ESCITALOPRAM OXALATE 20 MG/1
20 TABLET ORAL DAILY
Qty: 90 TABLET | Refills: 1 | Status: SHIPPED | OUTPATIENT
Start: 2018-10-09 | End: 2019-04-12 | Stop reason: SDUPTHER

## 2018-11-05 DIAGNOSIS — I25.10 CORONARY ARTERIOSCLEROSIS IN NATIVE ARTERY: Primary | ICD-10-CM

## 2018-11-07 ENCOUNTER — TELEPHONE (OUTPATIENT)
Dept: INTERNAL MEDICINE | Age: 83
End: 2018-11-07

## 2018-11-07 RX ORDER — SOTALOL HYDROCHLORIDE 80 MG/1
TABLET ORAL
Qty: 90 TABLET | Refills: 1 | Status: SHIPPED | OUTPATIENT
Start: 2018-11-07 | End: 2019-03-04 | Stop reason: SDUPTHER

## 2018-11-14 ENCOUNTER — TELEPHONE (OUTPATIENT)
Dept: INTERNAL MEDICINE | Age: 83
End: 2018-11-14

## 2018-11-14 NOTE — TELEPHONE ENCOUNTER
Can try OTC Melatonin 3mg 1-2 hours before bedtime. Otherwise, will need to be seen for further recommendations.

## 2018-11-14 NOTE — TELEPHONE ENCOUNTER
Pt states he has tried OTC Melatonin & did not like it & declined to schedule an appt at this time. Pt said he will think about it & call back.

## 2018-11-14 NOTE — TELEPHONE ENCOUNTER
Pt is requesting a medication to help him sleep through the night. He is complaining of sleepless nights.    [facility] #[id number].

## 2019-01-08 ENCOUNTER — HOSPITAL ENCOUNTER (OUTPATIENT)
Dept: CT IMAGING | Facility: HOSPITAL | Age: 84
Discharge: HOME | End: 2019-01-08
Attending: THORACIC SURGERY (CARDIOTHORACIC VASCULAR SURGERY) | Admitting: THORACIC SURGERY (CARDIOTHORACIC VASCULAR SURGERY)

## 2019-01-08 DIAGNOSIS — C34.91 ADENOCARCINOMA OF RIGHT LUNG (HCC): ICD-10-CM

## 2019-01-08 PROCEDURE — 71250 CT THORAX DX C-: CPT

## 2019-01-16 ENCOUNTER — OFFICE VISIT (OUTPATIENT)
Dept: SURGERY | Facility: CLINIC | Age: 84
End: 2019-01-16

## 2019-01-16 VITALS
BODY MASS INDEX: 28 KG/M2 | DIASTOLIC BLOOD PRESSURE: 60 MMHG | HEIGHT: 71 IN | WEIGHT: 200 LBS | OXYGEN SATURATION: 98 % | SYSTOLIC BLOOD PRESSURE: 120 MMHG | HEART RATE: 52 BPM

## 2019-01-16 DIAGNOSIS — Z09 FOLLOW-UP EXAMINATION FOLLOWING SURGERY: ICD-10-CM

## 2019-01-16 DIAGNOSIS — C34.91 ADENOCARCINOMA OF RIGHT LUNG (HCC): Primary | ICD-10-CM

## 2019-01-16 PROCEDURE — 99213 OFFICE O/P EST LOW 20 MIN: CPT | Performed by: THORACIC SURGERY (CARDIOTHORACIC VASCULAR SURGERY)

## 2019-01-16 NOTE — PROGRESS NOTES
Subjective   Patient ID: Bhaskar Ibarra is a 83 y.o. male is here today for follow-up.    History of Present Illness  Dear Colleague,  Bhaskar Ibarra was seen in our office today for further follow-up of primary stereotactic radiation therapy of an adenocarcinoma of the right lung.  Subsequent follow-up has shown nodular consolidation in the area of the tumor.  CT directed needle biopsy failed to show any malignancy.  We have been watching him with serial CT scans.  He has a persistent cough which she attributes to allergies and upper respiratory tract infection.  He has no hemoptysis.  He has no pleuritic pain.  There has been no unexplained weight loss.  No shortness of breath and no wheezing.    The following portions of the patient's history were reviewed and updated as appropriate: allergies, current medications, past family history, past medical history, past social history, past surgical history and problem list.  Review of Systems   Constitution: Negative.   HENT: Negative.    Eyes: Negative.    Cardiovascular: Negative.    Respiratory: Positive for cough. Negative for hemoptysis, shortness of breath and wheezing.    Endocrine: Negative.    Hematologic/Lymphatic: Negative.    Skin: Negative.    Musculoskeletal: Negative.    Gastrointestinal: Negative.    Genitourinary: Negative.    Neurological: Negative.    Psychiatric/Behavioral: Negative.      Patient Active Problem List   Diagnosis   • Coronary arteriosclerosis in native artery   • Atrial fibrillation (CMS/HCC)   • Acute non-Q wave ST elevation myocardial infarction (STEMI) involving left anterior descending (LAD) coronary artery   • Mitral valve insufficiency   • S/P CABG (coronary artery bypass graft)   • S/P mitral valve repair   • Depression   • Hypothyroidism   • Hyperlipidemia   • Pulmonary nodule   • Adenocarcinoma of right lung (CMS/HCC)   • Benign nodular prostatic hyperplasia with lower urinary tract symptoms   • Confusion   • Post  concussion syndrome   • Vitamin D deficiency   • Well adult health check   • Impaired fasting glucose   • AAA (abdominal aortic aneurysm) (CMS/HCC)   • Ascending aortic aneurysm (CMS/HCC)     Past Medical History:   Diagnosis Date   • Abdominal aortic aneurysm (CMS/HCC)    • Acute myocardial infarction (CMS/HCC)    • Acute renal failure (CMS/HCC)    • Anemia    • Aneurysm (CMS/HCC)    • Atrial fibrillation (CMS/HCC)    • Cardiomyopathy (CMS/HCC)    • Chest pain    • Coronary artery disease     July 2015-    • Depression    • GERD (gastroesophageal reflux disease)    • Hyperlipidemia    • Hypertension    • Hypothyroidism    • Lung cancer (CMS/HCC) 2016    radiation    • Mitral regurgitation    • Myocardial infarction (CMS/HCC)    • Pleural effusion    • Pleural effusion, bilateral    • RLS (restless legs syndrome)    • Sleep apnea    • Ulcerative colitis (CMS/HCC)    • Ulcerative colitis (CMS/HCC)      Past Surgical History:   Procedure Laterality Date   • BACK SURGERY      lumbar   • CARDIAC CATHETERIZATION     • CORONARY ANGIOPLASTY     • CORONARY ARTERY BYPASS GRAFT  07/27/2015    X 5  Dr Louis   • HERNIA REPAIR      inguinal - left   • MITRAL VALVE REPAIR/REPLACEMENT  07/27/2015    Dr Louis   • NASAL SEPTUM SURGERY      benign growth removed   • OTHER SURGICAL HISTORY      Cardiovasc Endosc W/ Video-Assist Harv Veins Coron Art Byp   • PROSTATE SURGERY     • SKIN BIOPSY      benign     Family History   Problem Relation Age of Onset   • Coronary artery disease Brother    • Heart attack Brother      Social History     Socioeconomic History   • Marital status:      Spouse name: Not on file   • Number of children: Not on file   • Years of education: Not on file   • Highest education level: Not on file   Social Needs   • Financial resource strain: Not on file   • Food insecurity - worry: Not on file   • Food insecurity - inability: Not on file   • Transportation needs - medical: Not on file   •  Transportation needs - non-medical: Not on file   Occupational History   • Not on file   Tobacco Use   • Smoking status: Former Smoker     Years: 5.00     Types: Cigarettes   • Smokeless tobacco: Never Used   • Tobacco comment: 1 cig day x 5 years   Substance and Sexual Activity   • Alcohol use: No   • Drug use: No   • Sexual activity: Defer   Other Topics Concern   • Not on file   Social History Narrative   • Not on file       Current Outpatient Medications:   •  apixaban (ELIQUIS) 2.5 MG tablet tablet, Take 1 tablet by mouth Every 12 (Twelve) Hours., Disp: 180 tablet, Rfl: 1  •  aspirin 81 MG tablet, Take 81 mg by mouth Daily., Disp: , Rfl:   •  atorvastatin (LIPITOR) 40 MG tablet, 40 mg Every Night., Disp: , Rfl:   •  azelastine (ASTELIN) 0.1 % nasal spray, 2 sprays into each nostril 2 (Two) Times a Day. Use in each nostril as directed, Disp: 30 mL, Rfl: 12  •  carvedilol (COREG) 3.125 MG tablet, Take 1 tablet by mouth 2 (Two) Times a Day With Meals., Disp: 30 tablet, Rfl: 0  •  escitalopram (LEXAPRO) 20 MG tablet, Take 1 tablet by mouth Daily., Disp: 90 tablet, Rfl: 1  •  fenofibrate (TRICOR) 145 MG tablet, Take 1 tablet by mouth Daily., Disp: 90 tablet, Rfl: 3  •  furosemide (LASIX) 40 MG tablet, Take 1 tablet by mouth Daily., Disp: 90 tablet, Rfl: 1  •  levothyroxine (SYNTHROID, LEVOTHROID) 88 MCG tablet, Take 1 tablet by mouth Daily., Disp: 90 tablet, Rfl: 1  •  omeprazole (priLOSEC) 40 MG capsule, Take 1 capsule by mouth Daily., Disp: 90 capsule, Rfl: 2  •  rOPINIRole (REQUIP) 0.5 MG tablet, TAKE TWO TABLETS BY MOUTH ONE HOUR BEFORE BEDTIME, Disp: 60 tablet, Rfl: 3  •  sotalol (BETAPACE) 80 MG tablet, Take a 1/2 of an 80 mg tablet by mouth twice daily., Disp: 90 tablet, Rfl: 1  Allergies   Allergen Reactions   • Latex Itching        Objective   Vitals:    01/16/19 0847   BP: 120/60   Pulse: 52   SpO2: 98%     Physical Exam   Constitutional: He is oriented to person, place, and time. He appears well-developed  and well-nourished.   HENT:   Head: Normocephalic.   Eyes: Conjunctivae, EOM and lids are normal. Pupils are equal, round, and reactive to light.   Neck: Trachea normal and normal range of motion. Neck supple. No hepatojugular reflux and no JVD present. Carotid bruit is not present. No thyroid mass and no thyromegaly present.   Cardiovascular: Normal rate, regular rhythm, S1 normal, S2 normal, normal heart sounds and normal pulses.  No extrasystoles are present. PMI is not displaced.   Pulmonary/Chest: Effort normal and breath sounds normal.   Abdominal: Soft. Normal appearance and bowel sounds are normal. He exhibits no mass. There is no hepatosplenomegaly. There is no tenderness. No hernia.   Musculoskeletal: Normal range of motion.   Neurological: He is alert and oriented to person, place, and time. He has normal strength and normal reflexes. No cranial nerve deficit or sensory deficit. He displays a negative Romberg sign.   Skin: Skin is warm, dry and intact.   Psychiatric: He has a normal mood and affect. His speech is normal and behavior is normal. Judgment and thought content normal. Cognition and memory are normal.     Independent Review of Radiographic Studies:    CT of the chest performed January 8, 2019 was independently reviewed and compared to CT scans dating back to August 2018.  The dense consolidative change in the right lower lobe is stable.  There is a nodular component which remains stable.  There are no new infiltrates nodules or masses.  There is no hilar or mediastinal adenopathy.  There is no pleural effusion.    Assessment/Plan     It appears that this area in the right lower lobe is all related to radiation changes.  There has been no change over the last 2 CT scans.  We will continue to watch him closely.  I've recommended that he return in 3 months with a repeat CT of the chest.  I will keep you informed of his progress.  Thank you for allowing me to participate in the care   Stacey    Diagnoses and all orders for this visit:    Adenocarcinoma of right lung (CMS/HCC)  -     CT Chest Without Contrast; Future    Follow-up examination following surgery  -     CT Chest Without Contrast; Future

## 2019-02-04 ENCOUNTER — OFFICE VISIT (OUTPATIENT)
Dept: INTERNAL MEDICINE | Age: 84
End: 2019-02-04

## 2019-02-04 VITALS
TEMPERATURE: 96 F | BODY MASS INDEX: 28.03 KG/M2 | DIASTOLIC BLOOD PRESSURE: 80 MMHG | OXYGEN SATURATION: 93 % | HEART RATE: 75 BPM | WEIGHT: 200.2 LBS | HEIGHT: 71 IN | SYSTOLIC BLOOD PRESSURE: 120 MMHG

## 2019-02-04 DIAGNOSIS — E03.4 HYPOTHYROIDISM DUE TO ACQUIRED ATROPHY OF THYROID: Primary | ICD-10-CM

## 2019-02-04 DIAGNOSIS — I21.02: ICD-10-CM

## 2019-02-04 DIAGNOSIS — E78.2 MIXED HYPERLIPIDEMIA: ICD-10-CM

## 2019-02-04 DIAGNOSIS — D53.9 MACROCYTIC ANEMIA: ICD-10-CM

## 2019-02-04 DIAGNOSIS — I50.89 OTHER HEART FAILURE (HCC): ICD-10-CM

## 2019-02-04 DIAGNOSIS — R05.3 PERSISTENT COUGH: ICD-10-CM

## 2019-02-04 DIAGNOSIS — I48.20 CHRONIC ATRIAL FIBRILLATION (HCC): ICD-10-CM

## 2019-02-04 PROCEDURE — 99214 OFFICE O/P EST MOD 30 MIN: CPT | Performed by: NURSE PRACTITIONER

## 2019-02-04 RX ORDER — CYANOCOBALAMIN (VITAMIN B-12) 2000 MCG
2000 TABLET ORAL DAILY
Qty: 30 TABLET | COMMUNITY
Start: 2019-02-04

## 2019-02-04 NOTE — PROGRESS NOTES
Subjective   Bhaskar Ibarra is a 83 y.o. male.     History of Present Illness     Patient here for follow-up multiple chronic medical issues.    Atrial fibrillation: Currently on Eliquis, denies any bleeding issues.  He previously had a Dr. Al Payne cardiology on board, whom he is no longer seen and is requesting referral to new cardiologist.  He reports history of congestive heart failure (which I did not note in chart), also history of CAD and mitral valve insufficiency s/p repair.  He denies any recent increased swelling, weight has been stable.  No shortness of breath.    Chronic cough: Reports has had for years. Has not been responsive to allergy medications and NCS. Reflux is controlled per patient. 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 Lucero.     Hypothyroidism: Has been stable on Synthroid 88mcg daily.     Hyperlipidemia: On Lipitor and fenofibrate. Last lipid panel 8/2018.     The following portions of the patient's history were reviewed and updated as appropriate: allergies, current medications, past family history, past medical history, past social history, past surgical history and problem list.    Review of Systems   Constitutional: Negative for activity change, appetite change, unexpected weight gain and unexpected weight loss.   Respiratory: Positive for cough. Negative for shortness of breath and wheezing.    Cardiovascular: Negative for chest pain, palpitations and leg swelling.       Objective   Physical Exam   Constitutional: He appears well-developed and well-nourished. He is active. He does not appear ill. No distress.   Cardiovascular: Normal rate, regular rhythm and normal heart sounds.   No swelling present BLE    Pulmonary/Chest: Effort normal and breath sounds normal.   Neurological: He is alert.   Psychiatric: He has a normal mood and affect. His speech is normal and behavior is normal. Thought content normal.    Nursing note and vitals reviewed.        Assessment/Plan   Problems Addressed this Visit        Cardiovascular and Mediastinum    Atrial fibrillation (CMS/HCC)    Relevant Orders    Ambulatory Referral to Cardiology    Acute non-Q wave ST elevation myocardial infarction (STEMI) involving left anterior descending (LAD) coronary artery    Relevant Orders    Ambulatory Referral to Cardiology    Hyperlipidemia    Relevant Orders    Comprehensive Metabolic Panel    Other heart failure (CMS/HCC)    Relevant Orders    Ambulatory Referral to Cardiology       Endocrine    Hypothyroidism - Primary    Relevant Orders    TSH Rfx On Abnormal To Free T4       Hematopoietic and Hemostatic    Macrocytic anemia    Relevant Medications    cyanocobalamin (VITAMIN B-12) 2000 MCG tablet tablet      Other Visit Diagnoses     Persistent cough            1. Hypothyroidism due to acquired atrophy of thyroid  Check labs, adjust medication as necessary.  - TSH Rfx On Abnormal To Free T4    2. Macrocytic anemia  Patient was diagnosed with B12 deficiency after last labs, has not started B12 supplement as directed.  Discussed starting over-the-counter B12 supplement daily.    - cyanocobalamin (VITAMIN B-12) 2000 MCG tablet tablet; Take 1 tablet by mouth Daily.  Dispense: 30 tablet    3. Mixed hyperlipidemia  Will check fasting lipid panel next visit.    - Comprehensive Metabolic Panel    4. Other heart failure (CMS/HCC)    - Ambulatory Referral to Cardiology    5. Acute non-Q wave ST elevation myocardial infarction (STEMI) involving left anterior descending (LAD) coronary artery    - Ambulatory Referral to Cardiology    6. Chronic atrial fibrillation (CMS/HCC)    - Ambulatory Referral to Cardiology    7. Persistent cough  Persistent cough of prolonged duration of unknown origin. Has had recent chest CT per Dr. Lucero, unsure of etiology of cough. Recommended trying OTC antihistamine in combination with NCS spray, may need to consider  pulmonology consult if cough continues if patient is agreeable.

## 2019-02-05 LAB
ALBUMIN SERPL-MCNC: 4.3 G/DL (ref 3.5–5.2)
ALBUMIN/GLOB SERPL: 1.4 G/DL
ALP SERPL-CCNC: 80 U/L (ref 39–117)
ALT SERPL-CCNC: 20 U/L (ref 1–41)
AST SERPL-CCNC: 15 U/L (ref 1–40)
BILIRUB SERPL-MCNC: 0.3 MG/DL (ref 0.1–1.2)
BUN SERPL-MCNC: 26 MG/DL (ref 8–23)
BUN/CREAT SERPL: 15.3 (ref 7–25)
CALCIUM SERPL-MCNC: 10 MG/DL (ref 8.6–10.5)
CHLORIDE SERPL-SCNC: 100 MMOL/L (ref 98–107)
CO2 SERPL-SCNC: 25.4 MMOL/L (ref 22–29)
CREAT SERPL-MCNC: 1.7 MG/DL (ref 0.76–1.27)
GLOBULIN SER CALC-MCNC: 3 GM/DL
GLUCOSE SERPL-MCNC: 152 MG/DL (ref 65–99)
POTASSIUM SERPL-SCNC: 4.8 MMOL/L (ref 3.5–5.2)
PROT SERPL-MCNC: 7.3 G/DL (ref 6–8.5)
SODIUM SERPL-SCNC: 141 MMOL/L (ref 136–145)
TSH SERPL DL<=0.005 MIU/L-ACNC: 3.88 MIU/ML (ref 0.27–4.2)

## 2019-02-14 RX ORDER — ATORVASTATIN CALCIUM 40 MG/1
40 TABLET, FILM COATED ORAL NIGHTLY
Qty: 90 TABLET | Refills: 0 | Status: SHIPPED | OUTPATIENT
Start: 2019-02-14 | End: 2019-05-10 | Stop reason: SDUPTHER

## 2019-03-04 ENCOUNTER — TELEPHONE (OUTPATIENT)
Dept: INTERNAL MEDICINE | Age: 84
End: 2019-03-04

## 2019-03-04 RX ORDER — SOTALOL HYDROCHLORIDE 80 MG/1
TABLET ORAL
Qty: 30 TABLET | Refills: 0 | Status: SHIPPED | OUTPATIENT
Start: 2019-03-04 | End: 2019-06-07 | Stop reason: SDUPTHER

## 2019-03-04 NOTE — TELEPHONE ENCOUNTER
Refill request sent to patient's pharmacy per request. . Pt will have to set up delivery for themselves.

## 2019-03-04 NOTE — TELEPHONE ENCOUNTER
Patient states his Sotalol was supposed to be mailed out, but they haven't sent it out yet.  Patient will be out of medication tomorrow (03-).  Patient would like for [facility] to deliver the medication to his house.  Please advise.

## 2019-03-07 RX ORDER — CARVEDILOL 3.12 MG/1
TABLET ORAL
Qty: 180 TABLET | Refills: 1 | Status: SHIPPED | OUTPATIENT
Start: 2019-03-07 | End: 2019-06-07 | Stop reason: SDUPTHER

## 2019-03-14 RX ORDER — FUROSEMIDE 40 MG/1
TABLET ORAL
Qty: 90 TABLET | Refills: 1 | Status: SHIPPED | OUTPATIENT
Start: 2019-03-14 | End: 2019-09-27 | Stop reason: SDUPTHER

## 2019-03-26 RX ORDER — LEVOTHYROXINE SODIUM 88 UG/1
TABLET ORAL
Qty: 90 TABLET | Refills: 1 | Status: SHIPPED | OUTPATIENT
Start: 2019-03-26 | End: 2019-09-27 | Stop reason: SDUPTHER

## 2019-04-03 ENCOUNTER — OFFICE VISIT (OUTPATIENT)
Dept: CARDIOLOGY | Facility: CLINIC | Age: 84
End: 2019-04-03

## 2019-04-03 VITALS
HEIGHT: 71 IN | SYSTOLIC BLOOD PRESSURE: 118 MMHG | WEIGHT: 202.4 LBS | BODY MASS INDEX: 28.34 KG/M2 | HEART RATE: 56 BPM | DIASTOLIC BLOOD PRESSURE: 68 MMHG

## 2019-04-03 DIAGNOSIS — I48.0 PAROXYSMAL ATRIAL FIBRILLATION (HCC): ICD-10-CM

## 2019-04-03 DIAGNOSIS — I34.0 NON-RHEUMATIC MITRAL REGURGITATION: ICD-10-CM

## 2019-04-03 DIAGNOSIS — I25.10 CORONARY ARTERIOSCLEROSIS IN NATIVE ARTERY: Primary | ICD-10-CM

## 2019-04-03 DIAGNOSIS — Z98.890 S/P MITRAL VALVE REPAIR: ICD-10-CM

## 2019-04-03 DIAGNOSIS — C34.91 ADENOCARCINOMA OF RIGHT LUNG (HCC): ICD-10-CM

## 2019-04-03 DIAGNOSIS — Z95.1 S/P CABG (CORONARY ARTERY BYPASS GRAFT): ICD-10-CM

## 2019-04-03 PROCEDURE — 99214 OFFICE O/P EST MOD 30 MIN: CPT | Performed by: INTERNAL MEDICINE

## 2019-04-03 PROCEDURE — 93000 ELECTROCARDIOGRAM COMPLETE: CPT | Performed by: INTERNAL MEDICINE

## 2019-04-03 NOTE — PROGRESS NOTES
Date of Office Visit: 2019  Encounter Provider: Efrain Yuan MD  Place of Service: [facility] CARDIOLOGY  Patient Name: Bhaskar Ibarra  :[id number]  [id number]    Chief Complaint   Patient presents with   • Coronary Artery Disease   :     HPI: Bhaskar Ibarra is a 83 y.o. male  He is here to establish care.  He has had a prior five-vessel coronary artery bypass grafting in  and he had a mitral valve repair at that time.  He had a prior infarct, a non-ST-elevation myocardial infarction, and had severely reduced left ventricular function and improved.  He was noted to have asymptomatic paroxysmal atrial fibrillation and had been placed on sotalol and Eliquis since then.  He has done pretty well.  He has not had any chest pain, shortness of breath, paroxysmal nocturnal dyspnea, orthopnea, or edema.  The atrial fibrillation was noted at cardiac rehabilitation.  He spends most of his time helping to take care of his wife.  He has been doing pretty well.  He is a former [place] .  He was in the [employer].          Past Medical History:   Diagnosis Date   • Abdominal aortic aneurysm (CMS/HCC)    • Acute myocardial infarction (CMS/HCC)    • Acute renal failure (CMS/HCC)    • Anemia    • Aneurysm (CMS/HCC)    • Atrial fibrillation (CMS/HCC)    • Cardiomyopathy (CMS/HCC)    • Chest pain    • Coronary artery disease     2015-    • Depression    • GERD (gastroesophageal reflux disease)    • Hyperlipidemia    • Hypertension    • Hypothyroidism    • Lung cancer (CMS/HCC) 2016    radiation    • Mitral regurgitation    • Myocardial infarction (CMS/HCC)    • Pleural effusion    • Pleural effusion, bilateral    • RLS (restless legs syndrome)    • Sleep apnea    • Ulcerative colitis (CMS/HCC)    • Ulcerative colitis (CMS/HCC)        Past Surgical History:   Procedure Laterality Date   • BACK SURGERY      lumbar   • CARDIAC CATHETERIZATION     • CORONARY ANGIOPLASTY     •  CORONARY ARTERY BYPASS GRAFT  07/27/2015    X 5  Dr Louis   • HERNIA REPAIR      inguinal - left   • MITRAL VALVE REPAIR/REPLACEMENT  07/27/2015    Dr Louis   • NASAL SEPTUM SURGERY      benign growth removed   • OTHER SURGICAL HISTORY      Cardiovasc Endosc W/ Video-Assist Harv Veins Coron Art Byp   • PROSTATE SURGERY     • SKIN BIOPSY      benign       Social History     Socioeconomic History   • Marital status:      Spouse name: Not on file   • Number of children: Not on file   • Years of education: Not on file   • Highest education level: Not on file   Tobacco Use   • Smoking status: Former Smoker     Years: 5.00     Types: Cigarettes   • Smokeless tobacco: Never Used   • Tobacco comment: 1 cig day x 5 years   Substance and Sexual Activity   • Alcohol use: No   • Drug use: No   • Sexual activity: Defer       Family History   Problem Relation Age of Onset   • Coronary artery disease Brother    • Heart attack Brother    • Cancer Mother    • Lung cancer Father        Review of Systems   Constitution: Negative for decreased appetite, fever, malaise/fatigue and weight loss.   HENT: Negative for nosebleeds.    Eyes: Negative for double vision.   Cardiovascular: Negative for chest pain, claudication, cyanosis, dyspnea on exertion, irregular heartbeat, leg swelling, near-syncope, orthopnea, palpitations, paroxysmal nocturnal dyspnea and syncope.   Respiratory: Negative for cough, hemoptysis and shortness of breath.    Hematologic/Lymphatic: Negative for bleeding problem.   Skin: Negative for rash.   Musculoskeletal: Negative for falls and myalgias.   Gastrointestinal: Negative for hematochezia, jaundice, melena, nausea and vomiting.   Genitourinary: Negative for hematuria.   Neurological: Negative for dizziness and seizures.   Psychiatric/Behavioral: Negative for altered mental status and memory loss.       Allergies   Allergen Reactions   • Latex Itching         Current Outpatient Medications:   •  apixaban  "(ELIQUIS) 2.5 MG tablet tablet, Take 1 tablet by mouth Every 12 (Twelve) Hours., Disp: 180 tablet, Rfl: 1  •  atorvastatin (LIPITOR) 40 MG tablet, Take 1 tablet by mouth Every Night., Disp: 90 tablet, Rfl: 0  •  carvedilol (COREG) 3.125 MG tablet, TAKE 1 TABLET TWICE A DAY WITH MEALS, Disp: 180 tablet, Rfl: 1  •  cyanocobalamin (VITAMIN B-12) 2000 MCG tablet tablet, Take 1 tablet by mouth Daily., Disp: 30 tablet, Rfl:   •  escitalopram (LEXAPRO) 20 MG tablet, Take 1 tablet by mouth Daily., Disp: 90 tablet, Rfl: 1  •  fenofibrate (TRICOR) 145 MG tablet, Take 1 tablet by mouth Daily., Disp: 90 tablet, Rfl: 3  •  furosemide (LASIX) 40 MG tablet, TAKE 1 TABLET DAILY, Disp: 90 tablet, Rfl: 1  •  levothyroxine (SYNTHROID, LEVOTHROID) 88 MCG tablet, TAKE 1 TABLET DAILY, Disp: 90 tablet, Rfl: 1  •  omeprazole (priLOSEC) 40 MG capsule, Take 1 capsule by mouth Daily., Disp: 90 capsule, Rfl: 2  •  rOPINIRole (REQUIP) 0.5 MG tablet, TAKE TWO TABLETS BY MOUTH ONE HOUR BEFORE BEDTIME, Disp: 60 tablet, Rfl: 3  •  sotalol (BETAPACE) 80 MG tablet, Take a 1/2 of an 80 mg tablet by mouth twice daily., Disp: 30 tablet, Rfl: 0      Objective:     Vitals:    04/03/19 1148   BP: 118/68   Pulse: 56   Weight: 91.8 kg (202 lb 6.4 oz)   Height: 180.3 cm (71\")     Body mass index is 28.23 kg/m².    Physical Exam   Constitutional: He is oriented to person, place, and time. He appears well-developed and well-nourished.   HENT:   Head: Normocephalic.   Eyes: No scleral icterus.   Neck: No JVD present. No thyromegaly present.   Cardiovascular: Normal rate, regular rhythm and normal heart sounds. Exam reveals no gallop and no friction rub.   No murmur heard.  Pulmonary/Chest: Effort normal and breath sounds normal. He has no wheezes. He has no rales.   Abdominal: Soft. There is no hepatosplenomegaly. There is no tenderness.   Musculoskeletal: Normal range of motion. He exhibits no edema.   Lymphadenopathy:     He has no cervical adenopathy. "   Neurological: He is alert and oriented to person, place, and time.   Skin: Skin is warm and dry. No rash noted.   Psychiatric: He has a normal mood and affect.         ECG 12 Lead  Date/Time: 4/3/2019 12:16 PM  Performed by: Efrain Yuan MD  Authorized by: Efrain Yuan MD   Comparison: compared with previous ECG   Similar to previous ECG  Rhythm: sinus rhythm    Clinical impression: normal ECG             Assessment:       Diagnosis Plan   1. Coronary arteriosclerosis in native artery     2. Paroxysmal atrial fibrillation (CMS/HCC)     3. Non-rheumatic mitral regurgitation     4. Adenocarcinoma of right lung (CMS/HCC)     5. S/P CABG (coronary artery bypass graft)     6. S/P mitral valve repair            Plan:       He is, I think, relatively stable at this point.  I am going to stop his aspirin.  I do not think there is any benefit of taking that with his Eliquis.  I am going to ask that we get an echocardiogram on him to see where we are from his mitral valve repair and his left ventricular function.  It has not been looked at in four years.  He is asymptomatic from a coronary standpoint.  I am going to have him come back and see us in a year and then see me in two years.  I think, at that point, we could strongly consider stopping his sotalol.  He is an elderly gentleman.  His QT is normal now but that occurred in the setting of rehabilitation and see how he does off of it.    Coronary Artery Disease  Assessment  • The patient has no angina    Subjective - Objective  • There is a history of past MI  • There is a history of previous coronary artery bypass graft          As always, it has been a pleasure to participate in your patient's care.      Sincerely,       Efrain Yuan MD

## 2019-04-08 ENCOUNTER — HOSPITAL ENCOUNTER (OUTPATIENT)
Dept: CT IMAGING | Facility: HOSPITAL | Age: 84
Discharge: HOME | End: 2019-04-08
Attending: THORACIC SURGERY (CARDIOTHORACIC VASCULAR SURGERY) | Admitting: THORACIC SURGERY (CARDIOTHORACIC VASCULAR SURGERY)

## 2019-04-08 DIAGNOSIS — C34.91 ADENOCARCINOMA OF RIGHT LUNG (HCC): ICD-10-CM

## 2019-04-08 DIAGNOSIS — Z09 FOLLOW-UP EXAMINATION FOLLOWING SURGERY: ICD-10-CM

## 2019-04-08 PROCEDURE — 71250 CT THORAX DX C-: CPT

## 2019-04-12 RX ORDER — ESCITALOPRAM OXALATE 20 MG/1
TABLET ORAL
Qty: 90 TABLET | Refills: 1 | Status: SHIPPED | OUTPATIENT
Start: 2019-04-12 | End: 2019-10-11 | Stop reason: SDUPTHER

## 2019-04-16 ENCOUNTER — OFFICE VISIT (OUTPATIENT)
Dept: SURGERY | Facility: CLINIC | Age: 84
End: 2019-04-16

## 2019-04-16 VITALS
BODY MASS INDEX: 28.28 KG/M2 | SYSTOLIC BLOOD PRESSURE: 145 MMHG | OXYGEN SATURATION: 98 % | HEART RATE: 52 BPM | DIASTOLIC BLOOD PRESSURE: 64 MMHG | HEIGHT: 71 IN | WEIGHT: 202 LBS

## 2019-04-16 DIAGNOSIS — C34.91 ADENOCARCINOMA OF RIGHT LUNG (HCC): Primary | ICD-10-CM

## 2019-04-16 PROCEDURE — 99213 OFFICE O/P EST LOW 20 MIN: CPT | Performed by: THORACIC SURGERY (CARDIOTHORACIC VASCULAR SURGERY)

## 2019-04-16 NOTE — PROGRESS NOTES
Subjective   Patient ID: Bhaskar Ibarra is a 83 y.o. male is here today for follow-up.    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  We have been following this gentleman since December 2016.  He received his radiation in April 2017.  Since his last visit he has done well.  He is active without significant shortness of breath or wheezing.  He reports no cough and no hemoptysis.  He has no hoarseness or change in his voice.  He has no pleuritic pain.  There has been no unexplained weight loss    The following portions of the patient's history were reviewed and updated as appropriate: allergies, current medications, past family history, past medical history, past social history, past surgical history and problem list.  Review of Systems   Constitution: Negative.   HENT: Negative.    Eyes: Negative.    Cardiovascular: Negative.    Respiratory: Negative.    Endocrine: Negative.    Hematologic/Lymphatic: Negative.    Skin: Negative.    Musculoskeletal: Negative.    Gastrointestinal: Negative.    Genitourinary: Negative.    Neurological: Negative.    Psychiatric/Behavioral: Negative.    Allergic/Immunologic: Negative.      Patient Active Problem List   Diagnosis   • Coronary arteriosclerosis in native artery   • Atrial fibrillation (CMS/HCC)   • Acute non-Q wave ST elevation myocardial infarction (STEMI) involving left anterior descending (LAD) coronary artery   • Mitral valve insufficiency   • S/P CABG (coronary artery bypass graft)   • S/P mitral valve repair   • Depression   • Hypothyroidism   • Hyperlipidemia   • Pulmonary nodule   • Adenocarcinoma of right lung (CMS/HCC)   • Benign nodular prostatic hyperplasia with lower urinary tract symptoms   • Confusion   • Post concussion syndrome   • Vitamin D deficiency   • Well adult health check   • Impaired  fasting glucose   • AAA (abdominal aortic aneurysm) (CMS/HCC)   • Ascending aortic aneurysm (CMS/HCC)   • Macrocytic anemia   • Other heart failure (CMS/HCC)     Past Medical History:   Diagnosis Date   • Abdominal aortic aneurysm (CMS/HCC)    • Acute myocardial infarction (CMS/HCC)    • Acute renal failure (CMS/HCC)    • Anemia    • Aneurysm (CMS/HCC)    • Atrial fibrillation (CMS/HCC)    • Cardiomyopathy (CMS/HCC)    • Chest pain    • Coronary artery disease     July 2015-    • Depression    • GERD (gastroesophageal reflux disease)    • Hyperlipidemia    • Hypertension    • Hypothyroidism    • Lung cancer (CMS/HCC) 2016    radiation    • Mitral regurgitation    • Myocardial infarction (CMS/HCC)    • Pleural effusion    • Pleural effusion, bilateral    • RLS (restless legs syndrome)    • Sleep apnea    • Ulcerative colitis (CMS/HCC)    • Ulcerative colitis (CMS/HCC)      Past Surgical History:   Procedure Laterality Date   • BACK SURGERY      lumbar   • CARDIAC CATHETERIZATION     • CORONARY ANGIOPLASTY     • CORONARY ARTERY BYPASS GRAFT  07/27/2015    X 5  Dr Louis   • HERNIA REPAIR      inguinal - left   • MITRAL VALVE REPAIR/REPLACEMENT  07/27/2015    Dr Louis   • NASAL SEPTUM SURGERY      benign growth removed   • OTHER SURGICAL HISTORY      Cardiovasc Endosc W/ Video-Assist Harv Veins Coron Art Byp   • PROSTATE SURGERY     • SKIN BIOPSY      benign     Family History   Problem Relation Age of Onset   • Coronary artery disease Brother    • Heart attack Brother    • Cancer Mother    • Lung cancer Father      Social History     Socioeconomic History   • Marital status:      Spouse name: Not on file   • Number of children: Not on file   • Years of education: Not on file   • Highest education level: Not on file   Tobacco Use   • Smoking status: Former Smoker     Years: 5.00     Types: Cigarettes   • Smokeless tobacco: Never Used   • Tobacco comment: 1 cig day x 5 years   Substance and Sexual Activity   •  Alcohol use: No   • Drug use: No   • Sexual activity: Defer       Current Outpatient Medications:   •  apixaban (ELIQUIS) 2.5 MG tablet tablet, Take 1 tablet by mouth Every 12 (Twelve) Hours., Disp: 180 tablet, Rfl: 1  •  atorvastatin (LIPITOR) 40 MG tablet, Take 1 tablet by mouth Every Night., Disp: 90 tablet, Rfl: 0  •  carvedilol (COREG) 3.125 MG tablet, TAKE 1 TABLET TWICE A DAY WITH MEALS, Disp: 180 tablet, Rfl: 1  •  cyanocobalamin (VITAMIN B-12) 2000 MCG tablet tablet, Take 1 tablet by mouth Daily., Disp: 30 tablet, Rfl:   •  escitalopram (LEXAPRO) 20 MG tablet, TAKE 1 TABLET DAILY, Disp: 90 tablet, Rfl: 1  •  fenofibrate (TRICOR) 145 MG tablet, Take 1 tablet by mouth Daily., Disp: 90 tablet, Rfl: 3  •  furosemide (LASIX) 40 MG tablet, TAKE 1 TABLET DAILY, Disp: 90 tablet, Rfl: 1  •  levothyroxine (SYNTHROID, LEVOTHROID) 88 MCG tablet, TAKE 1 TABLET DAILY, Disp: 90 tablet, Rfl: 1  •  omeprazole (priLOSEC) 40 MG capsule, Take 1 capsule by mouth Daily., Disp: 90 capsule, Rfl: 2  •  rOPINIRole (REQUIP) 0.5 MG tablet, TAKE TWO TABLETS BY MOUTH ONE HOUR BEFORE BEDTIME, Disp: 60 tablet, Rfl: 3  •  sotalol (BETAPACE) 80 MG tablet, Take a 1/2 of an 80 mg tablet by mouth twice daily., Disp: 30 tablet, Rfl: 0  Allergies   Allergen Reactions   • Latex Itching        Objective   Vitals:    04/16/19 0944   BP: 145/64   Pulse: 52   SpO2: 98%     Physical Exam   Constitutional: He is oriented to person, place, and time. He appears well-developed and well-nourished.   HENT:   Head: Normocephalic.   Eyes: Conjunctivae, EOM and lids are normal. Pupils are equal, round, and reactive to light.   Neck: Trachea normal and normal range of motion. Neck supple. No hepatojugular reflux and no JVD present. Carotid bruit is not present. No thyroid mass and no thyromegaly present.   Cardiovascular: Normal rate, regular rhythm, S1 normal, S2 normal, normal heart sounds and normal pulses.  No extrasystoles are present. PMI is not  displaced.   Pulmonary/Chest: Effort normal and breath sounds normal.   Abdominal: Soft. Normal appearance and bowel sounds are normal. He exhibits no mass. There is no hepatosplenomegaly. There is no tenderness. No hernia.   Musculoskeletal: Normal range of motion.   Neurological: He is alert and oriented to person, place, and time. He has normal strength and normal reflexes. No cranial nerve deficit or sensory deficit. He displays a negative Romberg sign.   Skin: Skin is warm, dry and intact.   Psychiatric: He has a normal mood and affect. His speech is normal and behavior is normal. Judgment and thought content normal. Cognition and memory are normal.     Independent Review of Radiographic Studies:    CT of the chest performed April 8, 2019 was independently reviewed and compared to other CT scans.  There has been no significant change in the tumor in the right lower lobe of the lung.  There is surrounding bronchiectasis with pleural thickening and scarring.  There are no new pulmonary infiltrates nodules or masses.  There is no suspicious hilar or mediastinal adenopathy.  No pleural effusions.  No suspicious bony lesions.    Assessment/Plan     Mr. Ibarra continues to do well.  He shows no recurrence or metastases from his lung cancer.  It is been 2 years since his primary treatment.  We will continue to follow him at yearly intervals.  He will return to see me in 1 year with a CT of the chest without contrast.  I will be glad to see him sooner if you think it is necessary.  Thank you for allowing me to participate in the care of Mr. Ibarra.    Diagnoses and all orders for this visit:    Adenocarcinoma of right lung (CMS/HCC)  -     CT Chest Without Contrast; Future

## 2019-04-18 ENCOUNTER — HOSPITAL ENCOUNTER (OUTPATIENT)
Dept: CARDIOLOGY | Facility: HOSPITAL | Age: 84
Discharge: HOME | End: 2019-04-18
Admitting: INTERNAL MEDICINE

## 2019-04-18 VITALS
OXYGEN SATURATION: 97 % | BODY MASS INDEX: 28.28 KG/M2 | WEIGHT: 202 LBS | HEIGHT: 71 IN | HEART RATE: 80 BPM | SYSTOLIC BLOOD PRESSURE: 141 MMHG | DIASTOLIC BLOOD PRESSURE: 76 MMHG

## 2019-04-18 DIAGNOSIS — Z95.1 S/P CABG (CORONARY ARTERY BYPASS GRAFT): ICD-10-CM

## 2019-04-18 DIAGNOSIS — Z98.890 S/P MITRAL VALVE REPAIR: ICD-10-CM

## 2019-04-18 DIAGNOSIS — I48.0 PAROXYSMAL ATRIAL FIBRILLATION (HCC): ICD-10-CM

## 2019-04-18 DIAGNOSIS — I25.10 CORONARY ARTERIOSCLEROSIS IN NATIVE ARTERY: ICD-10-CM

## 2019-04-18 DIAGNOSIS — C34.91 ADENOCARCINOMA OF RIGHT LUNG (HCC): ICD-10-CM

## 2019-04-18 DIAGNOSIS — I34.0 NON-RHEUMATIC MITRAL REGURGITATION: ICD-10-CM

## 2019-04-18 LAB
ASCENDING AORTA: 4 CM
BH CV ECHO MEAS - ACS: 2 CM
BH CV ECHO MEAS - AI MAX PG: 41.8 MMHG
BH CV ECHO MEAS - AI MAX VEL: 323.4 CM/SEC
BH CV ECHO MEAS - AO MAX PG (FULL): 2.4 MMHG
BH CV ECHO MEAS - AO MAX PG: 9 MMHG
BH CV ECHO MEAS - AO MEAN PG (FULL): 1.8 MMHG
BH CV ECHO MEAS - AO MEAN PG: 5.1 MMHG
BH CV ECHO MEAS - AO ROOT AREA (BSA CORRECTED): 1.9
BH CV ECHO MEAS - AO ROOT AREA: 12.5 CM^2
BH CV ECHO MEAS - AO ROOT DIAM: 4 CM
BH CV ECHO MEAS - AO V2 MAX: 149.6 CM/SEC
BH CV ECHO MEAS - AO V2 MEAN: 106.2 CM/SEC
BH CV ECHO MEAS - AO V2 VTI: 31.8 CM
BH CV ECHO MEAS - AVA(I,A): 2.8 CM^2
BH CV ECHO MEAS - AVA(I,D): 2.8 CM^2
BH CV ECHO MEAS - AVA(V,A): 3.1 CM^2
BH CV ECHO MEAS - AVA(V,D): 3.1 CM^2
BH CV ECHO MEAS - BSA(HAYCOCK): 2.2 M^2
BH CV ECHO MEAS - BSA: 2.1 M^2
BH CV ECHO MEAS - BZI_BMI: 28.2 KILOGRAMS/M^2
BH CV ECHO MEAS - BZI_METRIC_HEIGHT: 180.3 CM
BH CV ECHO MEAS - BZI_METRIC_WEIGHT: 91.6 KG
BH CV ECHO MEAS - EDV(MOD-SP2): 67 ML
BH CV ECHO MEAS - EDV(MOD-SP4): 77 ML
BH CV ECHO MEAS - EF(MOD-BP): 45 %
BH CV ECHO MEAS - EF(MOD-SP2): 47.8 %
BH CV ECHO MEAS - EF(MOD-SP4): 41.6 %
BH CV ECHO MEAS - ESV(MOD-SP2): 35 ML
BH CV ECHO MEAS - ESV(MOD-SP4): 45 ML
BH CV ECHO MEAS - IVSD: 1.3 CM
BH CV ECHO MEAS - LAT PEAK E' VEL: 10 CM/SEC
BH CV ECHO MEAS - LV DIASTOLIC VOL/BSA (35-75): 36.4 ML/M^2
BH CV ECHO MEAS - LV MAX PG: 6.5 MMHG
BH CV ECHO MEAS - LV MEAN PG: 3.3 MMHG
BH CV ECHO MEAS - LV SYSTOLIC VOL/BSA (12-30): 21.2 ML/M^2
BH CV ECHO MEAS - LV V1 MAX: 127.6 CM/SEC
BH CV ECHO MEAS - LV V1 MEAN: 85.3 CM/SEC
BH CV ECHO MEAS - LV V1 VTI: 24.3 CM
BH CV ECHO MEAS - LVIDD: 4.3 CM
BH CV ECHO MEAS - LVIDS: 3.5 CM
BH CV ECHO MEAS - LVLD AP2: 7.4 CM
BH CV ECHO MEAS - LVLD AP4: 7.4 CM
BH CV ECHO MEAS - LVLS AP2: 6.1 CM
BH CV ECHO MEAS - LVLS AP4: 6.8 CM
BH CV ECHO MEAS - LVOT AREA (M): 3.5 CM^2
BH CV ECHO MEAS - LVOT AREA: 3.6 CM^2
BH CV ECHO MEAS - LVOT DIAM: 2.1 CM
BH CV ECHO MEAS - LVPWD: 0.8 CM
BH CV ECHO MEAS - MED PEAK E' VEL: 6 CM/SEC
BH CV ECHO MEAS - MR MAX PG: 69.6 MMHG
BH CV ECHO MEAS - MR MAX VEL: 417 CM/SEC
BH CV ECHO MEAS - MV A DUR: 0.14 SEC
BH CV ECHO MEAS - MV A MAX VEL: 108.7 CM/SEC
BH CV ECHO MEAS - MV DEC SLOPE: 191.1 CM/SEC^2
BH CV ECHO MEAS - MV DEC TIME: 0.6 SEC
BH CV ECHO MEAS - MV E MAX VEL: 77.6 CM/SEC
BH CV ECHO MEAS - MV E/A: 0.71
BH CV ECHO MEAS - MV MAX PG: 5.9 MMHG
BH CV ECHO MEAS - MV MEAN PG: 2.7 MMHG
BH CV ECHO MEAS - MV P1/2T MAX VEL: 115.5 CM/SEC
BH CV ECHO MEAS - MV P1/2T: 177.1 MSEC
BH CV ECHO MEAS - MV V2 MAX: 121.6 CM/SEC
BH CV ECHO MEAS - MV V2 MEAN: 77.5 CM/SEC
BH CV ECHO MEAS - MV V2 VTI: 52.9 CM
BH CV ECHO MEAS - MVA P1/2T LCG: 1.9 CM^2
BH CV ECHO MEAS - MVA(P1/2T): 1.2 CM^2
BH CV ECHO MEAS - MVA(VTI): 1.7 CM^2
BH CV ECHO MEAS - PA ACC TIME: 0.11 SEC
BH CV ECHO MEAS - PA MAX PG (FULL): 1.2 MMHG
BH CV ECHO MEAS - PA MAX PG: 3 MMHG
BH CV ECHO MEAS - PA PR(ACCEL): 30.3 MMHG
BH CV ECHO MEAS - PA V2 MAX: 86.9 CM/SEC
BH CV ECHO MEAS - PULM A REVS DUR: 0.12 SEC
BH CV ECHO MEAS - PULM A REVS VEL: 31.4 CM/SEC
BH CV ECHO MEAS - PULM DIAS VEL: 40.5 CM/SEC
BH CV ECHO MEAS - PULM S/D: 1.1
BH CV ECHO MEAS - PULM SYS VEL: 42.8 CM/SEC
BH CV ECHO MEAS - PVA(V,A): 2.7 CM^2
BH CV ECHO MEAS - PVA(V,D): 2.7 CM^2
BH CV ECHO MEAS - QP/QS: 0.47
BH CV ECHO MEAS - RAP SYSTOLE: 3 MMHG
BH CV ECHO MEAS - RV MAX PG: 1.8 MMHG
BH CV ECHO MEAS - RV MEAN PG: 0.93 MMHG
BH CV ECHO MEAS - RV V1 MAX: 67.9 CM/SEC
BH CV ECHO MEAS - RV V1 MEAN: 45.2 CM/SEC
BH CV ECHO MEAS - RV V1 VTI: 12.2 CM
BH CV ECHO MEAS - RVOT AREA: 3.4 CM^2
BH CV ECHO MEAS - RVOT DIAM: 2.1 CM
BH CV ECHO MEAS - RVSP: 28 MMHG
BH CV ECHO MEAS - SI(AO): 186.8 ML/M^2
BH CV ECHO MEAS - SI(LVOT): 41.5 ML/M^2
BH CV ECHO MEAS - SI(MOD-SP2): 15.1 ML/M^2
BH CV ECHO MEAS - SI(MOD-SP4): 15.1 ML/M^2
BH CV ECHO MEAS - SUP REN AO DIAM: 2.6 CM
BH CV ECHO MEAS - SV(AO): 395.7 ML
BH CV ECHO MEAS - SV(LVOT): 87.8 ML
BH CV ECHO MEAS - SV(MOD-SP2): 32 ML
BH CV ECHO MEAS - SV(MOD-SP4): 32 ML
BH CV ECHO MEAS - SV(RVOT): 41.5 ML
BH CV ECHO MEAS - TAPSE (>1.6): 1.5 CM2
BH CV ECHO MEAS - TR MAX VEL: 249.8 CM/SEC
BH CV ECHO MEASUREMENTS AVERAGE E/E' RATIO: 9.7
BH CV XLRA - RV BASE: 2.8 CM
BH CV XLRA - TDI S': 10 CM/SEC
LEFT ATRIUM VOLUME INDEX: 21 ML/M2
MAXIMAL PREDICTED HEART RATE: 137 BPM
SINUS: 3.9 CM
STJ: 3 CM
STRESS TARGET HR: 116 BPM

## 2019-04-18 PROCEDURE — 25010000002 PERFLUTREN (DEFINITY) 8.476 MG IN SODIUM CHLORIDE 0.9 % 10 ML INJECTION: Performed by: INTERNAL MEDICINE

## 2019-04-18 PROCEDURE — 93306 TTE W/DOPPLER COMPLETE: CPT

## 2019-04-18 PROCEDURE — 93306 TTE W/DOPPLER COMPLETE: CPT | Performed by: INTERNAL MEDICINE

## 2019-04-18 RX ADMIN — PERFLUTREN 1.5 ML: 6.52 INJECTION, SUSPENSION INTRAVENOUS at 10:51

## 2019-04-22 ENCOUNTER — TELEPHONE (OUTPATIENT)
Dept: CARDIOLOGY | Facility: CLINIC | Age: 84
End: 2019-04-22

## 2019-04-22 NOTE — TELEPHONE ENCOUNTER
04/22/19  10:06 AM  Bhaskar Ibarra  1935  Home Phone [phone]   Mobile [phone]       Bhaskar Ibarra calls for results of Echo done 4/18/19.    Patient can be reached at [phone]  Or call his daughter Reina [phone]  Thanks  Jina Soni RN  Triage nurse

## 2019-04-23 NOTE — TELEPHONE ENCOUNTER
I left a message for him to call me, his echo is normal and his valve looks good.  He has normal LV function.  I tried his daughter but the line was busy.

## 2019-05-10 DIAGNOSIS — I25.10 CORONARY ARTERIOSCLEROSIS IN NATIVE ARTERY: ICD-10-CM

## 2019-05-10 RX ORDER — APIXABAN 2.5 MG/1
TABLET, FILM COATED ORAL
Qty: 180 TABLET | Refills: 1 | Status: SHIPPED | OUTPATIENT
Start: 2019-05-10 | End: 2019-10-31 | Stop reason: SDUPTHER

## 2019-05-10 RX ORDER — ATORVASTATIN CALCIUM 40 MG/1
TABLET, FILM COATED ORAL
Qty: 90 TABLET | Refills: 0 | Status: SHIPPED | OUTPATIENT
Start: 2019-05-10 | End: 2019-08-15 | Stop reason: SDUPTHER

## 2019-06-07 ENCOUNTER — TELEPHONE (OUTPATIENT)
Dept: INTERNAL MEDICINE | Age: 84
End: 2019-06-07

## 2019-06-07 RX ORDER — SOTALOL HYDROCHLORIDE 80 MG/1
TABLET ORAL
Qty: 90 TABLET | Refills: 1 | Status: SHIPPED | OUTPATIENT
Start: 2019-06-07 | End: 2019-08-20 | Stop reason: SDUPTHER

## 2019-06-07 RX ORDER — OMEPRAZOLE 40 MG/1
CAPSULE, DELAYED RELEASE ORAL
Qty: 90 CAPSULE | Refills: 2 | Status: SHIPPED | OUTPATIENT
Start: 2019-06-07 | End: 2020-02-28

## 2019-06-24 RX ORDER — FENOFIBRATE 145 MG/1
145 TABLET, COATED ORAL DAILY
Qty: 90 TABLET | Refills: 3 | Status: SHIPPED | OUTPATIENT
Start: 2019-06-24 | End: 2019-06-24 | Stop reason: SDUPTHER

## 2019-06-24 RX ORDER — FENOFIBRATE 145 MG/1
145 TABLET, COATED ORAL DAILY
Qty: 30 TABLET | Refills: 0 | Status: SHIPPED | OUTPATIENT
Start: 2019-06-24 | End: 2020-07-08

## 2019-08-02 NOTE — PROGRESS NOTES
"Bhaskar Ibarra / 83 y.o. / male  Encounter Date: 08/05/2019    ASSESSMENT & PLAN:    Problem List Items Addressed This Visit        Cardiovascular and Mediastinum    Hyperlipidemia - Primary    Relevant Medications    atorvastatin (LIPITOR) 40 MG tablet    fenofibrate (TRICOR) 145 MG tablet    Other Relevant Orders    Lipid Panel With / Chol / HDL Ratio       Endocrine    Hypothyroidism    Relevant Medications    levothyroxine (SYNTHROID, LEVOTHROID) 88 MCG tablet    sotalol (BETAPACE) 80 MG tablet    carvedilol (COREG) 3.125 MG tablet    Other Relevant Orders    TSH+Free T4        Orders Placed This Encounter   Procedures   • Lipid Panel With / Chol / HDL Ratio   • TSH+Free T4     No orders of the defined types were placed in this encounter.      Summary/Discussion:  1. Continue current medications as prescribed, unless otherwise instructed.   2. Will check lipid panel and thyroid levels today for routine monitoring and adjustment of medication as needed.   3. Advised patient to increase aerobic exercise daily, 10 minutes walking 2 x daily, to improve HDL and aid in weight loss and overall health.   4. Follow up with PCP in 6 months for hypothyroidism and HLD.     Return in about 6 months (around 2/5/2020) for Check Up on chronic issues, Follow up with [facility].  ________________________________________________________________    VITALS:    Visit Vitals  /72   Pulse 79   Temp 96.9 °F (36.1 °C) (Temporal)   Ht 180.3 cm (70.98\")   Wt 88.9 kg (196 lb)   SpO2 96%   BMI 27.35 kg/m²       BP Readings from Last 3 Encounters:   08/05/19 118/72   04/18/19 141/76   04/16/19 145/64     Wt Readings from Last 3 Encounters:   08/05/19 88.9 kg (196 lb)   04/18/19 91.6 kg (202 lb)   04/16/19 91.6 kg (202 lb)      Body mass index is 27.35 kg/m².    CC: Main reason(s) for today's visit: Hypothyroidism      HPI    Patient is a 83 y.o. male who is here for 4 month f/u on hypothyroidism and hyperlipidemia. He is a " new patient to me. These are new problems to me. I am providing cross coverage for her PCP, Katherine Lester while she is on leave.     Chronic hyperlipidemia:  Current therapy include low fat/cholesterol diet, atorvastatin, fenofibrate, denies problems with medication.    Most recent labs:   Lab Results   Component Value Date     08/13/2018    LDL CANCELED 04/10/2018    LDL 98 01/20/2017    HDL 34 (L) 08/13/2018    HDL 27 (L) 04/10/2018    TRIG 190 (H) 08/13/2018    CHOLHDLRATIO 5.06 08/13/2018     Hypothyroidism: Controlled on stable dose of levothyroxine 88mcg daily.  Patient denies denies fatigue, weight changes, heat/cold intolerance, bowel/skin changes or CVS symptoms.       Patient Care Team:  Katherine Lester APRN as PCP - General (Internal Medicine)  Al Payne Jr., MD (Cardiology)  Lokesh Reddy MD as Consulting Physician (Vascular Surgery)  Bhaskar Lucero III, MD as Surgeon (Thoracic Surgery)  ____________________________________________________________________    REVIEW OF SYSTEMS    Review of Systems   Constitutional: Negative for activity change, appetite change, fatigue and unexpected weight change.   HENT: Negative.    Eyes: Negative.    Respiratory: Negative.    Cardiovascular: Negative.    Gastrointestinal: Negative.    Genitourinary: Negative.    Musculoskeletal: Negative.  Negative for arthralgias and myalgias.   Neurological: Negative.  Negative for headaches.   Psychiatric/Behavioral: Negative.        PHYSICAL EXAMINATION    Physical Exam   Constitutional: He is oriented to person, place, and time. He appears well-developed. No distress.   Cardiovascular: Normal rate, regular rhythm and normal heart sounds.   Pulmonary/Chest: Effort normal and breath sounds normal.   Musculoskeletal: Normal range of motion.   Neurological: He is alert and oriented to person, place, and time.   Skin: Skin is warm and dry.   Psychiatric: He has a normal mood and affect.   Vitals  reviewed.    REVIEWED DATA:    Labs:   Lab Results   Component Value Date     02/04/2019    K 4.8 02/04/2019    AST 15 02/04/2019    ALT 20 02/04/2019    BUN 26 (H) 02/04/2019    CREATININE 1.70 (H) 02/04/2019    CREATININE 1.40 (H) 08/20/2018    CREATININE 1.52 (H) 08/13/2018    EGFRIFNONA 39 (L) 02/04/2019    EGFRIFAFRI 47 (L) 02/04/2019       Lab Results   Component Value Date    GLUCOSE 116 (H) 07/27/2017    GLUCOSE 123 (H) 07/26/2017    GLUCOSE 135 (H) 07/24/2017    HGBA1C 6.37 (H) 08/13/2018    HGBA1C 6.15 (H) 01/20/2017    HGBA1C 6.2 (H) 07/27/2015       Lab Results   Component Value Date     08/13/2018    LDL CANCELED 04/10/2018    LDL 98 01/20/2017    HDL 34 (L) 08/13/2018    TRIG 190 (H) 08/13/2018    CHOLHDLRATIO 5.06 08/13/2018       Lab Results   Component Value Date    TSH 3.88 02/04/2019    FREET4 1.67 07/23/2017          Lab Results   Component Value Date    WBC Comment: 08/20/2018    WBC 6.6 08/20/2018    HGB 12.6 (L) 08/20/2018    HGB 13.0 (L) 08/13/2018    HGB 13.5 (L) 04/10/2018     08/20/2018         Imaging:      Medical Tests:      Summary of old records / correspondence / consultant report:      Request outside records:   ______________________________________________________________________    ALLERGIES  Allergies   Allergen Reactions   • Latex Itching        MEDICATIONS  Current Outpatient Medications on File Prior to Visit   Medication Sig   • atorvastatin (LIPITOR) 40 MG tablet TAKE 1 TABLET EVERY NIGHT   • carvedilol (COREG) 3.125 MG tablet    • cyanocobalamin (VITAMIN B-12) 2000 MCG tablet tablet Take 1 tablet by mouth Daily.   • ELIQUIS 2.5 MG tablet tablet TAKE 1 TABLET EVERY 12 HOURS   • escitalopram (LEXAPRO) 20 MG tablet TAKE 1 TABLET DAILY   • fenofibrate (TRICOR) 145 MG tablet Take 1 tablet by mouth Daily.   • furosemide (LASIX) 40 MG tablet TAKE 1 TABLET DAILY   • levothyroxine (SYNTHROID, LEVOTHROID) 88 MCG tablet TAKE 1 TABLET DAILY   • omeprazole  (priLOSEC) 40 MG capsule TAKE 1 CAPSULE DAILY   • rOPINIRole (REQUIP) 0.5 MG tablet TAKE TWO TABLETS BY MOUTH ONE HOUR BEFORE BEDTIME   • sotalol (BETAPACE) 80 MG tablet Take a 1/2 of an 80 mg tablet by mouth twice daily.     No current facility-administered medications on file prior to visit.        PFSH:     The following portions of the patient's history were reviewed and updated as appropriate: Allergies / Current Medications / Past Medical History / Surgical History / Social History / Family History    PROBLEM LIST   Patient Active Problem List   Diagnosis   • Coronary arteriosclerosis in native artery   • Atrial fibrillation (CMS/HCC)   • Acute non-Q wave ST elevation myocardial infarction (STEMI) involving left anterior descending (LAD) coronary artery   • Mitral valve insufficiency   • S/P CABG (coronary artery bypass graft)   • S/P mitral valve repair   • Depression   • Hypothyroidism   • Hyperlipidemia   • Pulmonary nodule   • Adenocarcinoma of right lung (CMS/HCC)   • Benign nodular prostatic hyperplasia with lower urinary tract symptoms   • Confusion   • Post concussion syndrome   • Vitamin D deficiency   • Well adult health check   • Impaired fasting glucose   • AAA (abdominal aortic aneurysm) (CMS/HCC)   • Ascending aortic aneurysm (CMS/HCC)   • Macrocytic anemia   • Other heart failure (CMS/HCC)       PAST MEDICAL HISTORY  Past Medical History:   Diagnosis Date   • Abdominal aortic aneurysm (CMS/HCC)    • Acute myocardial infarction (CMS/HCC)    • Acute renal failure (CMS/HCC)    • Anemia    • Aneurysm (CMS/HCC)    • Atrial fibrillation (CMS/HCC)    • Cardiomyopathy (CMS/HCC)    • Chest pain    • Coronary artery disease     July 2015-    • Depression    • GERD (gastroesophageal reflux disease)    • Hyperlipidemia    • Hypertension    • Hypothyroidism    • Lung cancer (CMS/HCC) 2016    radiation    • Mitral regurgitation    • Myocardial infarction (CMS/HCC)    • Pleural effusion    • Pleural effusion,  bilateral    • RLS (restless legs syndrome)    • Sleep apnea    • Ulcerative colitis (CMS/HCC)    • Ulcerative colitis (CMS/HCC)        SURGICAL HISTORY  Past Surgical History:   Procedure Laterality Date   • BACK SURGERY      lumbar   • CARDIAC CATHETERIZATION     • CORONARY ANGIOPLASTY     • CORONARY ARTERY BYPASS GRAFT  07/27/2015    X 5  Dr Louis   • HERNIA REPAIR      inguinal - left   • MITRAL VALVE REPAIR/REPLACEMENT  07/27/2015    Dr Louis   • NASAL SEPTUM SURGERY      benign growth removed   • OTHER SURGICAL HISTORY      Cardiovasc Endosc W/ Video-Assist Harv Veins Coron Art Byp   • PROSTATE SURGERY     • SKIN BIOPSY      benign       SOCIAL HISTORY  Social History     Socioeconomic History   • Marital status:      Spouse name: Not on file   • Number of children: Not on file   • Years of education: Not on file   • Highest education level: Not on file   Tobacco Use   • Smoking status: Former Smoker     Years: 5.00     Types: Cigarettes   • Smokeless tobacco: Never Used   • Tobacco comment: 1 cig day x 5 years   Substance and Sexual Activity   • Alcohol use: No   • Drug use: No   • Sexual activity: Defer       FAMILY HISTORY  Family History   Problem Relation Age of Onset   • Coronary artery disease Brother    • Heart attack Brother    • Cancer Mother    • Lung cancer Father

## 2019-08-05 ENCOUNTER — OFFICE VISIT (OUTPATIENT)
Dept: INTERNAL MEDICINE | Age: 84
End: 2019-08-05

## 2019-08-05 VITALS
DIASTOLIC BLOOD PRESSURE: 72 MMHG | BODY MASS INDEX: 27.44 KG/M2 | HEIGHT: 71 IN | OXYGEN SATURATION: 96 % | HEART RATE: 79 BPM | SYSTOLIC BLOOD PRESSURE: 118 MMHG | TEMPERATURE: 96.9 F | WEIGHT: 196 LBS

## 2019-08-05 DIAGNOSIS — E03.4 HYPOTHYROIDISM DUE TO ACQUIRED ATROPHY OF THYROID: ICD-10-CM

## 2019-08-05 DIAGNOSIS — E78.2 MIXED HYPERLIPIDEMIA: Primary | ICD-10-CM

## 2019-08-05 LAB
CHOLEST SERPL-MCNC: 185 MG/DL (ref 0–200)
CHOLEST/HDLC SERPL: 5.61 {RATIO}
HDLC SERPL-MCNC: 33 MG/DL (ref 40–60)
LDLC SERPL CALC-MCNC: 107 MG/DL (ref 0–100)
T4 FREE SERPL-MCNC: 1.59 NG/DL (ref 0.93–1.7)
TRIGL SERPL-MCNC: 227 MG/DL (ref 0–150)
TSH SERPL DL<=0.005 MIU/L-ACNC: 4.47 MIU/ML (ref 0.27–4.2)
VLDLC SERPL CALC-MCNC: 45.4 MG/DL

## 2019-08-05 PROCEDURE — 99213 OFFICE O/P EST LOW 20 MIN: CPT | Performed by: NURSE PRACTITIONER

## 2019-08-05 RX ORDER — CARVEDILOL 3.12 MG/1
TABLET ORAL
COMMUNITY
Start: 2019-06-07 | End: 2019-08-20 | Stop reason: SDUPTHER

## 2019-08-16 RX ORDER — ATORVASTATIN CALCIUM 40 MG/1
TABLET, FILM COATED ORAL
Qty: 90 TABLET | Refills: 2 | Status: SHIPPED | OUTPATIENT
Start: 2019-08-16 | End: 2020-05-26

## 2019-08-20 RX ORDER — CARVEDILOL 3.12 MG/1
TABLET ORAL
Qty: 180 TABLET | Refills: 4 | Status: SHIPPED | OUTPATIENT
Start: 2019-08-20 | End: 2020-09-08

## 2019-09-27 RX ORDER — LEVOTHYROXINE SODIUM 88 UG/1
TABLET ORAL
Qty: 90 TABLET | Refills: 1 | Status: SHIPPED | OUTPATIENT
Start: 2019-09-27 | End: 2020-03-30

## 2019-09-27 RX ORDER — FUROSEMIDE 40 MG/1
TABLET ORAL
Qty: 90 TABLET | Refills: 1 | Status: SHIPPED | OUTPATIENT
Start: 2019-09-27 | End: 2020-03-23

## 2019-10-11 RX ORDER — ESCITALOPRAM OXALATE 20 MG/1
TABLET ORAL
Qty: 90 TABLET | Refills: 1 | Status: SHIPPED | OUTPATIENT
Start: 2019-10-11 | End: 2020-04-13

## 2019-10-22 ENCOUNTER — OFFICE VISIT (OUTPATIENT)
Dept: CARDIAC SURGERY | Facility: CLINIC | Age: 84
End: 2019-10-22

## 2019-10-22 VITALS
TEMPERATURE: 98.3 F | BODY MASS INDEX: 25.9 KG/M2 | HEIGHT: 71 IN | SYSTOLIC BLOOD PRESSURE: 126 MMHG | RESPIRATION RATE: 20 BRPM | OXYGEN SATURATION: 98 % | WEIGHT: 185 LBS | HEART RATE: 65 BPM | DIASTOLIC BLOOD PRESSURE: 80 MMHG

## 2019-10-22 DIAGNOSIS — I71.21 ASCENDING AORTIC ANEURYSM (HCC): Primary | ICD-10-CM

## 2019-10-22 PROCEDURE — 99214 OFFICE O/P EST MOD 30 MIN: CPT | Performed by: NURSE PRACTITIONER

## 2019-10-22 RX ORDER — SOTALOL HYDROCHLORIDE 80 MG/1
TABLET ORAL
COMMUNITY
Start: 2019-09-13 | End: 2019-12-10 | Stop reason: SDUPTHER

## 2019-10-24 PROBLEM — I10 HTN (HYPERTENSION), BENIGN: Status: ACTIVE | Noted: 2019-10-24

## 2019-10-24 NOTE — PROGRESS NOTES
10/23/2019      Subjective:      Katherine Lester APRN    Chief Complaint:  Yearly follow up ascending aortic aneursym    History of Present Illness:       Dear Katherine Lu APRN and Colleagues,    It was nice to see Bhaskar Ibarra in [facility] for follow-up. He is a 83 y.o. male with a history of CABG/ mitral valve repair by Dr. Louis in July 2015, lung cancer treated with XRT in December 2016 by Dr. Lucero, HTN, and abd aortic aneurysm.  His ascending aortic aneurysm was originally discovered during CT scans for his lung cancer.  He has seen Dr. Reddy in the past for his abdominal aortic aneurysm.  However, he has not seen him since 2017 and it was decided at that time he would be seen PRN.  He was seen initially by Dr. Corona in September 2018 with recommendation for yearly follow up.  At that time, there was concern for questionable enlarging neoplasm.  However, that was deemed scarring from XRT per Dr. Lucero.   He comes in today for routine aneurysm surveillence.  The CT of chest without contrast on 4/8/2019 was reviewed and his ascending aorta remains 4.7 cm.  Scans from 2018 were reviewed as well and the same measurements were obtained.  He denies shortness of breath, chest/back pain, numbness/tingling/pain of extremities.  No vascular deficiencies or hyperextensible or hypermobile joints are noted on exam.  The patient's family history is positive for aneurysms--he reports two brothers with aneurysms.  He has one brother that has undergone aortic stent repair and one brother who has not had any surgical intervention to his aneurysm.  His family history is negative for dissections, negative for connective tissue disease, and positive for coronary disease in first degree family members-- a brother.  He is doing well except frustrated with world events.  He is the primary caregiver for his invalid wife d/t dementia.  He does have help when he needs to get to appts.  He quit smoking 50+  years ago.  He uses a cane for mobility and takes his time to ambulate.  His BP today was 126/80.  In review of his echo from 4/2019, his EF is ~50%, no significant mitral regurgitation noted, and trace to mild aortic regurgitation noted.        Patient Active Problem List   Diagnosis   • Coronary arteriosclerosis in native artery   • Atrial fibrillation (CMS/HCC)   • Acute non-Q wave ST elevation myocardial infarction (STEMI) involving left anterior descending (LAD) coronary artery   • Mitral valve insufficiency   • S/P CABG (coronary artery bypass graft)   • S/P mitral valve repair   • Depression   • Hypothyroidism   • Hyperlipidemia   • Pulmonary nodule   • Adenocarcinoma of right lung (CMS/HCC)   • Benign nodular prostatic hyperplasia with lower urinary tract symptoms   • Confusion   • Post concussion syndrome   • Vitamin D deficiency   • Well adult health check   • Impaired fasting glucose   • AAA (abdominal aortic aneurysm) (CMS/HCC)   • Ascending aortic aneurysm (CMS/HCC)   • Macrocytic anemia   • Other heart failure (CMS/HCC)       Past Medical History:   Diagnosis Date   • Abdominal aortic aneurysm (CMS/HCC)    • Acute myocardial infarction (CMS/HCC)    • Acute renal failure (CMS/HCC)    • Anemia    • Aneurysm (CMS/HCC)    • Atrial fibrillation (CMS/HCC)    • Cardiomyopathy (CMS/HCC)    • Chest pain    • Coronary artery disease     July 2015-    • Depression    • GERD (gastroesophageal reflux disease)    • Hyperlipidemia    • Hypertension    • Hypothyroidism    • Lung cancer (CMS/HCC) 2016    radiation    • Mitral regurgitation    • Myocardial infarction (CMS/HCC)    • Pleural effusion    • Pleural effusion, bilateral    • RLS (restless legs syndrome)    • Sleep apnea    • Ulcerative colitis (CMS/HCC)    • Ulcerative colitis (CMS/HCC)        Past Surgical History:   Procedure Laterality Date   • BACK SURGERY      lumbar   • CARDIAC CATHETERIZATION     • CORONARY ANGIOPLASTY     • CORONARY ARTERY BYPASS  GRAFT  07/27/2015    X 5  Dr Louis   • HERNIA REPAIR      inguinal - left   • MITRAL VALVE REPAIR/REPLACEMENT  07/27/2015    Dr Louis   • NASAL SEPTUM SURGERY      benign growth removed   • OTHER SURGICAL HISTORY      Cardiovasc Endosc W/ Video-Assist Harv Veins Coron Art Byp   • PROSTATE SURGERY     • SKIN BIOPSY      benign       Allergies   Allergen Reactions   • Latex Itching         Current Outpatient Medications:   •  atorvastatin (LIPITOR) 40 MG tablet, TAKE 1 TABLET EVERY NIGHT, Disp: 90 tablet, Rfl: 2  •  carvedilol (COREG) 3.125 MG tablet, TAKE 1 TABLET TWICE A DAY WITH MEALS, Disp: 180 tablet, Rfl: 4  •  cyanocobalamin (VITAMIN B-12) 2000 MCG tablet tablet, Take 1 tablet by mouth Daily., Disp: 30 tablet, Rfl:   •  ELIQUIS 2.5 MG tablet tablet, TAKE 1 TABLET EVERY 12 HOURS, Disp: 180 tablet, Rfl: 1  •  escitalopram (LEXAPRO) 20 MG tablet, TAKE 1 TABLET DAILY, Disp: 90 tablet, Rfl: 1  •  fenofibrate (TRICOR) 145 MG tablet, Take 1 tablet by mouth Daily., Disp: 30 tablet, Rfl: 0  •  furosemide (LASIX) 40 MG tablet, TAKE 1 TABLET DAILY, Disp: 90 tablet, Rfl: 1  •  levothyroxine (SYNTHROID, LEVOTHROID) 88 MCG tablet, TAKE 1 TABLET DAILY, Disp: 90 tablet, Rfl: 1  •  omeprazole (priLOSEC) 40 MG capsule, TAKE 1 CAPSULE DAILY, Disp: 90 capsule, Rfl: 2  •  rOPINIRole (REQUIP) 0.5 MG tablet, TAKE TWO TABLETS BY MOUTH ONE HOUR BEFORE BEDTIME, Disp: 60 tablet, Rfl: 3  •  sotalol (BETAPACE) 80 MG tablet, , Disp: , Rfl:     Social History     Socioeconomic History   • Marital status:      Spouse name: Not on file   • Number of children: Not on file   • Years of education: Not on file   • Highest education level: Not on file   Tobacco Use   • Smoking status: Former Smoker     Years: 5.00     Types: Cigarettes   • Smokeless tobacco: Never Used   • Tobacco comment: 1 cig day x 5 years   Substance and Sexual Activity   • Alcohol use: No   • Drug use: No   • Sexual activity: Defer       Family History   Problem  Relation Age of Onset   • Coronary artery disease Brother    • Heart attack Brother    • Cancer Mother    • Lung cancer Father            Review of Systems:  Review of Systems   Respiratory: Negative for shortness of breath.    Cardiovascular: Negative for chest pain, palpitations and leg swelling.   Gastrointestinal: Negative for abdominal pain.   Neurological: Negative for dizziness, seizures, syncope, weakness and light-headedness.   All other systems reviewed and are negative.      Physical Exam:    Vital Signs:  Weight: 83.9 kg (185 lb)   Body mass index is 25.8 kg/m².  Temp: 98.3 °F (36.8 °C)   Heart Rate: 65   BP: 126/80     Physical Exam   Constitutional: He is oriented to person, place, and time. Vital signs are normal. He appears well-developed and well-nourished. He is active and cooperative.   HENT:   Head: Normocephalic and atraumatic.   Nose: Nose normal.   Mouth/Throat: Uvula is midline, oropharynx is clear and moist and mucous membranes are normal.   Noted bilateral hearing aides   Eyes: Conjunctivae, EOM and lids are normal. Pupils are equal, round, and reactive to light. No scleral icterus.   Neck: Trachea normal. Neck supple. Normal carotid pulses, no hepatojugular reflux and no JVD present. Carotid bruit is not present. No thyroid mass and no thyromegaly present.   Cardiovascular: Normal rate, regular rhythm, normal heart sounds and intact distal pulses.   No murmur heard.  Pulses:       Carotid pulses are 2+ on the right side, and 2+ on the left side.       Radial pulses are 2+ on the right side, and 2+ on the left side.        Femoral pulses are 2+ on the right side, and 2+ on the left side.       Popliteal pulses are 2+ on the right side, and 2+ on the left side.        Dorsalis pedis pulses are 2+ on the right side, and 2+ on the left side.        Posterior tibial pulses are 2+ on the right side, and 2+ on the left side.   Pulmonary/Chest: Effort normal and breath sounds normal.   Abdominal:  Soft. Normal appearance and bowel sounds are normal. He exhibits no distension and no abdominal bruit. There is no hepatosplenomegaly. There is no tenderness.   Musculoskeletal:   Using a cane to assist his ambulation.   Lymphadenopathy:        Head (right side): No submental, no submandibular, no tonsillar, no preauricular, no posterior auricular and no occipital adenopathy present.        Head (left side): No submental, no submandibular, no tonsillar, no preauricular, no posterior auricular and no occipital adenopathy present.     He has no cervical adenopathy.        Right: No supraclavicular adenopathy present.        Left: No supraclavicular adenopathy present.   Neurological: He is alert and oriented to person, place, and time. GCS eye subscore is 4. GCS verbal subscore is 5. GCS motor subscore is 6.   Skin: Skin is warm, dry and intact. Capillary refill takes less than 2 seconds. No rash noted. No cyanosis or erythema. Nails show no clubbing.        Sternotomy noted.   Psychiatric: He has a normal mood and affect. His speech is normal and behavior is normal. Judgment and thought content normal. Cognition and memory are normal.   Nursing note and vitals reviewed.       Assessment:     1.  Ascending aortic aneurysm, 4.7 cm--stable  2.  Abdominal aortic aneurysm, 4.1 cm at last check--not seeing vascular at this time  3.  HTN--stable  4.  Hx CABG/ MV repair--stable  5.  Hx RLL lung carcinoma with XRT--followed by Dr. Lucero    Recommendation/Plan:       Continued risk factor modification of hypertension with a goal blood pressure less than 130/80, hyperlipidemia optimization, and continued avoidance of tobacco/nicotine products.    We discussed the importance of avoidance of over the counter decongestants and stimulants, specifically pseudoephedrine, for hypertension and aneurysm management.    Initiation of low aerobic exercise is indicated to help reduce body habitus, assist with blood pressure and cholesterol  control.       Although risk of rupture is low, emergency department presentation is warranted for acute chest, back, or abdominal pain, syncope, or stroke like symptoms.    Follow up in [facility] in one year(s) with CT scan of  chest without contrast.  We can definitely coordinate scans with thoracic surgery.    Thank you for allowing us to participate in his care.    Regards,    Gela Meza, APRN      **All problems and history are new to this examiner.  Extensive review of records prior to and during patient visit

## 2019-10-31 DIAGNOSIS — I25.10 CORONARY ARTERIOSCLEROSIS IN NATIVE ARTERY: ICD-10-CM

## 2019-10-31 RX ORDER — APIXABAN 2.5 MG/1
TABLET, FILM COATED ORAL
Qty: 180 TABLET | Refills: 3 | Status: SHIPPED | OUTPATIENT
Start: 2019-10-31 | End: 2020-11-05

## 2019-12-06 RX ORDER — SOTALOL HYDROCHLORIDE 80 MG/1
TABLET ORAL
Qty: 90 TABLET | Refills: 4 | OUTPATIENT
Start: 2019-12-06

## 2019-12-10 RX ORDER — SOTALOL HYDROCHLORIDE 80 MG/1
80 TABLET ORAL DAILY
Qty: 90 TABLET | Refills: 3 | Status: SHIPPED | OUTPATIENT
Start: 2019-12-10 | End: 2020-12-16

## 2020-02-10 ENCOUNTER — OFFICE VISIT (OUTPATIENT)
Dept: INTERNAL MEDICINE | Age: 85
End: 2020-02-10

## 2020-02-10 VITALS
OXYGEN SATURATION: 95 % | BODY MASS INDEX: 26.52 KG/M2 | DIASTOLIC BLOOD PRESSURE: 76 MMHG | TEMPERATURE: 97.6 F | WEIGHT: 189.4 LBS | HEART RATE: 61 BPM | SYSTOLIC BLOOD PRESSURE: 118 MMHG | HEIGHT: 71 IN

## 2020-02-10 DIAGNOSIS — I71.40 ABDOMINAL AORTIC ANEURYSM (AAA) WITHOUT RUPTURE (HCC): Primary | ICD-10-CM

## 2020-02-10 DIAGNOSIS — I48.0 PAROXYSMAL ATRIAL FIBRILLATION (HCC): ICD-10-CM

## 2020-02-10 DIAGNOSIS — E03.4 HYPOTHYROIDISM DUE TO ACQUIRED ATROPHY OF THYROID: ICD-10-CM

## 2020-02-10 DIAGNOSIS — E78.2 MIXED HYPERLIPIDEMIA: ICD-10-CM

## 2020-02-10 DIAGNOSIS — I10 HTN (HYPERTENSION), BENIGN: ICD-10-CM

## 2020-02-10 PROCEDURE — 99214 OFFICE O/P EST MOD 30 MIN: CPT | Performed by: NURSE PRACTITIONER

## 2020-02-10 NOTE — PROGRESS NOTES
[facility]  Katherine Ibarra / 84 y.o. / male  02/10/2020      ASSESSMENT & PLAN:    Problem List Items Addressed This Visit        Cardiovascular and Mediastinum    Atrial fibrillation (CMS/HCC)    Relevant Medications    carvedilol (COREG) 3.125 MG tablet    sotalol (BETAPACE) 80 MG tablet    Hyperlipidemia    Relevant Medications    fenofibrate (TRICOR) 145 MG tablet    atorvastatin (LIPITOR) 40 MG tablet    AAA (abdominal aortic aneurysm) (CMS/HCC) - Primary    HTN (hypertension), benign    Relevant Medications    carvedilol (COREG) 3.125 MG tablet    furosemide (LASIX) 40 MG tablet    sotalol (BETAPACE) 80 MG tablet    Other Relevant Orders    Comprehensive Metabolic Panel       Endocrine    Hypothyroidism    Relevant Medications    carvedilol (COREG) 3.125 MG tablet    levothyroxine (SYNTHROID, LEVOTHROID) 88 MCG tablet    sotalol (BETAPACE) 80 MG tablet    Other Relevant Orders    TSH+Free T4        Orders Placed This Encounter   Procedures   • Comprehensive Metabolic Panel   • TSH+Free T4     No orders of the defined types were placed in this encounter.      Summary/Discussion:    1. Abdominal aortic aneurysm (AAA) without rupture (CMS/HCC)  Followed by cardiothoracic surgery, 4.8cm ascending AAA.     2. Paroxysmal atrial fibrillation (CMS/HCC)  Stable, on Eliquis. Managed by cardiology.     3. Mixed hyperlipidemia  Stable on current.     4. HTN (hypertension), benign  Blood pressure stable on current therapy, continue same.  Check labs today and adjust dosage of medication as necessary.  Follow-up 6 months.    - Comprehensive Metabolic Panel    5. Hypothyroidism due to acquired atrophy of thyroid    - TSH+Free T4        Return in about 6 months (around 8/10/2020) for Next scheduled follow up.  ____________________________________________________________________    MEDICATIONS  Current Outpatient Medications   Medication Sig Dispense Refill   • atorvastatin (LIPITOR) 40 MG  "tablet TAKE 1 TABLET EVERY NIGHT 90 tablet 2   • carvedilol (COREG) 3.125 MG tablet TAKE 1 TABLET TWICE A DAY WITH MEALS 180 tablet 4   • cyanocobalamin (VITAMIN B-12) 2000 MCG tablet tablet Take 1 tablet by mouth Daily. 30 tablet    • ELIQUIS 2.5 MG tablet tablet TAKE 1 TABLET EVERY 12 HOURS 180 tablet 3   • escitalopram (LEXAPRO) 20 MG tablet TAKE 1 TABLET DAILY 90 tablet 1   • fenofibrate (TRICOR) 145 MG tablet Take 1 tablet by mouth Daily. 30 tablet 0   • furosemide (LASIX) 40 MG tablet TAKE 1 TABLET DAILY 90 tablet 1   • levothyroxine (SYNTHROID, LEVOTHROID) 88 MCG tablet TAKE 1 TABLET DAILY 90 tablet 1   • omeprazole (priLOSEC) 40 MG capsule TAKE 1 CAPSULE DAILY 90 capsule 2   • rOPINIRole (REQUIP) 0.5 MG tablet TAKE TWO TABLETS BY MOUTH ONE HOUR BEFORE BEDTIME 60 tablet 3   • sotalol (BETAPACE) 80 MG tablet Take 1 tablet by mouth Daily. 90 tablet 3     No current facility-administered medications for this visit.        VITALS    Visit Vitals  /76   Pulse 61   Temp 97.6 °F (36.4 °C)   Ht 180.3 cm (70.98\")   Wt 85.9 kg (189 lb 6.4 oz)   SpO2 95%   BMI 26.43 kg/m²       BP Readings from Last 3 Encounters:   02/10/20 118/76   10/22/19 126/80   08/05/19 118/72     Wt Readings from Last 3 Encounters:   02/10/20 85.9 kg (189 lb 6.4 oz)   10/22/19 83.9 kg (185 lb)   08/05/19 88.9 kg (196 lb)      Body mass index is 26.43 kg/m².    CC:  Main reason(s) for today's visit: Follow-up for hypertension and hyperlipidemia    HPI:   Chronic essential hypertension:  Since prior visit: compliant with medication(s) and denies significant problems with medication(s). Currently taking carvedilol, sotalol. He is not exercising, but is adherent to low sodium diet.  He denies symptoms of chest pain/pressure, dyspnea, swelling, vision impairment. CVD risk factors include: advanced age (>55 for males, >65 for females), dyslipidemia, HTN,  and sedentary lifestyle. Use of agents associated with HTN: no alcohol use, no tobacco use " and no caffeine use . Most recent in-office blood pressure readings:   BP Readings from Last 3 Encounters:   02/10/20 118/76   10/22/19 126/80   08/05/19 118/72       Chronic hyperlipidemia:  Current therapy include atorvastatin.    Most recent labs:   Lab Results   Component Value Date     (H) 08/05/2019     08/13/2018    LDL CANCELED 04/10/2018    HDL 33 (L) 08/05/2019    HDL 34 (L) 08/13/2018    HDL 27 (L) 04/10/2018    TRIG 227 (H) 08/05/2019    CHOLHDLRATIO 5.61 08/05/2019    CHOLHDLRATIO 5.06 08/13/2018        Hypothyroidism: Patient presents for evaluation of thyroid function. Symptoms consist of denies fatigue, weight changes, heat/cold intolerance, bowel/skin changes or CVS symptoms.  The problem has been stable.      Patient is under stress as the primary caregiver of his wife who has significant dementia and is basically wheelchair bound.       Patient Care Team:  Katherine Lester APRN as PCP - General (Internal Medicine)  Al Payne Jr., MD (Cardiology)  Lokesh Reddy MD as Consulting Physician (Vascular Surgery)  Nic, Bhaskar DUPREE III, MD as Surgeon (Thoracic Surgery)  ____________________________________________________________________      REVIEW OF SYSTEMS    Review of Systems   Constitutional: Negative for activity change, appetite change and unexpected weight change.   HENT: Negative for tinnitus.    Eyes: Negative for visual disturbance.   Respiratory: Negative for cough, chest tightness and shortness of breath.    Cardiovascular: Negative for chest pain, palpitations and leg swelling.   Neurological: Negative for dizziness, light-headedness and headaches.         PHYSICAL EXAMINATION    Physical Exam   Constitutional: He is oriented to person, place, and time. Vital signs are normal. He appears well-developed and well-nourished. He is cooperative. He does not appear ill. No distress.   Cardiovascular: Normal rate, regular rhythm, S1 normal, S2 normal and normal heart  sounds.   No murmur heard.  Pulmonary/Chest: Effort normal and breath sounds normal. He has no decreased breath sounds. He has no wheezes. He has no rhonchi. He has no rales.   Neurological: He is alert and oriented to person, place, and time.   Skin: Skin is warm, dry and intact.   Psychiatric: He has a normal mood and affect. His speech is normal and behavior is normal. Judgment and thought content normal. Cognition and memory are normal.   Nursing note and vitals reviewed.      REVIEWED DATA:    Labs:   Lab Results   Component Value Date     02/04/2019    K 4.8 02/04/2019    AST 15 02/04/2019    ALT 20 02/04/2019    BUN 26 (H) 02/04/2019    CREATININE 1.70 (H) 02/04/2019    CREATININE 1.40 (H) 08/20/2018    CREATININE 1.52 (H) 08/13/2018    EGFRIFNONA 39 (L) 02/04/2019    EGFRIFAFRI 47 (L) 02/04/2019       Lab Results   Component Value Date    HGBA1C 6.37 (H) 08/13/2018    HGBA1C 6.15 (H) 01/20/2017    HGBA1C 6.2 (H) 07/27/2015    GLUCOSE 116 (H) 07/27/2017    GLUCOSE 123 (H) 07/26/2017    GLUCOSE 135 (H) 07/24/2017       Lab Results   Component Value Date     (H) 08/05/2019     08/13/2018    LDL CANCELED 04/10/2018    HDL 33 (L) 08/05/2019    HDL 34 (L) 08/13/2018    HDL 27 (L) 04/10/2018    TRIG 227 (H) 08/05/2019    TRIG 190 (H) 08/13/2018    TRIG 453 (H) 04/10/2018    CHOLHDLRATIO 5.61 08/05/2019    CHOLHDLRATIO 5.06 08/13/2018       Lab Results   Component Value Date    TSH 4.470 (H) 08/05/2019    FREET4 1.59 08/05/2019          Lab Results   Component Value Date    WBC Comment: 08/20/2018    WBC 6.6 08/20/2018    HGB 12.6 (L) 08/20/2018    HGB 13.0 (L) 08/13/2018    HGB 13.5 (L) 04/10/2018     08/20/2018       Lab Results   Component Value Date    GLUCOSEU Negative 07/26/2017    BLOODU Negative 07/26/2017    NITRITEU Negative 07/26/2017    LEUKOCYTESUR Negative 07/26/2017       Imaging:        Medical Tests:        Summary of old records / correspondence / consultant report:         Request outside records:        ALLERGIES  Allergies   Allergen Reactions   • Latex Itching        PFSH:     The following portions of the patient's history were reviewed and updated as appropriate: Allergies / Current Medications / Past Medical History / Surgical History / Social History / Family History    PROBLEM LIST   Patient Active Problem List   Diagnosis   • Coronary arteriosclerosis in native artery   • Atrial fibrillation (CMS/HCC)   • Acute non-Q wave ST elevation myocardial infarction (STEMI) involving left anterior descending (LAD) coronary artery   • Mitral valve insufficiency   • S/P CABG (coronary artery bypass graft)   • S/P mitral valve repair   • Depression   • Hypothyroidism   • Hyperlipidemia   • Pulmonary nodule   • Adenocarcinoma of right lung (CMS/HCC)   • Benign nodular prostatic hyperplasia with lower urinary tract symptoms   • Confusion   • Post concussion syndrome   • Vitamin D deficiency   • Well adult health check   • Impaired fasting glucose   • AAA (abdominal aortic aneurysm) (CMS/HCC)   • Ascending aortic aneurysm (CMS/HCC)   • Macrocytic anemia   • Other heart failure (CMS/HCC)   • HTN (hypertension), benign       PAST MEDICAL HISTORY  Past Medical History:   Diagnosis Date   • Abdominal aortic aneurysm (CMS/HCC)    • Acute myocardial infarction (CMS/HCC)    • Acute renal failure (CMS/HCC)    • Anemia    • Aneurysm (CMS/HCC)    • Atrial fibrillation (CMS/HCC)    • Cardiomyopathy (CMS/HCC)    • Chest pain    • Coronary artery disease     July 2015-    • Depression    • GERD (gastroesophageal reflux disease)    • Hyperlipidemia    • Hypertension    • Hypothyroidism    • Lung cancer (CMS/HCC) 2016    radiation    • Mitral regurgitation    • Myocardial infarction (CMS/HCC)    • Pleural effusion    • Pleural effusion, bilateral    • RLS (restless legs syndrome)    • Sleep apnea    • Ulcerative colitis (CMS/HCC)    • Ulcerative colitis (CMS/HCC)        SURGICAL HISTORY  Past  Surgical History:   Procedure Laterality Date   • BACK SURGERY      lumbar   • CARDIAC CATHETERIZATION     • CORONARY ANGIOPLASTY     • CORONARY ARTERY BYPASS GRAFT  07/27/2015    X 5  Dr Louis   • HERNIA REPAIR      inguinal - left   • MITRAL VALVE REPAIR/REPLACEMENT  07/27/2015    Dr Louis   • NASAL SEPTUM SURGERY      benign growth removed   • OTHER SURGICAL HISTORY      Cardiovasc Endosc W/ Video-Assist Harv Veins Coron Art Byp   • PROSTATE SURGERY     • SKIN BIOPSY      benign       SOCIAL HISTORY  Social History     Socioeconomic History   • Marital status:      Spouse name: Not on file   • Number of children: Not on file   • Years of education: Not on file   • Highest education level: Not on file   Tobacco Use   • Smoking status: Former Smoker     Years: 5.00     Types: Cigarettes   • Smokeless tobacco: Never Used   • Tobacco comment: 1 cig day x 5 years   Substance and Sexual Activity   • Alcohol use: No   • Drug use: No   • Sexual activity: Defer       FAMILY HISTORY  Family History   Problem Relation Age of Onset   • Coronary artery disease Brother    • Heart attack Brother    • Cancer Mother    • Lung cancer Father

## 2020-02-11 LAB
ALBUMIN SERPL-MCNC: 4.6 G/DL (ref 3.5–5.2)
ALBUMIN/GLOB SERPL: 1.6 G/DL
ALP SERPL-CCNC: 74 U/L (ref 39–117)
ALT SERPL-CCNC: 16 U/L (ref 1–41)
AST SERPL-CCNC: 16 U/L (ref 1–40)
BILIRUB SERPL-MCNC: 0.4 MG/DL (ref 0.2–1.2)
BUN SERPL-MCNC: 24 MG/DL (ref 8–23)
BUN/CREAT SERPL: 14 (ref 7–25)
CALCIUM SERPL-MCNC: 9.9 MG/DL (ref 8.6–10.5)
CHLORIDE SERPL-SCNC: 99 MMOL/L (ref 98–107)
CO2 SERPL-SCNC: 28.3 MMOL/L (ref 22–29)
CREAT SERPL-MCNC: 1.71 MG/DL (ref 0.76–1.27)
GLOBULIN SER CALC-MCNC: 2.8 GM/DL
GLUCOSE SERPL-MCNC: 89 MG/DL (ref 65–99)
POTASSIUM SERPL-SCNC: 4.6 MMOL/L (ref 3.5–5.2)
PROT SERPL-MCNC: 7.4 G/DL (ref 6–8.5)
SODIUM SERPL-SCNC: 140 MMOL/L (ref 136–145)
T4 FREE SERPL-MCNC: 1.47 NG/DL (ref 0.93–1.7)
TSH SERPL DL<=0.005 MIU/L-ACNC: 4.58 UIU/ML (ref 0.27–4.2)

## 2020-02-28 RX ORDER — OMEPRAZOLE 40 MG/1
CAPSULE, DELAYED RELEASE ORAL
Qty: 90 CAPSULE | Refills: 1 | Status: SHIPPED | OUTPATIENT
Start: 2020-02-28 | End: 2020-08-31

## 2020-03-23 RX ORDER — FUROSEMIDE 40 MG/1
TABLET ORAL
Qty: 90 TABLET | Refills: 3 | Status: ON HOLD | OUTPATIENT
Start: 2020-03-23 | End: 2020-12-12 | Stop reason: SDUPTHER

## 2020-03-24 ENCOUNTER — TELEPHONE (OUTPATIENT)
Dept: CARDIOLOGY | Facility: CLINIC | Age: 85
End: 2020-03-24

## 2020-03-24 NOTE — TELEPHONE ENCOUNTER
Hold patient in regards to his appointment on April 6.  He is getting have a telephone appointment with me instead of coming to the office.

## 2020-03-30 DIAGNOSIS — C34.91 ADENOCARCINOMA OF RIGHT LUNG (HCC): Primary | ICD-10-CM

## 2020-03-30 RX ORDER — LEVOTHYROXINE SODIUM 88 UG/1
TABLET ORAL
Qty: 90 TABLET | Refills: 3 | Status: SHIPPED | OUTPATIENT
Start: 2020-03-30 | End: 2021-04-06

## 2020-04-06 ENCOUNTER — APPOINTMENT (OUTPATIENT)
Dept: CT IMAGING | Facility: HOSPITAL | Age: 85
End: 2020-04-06

## 2020-04-13 RX ORDER — ESCITALOPRAM OXALATE 20 MG/1
TABLET ORAL
Qty: 90 TABLET | Refills: 3 | Status: SHIPPED | OUTPATIENT
Start: 2020-04-13 | End: 2021-04-19

## 2020-05-26 RX ORDER — ATORVASTATIN CALCIUM 40 MG/1
TABLET, FILM COATED ORAL
Qty: 90 TABLET | Refills: 3 | Status: SHIPPED | OUTPATIENT
Start: 2020-05-26 | End: 2021-06-14

## 2020-06-08 ENCOUNTER — APPOINTMENT (OUTPATIENT)
Dept: CT IMAGING | Facility: HOSPITAL | Age: 85
End: 2020-06-08

## 2020-06-30 ENCOUNTER — TELEPHONE (OUTPATIENT)
Dept: CARDIOLOGY | Facility: CLINIC | Age: 85
End: 2020-06-30

## 2020-07-08 RX ORDER — FENOFIBRATE 145 MG/1
TABLET, COATED ORAL
Qty: 90 TABLET | Refills: 3 | Status: SHIPPED | OUTPATIENT
Start: 2020-07-08 | End: 2021-07-19

## 2020-07-16 ENCOUNTER — HOSPITAL ENCOUNTER (OUTPATIENT)
Dept: CT IMAGING | Facility: HOSPITAL | Age: 85
Discharge: HOME | End: 2020-07-16
Admitting: NURSE PRACTITIONER

## 2020-07-16 DIAGNOSIS — C34.91 ADENOCARCINOMA OF RIGHT LUNG (HCC): ICD-10-CM

## 2020-07-16 PROCEDURE — 71250 CT THORAX DX C-: CPT

## 2020-07-22 ENCOUNTER — OFFICE VISIT (OUTPATIENT)
Dept: SURGERY | Facility: CLINIC | Age: 85
End: 2020-07-22

## 2020-07-22 DIAGNOSIS — R91.1 LUNG NODULE: ICD-10-CM

## 2020-07-22 DIAGNOSIS — C34.91 ADENOCARCINOMA OF RIGHT LUNG (HCC): Primary | ICD-10-CM

## 2020-07-22 PROCEDURE — 99442 PR PHYS/QHP TELEPHONE EVALUATION 11-20 MIN: CPT | Performed by: THORACIC SURGERY (CARDIOTHORACIC VASCULAR SURGERY)

## 2020-07-22 NOTE — PROGRESS NOTES
Subjective   Patient ID: Bhaskar Ibarra is a 84 y.o. male is here today for follow-up.    History of Present Illness  Dear Colleague,  Bhaskar Ibarra was contacted by telephone today July 22, 2020 and consented to a telemedicine visit.  This is a follow-up visit of stereotactic radiation therapy treatment of an adenocarcinoma of the right lower lobe.  Radiation therapy was in April 2017.  Patient has been doing well.  He gets somewhat short of breath with exertion but admits to a very sedentary life.  He has no cough or hemoptysis.  He has no hoarseness or change in his voice.  He has no pleuritic pain.  He has had no unexplained weight loss.  He reports no back pain or abdominal pain.    The following portions of the patient's history were reviewed and updated as appropriate: allergies, current medications, past family history, past medical history, past social history, past surgical history and problem list.  Review of Systems   Constitution: Negative.   HENT: Negative.    Eyes: Negative.    Cardiovascular: Negative.    Respiratory: Negative.    Endocrine: Negative.    Hematologic/Lymphatic: Negative.    Skin: Negative.    Musculoskeletal: Negative.    Gastrointestinal: Negative.    Genitourinary: Negative.    Neurological: Negative.    Psychiatric/Behavioral: Negative.    Allergic/Immunologic: Negative.      Patient Active Problem List   Diagnosis   • Coronary arteriosclerosis in native artery   • Atrial fibrillation (CMS/HCC)   • Acute non-Q wave ST elevation myocardial infarction (STEMI) involving left anterior descending (LAD) coronary artery   • Mitral valve insufficiency   • S/P CABG (coronary artery bypass graft)   • S/P mitral valve repair   • Depression   • Hypothyroidism   • Hyperlipidemia   • Pulmonary nodule   • Adenocarcinoma of right lung (CMS/HCC)   • Benign nodular prostatic hyperplasia with lower urinary tract symptoms   • Confusion   • Post concussion syndrome   • Vitamin D deficiency   •  Well adult health check   • Impaired fasting glucose   • AAA (abdominal aortic aneurysm) (CMS/HCC)   • Ascending aortic aneurysm (CMS/HCC)   • Macrocytic anemia   • Other heart failure (CMS/HCC)   • HTN (hypertension), benign     Past Medical History:   Diagnosis Date   • Abdominal aortic aneurysm (CMS/HCC)    • Acute myocardial infarction (CMS/HCC)    • Acute renal failure (CMS/HCC)    • Anemia    • Aneurysm (CMS/HCC)    • Atrial fibrillation (CMS/HCC)    • Cardiomyopathy (CMS/HCC)    • Chest pain    • Coronary artery disease     July 2015-    • Depression    • GERD (gastroesophageal reflux disease)    • Hyperlipidemia    • Hypertension    • Hypothyroidism    • Lung cancer (CMS/HCC) 2016    radiation    • Mitral regurgitation    • Myocardial infarction (CMS/HCC)    • Pleural effusion    • Pleural effusion, bilateral    • RLS (restless legs syndrome)    • Sleep apnea    • Ulcerative colitis (CMS/HCC)    • Ulcerative colitis (CMS/HCC)      Past Surgical History:   Procedure Laterality Date   • BACK SURGERY      lumbar   • CARDIAC CATHETERIZATION     • CORONARY ANGIOPLASTY     • CORONARY ARTERY BYPASS GRAFT  07/27/2015    X 5  Dr Louis   • HERNIA REPAIR      inguinal - left   • MITRAL VALVE REPAIR/REPLACEMENT  07/27/2015    Dr Louis   • NASAL SEPTUM SURGERY      benign growth removed   • OTHER SURGICAL HISTORY      Cardiovasc Endosc W/ Video-Assist Harv Veins Coron Art Byp   • PROSTATE SURGERY     • SKIN BIOPSY      benign     Family History   Problem Relation Age of Onset   • Coronary artery disease Brother    • Heart attack Brother    • Cancer Mother    • Lung cancer Father      Social History     Socioeconomic History   • Marital status:      Spouse name: Not on file   • Number of children: Not on file   • Years of education: Not on file   • Highest education level: Not on file   Tobacco Use   • Smoking status: Former Smoker     Years: 5.00     Types: Cigarettes   • Smokeless tobacco: Never Used   •  Tobacco comment: 1 cig day x 5 years   Substance and Sexual Activity   • Alcohol use: No   • Drug use: No   • Sexual activity: Defer       Current Outpatient Medications:   •  atorvastatin (LIPITOR) 40 MG tablet, TAKE 1 TABLET EVERY NIGHT, Disp: 90 tablet, Rfl: 3  •  carvedilol (COREG) 3.125 MG tablet, TAKE 1 TABLET TWICE A DAY WITH MEALS, Disp: 180 tablet, Rfl: 4  •  cyanocobalamin (VITAMIN B-12) 2000 MCG tablet tablet, Take 1 tablet by mouth Daily., Disp: 30 tablet, Rfl:   •  ELIQUIS 2.5 MG tablet tablet, TAKE 1 TABLET EVERY 12 HOURS, Disp: 180 tablet, Rfl: 3  •  escitalopram (LEXAPRO) 20 MG tablet, TAKE 1 TABLET DAILY, Disp: 90 tablet, Rfl: 3  •  fenofibrate (TRICOR) 145 MG tablet, TAKE 1 TABLET DAILY, Disp: 90 tablet, Rfl: 3  •  furosemide (LASIX) 40 MG tablet, TAKE 1 TABLET DAILY, Disp: 90 tablet, Rfl: 3  •  levothyroxine (SYNTHROID, LEVOTHROID) 88 MCG tablet, TAKE 1 TABLET DAILY, Disp: 90 tablet, Rfl: 3  •  omeprazole (priLOSEC) 40 MG capsule, TAKE 1 CAPSULE DAILY, Disp: 90 capsule, Rfl: 1  •  rOPINIRole (REQUIP) 0.5 MG tablet, TAKE TWO TABLETS BY MOUTH ONE HOUR BEFORE BEDTIME, Disp: 60 tablet, Rfl: 3  •  sotalol (BETAPACE) 80 MG tablet, Take 1 tablet by mouth Daily., Disp: 90 tablet, Rfl: 3  Allergies   Allergen Reactions   • Latex Itching        Objective   There were no vitals filed for this visit.       Physical exam:    Telemedicine visit      Independent Review of Radiographic Studies:    CT of the chest performed July 16, 2020 was independently reviewed and compared to previous x-rays.  The previous area in the right lower lobe measures 27 x 17 mm.  There is coarse calcification bronchiectasis and volume loss associated with this.  There is a 6 mm nodule located superiorly and medially to this mass.  This is a new nodule.  There are no other new infiltrates nodules or masses.  There is no suspicious hilar or mediastinal adenopathy.  Of note the supra renal abdominal aortic aneurysm has increased in size  from 4.2 cm to 4.6 cm.      Assessment/Plan   Assessment:  New nodule in the right lower lobe is concerning for metastatic cancer.  Location and size make biopsy very difficult.  We will get a short-term follow-up CT scan to monitor this nodule.      Increase in the supra renal abdominal aortic aneurysm was noted.  Patient has follow-up appointment with Dr. Neno dinero for this problem.    Plan:    Patient will return to the office in 4 months with a CT of the chest without contrast.  I will keep you informed of his progress.  Thank you for allowing us to participate in the care Mr. Ibarra.      Diagnoses and all orders for this visit:    Adenocarcinoma of right lung (CMS/HCC)  -     CT Chest Without Contrast; Future    Lung nodule  -     CT Chest Without Contrast; Future

## 2020-08-10 ENCOUNTER — OFFICE VISIT (OUTPATIENT)
Dept: INTERNAL MEDICINE | Age: 85
End: 2020-08-10

## 2020-08-10 ENCOUNTER — TELEPHONE (OUTPATIENT)
Dept: INTERNAL MEDICINE | Age: 85
End: 2020-08-10

## 2020-08-10 VITALS — DIASTOLIC BLOOD PRESSURE: 70 MMHG | SYSTOLIC BLOOD PRESSURE: 138 MMHG

## 2020-08-10 DIAGNOSIS — C34.91 ADENOCARCINOMA OF RIGHT LUNG (HCC): ICD-10-CM

## 2020-08-10 DIAGNOSIS — E03.4 HYPOTHYROIDISM DUE TO ACQUIRED ATROPHY OF THYROID: ICD-10-CM

## 2020-08-10 DIAGNOSIS — I10 HTN (HYPERTENSION), BENIGN: ICD-10-CM

## 2020-08-10 DIAGNOSIS — I71.40 ABDOMINAL AORTIC ANEURYSM (AAA) WITHOUT RUPTURE (HCC): Primary | ICD-10-CM

## 2020-08-10 DIAGNOSIS — E78.2 MIXED HYPERLIPIDEMIA: ICD-10-CM

## 2020-08-10 PROCEDURE — 99442 PR PHYS/QHP TELEPHONE EVALUATION 11-20 MIN: CPT | Performed by: NURSE PRACTITIONER

## 2020-08-10 NOTE — PROGRESS NOTES
[facility] INTERNAL MEDICINE  Katherine Lester APRN      Bhaskar Ibarra / 84 y.o. / male  08/10/2020      ASSESSMENT & PLAN:    Problem List Items Addressed This Visit        Cardiovascular and Mediastinum    Hyperlipidemia    Relevant Medications    atorvastatin (LIPITOR) 40 MG tablet    fenofibrate (TRICOR) 145 MG tablet    AAA (abdominal aortic aneurysm) (CMS/HCC) - Primary    HTN (hypertension), benign    Relevant Medications    carvedilol (COREG) 3.125 MG tablet    sotalol (BETAPACE) 80 MG tablet    furosemide (LASIX) 40 MG tablet       Respiratory    Adenocarcinoma of right lung (CMS/HCC)       Endocrine    Hypothyroidism    Relevant Medications    carvedilol (COREG) 3.125 MG tablet    sotalol (BETAPACE) 80 MG tablet    levothyroxine (SYNTHROID, LEVOTHROID) 88 MCG tablet        No orders of the defined types were placed in this encounter.    No orders of the defined types were placed in this encounter.      Summary/Discussion:    This patient has consented to a telehealth visit via phone. The visit was scheduled as a telehealth phone visit to comply with patient safety concerns in accordance with CDC recommendations.  All vitals recorded within this visit are reported by the patient.  I spent 18 minutes in total including but not limited to the 12 minutes spent in direct conversation with this patient.       1. Abdominal aortic aneurysm (AAA) without rupture (CMS/HCC)  Up to 4.6cm from 4.2cm 1 year ago. Has repeat CT for November. Will need to follow up with vascular surgery.   Ascending aneurysm no significant change.       2. HTN (hypertension), benign  Blood pressure stable on current therapy, continue same.  Check labs today and adjust dosage of medication as necessary.  Follow-up 6 months.      3. Mixed hyperlipidemia  Stable on current therapy. Fasting lipid panel UTD and values within acceptable ranges.   Continue to follow and improve on low-fat, low carbohydrate diet.   Unable to obtain labs today r/t  telephone visit due to COVID-19 pandemic.     4. Hypothyroidism due to acquired atrophy of thyroid  Stable, denies symptoms of hypo or hyperthyroidism.     5. Adenocarcinoma of right lung (CMS/HCC)  New nodule RLL, monitored by cardiothoracic surgery.             No follow-ups on file.  ____________________________________________________________________    MEDICATIONS  Current Outpatient Medications   Medication Sig Dispense Refill   • atorvastatin (LIPITOR) 40 MG tablet TAKE 1 TABLET EVERY NIGHT 90 tablet 3   • carvedilol (COREG) 3.125 MG tablet TAKE 1 TABLET TWICE A DAY WITH MEALS 180 tablet 4   • cyanocobalamin (VITAMIN B-12) 2000 MCG tablet tablet Take 1 tablet by mouth Daily. 30 tablet    • ELIQUIS 2.5 MG tablet tablet TAKE 1 TABLET EVERY 12 HOURS 180 tablet 3   • escitalopram (LEXAPRO) 20 MG tablet TAKE 1 TABLET DAILY 90 tablet 3   • fenofibrate (TRICOR) 145 MG tablet TAKE 1 TABLET DAILY 90 tablet 3   • furosemide (LASIX) 40 MG tablet TAKE 1 TABLET DAILY 90 tablet 3   • levothyroxine (SYNTHROID, LEVOTHROID) 88 MCG tablet TAKE 1 TABLET DAILY 90 tablet 3   • omeprazole (priLOSEC) 40 MG capsule TAKE 1 CAPSULE DAILY 90 capsule 1   • sotalol (BETAPACE) 80 MG tablet Take 1 tablet by mouth Daily. 90 tablet 3     No current facility-administered medications for this visit.        VITALS    Visit Vitals  /70       BP Readings from Last 3 Encounters:   08/10/20 138/70   02/10/20 118/76   10/22/19 126/80     Wt Readings from Last 3 Encounters:   02/10/20 85.9 kg (189 lb 6.4 oz)   10/22/19 83.9 kg (185 lb)   08/05/19 88.9 kg (196 lb)      There is no height or weight on file to calculate BMI.    CC:  Main reason(s) for today's visit: Follow-up for hypertension and hyperlipidemia    HPI:   You have chosen to receive care through a telephone visit. Do you consent to use a telephone visit for your medical care today? Yes      Chronic essential hypertension:  Since prior visit: compliant with medication(s), checks  blood pressure regularly, denies significant problems with medication(s), SBP < 130 and SBP < 120. Currently taking carvedilol, sotalol, furosemide. He is not exercising, but is adherent to low sodium diet. BP readings at home are stable. He denies symptoms of chest pain/pressure, dyspnea, swelling, vision impairment. CVD risk factors include: advanced age (>55 for males, >65 for females), dyslipidemia, HTN, and sedentary lifestyle. Use of agents associated with HTN: no tobacco use . Most recent in-office blood pressure readings:   BP Readings from Last 3 Encounters:   08/10/20 138/70   02/10/20 118/76   10/22/19 126/80       Chronic hyperlipidemia:  Current therapy include atorvastatin, fenofibrate.    Most recent labs:   Lab Results   Component Value Date     (H) 08/05/2019     08/13/2018    LDL CANCELED 04/10/2018    HDL 33 (L) 08/05/2019    HDL 34 (L) 08/13/2018    HDL 27 (L) 04/10/2018    TRIG 227 (H) 08/05/2019    CHOLHDLRATIO 5.61 08/05/2019    CHOLHDLRATIO 5.06 08/13/2018        Recently seen by cardiothoracic surgery Dr. Lucero for follow-up visit of stereotactic radiation therapy treatment of an adenocarcinoma of the right lower lobe.  Radiation therapy was in April 2017.  Had recent CT scan with area of right lung concerning for metastatic malignancy, will have follow up CT in November.   Increase in size of AAA to 4.6cm.     Patient Care Team:  Katherine Lester APRN as PCP - General (Internal Medicine)  Al Payne Jr., MD (Cardiology)  Lokesh Reddy MD as Consulting Physician (Vascular Surgery)  Bhaskar Lucero III, MD as Surgeon (Thoracic Surgery)  Efrain Yuan MD as Consulting Physician (Cardiology)  ____________________________________________________________________      REVIEW OF SYSTEMS    Review of Systems   Constitutional: Negative for activity change, appetite change and unexpected weight change.   HENT: Negative for tinnitus.    Eyes: Negative for visual  disturbance.   Respiratory: Negative for cough, chest tightness and shortness of breath.    Cardiovascular: Negative for chest pain, palpitations and leg swelling.   Neurological: Negative for dizziness, light-headedness and headaches.         PHYSICAL EXAMINATION    Physical Exam   Constitutional: He is cooperative.   Psychiatric: He has a normal mood and affect. His speech is normal. Judgment and thought content normal. Cognition and memory are normal.   UNABLE TO PERFORM PHYSICAL EXAMINATION AS THIS IS A TELEPHONE ENCOUNTER VISIT.       REVIEWED DATA:    Labs:   Lab Results   Component Value Date     02/10/2020    K 4.6 02/10/2020    AST 16 02/10/2020    ALT 16 02/10/2020    BUN 24 (H) 02/10/2020    CREATININE 1.71 (H) 02/10/2020    CREATININE 1.70 (H) 02/04/2019    CREATININE 1.40 (H) 08/20/2018    EGFRIFNONA 38 (L) 02/10/2020    EGFRIFAFRI 46 (L) 02/10/2020       Lab Results   Component Value Date    HGBA1C 6.37 (H) 08/13/2018    HGBA1C 6.15 (H) 01/20/2017    HGBA1C 6.2 (H) 07/27/2015    GLUCOSE 116 (H) 07/27/2017    GLUCOSE 123 (H) 07/26/2017    GLUCOSE 135 (H) 07/24/2017       Lab Results   Component Value Date     (H) 08/05/2019     08/13/2018    LDL CANCELED 04/10/2018    HDL 33 (L) 08/05/2019    HDL 34 (L) 08/13/2018    HDL 27 (L) 04/10/2018    TRIG 227 (H) 08/05/2019    TRIG 190 (H) 08/13/2018    TRIG 453 (H) 04/10/2018    CHOLHDLRATIO 5.61 08/05/2019    CHOLHDLRATIO 5.06 08/13/2018       Lab Results   Component Value Date    TSH 4.580 (H) 02/10/2020    FREET4 1.47 02/10/2020          Lab Results   Component Value Date    WBC Comment: 08/20/2018    WBC 6.6 08/20/2018    HGB 12.6 (L) 08/20/2018    HGB 13.0 (L) 08/13/2018    HGB 13.5 (L) 04/10/2018     08/20/2018       Lab Results   Component Value Date    GLUCOSEU Negative 07/26/2017    BLOODU Negative 07/26/2017    NITRITEU Negative 07/26/2017    LEUKOCYTESUR Negative 07/26/2017       Imaging:     PROCEDURE:CT CHEST WO  CONTRAST-     HISTORY:  Right-sided lung cancer. Radiation therapy.     TECHNIQUE: Helical CT of the chest was performed without intravenous  contrast.  Radiation dose reduction techniques were utilized, including  automated exposure control and exposure modulation based on body size.     COMPARISON:  CT chest without contrast 04/08/2019.     FINDINGS:    There is no mediastinal, hilar, or axillary lymphadenopathy.     Heart size is normal. There is no pericardial effusion. There is  calcific coronary artery atherosclerosis with postsurgical changes from  prior CABG. There is a mitral valve prosthesis. The ascending aorta is  dilated to 4.5 cm, not significantly changed. The pulmonary trunk is  normal in caliber. There is advanced calcific aortic and branch vessel  atherosclerosis.     Pleural spaces are clear.     There is cholelithiasis. There is a tiny accessory spleen. There are  multiple calcified granulomata within the spleen. There is a 4.6 cm  suprarenal abdominal aortic aneurysm, which has increased in size from  04/08/2019, previously 4.2 cm when remeasured in a similar fashion.  There is calcific atherosclerosis of the visualized upper abdominal  aorta. There is a tiny hiatal hernia. There are multiple right renal  cysts, which are not significantly changed. There is focal cortical  volume loss in the superior left kidney, likely sequela of prior  infection or infarction.     There is multilevel degenerative disc disease. There is new moderate  height loss of the T11 vertebral body with prominent Schmorl's nodes at  both endplates, which is likely degenerative. Median sternotomy wires  are intact.     The trachea and central bronchi are clear. There is a 2.7 x 1.7 cm  irregular nodule with a corresponding coarse calcification,  bronchiectasis, and volume loss in the right lower lobe, which is not  significantly changed in size when remeasured in a similar fashion. A  0.6 x 0.6 cm nodular opacity at the  superomedial margin of the dominant  lesion is new from 04/08/2019. Additional scattered subcentimeter  nodules in both lungs are not significantly changed.     IMPRESSION:     1.  No significant change in a right lower lobe nodule with  corresponding bronchiectasis, volume loss, and calcification,  corresponding to the biopsy-proven malignancy. A 0.6 cm nodular opacity  superomedial to the dominant lesion is new from 04/08/2019 and is  concerning for tumor. This could be further assessed with biopsy or PET,  if clinically indicated; otherwise, short-term follow-up imaging is  recommended.     2.  Increased size of a 4.6 cm suprarenal abdominal aortic aneurysm.  Recommend vascular surgery consultation. Further evaluation with  dedicated CTA of the abdomen and pelvis may be helpful.     3.  Aneurysmal dilatation of the ascending aorta, not significantly  changed.     Findings and recommendations were discussed with GEOFF Castro,  by Dr. Guaman via telephone at 3:29 PM on 07/17/2020.     This report was finalized on 7/17/2020 4:33 PM by Dr. Zoey Guaman M.D.       Medical Tests:        Summary of old records / correspondence / consultant report:        Request outside records:        ALLERGIES  Allergies   Allergen Reactions   • Latex Itching        PFSH:     The following portions of the patient's history were reviewed and updated as appropriate: Allergies / Current Medications / Past Medical History / Surgical History / Social History / Family History    PROBLEM LIST   Patient Active Problem List   Diagnosis   • Coronary arteriosclerosis in native artery   • Atrial fibrillation (CMS/HCC)   • Acute non-Q wave ST elevation myocardial infarction (STEMI) involving left anterior descending (LAD) coronary artery   • Mitral valve insufficiency   • S/P CABG (coronary artery bypass graft)   • S/P mitral valve repair   • Depression   • Hypothyroidism   • Hyperlipidemia   • Pulmonary nodule   • Adenocarcinoma of  right lung (CMS/HCC)   • Benign nodular prostatic hyperplasia with lower urinary tract symptoms   • Confusion   • Post concussion syndrome   • Vitamin D deficiency   • Well adult health check   • Impaired fasting glucose   • AAA (abdominal aortic aneurysm) (CMS/HCC)   • Ascending aortic aneurysm (CMS/HCC)   • Macrocytic anemia   • Other heart failure (CMS/HCC)   • HTN (hypertension), benign       PAST MEDICAL HISTORY  Past Medical History:   Diagnosis Date   • Abdominal aortic aneurysm (CMS/HCC)    • Acute myocardial infarction (CMS/HCC)    • Acute renal failure (CMS/HCC)    • Anemia    • Aneurysm (CMS/HCC)    • Atrial fibrillation (CMS/HCC)    • Cardiomyopathy (CMS/HCC)    • Chest pain    • Coronary artery disease     July 2015-    • Depression    • GERD (gastroesophageal reflux disease)    • Hyperlipidemia    • Hypertension    • Hypothyroidism    • Lung cancer (CMS/HCC) 2016    radiation    • Mitral regurgitation    • Myocardial infarction (CMS/HCC)    • Pleural effusion    • Pleural effusion, bilateral    • RLS (restless legs syndrome)    • Sleep apnea    • Ulcerative colitis (CMS/HCC)    • Ulcerative colitis (CMS/HCC)        SURGICAL HISTORY  Past Surgical History:   Procedure Laterality Date   • BACK SURGERY      lumbar   • CARDIAC CATHETERIZATION     • CORONARY ANGIOPLASTY     • CORONARY ARTERY BYPASS GRAFT  07/27/2015    X 5  Dr Louis   • HERNIA REPAIR      inguinal - left   • MITRAL VALVE REPAIR/REPLACEMENT  07/27/2015    Dr Louis   • NASAL SEPTUM SURGERY      benign growth removed   • OTHER SURGICAL HISTORY      Cardiovasc Endosc W/ Video-Assist Harv Veins Coron Art Byp   • PROSTATE SURGERY     • SKIN BIOPSY      benign       SOCIAL HISTORY  Social History     Socioeconomic History   • Marital status:      Spouse name: Not on file   • Number of children: Not on file   • Years of education: Not on file   • Highest education level: Not on file   Tobacco Use   • Smoking status: Former Smoker      Years: 5.00     Types: Cigarettes   • Smokeless tobacco: Never Used   • Tobacco comment: 1 cig day x 5 years   Substance and Sexual Activity   • Alcohol use: No   • Drug use: No   • Sexual activity: Defer       FAMILY HISTORY  Family History   Problem Relation Age of Onset   • Coronary artery disease Brother    • Heart attack Brother    • Cancer Mother    • Lung cancer Father

## 2020-08-10 NOTE — TELEPHONE ENCOUNTER
Contact patient.     I was reviewing his CT scan results in further and noted that aneurysm of concern is in the abdomen, not the ascending aorta. Therefore, vascular surgery should be monitoring this.     Is he still seeing Dr. Reddy with vascular surgery? If so, this is who he needs to follow up with regarding enlargement/monitoring.   If not, I would recommend placing referral back to vascular surgery for monitoring.

## 2020-08-31 RX ORDER — OMEPRAZOLE 40 MG/1
CAPSULE, DELAYED RELEASE ORAL
Qty: 90 CAPSULE | Refills: 3 | Status: SHIPPED | OUTPATIENT
Start: 2020-08-31 | End: 2021-09-07

## 2020-09-08 RX ORDER — CARVEDILOL 3.12 MG/1
TABLET ORAL
Qty: 180 TABLET | Refills: 3 | Status: SHIPPED | OUTPATIENT
Start: 2020-09-08 | End: 2021-09-10

## 2020-09-14 NOTE — DISCHARGE INSTRUCTIONS
EDUCATION /DISCHARGE INSTRUCTIONS  CT/US guided biopsy:  A biopsy is a procedure done to remove tissue for further analysis.  Before images are taken to locate the target area.  Images can be obtained using ultrasound, CT or MRI.  A physician will clean your skin with antiseptic soap, place a sterile towel around the site and administer a local anesthetic to numb the area.  The physician will then insert a special needle.  Sometimes images are taken of the needle after it is inserted to ensure the needle is in the correct area to be biopsied.   A sample is obtained and sent to the laboratory for study.  Occasionally the laboratory is unable to make a diagnosis from the sample and the procedure may need to be repeated.  Within a week the radiologist will send a report to your physician.  A pathologist will also examine the tissue and send a report.      Risks of the procedure include but are not limited to:   *  Bleeding    *  Infection   *  Puncture of surrounding organs *  Death     *  Lung collapse if the biopsy is near the chest which may require insertion of a  tube to re-inflate the lung if severe.      Benefits of the procedure:  Using x-ray helps to locate the area that requires a biopsy. The procedure is less invasive than a surgical procedure, there are no large incisions and it does not require anesthesia.      Alternatives to the procedure:  A biopsy can be performed surgically.  Risks of a surgical biopsy include exposure to anesthesia, infection, excessive bleeding and injury to abdominal organs.  A benefit of surgical biopsy is the ability to see the area to be biopsied and remove of a larger piece of tissue.    THIS EDUCATION INFORMATION WAS REVIEWED PRIOR TO PROCEDURE AND CONSENT. Patient initials__________________Time___1230________________    Post Procedure:    *  Expect the biopsy site may be tender up to one week.    *  Rest today (no pushing pulling or straining).   *  Slowly increase activity  Pt advised to schedule a visit to discuss.   tomorrow.    *  If you received sedation do not drive for 24 hours.   *  Keep dressing clean and dry.   *  Leave dressing on puncture site for 24 hours.    *  You may shower when dressing removed.    Call your doctor if experiencing:   *  Signs of infection such as redness, swelling, excessive pain and / or foul        smelling drainage from the puncture site.   *  Chills or fever over 101 degrees (by mouth).   *  Unrelieved pain.   *  Any new or severe symptoms.   *  If experiencing sudden / severe shortness of breath or chest pain go to the       nearest emergency room.   Following the procedure:     Follow-up with the ordering physician as directed.    Continue to take other medications as directed by your physician unless    otherwise instructed.   If applicable, resume taking Aspirin  And Eliquis (Apixaban) _on 9/27/18 after 3 pm_________.    If you have any concerns please call the Radiology Nurses Desk at [phone].  You are the most important factor in your recovery.  Follow the above instructions carefully.

## 2020-11-05 DIAGNOSIS — I25.10 CORONARY ARTERIOSCLEROSIS IN NATIVE ARTERY: ICD-10-CM

## 2020-11-05 RX ORDER — APIXABAN 2.5 MG/1
TABLET, FILM COATED ORAL
Qty: 180 TABLET | Refills: 1 | Status: SHIPPED | OUTPATIENT
Start: 2020-11-05 | End: 2021-05-17

## 2020-12-01 ENCOUNTER — TELEPHONE (OUTPATIENT)
Dept: INTERNAL MEDICINE | Age: 85
End: 2020-12-01

## 2020-12-01 NOTE — TELEPHONE ENCOUNTER
Pt's daughter called stating her mother tested positive for covid, Mr Ibarra has not been feeling well to go get tested. Pt is complaining of body aches, diarrhea, chills. Taking Imodium, Tylenol. Eating hardly nothing, drinking but not as much.   Concerned about getting dehydrated.    Please advise    [phone]

## 2020-12-01 NOTE — TELEPHONE ENCOUNTER
Pt daughter was notified to take father to [facility]  Or er for evaluation. She stated he tool a big glass of water and is starting to feel better. If he declines she will take him to UC.

## 2020-12-02 ENCOUNTER — APPOINTMENT (OUTPATIENT)
Dept: CT IMAGING | Facility: HOSPITAL | Age: 85
End: 2020-12-02

## 2020-12-02 ENCOUNTER — HOSPITAL ENCOUNTER (INPATIENT)
Facility: HOSPITAL | Age: 85
LOS: 10 days | Discharge: HOME HEALTH | End: 2020-12-12
Attending: EMERGENCY MEDICINE | Admitting: INTERNAL MEDICINE

## 2020-12-02 ENCOUNTER — APPOINTMENT (OUTPATIENT)
Dept: GENERAL RADIOLOGY | Facility: HOSPITAL | Age: 85
End: 2020-12-02

## 2020-12-02 DIAGNOSIS — J12.82 PNEUMONIA DUE TO COVID-19 VIRUS: Primary | ICD-10-CM

## 2020-12-02 DIAGNOSIS — N17.9 AKI (ACUTE KIDNEY INJURY) (HCC): ICD-10-CM

## 2020-12-02 DIAGNOSIS — U07.1 PNEUMONIA DUE TO COVID-19 VIRUS: Primary | ICD-10-CM

## 2020-12-02 LAB
ALBUMIN SERPL-MCNC: 4.2 G/DL (ref 3.5–5.2)
ALBUMIN/GLOB SERPL: 1.2 G/DL
ALP SERPL-CCNC: 68 U/L (ref 39–117)
ALT SERPL W P-5'-P-CCNC: 29 U/L (ref 1–41)
ANION GAP SERPL CALCULATED.3IONS-SCNC: 12.6 MMOL/L (ref 5–15)
AST SERPL-CCNC: 55 U/L (ref 1–40)
B PARAPERT DNA SPEC QL NAA+PROBE: NOT DETECTED
B PERT DNA SPEC QL NAA+PROBE: NOT DETECTED
BACTERIA UR QL AUTO: NORMAL /HPF
BASOPHILS # BLD AUTO: 0.02 10*3/MM3 (ref 0–0.2)
BASOPHILS NFR BLD AUTO: 0.3 % (ref 0–1.5)
BILIRUB SERPL-MCNC: 0.5 MG/DL (ref 0–1.2)
BILIRUB UR QL STRIP: NEGATIVE
BUN SERPL-MCNC: 33 MG/DL (ref 8–23)
BUN/CREAT SERPL: 15.3 (ref 7–25)
C PNEUM DNA NPH QL NAA+NON-PROBE: NOT DETECTED
CALCIUM SPEC-SCNC: 9.1 MG/DL (ref 8.6–10.5)
CHLORIDE SERPL-SCNC: 95 MMOL/L (ref 98–107)
CLARITY UR: CLEAR
CO2 SERPL-SCNC: 26.4 MMOL/L (ref 22–29)
COLOR UR: YELLOW
CREAT SERPL-MCNC: 2.15 MG/DL (ref 0.76–1.27)
CRP SERPL-MCNC: 6.09 MG/DL (ref 0–0.5)
D-LACTATE SERPL-SCNC: 1.4 MMOL/L (ref 0.5–2)
DEPRECATED RDW RBC AUTO: 46.7 FL (ref 37–54)
EOSINOPHIL # BLD AUTO: 0.01 10*3/MM3 (ref 0–0.4)
EOSINOPHIL NFR BLD AUTO: 0.1 % (ref 0.3–6.2)
ERYTHROCYTE [DISTWIDTH] IN BLOOD BY AUTOMATED COUNT: 13.4 % (ref 12.3–15.4)
FLUAV SUBTYP SPEC NAA+PROBE: NOT DETECTED
FLUBV RNA ISLT QL NAA+PROBE: NOT DETECTED
GFR SERPL CREATININE-BSD FRML MDRD: 29 ML/MIN/1.73
GLOBULIN UR ELPH-MCNC: 3.6 GM/DL
GLUCOSE SERPL-MCNC: 100 MG/DL (ref 65–99)
GLUCOSE UR STRIP-MCNC: NEGATIVE MG/DL
HADV DNA SPEC NAA+PROBE: NOT DETECTED
HCOV 229E RNA SPEC QL NAA+PROBE: NOT DETECTED
HCOV HKU1 RNA SPEC QL NAA+PROBE: NOT DETECTED
HCOV NL63 RNA SPEC QL NAA+PROBE: NOT DETECTED
HCOV OC43 RNA SPEC QL NAA+PROBE: NOT DETECTED
HCT VFR BLD AUTO: 40.3 % (ref 37.5–51)
HGB BLD-MCNC: 13.9 G/DL (ref 13–17.7)
HGB UR QL STRIP.AUTO: ABNORMAL
HMPV RNA NPH QL NAA+NON-PROBE: NOT DETECTED
HPIV1 RNA SPEC QL NAA+PROBE: NOT DETECTED
HPIV2 RNA SPEC QL NAA+PROBE: NOT DETECTED
HPIV3 RNA NPH QL NAA+PROBE: NOT DETECTED
HPIV4 P GENE NPH QL NAA+PROBE: NOT DETECTED
HYALINE CASTS UR QL AUTO: NORMAL /LPF
IMM GRANULOCYTES # BLD AUTO: 0.1 10*3/MM3 (ref 0–0.05)
IMM GRANULOCYTES NFR BLD AUTO: 1.3 % (ref 0–0.5)
KETONES UR QL STRIP: NEGATIVE
LDH SERPL-CCNC: 362 U/L (ref 135–225)
LEUKOCYTE ESTERASE UR QL STRIP.AUTO: NEGATIVE
LYMPHOCYTES # BLD AUTO: 1.56 10*3/MM3 (ref 0.7–3.1)
LYMPHOCYTES NFR BLD AUTO: 20.9 % (ref 19.6–45.3)
M PNEUMO IGG SER IA-ACNC: NOT DETECTED
MCH RBC QN AUTO: 32.9 PG (ref 26.6–33)
MCHC RBC AUTO-ENTMCNC: 34.5 G/DL (ref 31.5–35.7)
MCV RBC AUTO: 95.5 FL (ref 79–97)
MONOCYTES # BLD AUTO: 0.55 10*3/MM3 (ref 0.1–0.9)
MONOCYTES NFR BLD AUTO: 7.4 % (ref 5–12)
NEUTROPHILS NFR BLD AUTO: 5.22 10*3/MM3 (ref 1.7–7)
NEUTROPHILS NFR BLD AUTO: 70 % (ref 42.7–76)
NITRITE UR QL STRIP: NEGATIVE
NRBC BLD AUTO-RTO: 0 /100 WBC (ref 0–0.2)
NT-PROBNP SERPL-MCNC: 641.4 PG/ML (ref 0–1800)
PH UR STRIP.AUTO: <=5 [PH] (ref 5–8)
PLATELET # BLD AUTO: 239 10*3/MM3 (ref 140–450)
PMV BLD AUTO: 11.8 FL (ref 6–12)
POTASSIUM SERPL-SCNC: 4 MMOL/L (ref 3.5–5.2)
PROCALCITONIN SERPL-MCNC: 0.22 NG/ML (ref 0–0.25)
PROT SERPL-MCNC: 7.8 G/DL (ref 6–8.5)
PROT UR QL STRIP: ABNORMAL
QT INTERVAL: 504 MS
RBC # BLD AUTO: 4.22 10*6/MM3 (ref 4.14–5.8)
RBC # UR: NORMAL /HPF
REF LAB TEST METHOD: NORMAL
RHINOVIRUS RNA SPEC NAA+PROBE: NOT DETECTED
RSV RNA NPH QL NAA+NON-PROBE: NOT DETECTED
SARS-COV-2 RNA NPH QL NAA+NON-PROBE: DETECTED
SODIUM SERPL-SCNC: 134 MMOL/L (ref 136–145)
SP GR UR STRIP: 1.01 (ref 1–1.03)
SQUAMOUS #/AREA URNS HPF: NORMAL /HPF
TROPONIN T SERPL-MCNC: <0.01 NG/ML (ref 0–0.03)
UROBILINOGEN UR QL STRIP: ABNORMAL
WBC # BLD AUTO: 7.46 10*3/MM3 (ref 3.4–10.8)
WBC UR QL AUTO: NORMAL /HPF

## 2020-12-02 PROCEDURE — 83615 LACTATE (LD) (LDH) ENZYME: CPT | Performed by: EMERGENCY MEDICINE

## 2020-12-02 PROCEDURE — 99285 EMERGENCY DEPT VISIT HI MDM: CPT

## 2020-12-02 PROCEDURE — 70450 CT HEAD/BRAIN W/O DYE: CPT

## 2020-12-02 PROCEDURE — 86140 C-REACTIVE PROTEIN: CPT | Performed by: EMERGENCY MEDICINE

## 2020-12-02 PROCEDURE — 0202U NFCT DS 22 TRGT SARS-COV-2: CPT | Performed by: EMERGENCY MEDICINE

## 2020-12-02 PROCEDURE — 93010 ELECTROCARDIOGRAM REPORT: CPT | Performed by: INTERNAL MEDICINE

## 2020-12-02 PROCEDURE — 83605 ASSAY OF LACTIC ACID: CPT | Performed by: EMERGENCY MEDICINE

## 2020-12-02 PROCEDURE — 81001 URINALYSIS AUTO W/SCOPE: CPT | Performed by: EMERGENCY MEDICINE

## 2020-12-02 PROCEDURE — 93005 ELECTROCARDIOGRAM TRACING: CPT | Performed by: EMERGENCY MEDICINE

## 2020-12-02 PROCEDURE — 84145 PROCALCITONIN (PCT): CPT | Performed by: EMERGENCY MEDICINE

## 2020-12-02 PROCEDURE — 71045 X-RAY EXAM CHEST 1 VIEW: CPT

## 2020-12-02 PROCEDURE — 80053 COMPREHEN METABOLIC PANEL: CPT | Performed by: EMERGENCY MEDICINE

## 2020-12-02 PROCEDURE — 83880 ASSAY OF NATRIURETIC PEPTIDE: CPT | Performed by: EMERGENCY MEDICINE

## 2020-12-02 PROCEDURE — 85025 COMPLETE CBC W/AUTO DIFF WBC: CPT | Performed by: EMERGENCY MEDICINE

## 2020-12-02 PROCEDURE — 84484 ASSAY OF TROPONIN QUANT: CPT | Performed by: EMERGENCY MEDICINE

## 2020-12-02 RX ORDER — ONDANSETRON 2 MG/ML
4 INJECTION INTRAMUSCULAR; INTRAVENOUS EVERY 6 HOURS PRN
Status: DISCONTINUED | OUTPATIENT
Start: 2020-12-02 | End: 2020-12-12 | Stop reason: HOSPADM

## 2020-12-02 RX ORDER — LEVOTHYROXINE SODIUM 88 UG/1
88 TABLET ORAL
Status: DISCONTINUED | OUTPATIENT
Start: 2020-12-03 | End: 2020-12-12 | Stop reason: HOSPADM

## 2020-12-02 RX ORDER — ACETAMINOPHEN 325 MG/1
650 TABLET ORAL EVERY 4 HOURS PRN
Status: DISCONTINUED | OUTPATIENT
Start: 2020-12-02 | End: 2020-12-12 | Stop reason: HOSPADM

## 2020-12-02 RX ORDER — CARVEDILOL 3.12 MG/1
3.12 TABLET ORAL 2 TIMES DAILY WITH MEALS
Status: DISCONTINUED | OUTPATIENT
Start: 2020-12-02 | End: 2020-12-12 | Stop reason: HOSPADM

## 2020-12-02 RX ORDER — SODIUM CHLORIDE 0.9 % (FLUSH) 0.9 %
10 SYRINGE (ML) INJECTION AS NEEDED
Status: DISCONTINUED | OUTPATIENT
Start: 2020-12-02 | End: 2020-12-12 | Stop reason: HOSPADM

## 2020-12-02 RX ORDER — ATORVASTATIN CALCIUM 20 MG/1
40 TABLET, FILM COATED ORAL NIGHTLY
Status: DISCONTINUED | OUTPATIENT
Start: 2020-12-02 | End: 2020-12-12 | Stop reason: HOSPADM

## 2020-12-02 RX ORDER — CHOLECALCIFEROL (VITAMIN D3) 125 MCG
2000 CAPSULE ORAL DAILY
Status: DISCONTINUED | OUTPATIENT
Start: 2020-12-03 | End: 2020-12-12 | Stop reason: HOSPADM

## 2020-12-02 RX ORDER — SODIUM CHLORIDE 9 MG/ML
75 INJECTION, SOLUTION INTRAVENOUS CONTINUOUS
Status: DISCONTINUED | OUTPATIENT
Start: 2020-12-02 | End: 2020-12-05

## 2020-12-02 RX ORDER — PANTOPRAZOLE SODIUM 40 MG/1
40 TABLET, DELAYED RELEASE ORAL EVERY MORNING
Status: DISCONTINUED | OUTPATIENT
Start: 2020-12-03 | End: 2020-12-12 | Stop reason: HOSPADM

## 2020-12-02 RX ORDER — SOTALOL HYDROCHLORIDE 80 MG/1
80 TABLET ORAL DAILY
Status: DISCONTINUED | OUTPATIENT
Start: 2020-12-03 | End: 2020-12-12 | Stop reason: HOSPADM

## 2020-12-02 RX ORDER — SODIUM CHLORIDE 0.9 % (FLUSH) 0.9 %
10 SYRINGE (ML) INJECTION EVERY 12 HOURS SCHEDULED
Status: DISCONTINUED | OUTPATIENT
Start: 2020-12-02 | End: 2020-12-12 | Stop reason: HOSPADM

## 2020-12-02 RX ORDER — ESCITALOPRAM OXALATE 20 MG/1
20 TABLET ORAL DAILY
Status: DISCONTINUED | OUTPATIENT
Start: 2020-12-03 | End: 2020-12-12 | Stop reason: HOSPADM

## 2020-12-02 RX ORDER — ACETAMINOPHEN 160 MG/5ML
650 SOLUTION ORAL EVERY 4 HOURS PRN
Status: DISCONTINUED | OUTPATIENT
Start: 2020-12-02 | End: 2020-12-12 | Stop reason: HOSPADM

## 2020-12-02 RX ORDER — ACETAMINOPHEN 650 MG/1
650 SUPPOSITORY RECTAL EVERY 4 HOURS PRN
Status: DISCONTINUED | OUTPATIENT
Start: 2020-12-02 | End: 2020-12-12 | Stop reason: HOSPADM

## 2020-12-02 RX ORDER — PANTOPRAZOLE SODIUM 40 MG/1
40 TABLET, DELAYED RELEASE ORAL ONCE
Status: COMPLETED | OUTPATIENT
Start: 2020-12-02 | End: 2020-12-02

## 2020-12-02 RX ADMIN — SODIUM CHLORIDE 75 ML/HR: 9 INJECTION, SOLUTION INTRAVENOUS at 22:57

## 2020-12-02 RX ADMIN — PANTOPRAZOLE SODIUM 40 MG: 40 TABLET, DELAYED RELEASE ORAL at 18:20

## 2020-12-02 RX ADMIN — SODIUM CHLORIDE, PRESERVATIVE FREE 10 ML: 5 INJECTION INTRAVENOUS at 22:58

## 2020-12-02 RX ADMIN — CARVEDILOL 3.12 MG: 3.12 TABLET, FILM COATED ORAL at 22:57

## 2020-12-02 RX ADMIN — ATORVASTATIN CALCIUM 40 MG: 20 TABLET, FILM COATED ORAL at 22:57

## 2020-12-02 RX ADMIN — APIXABAN 2.5 MG: 2.5 TABLET, FILM COATED ORAL at 22:57

## 2020-12-02 NOTE — ED NOTES
Pt noted to have mask on when this RN entered the room.  This RN wore appropriate PPE throughout our encounter. Hand hygiene performed upon entering and exiting room.       Kaila Acevedo RN  12/02/20 [id number]

## 2020-12-02 NOTE — ED NOTES
Pt updated at this time that he is positive for COVID-19. Pt given protonix tablet and given sarah mist and turkey box at this time. Pt sitting up in the bed eating. Pt also updated that he will be admitted.     This RN wearing proper PPE, mask and glasses, during pt encounter. Pt wearing mask and reminded pull mask over nose when appropriate. Hand hygiene performed before and after pt encounter.        Julia Martínez, RN  12/02/20 [id number]

## 2020-12-02 NOTE — ED NOTES
Spoke to daughter, updated on current status, tests ordered as of this time. Asked to call back for update approx 1 hour. Identity and access code used for verification.       Yina Pollack RN  12/02/20 [id number]

## 2020-12-02 NOTE — ED TRIAGE NOTES
Pt to ED from home, felt weak recently, no LOC, + head injury, fell in bathroom, denies pain. On eliquis. Wife does have covid, pt denies cough, SOA.     Pt placed in mask at triage, all staff wearing appropriate ppe

## 2020-12-02 NOTE — ED PROVIDER NOTES
EMERGENCY DEPARTMENT ENCOUNTER    Room Number:  18/18  Date of encounter:  12/2/2020  PCP: Katherine Lester APRN  Historian: Patient      HPI:  Chief Complaint: Malaise weakness, cough, fall/head injury  A complete HPI/ROS/PMH/PSH/SH/FH are unobtainable due to: The patient is very hard of hearing    Context: Bhaskar Ibarra is a 85 y.o. male who presents to the ED c/o generalized weakness with decreased p.o. intake for the past several days.  Wife has Covid.  He has had a nonproductive cough and has been short of breath.  Today, he was in the bathroom and fell-he does not think he passed out, but he cannot really tell me why he fell either.  He has not been eating or drinking much.  He does not think he has had a fever.  He does complain of myalgias/arthralgias.      The patient was placed in a mask in triage, hand hygiene was performed before and after my interaction with the patient.  I wore a mask, safety glasses and gloves during my entire interaction with the patient.    PAST MEDICAL HISTORY  Active Ambulatory Problems     Diagnosis Date Noted   • Coronary arteriosclerosis in native artery 03/15/2016   • Atrial fibrillation (CMS/[facility]) 03/15/2016   • Acute non-Q wave ST elevation myocardial infarction (STEMI) involving left anterior descending (LAD) coronary artery 03/15/2016   • Mitral valve insufficiency 03/15/2016   • S/P CABG (coronary artery bypass graft) 03/25/2016   • S/P mitral valve repair 03/25/2016   • Depression 07/01/2016   • Hypothyroidism 07/01/2016   • Hyperlipidemia 07/01/2016   • Pulmonary nodule 11/16/2016   • Adenocarcinoma of right lung (CMS/[facility]) 12/08/2016   • Benign nodular prostatic hyperplasia with lower urinary tract symptoms 01/20/2017   • Confusion 07/23/2017   • Post concussion syndrome 07/28/2017   • Vitamin D deficiency 07/28/2017   • Well adult health check 04/10/2018   • Impaired fasting glucose 08/07/2018   • AAA (abdominal aortic aneurysm) (CMS/[facility]) 08/07/2018   • Ascending  aortic aneurysm (CMS/HCC) 09/23/2018   • Macrocytic anemia 02/04/2019   • Other heart failure (CMS/HCC) 02/04/2019   • HTN (hypertension), benign 10/24/2019     Resolved Ambulatory Problems     Diagnosis Date Noted   • DM (diabetes mellitus screen) 01/20/2017   • Osteoporosis 07/28/2017     Past Medical History:   Diagnosis Date   • Abdominal aortic aneurysm (CMS/HCC)    • Acute myocardial infarction (CMS/HCC)    • Acute renal failure (CMS/HCC)    • Anemia    • Aneurysm (CMS/HCC)    • Cardiomyopathy (CMS/HCC)    • Chest pain    • Coronary artery disease    • GERD (gastroesophageal reflux disease)    • Hypertension    • Lung cancer (CMS/[facility]) 2016   • Mitral regurgitation    • Myocardial infarction (CMS/HCC)    • Pleural effusion    • Pleural effusion, bilateral    • RLS (restless legs syndrome)    • Sleep apnea    • Ulcerative colitis (CMS/[facility])    • Ulcerative colitis (CMS/HCC)          PAST SURGICAL HISTORY  Past Surgical History:   Procedure Laterality Date   • BACK SURGERY      lumbar   • CARDIAC CATHETERIZATION     • CORONARY ANGIOPLASTY     • CORONARY ARTERY BYPASS GRAFT  07/27/2015    X 5  Dr Louis   • HERNIA REPAIR      inguinal - left   • MITRAL VALVE REPAIR/REPLACEMENT  07/27/2015    Dr Louis   • NASAL SEPTUM SURGERY      benign growth removed   • OTHER SURGICAL HISTORY      Cardiovasc Endosc W/ Video-Assist Harv Veins Coron Art Byp   • PROSTATE SURGERY     • SKIN BIOPSY      benign         FAMILY HISTORY  Family History   Problem Relation Age of Onset   • Coronary artery disease Brother    • Heart attack Brother    • Cancer Mother    • Lung cancer Father          SOCIAL HISTORY  Social History     Socioeconomic History   • Marital status:      Spouse name: Not on file   • Number of children: Not on file   • Years of education: Not on file   • Highest education level: Not on file   Tobacco Use   • Smoking status: Former Smoker     Years: 5.00     Types: Cigarettes   • Smokeless tobacco: Never Used    • Tobacco comment: 1 cig day x 5 years   Substance and Sexual Activity   • Alcohol use: No   • Drug use: No   • Sexual activity: Defer         ALLERGIES  Latex        REVIEW OF SYSTEMS  Review of Systems   Reason unable to perform ROS: He is very hard of hearing.   Constitutional: Positive for appetite change and fatigue.   Respiratory: Positive for cough and shortness of breath.    Cardiovascular: Negative for chest pain.   Gastrointestinal: Negative for abdominal pain, diarrhea and vomiting.   Genitourinary: Negative for difficulty urinating and dysuria.   Musculoskeletal: Positive for arthralgias and myalgias.        All systems reviewed and negative except for those discussed in HPI.       PHYSICAL EXAM    I have reviewed the triage vital signs and nursing notes.    ED Triage Vitals [12/02/20 1309]   Temp Heart Rate Resp BP SpO2   97.6 °F (36.4 °C) 58 16 140/82 99 %      Temp src Heart Rate Source Patient Position BP Location FiO2 (%)   Tympanic -- -- -- --       Physical Exam   Constitutional: Pt. is awake and alert. No distress.   HENT: Normocephalic and atraumatic,  EOM are normal. Pupils are equal, round, and reactive to light.  Neck: Normal range of motion. Neck supple. No JVD present.   Cardiovascular: Normal rate, regular rhythm and normal heart sounds. Exam reveals no gallop and no friction rub.   No murmur heard.  Pulmonary/Chest: Effort normal and breath sounds normal. No stridor. No respiratory distress. No wheezes, no rales.   Abdominal: Soft. Bowel sounds are normal. No distension. There is no tenderness. There is no rebound and no guarding.   Musculoskeletal: Normal range of motion. No edema, tenderness or deformity.   Neurological: Pt. is awake and alert.  He has generalized weakness without focal deficits.   Skin: Skin is warm and dry. No rash noted. Pt. is not diaphoretic. No erythema.   Psychiatric: He is pleasant and cooperative.  Nursing note and vitals reviewed.        LAB RESULTS  Recent  Results (from the past 24 hour(s))   Comprehensive Metabolic Panel    Collection Time: 12/02/20  1:57 PM    Specimen: Blood   Result Value Ref Range    Glucose 100 (H) 65 - 99 mg/dL    BUN 33 (H) 8 - 23 mg/dL    Creatinine 2.15 (H) 0.76 - 1.27 mg/dL    Sodium 134 (L) 136 - 145 mmol/L    Potassium 4.0 3.5 - 5.2 mmol/L    Chloride 95 (L) 98 - 107 mmol/L    CO2 26.4 22.0 - 29.0 mmol/L    Calcium 9.1 8.6 - 10.5 mg/dL    Total Protein 7.8 6.0 - 8.5 g/dL    Albumin 4.20 3.50 - 5.20 g/dL    ALT (SGPT) 29 1 - 41 U/L    AST (SGOT) 55 (H) 1 - 40 U/L    Alkaline Phosphatase 68 39 - 117 U/L    Total Bilirubin 0.5 0.0 - 1.2 mg/dL    eGFR Non African Amer 29 (L) >60 mL/min/1.73    Globulin 3.6 gm/dL    A/G Ratio 1.2 g/dL    BUN/Creatinine Ratio 15.3 7.0 - 25.0    Anion Gap 12.6 5.0 - 15.0 mmol/L   Lactate Dehydrogenase    Collection Time: 12/02/20  1:57 PM    Specimen: Blood   Result Value Ref Range     (H) 135 - 225 U/L   Procalcitonin    Collection Time: 12/02/20  1:57 PM    Specimen: Blood   Result Value Ref Range    Procalcitonin 0.22 0.00 - 0.25 ng/mL   C-reactive Protein    Collection Time: 12/02/20  1:57 PM    Specimen: Blood   Result Value Ref Range    C-Reactive Protein 6.09 (H) 0.00 - 0.50 mg/dL   CBC Auto Differential    Collection Time: 12/02/20  1:57 PM    Specimen: Blood   Result Value Ref Range    WBC 7.46 3.40 - 10.80 10*3/mm3    RBC 4.22 4.14 - 5.80 10*6/mm3    Hemoglobin 13.9 13.0 - 17.7 g/dL    Hematocrit 40.3 37.5 - 51.0 %    MCV 95.5 79.0 - 97.0 fL    MCH 32.9 26.6 - 33.0 pg    MCHC 34.5 31.5 - 35.7 g/dL    RDW 13.4 12.3 - 15.4 %    RDW-SD 46.7 37.0 - 54.0 fl    MPV 11.8 6.0 - 12.0 fL    Platelets 239 140 - 450 10*3/mm3    Neutrophil % 70.0 42.7 - 76.0 %    Lymphocyte % 20.9 19.6 - 45.3 %    Monocyte % 7.4 5.0 - 12.0 %    Eosinophil % 0.1 (L) 0.3 - 6.2 %    Basophil % 0.3 0.0 - 1.5 %    Immature Grans % 1.3 (H) 0.0 - 0.5 %    Neutrophils, Absolute 5.22 1.70 - 7.00 10*3/mm3    Lymphocytes, Absolute 1.56  0.70 - 3.10 10*3/mm3    Monocytes, Absolute 0.55 0.10 - 0.90 10*3/mm3    Eosinophils, Absolute 0.01 0.00 - 0.40 10*3/mm3    Basophils, Absolute 0.02 0.00 - 0.20 10*3/mm3    Immature Grans, Absolute 0.10 (H) 0.00 - 0.05 10*3/mm3    nRBC 0.0 0.0 - 0.2 /100 WBC   BNP    Collection Time: 12/02/20  1:57 PM    Specimen: Blood   Result Value Ref Range    proBNP 641.4 0.0-1,800.0 pg/mL   Troponin    Collection Time: 12/02/20  1:57 PM    Specimen: Blood   Result Value Ref Range    Troponin T <0.010 0.000 - 0.030 ng/mL   Respiratory Panel PCR w/COVID-19(SARS-CoV-2) SARAI/BOB/SCAR/PAD/COR/MAD/[facility] In-House, NP Swab in UTM/[facility], 3-4 HR TAT - Swab, Nasopharynx    Collection Time: 12/02/20  1:58 PM    Specimen: Nasopharynx; Swab   Result Value Ref Range    ADENOVIRUS, PCR Not Detected Not Detected    Coronavirus 229E Not Detected Not Detected    Coronavirus HKU1 Not Detected Not Detected    Coronavirus NL63 Not Detected Not Detected    Coronavirus OC43 Not Detected Not Detected    COVID19 Detected (C) Not Detected - Ref. Range    Human Metapneumovirus Not Detected Not Detected    Human Rhinovirus/Enterovirus Not Detected Not Detected    Influenza A PCR Not Detected Not Detected    Influenza B PCR Not Detected Not Detected    Parainfluenza Virus 1 Not Detected Not Detected    Parainfluenza Virus 2 Not Detected Not Detected    Parainfluenza Virus 3 Not Detected Not Detected    Parainfluenza Virus 4 Not Detected Not Detected    RSV, PCR Not Detected Not Detected    Bordetella pertussis pcr Not Detected Not Detected    Bordetella parapertussis PCR Not Detected Not Detected    Chlamydophila pneumoniae PCR Not Detected Not Detected    Mycoplasma pneumo by PCR Not Detected Not Detected   Lactic Acid, Plasma    Collection Time: 12/02/20  2:01 PM    Specimen: Blood   Result Value Ref Range    Lactate 1.4 0.5 - 2.0 mmol/L   ECG 12 Lead    Collection Time: 12/02/20  2:48 PM   Result Value Ref Range    QT Interval 504 ms   Urinalysis With  Microscopic If Indicated (No Culture) - Urine, Clean Catch    Collection Time: 12/02/20  3:45 PM    Specimen: Urine, Clean Catch   Result Value Ref Range    Color, UA Yellow Yellow, Straw    Appearance, UA Clear Clear    pH, UA <=5.0 5.0 - 8.0    Specific Gravity, UA 1.014 1.005 - 1.030    Glucose, UA Negative Negative    Ketones, UA Negative Negative    Bilirubin, UA Negative Negative    Blood, UA Trace (A) Negative    Protein, UA Trace (A) Negative    Leuk Esterase, UA Negative Negative    Nitrite, UA Negative Negative    Urobilinogen, UA 0.2 E.U./dL 0.2 - 1.0 E.U./dL   Urinalysis, Microscopic Only - Urine, Clean Catch    Collection Time: 12/02/20  3:45 PM    Specimen: Urine, Clean Catch   Result Value Ref Range    RBC, UA 0-2 None Seen, 0-2 /HPF    WBC, UA 0-2 None Seen, 0-2 /HPF    Bacteria, UA None Seen None Seen /HPF    Squamous Epithelial Cells, UA 0-2 None Seen, 0-2 /HPF    Hyaline Casts, UA 0-2 None Seen /LPF    Methodology Automated Microscopy        Ordered the above labs and independently reviewed the results.        RADIOLOGY  Ct Head Without Contrast    Result Date: 12/2/2020  EMERGENCY NONCONTRAST HEAD CT 12/02/2020  CLINICAL HISTORY: Patient fell, hit back of head, minor head injury, has some weakness, patient is on Eliquis - blood thinners.  TECHNIQUE: Spiral CT images were obtained from the base of the skull to the vertex without intravenous contrast. Images were reformatted and submitted in 3 mm thick axial CT section with brain algorithm and 2 mm thick axial CT section with high-resolution bone algorithm and 2 mm thick sagittal and coronal reconstructions were performed and submitted in brain algorithm.  FINDINGS: There is mild low-density in the periventricular white matter consistent with mild small vessel disease.  There is a 6 mm old lacunar infarct in the body of the right caudate nucleus. There is a 15 x 5 mm area of encephalomalacia compatible with old infarct in the posterior inferior  lateral left cerebellum in the left PICA territory. There is a focal area of benign dystrophic calcification in the central portion of the right side of anjel measuring 4 x 3 mm in size, felt to be a benign dystrophic calcification of no acute significance. The remainder of the brain parenchyma is normal in attenuation. The ventricles are normal in size. I see no focal mass effect. There is no midline shift. No extra-axial fluid collections are identified. There is no evidence of acute intracranial hemorrhage. There is subtotal opacification of the left sphenoid sinus with fluid and mucosal thickening and bony thickening of the walls of the left sphenoid sinus from chronic left sphenoid sinus disease.  The remainder of the paranasal sinuses and mastoid air cells and middle ear cavities are clear. There is a tiny scalp hematoma over the lateral left occipital bone from today's head trauma.  No underlying acute skull fracture is identified.      1. No acute intracranial abnormality is seen with no acute skull fracture or intracranial hemorrhage identified. 2. 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 3. There is complete opacification of the left sphenoid sinus with circumferential mucosal thickening and some central chronic inspissated secretions and bony thickening of the walls of the left sphenoid sinus from chronic sphenoid sinus disease and there is nodular opacity anterior to the left sphenoid ostium that could be a polyp.  This could be correlated with direct visualization. The remainder of the head CT is unremarkable.  Radiation dose reduction techniques were utilized, including automated exposure control and exposure modulation based on body size.       Xr  Chest Ap    Result Date: 12/2/2020  XR CHEST AP-  INDICATIONS: Cough and fever, possible Covid 19  TECHNIQUE: Frontal view of the chest  COMPARISON: 04/09/2012, correlated with CT from 07/16/2020  FINDINGS:  The heart size is borderline. The aorta appears ectatic, calcified. Sternotomy wires are noted. The pulmonary vasculature is unremarkable. A previous CT demonstrated right retrocardiac opacity is not well characterized on this exam. Minimal patchy densities at the lung bases could be areas of developing pneumonia, including possibility of viral pneumonia, follow-up recommended. No pleural effusion or pneumothorax. No acute osseous process.       Minimal patchy densities at the lung bases could be areas of developing pneumonia, including possibility of viral pneumonia, follow-up recommended. Ectatic aorta.  This report was finalized on 12/2/2020 3:13 PM by Dr. Al Vo M.D.        I ordered the above noted radiological studies. Reviewed by me and discussed with radiologist.  See dictation for official radiology interpretation.      PROCEDURES    Procedures      MEDICATIONS GIVEN IN ER    Medications   pantoprazole (PROTONIX) EC tablet 40 mg (40 mg Oral Given 12/2/20 1820)         PROGRESS, DATA ANALYSIS, CONSULTS, AND MEDICAL DECISION MAKING    Any/all labs have been independently reviewed by me.  Any/all radiology studies have been reviewed by me and discussed with radiologist dictating the report.   EKG's independently viewed and interpreted by me.  Discussion below represents my analysis of pertinent findings related to patient's condition, differential diagnosis, treatment plan and final disposition.      ED Course as of Dec 02 1904   Wed Dec 02, 2020   1435 CT of the brain was independently viewed by me and discussed with Dr. Garcia (radiology)-there are no acute processes identified.  For official interpretation, see dictated report.    [WC]   1435 WBC: 7.46 [WC]   1435 Hemoglobin: 13.9 [WC]   1435  Hematocrit: 40.3 [WC]   1436 Platelets: 239 [WC]   1439 proBNP: 641.4 [WC]   1442 Troponin T: <0.010 [WC]   1442 LDH(!): 362 [WC]   1442 C-Reactive Protein(!): 6.09 [WC]   1442 BUN(!): 33 [WC]   1442 Creatinine(!): 2.15 [WC]   1449 Procalcitonin: 0.22 [WC]   1450 EKG performed at 1448 and interpreted by me shows normal sinus rhythm with a heart rate of 65 bpm.  NC interval is borderline prolonged.  There are nonspecific ST-T changes.  There is no significant change when compared to 7/23/2017.    [WC]   1803 COVID19(!!): Detected [WC]   1902 Case discussed with Dr. Diggs ([facility]) - she agrees to admit the patient to a telemetry bed.    [WC]      ED Course User Index  [WC] Efrain Headley MD       AS OF 19:04 EST VITALS:    BP - 107/57  HR - 70  TEMP - 97.6 °F (36.4 °C) (Tympanic)  02 SATS - 93%        DIAGNOSIS  Final diagnoses:   Pneumonia due to COVID-19 virus   ALEXANDRU (acute kidney injury) (CMS/[facility])         DISPOSITION  Admitted-telemetry           Efrain Headley MD  12/02/20 1904

## 2020-12-03 LAB
ANION GAP SERPL CALCULATED.3IONS-SCNC: 13.6 MMOL/L (ref 5–15)
BASOPHILS # BLD AUTO: 0.02 10*3/MM3 (ref 0–0.2)
BASOPHILS NFR BLD AUTO: 0.3 % (ref 0–1.5)
BUN SERPL-MCNC: 31 MG/DL (ref 8–23)
BUN/CREAT SERPL: 17 (ref 7–25)
CALCIUM SPEC-SCNC: 8.6 MG/DL (ref 8.6–10.5)
CHLORIDE SERPL-SCNC: 98 MMOL/L (ref 98–107)
CO2 SERPL-SCNC: 24.4 MMOL/L (ref 22–29)
CREAT SERPL-MCNC: 1.82 MG/DL (ref 0.76–1.27)
DEPRECATED RDW RBC AUTO: 45.2 FL (ref 37–54)
EOSINOPHIL # BLD AUTO: 0.03 10*3/MM3 (ref 0–0.4)
EOSINOPHIL NFR BLD AUTO: 0.4 % (ref 0.3–6.2)
ERYTHROCYTE [DISTWIDTH] IN BLOOD BY AUTOMATED COUNT: 13 % (ref 12.3–15.4)
GFR SERPL CREATININE-BSD FRML MDRD: 36 ML/MIN/1.73
GLUCOSE SERPL-MCNC: 109 MG/DL (ref 65–99)
HCT VFR BLD AUTO: 40.1 % (ref 37.5–51)
HGB BLD-MCNC: 13.4 G/DL (ref 13–17.7)
IMM GRANULOCYTES # BLD AUTO: 0.08 10*3/MM3 (ref 0–0.05)
IMM GRANULOCYTES NFR BLD AUTO: 1.1 % (ref 0–0.5)
LYMPHOCYTES # BLD AUTO: 1.93 10*3/MM3 (ref 0.7–3.1)
LYMPHOCYTES NFR BLD AUTO: 27 % (ref 19.6–45.3)
MAGNESIUM SERPL-MCNC: 2.2 MG/DL (ref 1.6–2.4)
MCH RBC QN AUTO: 31.9 PG (ref 26.6–33)
MCHC RBC AUTO-ENTMCNC: 33.4 G/DL (ref 31.5–35.7)
MCV RBC AUTO: 95.5 FL (ref 79–97)
MONOCYTES # BLD AUTO: 0.61 10*3/MM3 (ref 0.1–0.9)
MONOCYTES NFR BLD AUTO: 8.5 % (ref 5–12)
NEUTROPHILS NFR BLD AUTO: 4.48 10*3/MM3 (ref 1.7–7)
NEUTROPHILS NFR BLD AUTO: 62.7 % (ref 42.7–76)
NRBC BLD AUTO-RTO: 0 /100 WBC (ref 0–0.2)
PLATELET # BLD AUTO: 197 10*3/MM3 (ref 140–450)
PMV BLD AUTO: 11.3 FL (ref 6–12)
POTASSIUM SERPL-SCNC: 3.6 MMOL/L (ref 3.5–5.2)
RBC # BLD AUTO: 4.2 10*6/MM3 (ref 4.14–5.8)
SODIUM SERPL-SCNC: 136 MMOL/L (ref 136–145)
WBC # BLD AUTO: 7.15 10*3/MM3 (ref 3.4–10.8)

## 2020-12-03 PROCEDURE — 97166 OT EVAL MOD COMPLEX 45 MIN: CPT

## 2020-12-03 PROCEDURE — 97535 SELF CARE MNGMENT TRAINING: CPT

## 2020-12-03 PROCEDURE — 85025 COMPLETE CBC W/AUTO DIFF WBC: CPT | Performed by: INTERNAL MEDICINE

## 2020-12-03 PROCEDURE — 80048 BASIC METABOLIC PNL TOTAL CA: CPT | Performed by: INTERNAL MEDICINE

## 2020-12-03 PROCEDURE — 83735 ASSAY OF MAGNESIUM: CPT | Performed by: INTERNAL MEDICINE

## 2020-12-03 PROCEDURE — 97110 THERAPEUTIC EXERCISES: CPT

## 2020-12-03 PROCEDURE — 97162 PT EVAL MOD COMPLEX 30 MIN: CPT

## 2020-12-03 RX ORDER — BENZONATATE 100 MG/1
100 CAPSULE ORAL EVERY 8 HOURS PRN
Status: DISCONTINUED | OUTPATIENT
Start: 2020-12-03 | End: 2020-12-12 | Stop reason: HOSPADM

## 2020-12-03 RX ORDER — GUAIFENESIN/DEXTROMETHORPHAN 100-10MG/5
10 SYRUP ORAL EVERY 4 HOURS PRN
Status: DISCONTINUED | OUTPATIENT
Start: 2020-12-03 | End: 2020-12-12 | Stop reason: HOSPADM

## 2020-12-03 RX ADMIN — PANTOPRAZOLE SODIUM 40 MG: 40 TABLET, DELAYED RELEASE ORAL at 09:50

## 2020-12-03 RX ADMIN — ESCITALOPRAM 20 MG: 20 TABLET, FILM COATED ORAL at 12:36

## 2020-12-03 RX ADMIN — ATORVASTATIN CALCIUM 40 MG: 20 TABLET, FILM COATED ORAL at 21:02

## 2020-12-03 RX ADMIN — BENZONATATE 100 MG: 100 CAPSULE ORAL at 17:35

## 2020-12-03 RX ADMIN — Medication 2000 MCG: at 09:54

## 2020-12-03 RX ADMIN — SODIUM CHLORIDE 75 ML/HR: 9 INJECTION, SOLUTION INTRAVENOUS at 12:28

## 2020-12-03 RX ADMIN — LEVOTHYROXINE SODIUM 88 MCG: 88 TABLET ORAL at 09:50

## 2020-12-03 RX ADMIN — APIXABAN 2.5 MG: 2.5 TABLET, FILM COATED ORAL at 09:50

## 2020-12-03 RX ADMIN — CARVEDILOL 3.12 MG: 3.12 TABLET, FILM COATED ORAL at 21:02

## 2020-12-03 RX ADMIN — GUAIFENESIN AND DEXTROMETHORPHAN 10 ML: 100; 10 SYRUP ORAL at 12:38

## 2020-12-03 RX ADMIN — GUAIFENESIN AND DEXTROMETHORPHAN 10 ML: 100; 10 SYRUP ORAL at 21:03

## 2020-12-03 RX ADMIN — APIXABAN 2.5 MG: 2.5 TABLET, FILM COATED ORAL at 21:03

## 2020-12-03 RX ADMIN — SODIUM CHLORIDE, PRESERVATIVE FREE 10 ML: 5 INJECTION INTRAVENOUS at 09:54

## 2020-12-03 RX ADMIN — SODIUM CHLORIDE 75 ML/HR: 9 INJECTION, SOLUTION INTRAVENOUS at 21:16

## 2020-12-03 RX ADMIN — SODIUM CHLORIDE, PRESERVATIVE FREE 10 ML: 5 INJECTION INTRAVENOUS at 21:03

## 2020-12-03 NOTE — PLAN OF CARE
Goal Outcome Evaluation:         Arrived last night from ER with covid and ALEXANDRU. VSS on room air. N/s infusing as ordered. Fall protocol, recent fall at home. Lungs decreased.Currently resting in bed. Will continue to monitor.

## 2020-12-03 NOTE — DISCHARGE PLACEMENT REQUEST
"Juan C Malone (85 y.o. Male)     Date of Birth Social Security Number Address Home Phone MRN    [id number]  [id number] [id number] [phone] [id number]    [employer] Marital Status          None        Admission Date Admission Type Admitting Provider Attending Provider Department, Room/Bed    12/2/20 Emergency Stingl, MD Sebas Siegel Lyle E, MD [address], [address]    Discharge Date Discharge Disposition Discharge Destination                       Attending Provider: Sanjeev Mullen MD    Allergies: Latex    Isolation: Enh Drop/Con   Infection: COVID (confirmed) (12/02/20)   Code Status: CPR    Ht: 180.3 cm (71\")   Wt: 80 kg (176 lb 5.9 oz)    Admission Cmt: None   Principal Problem: None                Active Insurance as of 12/2/2020     Primary Coverage     Payor Plan Insurance Group Employer/Plan Group    AETNA MEDICARE REPLACEMENT AETNA MEDICARE REPLACEMENT [id number]     Payor Plan Address Payor Plan Phone Number Payor Plan Fax Number Effective Dates    [address] [phone]  1/1/2019 - None Entered    [facility] [id number]       Subscriber Name Subscriber Birth Date Member ID       JUAN C MALONE 1935 [id number]                 Emergency Contacts      (Rel.) Home Phone Work Phone Mobile Phone    Malka Minor (Daughter) [phone] -- [phone]            {Outbreak/Travel/Exposure Documentation......;  Question Available Choices Patient Response   Outbreak Screen: Do you currently have a new onset of the following symptoms?        Fever/Chils, Cough, Shortness of air, Loss of taste or smell, No, Unknown  (!) Cough (12/02/20 2052)   Outbreak Screen: In the last 14 days, have you had contact with anyone who is ill, has show any of the symptoms listed above and/or has been diagnosis with the 2019 Novel Coronavirus? This includes any immediate household members but excludes any patients with whom you have been in contact " within your normal work duties wearing proper PPE, if you are a healthcare worker.  Yes, No, Unknown              (!) Yes (12/02/20 2052)   Outbreak Screen: Who was notified?    Free text  (not recorded)   Travel Screen: Have you traveled in the last month? If so, to what country have you traveled? If [place] what state? Yes, No, Unknown  List of all countries  List of all States No (12/02/20 2053)  (not recorded)  (not recorded)   Infection Risk: Do you currently have the following symptoms?  (If cough is selected, the Tuberculosis Screen is performed.) Cough, Fever, Rash, No (!) Cough (12/02/20 2053)   Tuberculosis Screen: Do you have any of the following Tuberculosis Risks?  · Have you lived or spent time with anyone who had or may have TB?  · Have you lived in or visited any of the following areas for more than one month: [place], [place], [place], [place] or [place], the [place] or [place]?  · Do you have HIV/AIDS?  · Have you lived in or worked in a nursing home, homeless shelter, correctional facility, or substance abuse treatment facility?   · No    If Yes do you have any of the following symptoms? Yes responses display to the right    If Yes, symptoms listed are:  Cough greater than or equal to 3 weeks, Loss of appetite, Unexplained weight loss, Night sweats, Bloody sputum or hemoptysis, Hoarseness, Fever, Fatigue, Chest pain, No No (12/02/20 2053)  (not recorded)   Exposure Screen: Have you been exposed to any of these contagious diseases in the last month? Measles, Chickenpox, Meningitis, Pertussis, Whooping Cough, No No (12/02/20 2053)

## 2020-12-03 NOTE — PLAN OF CARE
Goal Outcome Evaluation:  Plan of Care Reviewed With: patient     Outcome Summary: Pt is a 86 yo M who was admitted with weakness, falls and PNA secondary to COVID 19. Pt presents to PT with some impaired functonal mobility and gait secondary to generalized weakness, impaired balance, and decreased activity, tolerance. Pt may benefit from skilled PT to address strength, mobility, and gait.Patient was intermittently wearing a face mask during this therapy encounter. Therapist used appropriate personal protective equipment including gown, eye protection, mask and gloves.  Mask used was standard procedure mask. Appropriate PPE was worn during the entire therapy session. Hand hygiene was completed before and after therapy session. Patient is in enhanced droplet precautions.

## 2020-12-03 NOTE — ED NOTES
Nursing report ED to floor  Bhaskar Ibarra  85 y.o.  male    HPI (triage note):   Chief Complaint   Patient presents with   • Head Injury     on eliquis    • Fall   • Weakness - Generalized   • Exposure To Known Illness     wife has covid        Admitting doctor:   Ginna Diggs MD    Admitting diagnosis:   The primary encounter diagnosis was Pneumonia due to COVID-19 virus. A diagnosis of ALEXANDRU (acute kidney injury) (CMS/[facility]) was also pertinent to this visit.    Code status:   Current Code Status     Date Active Code Status Order ID Comments User Context       Prior    Advance Care Planning Activity          Allergies:   Latex    Weight:       12/02/20  1324   Weight: 84.8 kg (186 lb 14.4 oz)       Most recent vitals:   Vitals:    12/02/20 1800 12/02/20 1920 12/02/20 1921 12/02/20 1922   BP:  116/57     Pulse: 70 69 71 68   Resp:  17     Temp:       TempSrc:       SpO2: 93% 94% 92% 95%   Weight:       Height:           Active LDAs/IV Access:   Lines, Drains & Airways    Active LDAs     Name:   Placement date:   Placement time:   Site:   Days:    Peripheral IV 12/02/20 1337 Right Antecubital   12/02/20    1337    Antecubital   less than 1                Labs (abnormal labs have a star):   Labs Reviewed   RESPIRATORY PANEL PCR W/ COVID-19 (SARS-COV-2) SARAI/BOB/SCAR/PAD/COR/MAD/CJ IN-HOUSE, NP SWAB IN UTM/VTP, 3-4 HR TAT - Abnormal; Notable for the following components:       Result Value    COVID19 Detected (*)     All other components within normal limits    Narrative:     Fact sheet for providers: https://docs.[employer]/wp-content/uploads/CCD6736-2026-UN5.1-EUA-Provider-Fact-Sheet-3.pdf    Fact sheet for patients: https://docs.[employer]/wp-content/uploads/TSZ7619-4405-JY7.1-EUA-Patient-Fact-Sheet-1.pdf   COMPREHENSIVE METABOLIC PANEL - Abnormal; Notable for the following components:    Glucose 100 (*)     BUN 33 (*)     Creatinine 2.15 (*)     Sodium 134 (*)     Chloride 95 (*)     AST (SGOT) 55 (*)   "   eGFR Non  Amer 29 (*)     All other components within normal limits    Narrative:     GFR Normal >60  Chronic Kidney Disease <60  Kidney Failure <15     LACTATE DEHYDROGENASE - Abnormal; Notable for the following components:     (*)     All other components within normal limits   C-REACTIVE PROTEIN - Abnormal; Notable for the following components:    C-Reactive Protein 6.09 (*)     All other components within normal limits   CBC WITH AUTO DIFFERENTIAL - Abnormal; Notable for the following components:    Eosinophil % 0.1 (*)     Immature Grans % 1.3 (*)     Immature Grans, Absolute 0.10 (*)     All other components within normal limits   URINALYSIS W/ MICROSCOPIC IF INDICATED (NO CULTURE) - Abnormal; Notable for the following components:    Blood, UA Trace (*)     Protein, UA Trace (*)     All other components within normal limits   PROCALCITONIN - Normal    Narrative:     As a Marker for Sepsis (Non-Neonates):   1. <0.5 ng/mL represents a low risk of severe sepsis and/or septic shock.  1. >2 ng/mL represents a high risk of severe sepsis and/or septic shock.    As a Marker for Lower Respiratory Tract Infections that require antibiotic therapy:  PCT on Admission     Antibiotic Therapy             6-12 Hrs later  > 0.5                Strongly Recommended            >0.25 - <0.5         Recommended  0.1 - 0.25           Discouraged                   Remeasure/reassess PCT  <0.1                 Strongly Discouraged          Remeasure/reassess PCT      As 28 day mortality risk marker: \"Change in Procalcitonin Result\" (> 80 % or <=80 %) if Day 0 (or Day 1) and Day 4 values are available. Refer to http://www.[employer]-pct-calculator.com/   Change in PCT <=80 %   A decrease of PCT levels below or equal to 80 % defines a positive change in PCT test result representing a higher risk for 28-day all-cause mortality of patients diagnosed with severe sepsis or septic shock.  Change in PCT > 80 %   A decrease of PCT " levels of more than 80 % defines a negative change in PCT result representing a lower risk for 28-day all-cause mortality of patients diagnosed with severe sepsis or septic shock.                Results may be falsely decreased if patient taking Biotin.    LACTIC ACID, PLASMA - Normal   BNP (IN-HOUSE) - Normal    Narrative:     Among patients with dyspnea, NT-proBNP is highly sensitive for the detection of acute congestive heart failure. In addition NT-proBNP of <300 pg/ml effectively rules out acute congestive heart failure with 99% negative predictive value.    Results may be falsely decreased if patient taking Biotin.     TROPONIN (IN-HOUSE) - Normal    Narrative:     Troponin T Reference Range:  <= 0.03 ng/mL-   Negative for AMI  >0.03 ng/mL-     Abnormal for myocardial necrosis.  Clinicians would have to utilize clinical acumen, EKG, Troponin and serial changes to determine if it is an Acute Myocardial Infarction or myocardial injury due to an underlying chronic condition.       Results may be falsely decreased if patient taking Biotin.     URINALYSIS, MICROSCOPIC ONLY   CBC AND DIFFERENTIAL    Narrative:     The following orders were created for panel order CBC & Differential.  Procedure                               Abnormality         Status                     ---------                               -----------         ------                     CBC Auto Differential[[id number]]        Abnormal            Final result                 Please view results for these tests on the individual orders.       EKG:   ECG 12 Lead   Preliminary Result   HEART RATE= 65  bpm   RR Interval= 920  ms   MO Interval= 238  ms   P Horizontal Axis= 16  deg   P Front Axis= 46  deg   QRSD Interval= 97  ms   QT Interval= 504  ms   QRS Axis= 20  deg   T Wave Axis= 61  deg   - ABNORMAL ECG -   Sinus rhythm   Prolonged MO interval   Prolonged QT interval   Electronically Signed By:    Date and Time of Study: 2020-12-02 14:48:26           Meds given in ED:   Medications   pantoprazole (PROTONIX) EC tablet 40 mg (40 mg Oral Given 12/2/20 1820)       Imaging results:  Ct Head Without Contrast    Result Date: 12/2/2020  1. No acute intracranial abnormality is seen with no acute skull fracture or intracranial hemorrhage identified. 2. 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 3. There is complete opacification of the left sphenoid sinus with circumferential mucosal thickening and some central chronic inspissated secretions and bony thickening of the walls of the left sphenoid sinus from chronic sphenoid sinus disease and there is nodular opacity anterior to the left sphenoid ostium that could be a polyp.  This could be correlated with direct visualization. The remainder of the head CT is unremarkable.  Radiation dose reduction techniques were utilized, including automated exposure control and exposure modulation based on body size.       Xr Chest Ap    Result Date: 12/2/2020   Minimal patchy densities at the lung bases could be areas of developing pneumonia, including possibility of viral pneumonia, follow-up recommended. Ectatic aorta.  This report was finalized on 12/2/2020 3:13 PM by Dr. Al Vo M.D.        Ambulatory status:   - up with assist    Social issues:   Social History     Socioeconomic History   • Marital status:      Spouse name: Not on file   • Number of children: Not on file   • Years of education: Not on file   • Highest education level: Not on file   Tobacco Use   • Smoking status: Former Smoker     Years: 5.00     Types: Cigarettes   • Smokeless tobacco: Never Used   • Tobacco comment: 1 cig day x 5 years   Substance and Sexual Activity   • Alcohol use: No   •  Drug use: No   • Sexual activity: Defer    Nursing report ED to floor       Kaila Acevedo, RN  12/02/20 0320

## 2020-12-03 NOTE — PROGRESS NOTES
Discharge Planning Assessment  [facility]     Patient Name: Bhaskar Ibarra  MRN: [id number]  Today's Date: 12/3/2020    Admit Date: 12/2/2020    Discharge Needs Assessment     Row Name 12/03/20 1428       Living Environment    Lives With  spouse    Name(s) of Who Lives With Patient  wife- Cristal Ibarra    Current Living Arrangements  home/apartment/condo    Primary Care Provided by  self;child(bryon)    Provides Primary Care For  no one, unable/limited ability to care for self    Family Caregiver if Needed  none    Quality of Family Relationships  helpful;involved;supportive    Able to Return to Prior Arrangements  yes       Resource/Environmental Concerns    Resource/Environmental Concerns  none    Transportation Concerns  car, none       Transition Planning    Patient/Family Anticipates Transition to  home with family    Patient/Family Anticipated Services at Transition  home health care    Transportation Anticipated  family or friend will provide       Discharge Needs Assessment    Current Outpatient/Agency/Support Group  homecare agency    Equipment Currently Used at Home  cane, straight    Concerns to be Addressed  discharge planning    Anticipated Changes Related to Illness  none    Current Discharge Risk  physical impairment        Discharge Plan     Row Name 12/03/20 1425       Plan    Plan  Plans home with assistance from his daughter and [facility].    Provided Post Acute Provider List?  N/A    N/A Provider List Comment  The patient’s daughter was not provided with a HH/SNF list nor a print out of the HH/nursing home compare list from Medicare.gov as she states that her mother’s d/c plan is home with her assistance and [facility] .    Provided Post Acute Provider Quality & Resource List?  N/A    N/A Quality & Resource List Comment  The patient’s daughter was not provided with a HH/SNF list nor a print out of the HH/nursing home compare list from Medicare.gov as she states that her mother’s d/c plan is home with  her assistance and [facility].    Patient/Family in Agreement with Plan  yes    Plan Comments  Attempted to reach the patient by telephone as he is in isolation precautions.  Spoke to the patient’s daughter Reina Minor [phone] as she is listed as the patient’s Healthcare Surrogate after the patient’s spouse which is listed on his living will documents in Epic.  Verified facesheet, checked IMM and discussed discharge planning needs.  The patient’s daughter states that she plans for the patient to d/c home and is requesting [facility] to work with him as well.  Voicemail message was left for [facility].  The patient’s daughter states that she will be staying at her parent’s house with them providing care for them 24/7 as she is quarantined from her own home until 12/20/2020.  The patient’s daughter states that she will wear a mask and gloves while providing care for her parents.  The patient states that typically her parents resides at home alone with assistance from her 4-5 days per week and on the days when she does come to assist her parents they have a private pay in home caregiver who is not with any agency that comes over 2-3 days per week for about 5 hours each day.  The patient’s daughter states that the private pay in home caregiver currently has COVID and will not be able to assist her therefore she was emailed the private pay in home caregivers list in order for her to obtain additional assistance for her parents at home. has grab bars in the bathroom, has a shower chair and 3 in 1 commode.  The home has a ramp to enter the single story home with a basement. The patient’s PCP is GEOFF Oviedo, pharmacy is [facility] in [facility] and the patient takes his own medication with assistance from their daughter and private pay in home caregiver.  The patient has not trouble affording his medication. The patient has no HH/SNF history.  Discussed with the patient’s daughter the amount of care that is going  to be required for her to provide care for both of her parents at home upon d/c as they are both currently hospitalized.  The patient’s daughter states that she adamantly refuses for either of her parents to go to rehab upon d/c. The patient’s daughter states that the patient only used a cane at home and was basically independent with his activities of daily living.  The patient’s daughter states that she is aware that the patient’s options for rehab are:  [facility], [facility] and [facility] and she is refusing to allow any referrals to be made to those facilities for the patient….continued…        Continued Care and Services - Admitted Since 12/2/2020     Home Medical Care     Service Provider Request Status Selected Services Address Phone Fax Patient Preferred    [facility]  Pending - Request Sent N/A [id number] [phone] --                Demographic Summary     Row Name 12/03/20 1426       General Information    Admission Type  inpatient    Arrived From  home    Referral Source  admission list    Reason for Consult  discharge planning    Preferred Language  English     Used During This Interaction  no        Functional Status     Row Name 12/03/20 1427       Functional Status    Usual Activity Tolerance  moderate    Current Activity Tolerance  fair       Functional Status, IADL    Medications  assistive equipment    Meal Preparation  assistive equipment    Housekeeping  assistive equipment    Laundry  assistive equipment    Shopping  assistive equipment       Mental Status Summary    Recent Changes in Mental Status/Cognitive Functioning  unable to assess        Psychosocial    No documentation.       Abuse/Neglect    No documentation.       Legal    No documentation.       Substance Abuse    No documentation.       Patient Forms    No documentation.           BRYAN You

## 2020-12-03 NOTE — THERAPY EVALUATION
Patient Name: Bhaskar Ibarra  : 1935    MRN: [id number]                              Today's Date: 12/3/2020       Admit Date: 2020    Visit Dx:     ICD-10-CM ICD-9-CM   1. Pneumonia due to COVID-19 virus  U07.1 480.8    J12.89    2. ALEXANDRU (acute kidney injury) (CMS/HCC)  N17.9 584.9     Patient Active Problem List   Diagnosis   • Coronary arteriosclerosis in native artery   • Atrial fibrillation (CMS/HCC)   • Acute non-Q wave ST elevation myocardial infarction (STEMI) involving left anterior descending (LAD) coronary artery   • Mitral valve insufficiency   • S/P CABG (coronary artery bypass graft)   • S/P mitral valve repair   • Depression   • Hypothyroidism   • Hyperlipidemia   • Pulmonary nodule   • Adenocarcinoma of right lung (CMS/HCC)   • Benign nodular prostatic hyperplasia with lower urinary tract symptoms   • Confusion   • Post concussion syndrome   • Vitamin D deficiency   • Well adult health check   • Impaired fasting glucose   • AAA (abdominal aortic aneurysm) (CMS/HCC)   • Ascending aortic aneurysm (CMS/HCC)   • Macrocytic anemia   • Other heart failure (CMS/HCC)   • HTN (hypertension), benign   • Pneumonia due to COVID-19 virus     Past Medical History:   Diagnosis Date   • Abdominal aortic aneurysm (CMS/HCC)    • Acute myocardial infarction (CMS/HCC)    • Acute renal failure (CMS/HCC)    • Anemia    • Aneurysm (CMS/HCC)    • Atrial fibrillation (CMS/HCC)    • Cardiomyopathy (CMS/HCC)    • Chest pain    • Coronary artery disease     2015-    • Depression    • GERD (gastroesophageal reflux disease)    • Hyperlipidemia    • Hypertension    • Hypothyroidism    • Lung cancer (CMS/HCC) 2016    radiation    • Mitral regurgitation    • Myocardial infarction (CMS/HCC)    • Pleural effusion    • Pleural effusion, bilateral    • RLS (restless legs syndrome)    • Sleep apnea    • Ulcerative colitis (CMS/HCC)    • Ulcerative colitis (CMS/HCC)      Past Surgical History:   Procedure Laterality  Date   • BACK SURGERY      lumbar   • CARDIAC CATHETERIZATION     • CORONARY ANGIOPLASTY     • CORONARY ARTERY BYPASS GRAFT  07/27/2015    X 5  Dr Louis   • HERNIA REPAIR      inguinal - left   • MITRAL VALVE REPAIR/REPLACEMENT  07/27/2015    Dr Louis   • NASAL SEPTUM SURGERY      benign growth removed   • OTHER SURGICAL HISTORY      Cardiovasc Endosc W/ Video-Assist Harv Veins Coron Art Byp   • PROSTATE SURGERY     • SKIN BIOPSY      benign     General Information     Row Name 12/03/20 1330          OT Time and Intention    Document Type  evaluation  -ALFREDO     Mode of Treatment  occupational therapy  -ALFREDO     Row Name 12/03/20 1330          General Information    Patient Profile Reviewed  yes  -ALFREDO     Prior Level of Function  independent:;ADL's;all household mobility;community mobility  -ALFREDO     Existing Precautions/Restrictions  fall  -ALFREDO     Barriers to Rehab  medically complex  -ALFREDO     Row Name 12/03/20 1330          Occupational Profile    Reason for Services/Referral (Occupational Profile)  Pt is a 86 y/o M presenting to ER with COVID PNA and ALEXANDRU. Pt resides with his spouse and adult daughter in a one story home with ramp access. Pt reports indep with ADLs and functional mobility PTA, using cane for community mobility.  -ALFREDO     Row Name 12/03/20 1330          Living Environment    Lives With  child(bryon), adult;spouse  -ALFREDO     Row Name 12/03/20 1330          Home Main Entrance    Number of Stairs, Main Entrance  other (see comments) ramp access  -ALFREDO     Row Name 12/03/20 1330          Cognition    Orientation Status (Cognition)  oriented x 3  -ALFREDO     Row Name 12/03/20 1330          Safety Issues, Functional Mobility    Impairments Affecting Function (Mobility)  balance;endurance/activity tolerance;strength  -ALFREDO       User Key  (r) = Recorded By, (t) = Taken By, (c) = Cosigned By    Initials Name Provider Type    Valeria Groves OT Occupational Therapist        Mobility/ADL's     Row Name 12/03/20 2153           Bed Mobility    Bed Mobility  supine-sit  -     Supine-Sit [place] (Bed Mobility)  set up;minimum assist (75% patient effort);1 person assist  -     Assistive Device (Bed Mobility)  bed rails;head of bed elevated  -     Row Name 12/03/20 1336          Transfers    Transfers  bed-chair transfer  -     Bed-Chair [place] (Transfers)  set up;minimum assist (75% patient effort);1 person assist  -     Row Name 12/03/20 1336          Functional Mobility    Functional Mobility- Ind. Level  minimum assist (75% patient effort);1 person;contact guard assist  -     Functional Mobility-Distance (Feet)  3  -     Row Name 12/03/20 1336          Activities of Daily Living    BADL Assessment/Intervention  lower body dressing  -     Row Name 12/03/20 1336          Lower Body Dressing Assessment/Training    [place] Level (Lower Body Dressing)  don;socks;standby assist  -     Position (Lower Body Dressing)  edge of bed sitting  -       User Key  (r) = Recorded By, (t) = Taken By, (c) = Cosigned By    Initials Name Provider Type    Valeria Groves OT Occupational Therapist        Obj/Interventions     Row Name 12/03/20 1337          Sensory Assessment (Somatosensory)    Sensory Assessment (Somatosensory)  sensation intact  -[facility] Name 12/03/20 1337          Vision Assessment/Intervention    Visual Impairment/Limitations  WFL;corrective lenses for reading  -     Row Name 12/03/20 1337          Range of Motion Comprehensive    General Range of Motion  no range of motion deficits identified  -     Row Name 12/03/20 1337          Strength Comprehensive (MMT)    General Manual Muscle Testing (MMT) Assessment  no strength deficits identified  -     Comment, General Manual Muscle Testing (MMT) Assessment  generalized weakness, but WFL  -     Row Name 12/03/20 1337          Balance    Balance Assessment  sitting static balance;standing static balance;standing dynamic balance  -      Static Sitting Balance  WFL;unsupported;sitting, edge of bed  -ALFREDO     Static Standing Balance  supported;standing;mild impairment  -ALFREDO     Dynamic Standing Balance  mild impairment;supported;standing  -ALFREDO       User Key  (r) = Recorded By, (t) = Taken By, (c) = Cosigned By    Initials Name Provider Type    Valeria Groves, OT Occupational Therapist        Goals/Plan     Row Name 12/03/20 6163          Transfer Goal 1 (OT)    Activity/Assistive Device (Transfer Goal 1, OT)  sit-to-stand/stand-to-sit;bed-to-chair/chair-to-bed;toilet  -ALFREDO     [place] Level/Cues Needed (Transfer Goal 1, OT)  set-up required;modified independence  -ALFREDO     Time Frame (Transfer Goal 1, OT)  short term goal (STG);2 weeks  -ALFREDO     Progress/Outcome (Transfer Goal 1, OT)  goal ongoing  -     Row Name 12/03/20 1343          Bathing Goal 1 (OT)    Activity/Device (Bathing Goal 1, OT)  upper body bathing;lower body bathing  -ALFREDO     [place] Level/Cues Needed (Bathing Goal 1, OT)  modified independence  -ALFREDO     Time Frame (Bathing Goal 1, OT)  short term goal (STG);2 weeks  -ALFREDO     Progress/Outcomes (Bathing Goal 1, OT)  goal ongoing  -ALFREDO     Row Name 12/03/20 1343          Dressing Goal 1 (OT)    Activity/Device (Dressing Goal 1, OT)  upper body dressing;lower body dressing  -ALFREDO     [place]/Cues Needed (Dressing Goal 1, OT)  modified independence;set-up required  -ALFREDO     Time Frame (Dressing Goal 1, OT)  2 weeks;short term goal (STG)  -ALFREDO     Progress/Outcome (Dressing Goal 1, OT)  goal ongoing  -     Row Name 12/03/20 1345          Therapy Assessment/Plan (OT)    Planned Therapy Interventions (OT)  activity tolerance training;functional balance retraining;occupation/activity based interventions;ROM/therapeutic exercise;strengthening exercise;transfer/mobility retraining;patient/caregiver education/training;neuromuscular control/coordination retraining;cognitive/visual perception retraining;edema  control/reduction;BADL retraining;adaptive equipment training  -ALFREDO       User Key  (r) = Recorded By, (t) = Taken By, (c) = Cosigned By    Initials Name Provider Type    Valeria Groves, ANGELICA Occupational Therapist        Clinical Impression     Row Name 12/03/20 1348          Pain Scale: FACES Pre/Post-Treatment    Pain: FACES Scale, Pretreatment  0-->no hurt  -ALFREDO     Posttreatment Pain Rating  0-->no hurt  -ALFREDO     Row Name 12/03/20 1341          Plan of Care Review    Plan of Care Reviewed With  patient  -ALFREDO     Outcome Summary  Pt is a 84 y/o M presenting to ER with COVID PNA and ALEXANDRU. Pt resides with his spouse and adult daughter in a one story home with ramp access. Pt reports indep with ADLs and functional mobility PTA, using cane for community mobility. Pt agreeable to getting OOB this date, transferring with min A-CGA due to unsteady balance. ADLs completed at EOB with SBA. Pt appears midlly below baseline with self care and would benefit from skilled OT services to address impaired activity tolerance, impaired balance, impaired strength and acuity of illness. OT recommends continued treatment at 5x/week and d/c to IP rehab pending progress and medical stability.  -ALFREDO     Row Name 12/03/20 1341          Therapy Assessment/Plan (OT)    Rehab Potential (OT)  good, to achieve stated therapy goals  -ALFREDO     Criteria for Skilled Therapeutic Interventions Met (OT)  yes;skilled treatment is necessary  -ALFREDO     Therapy Frequency (OT)  5 times/wk  -ALFREDO     Row Name 12/03/20 1348          Therapy Plan Review/Discharge Plan (OT)    Anticipated Discharge Disposition (OT)  inpatient rehabilitation facility  -ALFREDO     Row Name 12/03/20 1340          Vital Signs    O2 Delivery Pre Treatment  room air  -ALFREDO     O2 Delivery Intra Treatment  room air  -ALFREDO     O2 Delivery Post Treatment  room air  -ALFREDO     Recovery Time  WFL VSS  -ALFREDO     Row Name 12/03/20 1340          Positioning and Restraints    Pre-Treatment Position  in bed   -ALFREDO     Post Treatment Position  chair  -ALFREDO     In Chair  notified nsg;reclined;call light within reach;encouraged to call for assist;exit alarm on  -ALFREDO       User Key  (r) = Recorded By, (t) = Taken By, (c) = Cosigned By    Initials Name Provider Type    Valeria Groves OT Occupational Therapist        Outcome Measures     Row Name 12/03/20 1344          How much help from another is currently needed...    Putting on and taking off regular lower body clothing?  3  -ALFREDO     Bathing (including washing, rinsing, and drying)  3  -ALFREDO     Toileting (which includes using toilet bed pan or urinal)  3  -ALFREDO     Putting on and taking off regular upper body clothing  4  -ALFREDO     Taking care of personal grooming (such as brushing teeth)  4  -ALFREDO     Eating meals  4  -ALFREDO     AM-PAC 6 Clicks Score (OT)  21  -ALFREDO     Row Name 12/03/20 1344          Functional Assessment    Outcome Measure Options  AM-PAC 6 Clicks Daily Activity (OT)  -ALFREDO       User Key  (r) = Recorded By, (t) = Taken By, (c) = Cosigned By    Initials Name Provider Type    Valeria Groves OT Occupational Therapist        Occupational Therapy Education                 Title: PT OT SLP Therapies (Done)     Topic: Occupational Therapy (Done)     Point: ADL training (Done)     Description:   Instruct learner(s) on proper safety adaptation and remediation techniques during self care or transfers.   Instruct in proper use of assistive devices.              Learning Progress Summary           Patient Acceptance, VIVIAN BOLTON DU by ALFREDO at 12/3/2020 1345                               User Key     Initials Effective Dates Name Provider Type Discipline     07/15/20 -  Valeria Figueroa OT Occupational Therapist OT              OT Recommendation and Plan  Planned Therapy Interventions (OT): activity tolerance training, functional balance retraining, occupation/activity based interventions, ROM/therapeutic exercise, strengthening exercise, transfer/mobility  retraining, patient/caregiver education/training, neuromuscular control/coordination retraining, cognitive/visual perception retraining, edema control/reduction, BADL retraining, adaptive equipment training  Therapy Frequency (OT): 5 times/wk  Plan of Care Review  Plan of Care Reviewed With: patient  Outcome Summary: Pt is a 86 y/o M presenting to ER with COVID PNA and ALEXANDRU. Pt resides with his spouse and adult daughter in a one story home with ramp access. Pt reports indep with ADLs and functional mobility PTA, using cane for community mobility. Pt agreeable to getting OOB this date, transferring with min A-CGA due to unsteady balance. ADLs completed at EOB with SBA. Pt appears midlly below baseline with self care and would benefit from skilled OT services to address impaired activity tolerance, impaired balance, impaired strength and acuity of illness. OT recommends continued treatment at 5x/week and d/c to IP rehab pending progress and medical stability.     Time Calculation:   Time Calculation- OT     Row Name 12/03/20 1347             Time Calculation- OT    OT Start Time  1053  -ALFREDO      OT Stop Time  1117  -      OT Time Calculation (min)  24 min  -ALFREDO      Total Timed Code Minutes- OT  16 minute(s)  -ALFREDO      OT Received On  12/03/20  -ALFREDO      OT - Next Appointment  12/04/20  -      OT Goal Re-Cert Due Date  12/17/20  -        User Key  (r) = Recorded By, (t) = Taken By, (c) = Cosigned By    Initials Name Provider Type    Valeria Groves OT Occupational Therapist        Therapy Charges for Today     Code Description Service Date Service Provider Modifiers Qty    [id number]  OT EVAL MOD COMPLEXITY 2 12/3/2020 Valeria Figueroa OT GO 1    [id number]  OT SELF CARE/MGMT/TRAIN EA 15 MIN 12/3/2020 Valeria Figueroa OT GO 1               Valeria Figueroa OT  12/3/2020

## 2020-12-03 NOTE — PLAN OF CARE
Problem: Adult Inpatient Plan of Care  Goal: Plan of Care Review  Outcome: Ongoing, Progressing  Flowsheets (Taken 12/3/2020 1340)  Plan of Care Reviewed With: patient  Outcome Summary: Pt is a 84 y/o M presenting to ER with COVID PNA and ALEXANDRU. Pt resides with his spouse and adult daughter in a one story home with ramp access. Pt reports indep with ADLs and functional mobility PTA, using cane for community mobility. Pt agreeable to getting OOB this date, transferring with min A-CGA due to unsteady balance. ADLs completed at EOB with SBA. Pt appears midlly below baseline with self care and would benefit from skilled OT services to address impaired activity tolerance, impaired balance, impaired strength and acuity of illness. OT recommends continued treatment at 5x/week and d/c to IP rehab pending progress and medical stability.    Patient was was wearing a face mask during this therapy encounter. Therapist used appropriate personal protective equipment including mask, gloves, eye protection, face shield, and gown.  Mask used was N95/duckbill. Appropriate PPE was worn during the entire therapy session. Hand hygiene was completed before and after therapy session. Patient is in enhanced droplet precautions.

## 2020-12-03 NOTE — H&P
HISTORY AND PHYSICAL   [facility]        Patient Identification:  Name: Bhaskar Ibarra  Age: 85 y.o.  Sex: male  :  1935  MRN: [id number]                     Primary Care Physician: Katherine Lester APRN    Chief Complaint:  85 year old gentleman who presented to the emergency room with weakness and poor po intake for several days; he has had malaise and no energy;he has had a cough and some shortness of breath; his wife was diagnosed with covid; he has had body aches but denies fever or chills    History of Present Illness:   As above    Past Medical History:  Past Medical History:   Diagnosis Date   • Abdominal aortic aneurysm (CMS/HCC)    • Acute myocardial infarction (CMS/HCC)    • Acute renal failure (CMS/HCC)    • Anemia    • Aneurysm (CMS/HCC)    • Atrial fibrillation (CMS/HCC)    • Cardiomyopathy (CMS/HCC)    • Chest pain    • Coronary artery disease     2015-    • Depression    • GERD (gastroesophageal reflux disease)    • Hyperlipidemia    • Hypertension    • Hypothyroidism    • Lung cancer (CMS/HCC) 2016    radiation    • Mitral regurgitation    • Myocardial infarction (CMS/HCC)    • Pleural effusion    • Pleural effusion, bilateral    • RLS (restless legs syndrome)    • Sleep apnea    • Ulcerative colitis (CMS/HCC)    • Ulcerative colitis (CMS/HCC)      Past Surgical History:  Past Surgical History:   Procedure Laterality Date   • BACK SURGERY      lumbar   • CARDIAC CATHETERIZATION     • CORONARY ANGIOPLASTY     • CORONARY ARTERY BYPASS GRAFT  07/27/2015    X 5  Dr Louis   • HERNIA REPAIR      inguinal - left   • MITRAL VALVE REPAIR/REPLACEMENT  2015    Dr Louis   • NASAL SEPTUM SURGERY      benign growth removed   • OTHER SURGICAL HISTORY      Cardiovasc Endosc W/ Video-Assist Harv Veins Coron Art Byp   • PROSTATE SURGERY     • SKIN BIOPSY      benign      Home Meds:  Medications Prior to Admission   Medication Sig Dispense Refill Last Dose   • atorvastatin  (LIPITOR) 40 MG tablet TAKE 1 TABLET EVERY NIGHT 90 tablet 3 12/1/2020 at Unknown time   • carvedilol (COREG) 3.125 MG tablet TAKE 1 TABLET TWICE A DAY WITH MEALS 180 tablet 3 12/2/2020 at Unknown time   • cyanocobalamin (VITAMIN B-12) 2000 MCG tablet tablet Take 1 tablet by mouth Daily. 30 tablet  12/2/2020 at Unknown time   • Eliquis 2.5 MG tablet tablet TAKE 1 TABLET EVERY 12 HOURS 180 tablet 1 12/2/2020 at Unknown time   • escitalopram (LEXAPRO) 20 MG tablet TAKE 1 TABLET DAILY 90 tablet 3 12/2/2020 at Unknown time   • furosemide (LASIX) 40 MG tablet TAKE 1 TABLET DAILY 90 tablet 3 12/2/2020 at Unknown time   • levothyroxine (SYNTHROID, LEVOTHROID) 88 MCG tablet TAKE 1 TABLET DAILY 90 tablet 3 12/2/2020 at Unknown time   • omeprazole (priLOSEC) 40 MG capsule TAKE 1 CAPSULE DAILY 90 capsule 3 12/2/2020 at Unknown time   • sotalol (BETAPACE) 80 MG tablet Take 1 tablet by mouth Daily. 90 tablet 3 12/2/2020 at Unknown time   • fenofibrate (TRICOR) 145 MG tablet TAKE 1 TABLET DAILY 90 tablet 3 Unknown at Unknown time       Allergies:  Allergies   Allergen Reactions   • Latex Itching     Immunizations:  Immunization History   Administered Date(s) Administered   • Flu Mist 10/18/2016, 10/30/2017   • Flu Vaccine Quad PF >18YRS 09/21/2009, 09/23/2010   • Fluzone High Dose =>65 Years ([employer] ONLY) 10/08/2018     Social History:   Social History     Social History Narrative   • Not on file     Social History     Socioeconomic History   • Marital status:      Spouse name: Not on file   • Number of children: Not on file   • Years of education: Not on file   • Highest education level: Not on file   Tobacco Use   • Smoking status: Former Smoker     Years: 5.00     Types: Cigarettes   • Smokeless tobacco: Never Used   • Tobacco comment: 1 cig day x 5 years   Substance and Sexual Activity   • Alcohol use: No   • Drug use: No   • Sexual activity: Defer       Family History:  Family History   Problem Relation Age of Onset  "  • Coronary artery disease Brother    • Heart attack Brother    • Cancer Mother    • Lung cancer Father         Review of Systems  See history of present illness and past medical history.  Patient denies headache, dizziness, syncope, falls, trauma, change in vision, change in hearing, change in taste, changes in weight, changes in appetite, focal weakness, numbness, or paresthesia.  Patient denies chest pain, palpitations, dyspnea, orthopnea, PND, cough, sinus pressure, rhinorrhea, epistaxis, hemoptysis, nausea, vomiting,hematemesis, diarrhea, constipation or hematchezia.  Denies cold or heat intolerance, polydipsia, polyuria, polyphagia. Denies hematuria, pyuria, dysuria, hesitancy, frequency or urgency. Denies consumption of raw and under cooked meats foods or change in water source.  Denies fever, chills, sweats, night sweats.  Denies missing any routine medications. Remainder of ROS is negative.    Objective:  T Max 24 hrs: Temp (24hrs), Av.6 °F (36.4 °C), Min:97.6 °F (36.4 °C), Max:97.6 °F (36.4 °C)    Vitals Ranges:   Temp:  [97.6 °F (36.4 °C)] 97.6 °F (36.4 °C)  Heart Rate:  [58-71] 67  Resp:  [16-20] 18  BP: (107-140)/(57-82) 115/64      Exam:  /64 (BP Location: Left arm, Patient Position: Lying)   Pulse 67   Temp 97.6 °F (36.4 °C) (Tympanic)   Resp 18   Ht 180.3 cm (71\")   Wt 80 kg (176 lb 5.9 oz)   SpO2 93%   BMI 24.60 kg/m²     General Appearance:    Alert, cooperative, no distress, appears stated age; chronically ill-appearing   Head:    Normocephalic, without obvious abnormality, atraumatic   Eyes:    PERRL, conjunctivae/corneas clear, EOM's intact, both eyes   Ears:    Normal external ear canals, both ears   Nose:   Nares normal, septum midline, mucosa normal, no drainage    or sinus tenderness   Throat:   Lips, mucosa, and tongue normal   Neck:   Supple, symmetrical, trachea midline, no adenopathy;     thyroid:  no enlargement/tenderness/nodules; no carotid    bruit or JVD   Back:    "  Symmetric, no curvature, ROM normal, no CVA tenderness   Lungs:     Decreased breath sounds bilaterally, respirations unlabored   Chest Wall:    No tenderness or deformity    Heart:    Regular rate and rhythm, S1 and S2 normal, no murmur, rub   or gallop   Abdomen:     Soft, nontender, bowel sounds active all four quadrants,     no masses, no hepatomegaly, no splenomegaly   Extremities:   Extremities normal, atraumatic, no cyanosis or edema   Pulses:   2+ and symmetric all extremities   Skin:   Skin color, texture, turgor normal, no rashes or lesions   Lymph nodes:   Cervical, supraclavicular, and axillary nodes normal   Neurologic:   CNII-XII intact, normal strength, sensation intact throughout      .    Data Review:  Labs in chart were reviewed.  WBC   Date Value Ref Range Status   12/02/2020 7.46 3.40 - 10.80 10*3/mm3 Final     Hemoglobin   Date Value Ref Range Status   12/02/2020 13.9 13.0 - 17.7 g/dL Final     Hematocrit   Date Value Ref Range Status   12/02/2020 40.3 37.5 - 51.0 % Final     Platelets   Date Value Ref Range Status   12/02/2020 239 140 - 450 10*3/mm3 Final     Sodium   Date Value Ref Range Status   12/02/2020 134 (L) 136 - 145 mmol/L Final     Potassium   Date Value Ref Range Status   12/02/2020 4.0 3.5 - 5.2 mmol/L Final     Chloride   Date Value Ref Range Status   12/02/2020 95 (L) 98 - 107 mmol/L Final     CO2   Date Value Ref Range Status   12/02/2020 26.4 22.0 - 29.0 mmol/L Final     BUN   Date Value Ref Range Status   12/02/2020 33 (H) 8 - 23 mg/dL Final     Creatinine   Date Value Ref Range Status   12/02/2020 2.15 (H) 0.76 - 1.27 mg/dL Final     Glucose   Date Value Ref Range Status   12/02/2020 100 (H) 65 - 99 mg/dL Final     Calcium   Date Value Ref Range Status   12/02/2020 9.1 8.6 - 10.5 mg/dL Final     AST (SGOT)   Date Value Ref Range Status   12/02/2020 55 (H) 1 - 40 U/L Final     ALT (SGPT)   Date Value Ref Range Status   12/02/2020 29 1 - 41 U/L Final     Alkaline Phosphatase    Date Value Ref Range Status   12/02/2020 68 39 - 117 U/L Final                Imaging Results (All)     Procedure Component Value Units Date/Time    XR Chest AP [[id number]] Collected: 12/02/20 1507     Updated: 12/02/20 1516    Narrative:      XR CHEST AP-     INDICATIONS: Cough and fever, possible Covid 19     TECHNIQUE: Frontal view of the chest     COMPARISON: 04/09/2012, correlated with CT from 07/16/2020     FINDINGS:     The heart size is borderline. The aorta appears ectatic, calcified.  Sternotomy wires are noted. The pulmonary vasculature is unremarkable. A  previous CT demonstrated right retrocardiac opacity is not well  characterized on this exam. Minimal patchy densities at the lung bases  could be areas of developing pneumonia, including possibility of viral  pneumonia, follow-up recommended. No pleural effusion or pneumothorax.  No acute osseous process.       Impression:         Minimal patchy densities at the lung bases could be areas of developing  pneumonia, including possibility of viral pneumonia, follow-up  recommended. Ectatic aorta.     This report was finalized on 12/2/2020 3:13 PM by Dr. Al Vo M.D.       CT Head Without Contrast [[id number]] Collected: 12/02/20 1446     Updated: 12/02/20 1446    Narrative:      EMERGENCY NONCONTRAST HEAD CT 12/02/2020     CLINICAL HISTORY: Patient fell, hit back of head, minor head injury, has  some weakness, patient is on Eliquis - blood thinners.     TECHNIQUE: Spiral CT images were obtained from the base of the skull to  the vertex without intravenous contrast. Images were reformatted and  submitted in 3 mm thick axial CT section with brain algorithm and 2 mm  thick axial CT section with high-resolution bone algorithm and 2 mm  thick sagittal and coronal reconstructions were performed and submitted  in brain algorithm.     FINDINGS: There is mild low-density in the periventricular white matter  consistent with mild small vessel disease.   There is a 6 mm old lacunar  infarct in the body of the right caudate nucleus. There is a 15 x 5 mm  area of encephalomalacia compatible with old infarct in the posterior  inferior lateral left cerebellum in the left PICA territory. There is a  focal area of benign dystrophic calcification in the central portion of  the right side of anjel measuring 4 x 3 mm in size, felt to be a benign  dystrophic calcification of no acute significance. The remainder of the  brain parenchyma is normal in attenuation. The ventricles are normal in  size. I see no focal mass effect. There is no midline shift. No  extra-axial fluid collections are identified. There is no evidence of  acute intracranial hemorrhage. There is subtotal opacification of the  left sphenoid sinus with fluid and mucosal thickening and bony  thickening of the walls of the left sphenoid sinus from chronic left  sphenoid sinus disease.  The remainder of the paranasal sinuses and  mastoid air cells and middle ear cavities are clear. There is a tiny  scalp hematoma over the lateral left occipital bone from today's head  trauma.  No underlying acute skull fracture is identified.       Impression:      1. No acute intracranial abnormality is seen with no acute skull  fracture or intracranial hemorrhage identified.  2. Mild small vessel disease in the cerebral white matter, a 6 mm old  lacunar infarct in the body of the right caudate nucleus and there is a  15 x 5 mm old posterior inferior lateral left cerebellar infarct in the  left PICA territory and calcified plaques in the cavernous internal  carotid arteries.  3. There is complete opacification of the left sphenoid sinus with  circumferential mucosal thickening and some central chronic inspissated  secretions and bony thickening of the walls of the left sphenoid sinus  from chronic sphenoid sinus disease and there is nodular opacity  anterior to the left sphenoid ostium that could be a polyp.  This could  be  correlated with direct visualization. The remainder of the head CT is  unremarkable.      Radiation dose reduction techniques were utilized, including automated  exposure control and exposure modulation based on body size.              Patient Active Problem List   Diagnosis Code   • Coronary arteriosclerosis in native artery I25.10   • Atrial fibrillation (CMS/HCC) I48.91   • Acute non-Q wave ST elevation myocardial infarction (STEMI) involving left anterior descending (LAD) coronary artery I21.02   • Mitral valve insufficiency I34.0   • S/P CABG (coronary artery bypass graft) Z95.1   • S/P mitral valve repair Z98.890   • Depression F32.9   • Hypothyroidism E03.9   • Hyperlipidemia E78.5   • Pulmonary nodule R91.1   • Adenocarcinoma of right lung (CMS/HCC) C34.91   • Benign nodular prostatic hyperplasia with lower urinary tract symptoms N40.1   • Confusion R41.0   • Post concussion syndrome F07.81   • Vitamin D deficiency E55.9   • Well adult health check Z00.00   • Impaired fasting glucose R73.01   • AAA (abdominal aortic aneurysm) (CMS/HCC) I71.4   • Ascending aortic aneurysm (CMS/HCC) I71.2   • Macrocytic anemia D53.9   • Other heart failure (CMS/HCC) I50.89   • HTN (hypertension), benign I10   • Pneumonia due to COVID-19 virus U07.1, J12.89       Assessment:    Pneumonia due to COVID-19 virus  acute kidney injury  Hypothyroidism  Hypertension  Atrial fibrillation  ckd3  Weakness  S/p fall  Cad    Plan:  Will hold nephrotoxic meds  Hydrate gently  Check covid progression labs  Recommended that his daughter get tested for covid as well  Monitor bp  Pt.ot to see  Tylenol for myalgias  D.w patient and dr gabby Diggs MD  12/2/2020  21:38 EST

## 2020-12-03 NOTE — PROGRESS NOTES
Continued Stay Note  [facility]     Patient Name: Bhaskar Ibarra  MRN: [id number]  Today's Date: 12/3/2020    Admit Date: 12/2/2020    Discharge Plan     Row Name 12/03/20 4953       Plan    Plan Comments  ….continued from previous note…. The patient’s daughter was not provided with a HH/SNF list nor a print out of the HH/nursing home compare list from Medicare.gov as she states that her mother’s d/c plan is home with her assistance and [facility]. The patient’s daughter states that she will research using a private pay in home caregiver agency until their caregiver is stable to return to assist the patient and his spouse at home.  After P.T. eval and recommendations CCP will discuss with the patient’s daughter if she will be able to transport the patient home upon d/c or if a wheelchair transport will need to arranged and paid for by the patient’s daughter.  CCP will follow to see if [facility]  can accept the patient, will follow for P.T. and O.T. eval and recommendations and will discuss with the patient’s daughter as the patient’s daughter plans for the patient to return home upon d/c with her assistance  and [facility].  BRYAN Eli    Row Name 12/03/20 8100       Plan    Plan  Plans home with assistance from his daughter and [facility] .    Provided Post Acute Provider List?  N/A    N/A Provider List Comment  The patient’s daughter was not provided with a HH/SNF list nor a print out of the HH/nursing home compare list from Medicare.gov as she states that her mother’s d/c plan is home with her assistance and [facility] .    Provided Post Acute Provider Quality & Resource List?  N/A    N/A Quality & Resource List Comment  The patient’s daughter was not provided with a HH/SNF list nor a print out of the HH/nursing home compare list from Medicare.gov as she states that her mother’s d/c plan is home with her assistance and [facility] .    Patient/Family in Agreement with Plan  yes    Plan Comments  Attempted to reach the patient by  telephone as he is in isolation precautions.  Spoke to the patient’s daughter Reina Minor [phone] as she is listed as the patient’s Healthcare Surrogate after the patient’s spouse which is listed on his living will documents in Epic.  Verified facesheet, checked IMM and discussed discharge planning needs.  The patient’s daughter states that she plans for the patient to d/c home and is requesting [facility] to work with him as well.  Voicemail message was left for [facility].  The patient’s daughter states that she will be staying at her parent’s house with them providing care for them 24/7 as she is quarantined from her own home until 12/20/2020.  The patient’s daughter states that she will wear a mask and gloves while providing care for her parents.  The patient states that typically her parents resides at home alone with assistance from her 4-5 days per week and on the days when she does come to assist her parents they have a private pay in home caregiver who is not with any agency that comes over 2-3 days per week for about 5 hours each day.  The patient’s daughter states that the private pay in home caregiver currently has COVID and will not be able to assist her therefore she was emailed the private pay in home caregivers list in order for her to obtain additional assistance for her parents at home. has grab bars in the bathroom, has a shower chair and 3 in 1 commode.  The home has a ramp to enter the single story home with a basement. The patient’s PCP is GEOFF Oviedo, pharmacy is [facility] and the patient takes his own medication with assistance from their daughter and private pay in home caregiver.  The patient has not trouble affording his medication. The patient has no HH/SNF history.  Discussed with the patient’s daughter the amount of care that is going to be required for her to provide care for both of her parents at home upon d/c as they are both currently hospitalized.   The patient’s daughter states that she adamantly refuses for either of her parents to go to rehab upon d/c. The patient’s daughter states that the patient only used a cane at home and was basically independent with his activities of daily living.  The patient’s daughter states that she is aware that the patient’s options for rehab are:  [facility], [facility] and [facility] and she is refusing to allow any referrals to be made to those facilities for the patient….continued…        Discharge Codes    No documentation.             BRYAN You

## 2020-12-03 NOTE — PLAN OF CARE
Goal Outcome Evaluation:  Plan of Care Reviewed With: patient     Outcome Summary: Low grade temperature today. Coreg held this am d/t low HR 54, and Betapace held d/t increased QTc/QT interval. Dr. Mullen aware. Monitor vital signs, labs, and lungs.

## 2020-12-03 NOTE — THERAPY EVALUATION
Patient Name: Bhaskar Ibarra  : 1935    MRN: [id number]                              Today's Date: 12/3/2020       Admit Date: 2020    Visit Dx:     ICD-10-CM ICD-9-CM   1. Pneumonia due to COVID-19 virus  U07.1 480.8    J12.89    2. ALEXANDRU (acute kidney injury) (CMS/HCC)  N17.9 584.9     Patient Active Problem List   Diagnosis   • Coronary arteriosclerosis in native artery   • Atrial fibrillation (CMS/HCC)   • Acute non-Q wave ST elevation myocardial infarction (STEMI) involving left anterior descending (LAD) coronary artery   • Mitral valve insufficiency   • S/P CABG (coronary artery bypass graft)   • S/P mitral valve repair   • Depression   • Hypothyroidism   • Hyperlipidemia   • Pulmonary nodule   • Adenocarcinoma of right lung (CMS/HCC)   • Benign nodular prostatic hyperplasia with lower urinary tract symptoms   • Confusion   • Post concussion syndrome   • Vitamin D deficiency   • Well adult health check   • Impaired fasting glucose   • AAA (abdominal aortic aneurysm) (CMS/HCC)   • Ascending aortic aneurysm (CMS/HCC)   • Macrocytic anemia   • Other heart failure (CMS/HCC)   • HTN (hypertension), benign   • Pneumonia due to COVID-19 virus     Past Medical History:   Diagnosis Date   • Abdominal aortic aneurysm (CMS/HCC)    • Acute myocardial infarction (CMS/HCC)    • Acute renal failure (CMS/HCC)    • Anemia    • Aneurysm (CMS/HCC)    • Atrial fibrillation (CMS/HCC)    • Cardiomyopathy (CMS/HCC)    • Chest pain    • Coronary artery disease     2015-    • Depression    • GERD (gastroesophageal reflux disease)    • Hyperlipidemia    • Hypertension    • Hypothyroidism    • Lung cancer (CMS/HCC) 2016    radiation    • Mitral regurgitation    • Myocardial infarction (CMS/HCC)    • Pleural effusion    • Pleural effusion, bilateral    • RLS (restless legs syndrome)    • Sleep apnea    • Ulcerative colitis (CMS/HCC)    • Ulcerative colitis (CMS/HCC)      Past Surgical History:   Procedure Laterality  Date   • BACK SURGERY      lumbar   • CARDIAC CATHETERIZATION     • CORONARY ANGIOPLASTY     • CORONARY ARTERY BYPASS GRAFT  07/27/2015    X 5  Dr Louis   • HERNIA REPAIR      inguinal - left   • MITRAL VALVE REPAIR/REPLACEMENT  07/27/2015    Dr Louis   • NASAL SEPTUM SURGERY      benign growth removed   • OTHER SURGICAL HISTORY      Cardiovasc Endosc W/ Video-Assist Harv Veins Coron Art Byp   • PROSTATE SURGERY     • SKIN BIOPSY      benign     General Information     Row Name 12/03/20 1254          Physical Therapy Time and Intention    Document Type  evaluation  -     Mode of Treatment  individual therapy;physical therapy  -     Row Name 12/03/20 1254          General Information    Prior Level of Function  independent:;gait;transfer;bed mobility no AD at baseline  -     Existing Precautions/Restrictions  fall  -     Barriers to Rehab  medically complex  -     Row Name 12/03/20 1254          Cognition    Orientation Status (Cognition)  oriented x 3  -     Row Name 12/03/20 1254          Safety Issues, Functional Mobility    Impairments Affecting Function (Mobility)  balance;endurance/activity tolerance;strength  -       User Key  (r) = Recorded By, (t) = Taken By, (c) = Cosigned By    Initials Name Provider Type     Kristen Segura, PT Physical Therapist        Mobility     Row Name 12/03/20 1257          Bed Mobility    Bed Mobility  supine-sit;sit-supine  -     Supine-Sit Kane (Bed Mobility)  standby assist  -     Sit-Supine Kane (Bed Mobility)  standby assist  -     Row Name 12/03/20 1257          Sit-Stand Transfer    Sit-Stand Kane (Transfers)  nonverbal cues (demo/gesture);verbal cues;minimum assist (75% patient effort)  -     Assistive Device (Sit-Stand Transfers)  -- no AD  -     Row Name 12/03/20 1257          Gait/Stairs (Locomotion)    Kane Level (Gait)  verbal cues;nonverbal cues (demo/gesture);minimum assist (75% patient effort)  -      Assistive Device (Gait)  -- no AD  -     Distance in Feet (Gait)  30  -CH     Deviations/Abnormal Patterns (Gait)  anushka decreased;gait speed decreased;stride length decreased  -     Bilateral Gait Deviations  forward flexed posture  -       User Key  (r) = Recorded By, (t) = Taken By, (c) = Cosigned By    Initials Name Provider Type     Kristen Segura, PT Physical Therapist        Obj/Interventions     Row Name 12/03/20 1258          Range of Motion Comprehensive    General Range of Motion  no range of motion deficits identified  -     Row Name 12/03/20 1258          Strength Comprehensive (MMT)    Comment, General Manual Muscle Testing (MMT) Assessment  some generalized weakness noted with functional mobility and gait, B LE grossly >/=3+/5  -     Row Name 12/03/20 1258          Motor Skills    Therapeutic Exercise  -- 10 reps B LE AP, LAQ, and seated marches  -       User Key  (r) = Recorded By, (t) = Taken By, (c) = Cosigned By    Initials Name Provider Type     Kristen Segura, PT Physical Therapist        Goals/Plan     Row Name 12/03/20 1304          Bed Mobility Goal 1 (PT)    Activity/Assistive Device (Bed Mobility Goal 1, PT)  bed mobility activities, all  -CH     Cass Level/Cues Needed (Bed Mobility Goal 1, PT)  supervision required  -     Time Frame (Bed Mobility Goal 1, PT)  1 week  -     Row Name 12/03/20 1309          Transfer Goal 1 (PT)    Activity/Assistive Device (Transfer Goal 1, PT)  transfers, all;walker, rolling  -CH     Cass Level/Cues Needed (Transfer Goal 1, PT)  supervision required  -     Time Frame (Transfer Goal 1, PT)  1 week  -     Row Name 12/03/20 1307          Gait Training Goal 1 (PT)    Activity/Assistive Device (Gait Training Goal 1, PT)  gait (walking locomotion);walker, rolling  -     Cass Level (Gait Training Goal 1, PT)  supervision required  -     Distance (Gait Training Goal 1, PT)  150  -CH     Time Frame  (Gait Training Goal 1, PT)  1 week  -       User Key  (r) = Recorded By, (t) = Taken By, (c) = Cosigned By    Initials Name Provider Type    Kristen So, PT Physical Therapist        Clinical Impression     Row Name 12/03/20 1253          Pain    Additional Documentation  Pain Scale: FACES Pre/Post-Treatment (Group)  -     Row Name 12/03/20 1259          Pain Scale: FACES Pre/Post-Treatment    Pain: FACES Scale, Pretreatment  0-->no hurt  -     Posttreatment Pain Rating  0-->no hurt  -     Row Name 12/03/20 1259          Plan of Care Review    Plan of Care Reviewed With  patient  -     Outcome Summary  Pt is a 86 yo M who was admitted with weakness, falls and PNA secondary to COVID 19. Pt presents to PT with some impaired functonal mobility and gait secondary to generalized weakness, impaired balance, and decreased activity, tolerance. Pt may benefit from skilled PT to address strength, mobility, and gait.  -     Row Name 12/03/20 1079          Therapy Assessment/Plan (PT)    Patient/Family Therapy Goals Statement (PT)  to return to [facility]  -     Rehab Potential (PT)  good, to achieve stated therapy goals  -     Criteria for Skilled Interventions Met (PT)  skilled treatment is necessary  -     Row Name 12/03/20 3973          Positioning and Restraints    Pre-Treatment Position  in bed  -     Post Treatment Position  bed  -     In Bed  supine;call light within reach;encouraged to call for assist;exit alarm on  -       User Key  (r) = Recorded By, (t) = Taken By, (c) = Cosigned By    Initials Name Provider Type    Kristen So, PT Physical Therapist        Outcome Measures     Row Name 12/03/20 9475          How much help from another person do you currently need...    Turning from your back to your side while in flat bed without using bedrails?  3  -CH     Moving from lying on back to sitting on the side of a flat bed without bedrails?  3  -CH     Moving to and from a bed to a  chair (including a wheelchair)?  3  -CH     Standing up from a chair using your arms (e.g., wheelchair, bedside chair)?  3  -CH     Climbing 3-5 steps with a railing?  2  -CH     To walk in hospital room?  3  -CH     AM-PAC 6 Clicks Score (PT)  17  -     Row Name 12/03/20 1305          Functional Assessment    Outcome Measure Options  AM-PAC 6 Clicks Basic Mobility (PT)  -       User Key  (r) = Recorded By, (t) = Taken By, (c) = Cosigned By    Initials Name Provider Type     Kristen Segura PT Physical Therapist        Physical Therapy Education                 Title: PT OT SLP Therapies (Done)     Topic: Physical Therapy (Done)     Point: Mobility training (Done)     Learning Progress Summary           Patient Acceptance, E,TB,D, VU,NR by  at 12/3/2020 1306                   Point: Home exercise program (Done)     Learning Progress Summary           Patient Acceptance, E,TB,D, VU,NR by  at 12/3/2020 1306                   Point: Body mechanics (Done)     Learning Progress Summary           Patient Acceptance, E,TB,D, VU,NR by  at 12/3/2020 1306                   Point: Precautions (Done)     Learning Progress Summary           Patient Acceptance, E,TB,D, VU,NR by  at 12/3/2020 1306                               User Key     Initials Effective Dates Name Provider Type [facility] 04/03/18 -  Kristen Segura PT Physical Therapist PT              PT Recommendation and Plan  Planned Therapy Interventions (PT): balance training, bed mobility training, gait training, home exercise program, patient/family education, strengthening, transfer training  Plan of Care Reviewed With: patient  Outcome Summary: Pt is a 86 yo M who was admitted with weakness, falls and PNA secondary to COVID 19. Pt presents to PT with some impaired functonal mobility and gait secondary to generalized weakness, impaired balance, and decreased activity, tolerance. Pt may benefit from skilled PT to address strength,  mobility, and gait.     Time Calculation:   PT Charges     Row Name 12/03/20 1006             Time Calculation    Start Time  0834  -      Stop Time  0850  -      Time Calculation (min)  16 min  -      PT Received On  12/03/20  -      PT - Next Appointment  12/05/20  -      PT Goal Re-Cert Due Date  12/10/20  -         Time Calculation- PT    Total Timed Code Minutes- PT  10 minute(s)  -        User Key  (r) = Recorded By, (t) = Taken By, (c) = Cosigned By    Initials Name Provider Type     Kristen Segura, PT Physical Therapist        Therapy Charges for Today     Code Description Service Date Service Provider Modifiers Qty    19202319777 HC PT EVAL MOD COMPLEXITY 2 12/3/2020 Kristen Segura, PT GP 1    96502557193 HC PT THER PROC EA 15 MIN 12/3/2020 Kristen Segura, PT GP 1          PT G-Codes  Outcome Measure Options: AM-PAC 6 Clicks Basic Mobility (PT)  AM-PAC 6 Clicks Score (PT): 17    Kristen Segura PT  12/3/2020

## 2020-12-04 ENCOUNTER — TELEPHONE (OUTPATIENT)
Dept: INTERNAL MEDICINE | Age: 85
End: 2020-12-04

## 2020-12-04 LAB
ABO GROUP BLD: NORMAL
ALBUMIN SERPL-MCNC: 3.4 G/DL (ref 3.5–5.2)
ALBUMIN/GLOB SERPL: 1.2 G/DL
ALP SERPL-CCNC: 52 U/L (ref 39–117)
ALT SERPL W P-5'-P-CCNC: 25 U/L (ref 1–41)
ANION GAP SERPL CALCULATED.3IONS-SCNC: 11 MMOL/L (ref 5–15)
AST SERPL-CCNC: 56 U/L (ref 1–40)
BASOPHILS # BLD AUTO: 0.02 10*3/MM3 (ref 0–0.2)
BASOPHILS NFR BLD AUTO: 0.3 % (ref 0–1.5)
BILIRUB SERPL-MCNC: 0.4 MG/DL (ref 0–1.2)
BLD GP AB SCN SERPL QL: NEGATIVE
BUN SERPL-MCNC: 21 MG/DL (ref 8–23)
BUN/CREAT SERPL: 15.4 (ref 7–25)
CALCIUM SPEC-SCNC: 8.1 MG/DL (ref 8.6–10.5)
CHLORIDE SERPL-SCNC: 104 MMOL/L (ref 98–107)
CO2 SERPL-SCNC: 22 MMOL/L (ref 22–29)
CREAT SERPL-MCNC: 1.36 MG/DL (ref 0.76–1.27)
CRP SERPL-MCNC: 9.12 MG/DL (ref 0–0.5)
DEPRECATED RDW RBC AUTO: 46.1 FL (ref 37–54)
EOSINOPHIL # BLD AUTO: 0.03 10*3/MM3 (ref 0–0.4)
EOSINOPHIL NFR BLD AUTO: 0.4 % (ref 0.3–6.2)
ERYTHROCYTE [DISTWIDTH] IN BLOOD BY AUTOMATED COUNT: 13.1 % (ref 12.3–15.4)
FERRITIN SERPL-MCNC: 849 NG/ML (ref 30–400)
GFR SERPL CREATININE-BSD FRML MDRD: 50 ML/MIN/1.73
GLOBULIN UR ELPH-MCNC: 2.9 GM/DL
GLUCOSE SERPL-MCNC: 120 MG/DL (ref 65–99)
HCT VFR BLD AUTO: 37.6 % (ref 37.5–51)
HGB BLD-MCNC: 12.5 G/DL (ref 13–17.7)
IMM GRANULOCYTES # BLD AUTO: 0.07 10*3/MM3 (ref 0–0.05)
IMM GRANULOCYTES NFR BLD AUTO: 1 % (ref 0–0.5)
LDH SERPL-CCNC: 342 U/L (ref 135–225)
LYMPHOCYTES # BLD AUTO: 1.37 10*3/MM3 (ref 0.7–3.1)
LYMPHOCYTES NFR BLD AUTO: 20.2 % (ref 19.6–45.3)
MCH RBC QN AUTO: 32.7 PG (ref 26.6–33)
MCHC RBC AUTO-ENTMCNC: 33.2 G/DL (ref 31.5–35.7)
MCV RBC AUTO: 98.4 FL (ref 79–97)
MONOCYTES # BLD AUTO: 0.46 10*3/MM3 (ref 0.1–0.9)
MONOCYTES NFR BLD AUTO: 6.8 % (ref 5–12)
NEUTROPHILS NFR BLD AUTO: 4.82 10*3/MM3 (ref 1.7–7)
NEUTROPHILS NFR BLD AUTO: 71.3 % (ref 42.7–76)
NRBC BLD AUTO-RTO: 0 /100 WBC (ref 0–0.2)
PLATELET # BLD AUTO: 153 10*3/MM3 (ref 140–450)
PMV BLD AUTO: 11.6 FL (ref 6–12)
POTASSIUM SERPL-SCNC: 3.5 MMOL/L (ref 3.5–5.2)
PROT SERPL-MCNC: 6.3 G/DL (ref 6–8.5)
RBC # BLD AUTO: 3.82 10*6/MM3 (ref 4.14–5.8)
RH BLD: POSITIVE
SODIUM SERPL-SCNC: 137 MMOL/L (ref 136–145)
T&S EXPIRATION DATE: NORMAL
WBC # BLD AUTO: 6.77 10*3/MM3 (ref 3.4–10.8)

## 2020-12-04 PROCEDURE — 83615 LACTATE (LD) (LDH) ENZYME: CPT | Performed by: HOSPITALIST

## 2020-12-04 PROCEDURE — G0378 HOSPITAL OBSERVATION PER HR: HCPCS

## 2020-12-04 PROCEDURE — 80053 COMPREHEN METABOLIC PANEL: CPT | Performed by: HOSPITALIST

## 2020-12-04 PROCEDURE — 86850 RBC ANTIBODY SCREEN: CPT | Performed by: HOSPITALIST

## 2020-12-04 PROCEDURE — 86901 BLOOD TYPING SEROLOGIC RH(D): CPT | Performed by: HOSPITALIST

## 2020-12-04 PROCEDURE — 86900 BLOOD TYPING SEROLOGIC ABO: CPT | Performed by: HOSPITALIST

## 2020-12-04 PROCEDURE — 82728 ASSAY OF FERRITIN: CPT | Performed by: HOSPITALIST

## 2020-12-04 PROCEDURE — 86140 C-REACTIVE PROTEIN: CPT | Performed by: HOSPITALIST

## 2020-12-04 PROCEDURE — 85025 COMPLETE CBC W/AUTO DIFF WBC: CPT | Performed by: HOSPITALIST

## 2020-12-04 RX ADMIN — SODIUM CHLORIDE 75 ML/HR: 9 INJECTION, SOLUTION INTRAVENOUS at 16:43

## 2020-12-04 RX ADMIN — APIXABAN 2.5 MG: 2.5 TABLET, FILM COATED ORAL at 21:39

## 2020-12-04 RX ADMIN — PANTOPRAZOLE SODIUM 40 MG: 40 TABLET, DELAYED RELEASE ORAL at 07:04

## 2020-12-04 RX ADMIN — SODIUM CHLORIDE, PRESERVATIVE FREE 10 ML: 5 INJECTION INTRAVENOUS at 09:50

## 2020-12-04 RX ADMIN — ESCITALOPRAM 20 MG: 20 TABLET, FILM COATED ORAL at 09:48

## 2020-12-04 RX ADMIN — GUAIFENESIN AND DEXTROMETHORPHAN 10 ML: 100; 10 SYRUP ORAL at 01:52

## 2020-12-04 RX ADMIN — SOTALOL HYDROCHLORIDE 80 MG: 80 TABLET ORAL at 09:49

## 2020-12-04 RX ADMIN — CARVEDILOL 3.12 MG: 3.12 TABLET, FILM COATED ORAL at 17:50

## 2020-12-04 RX ADMIN — BENZONATATE 100 MG: 100 CAPSULE ORAL at 01:52

## 2020-12-04 RX ADMIN — CARVEDILOL 3.12 MG: 3.12 TABLET, FILM COATED ORAL at 09:48

## 2020-12-04 RX ADMIN — Medication 2000 MCG: at 09:48

## 2020-12-04 RX ADMIN — GUAIFENESIN AND DEXTROMETHORPHAN 10 ML: 100; 10 SYRUP ORAL at 07:04

## 2020-12-04 RX ADMIN — ACETAMINOPHEN 650 MG: 325 TABLET, FILM COATED ORAL at 14:29

## 2020-12-04 RX ADMIN — LEVOTHYROXINE SODIUM 88 MCG: 88 TABLET ORAL at 07:04

## 2020-12-04 RX ADMIN — SODIUM CHLORIDE, PRESERVATIVE FREE 10 ML: 5 INJECTION INTRAVENOUS at 21:39

## 2020-12-04 RX ADMIN — APIXABAN 2.5 MG: 2.5 TABLET, FILM COATED ORAL at 09:49

## 2020-12-04 RX ADMIN — ATORVASTATIN CALCIUM 40 MG: 20 TABLET, FILM COATED ORAL at 21:39

## 2020-12-04 NOTE — PROGRESS NOTES
"DAILY PROGRESS NOTE  [facility]    Patient Identification:  Name: Bhaskar Ibarra  Age: 85 y.o.  Sex: male  :  1935  MRN: [id number]         Primary Care Physician: Katherine Lester APRN    Subjective:  Interval History: He is weak and has poor appetite.    Objective:    Scheduled Meds:apixaban, 2.5 mg, Oral, Q12H  atorvastatin, 40 mg, Oral, Nightly  carvedilol, 3.125 mg, Oral, BID With Meals  escitalopram, 20 mg, Oral, Daily  levothyroxine, 88 mcg, Oral, Q AM  pantoprazole, 40 mg, Oral, QAM  sodium chloride, 10 mL, Intravenous, Q12H  sotalol, 80 mg, Oral, Daily  cyanocobalamin, 2,000 mcg, Oral, Daily      Continuous Infusions:sodium chloride, 75 mL/hr, Last Rate: 75 mL/hr (20)        Vital signs in last 24 hours:  Temp:  [100.2 °F (37.9 °C)-101.2 °F (38.4 °C)] 101.2 °F (38.4 °C)  Heart Rate:  [58-64] 61  Resp:  [16-18] 18  BP: (128-141)/(59-69) 141/65    Intake/Output:    Intake/Output Summary (Last 24 hours) at 2020 1429  Last data filed at 2020 1347  Gross per 24 hour   Intake 960 ml   Output 600 ml   Net 360 ml       Exam:  /65 (BP Location: Right arm, Patient Position: Lying)   Pulse 61   Temp (!) 101.2 °F (38.4 °C) (Oral)   Resp 18   Ht 180.3 cm (71\")   Wt 80 kg (176 lb 5.9 oz)   SpO2 94%   BMI 24.60 kg/m²     General Appearance:    Alert, cooperative, no distress   Head:    Normocephalic, without obvious abnormality, atraumatic   Eyes:       Throat:   Lips, tongue, gums normal   Neck:   Supple, symmetrical, trachea midline, no JVD   Lungs:     Clear to auscultation bilaterally, respirations unlabored   Chest Wall:    No tenderness or deformity    Heart:    Regular rate and rhythm, S1 and S2 normal, no murmur,no  Rub or gallop   Abdomen:     Soft, nontender, bowel sounds active, no masses, no organomegaly    Extremities:   Extremities normal, atraumatic, no cyanosis or edema   Pulses:      Skin:   Skin is warm and dry,  no rashes or palpable lesions "   Neurologic:   no focal deficits noted      Lab Results (last 72 hours)     Procedure Component Value Units Date/Time    Comprehensive Metabolic Panel [[id number]] Collected: 12/04/20 1028    Specimen: Blood Updated: 12/04/20 1057    Lactate Dehydrogenase [[id number]] Collected: 12/04/20 1028    Specimen: Blood Updated: 12/04/20 1057    Ferritin [[id number]]  (Abnormal) Collected: 12/04/20 0459    Specimen: Blood Updated: 12/04/20 0601     Ferritin 849.00 ng/mL     Narrative:      Results may be falsely decreased if patient taking Biotin.      C-reactive Protein [[id number]]  (Abnormal) Collected: 12/04/20 0459    Specimen: Blood Updated: 12/04/20 0557     C-Reactive Protein 9.12 mg/dL     CBC & Differential [[id number]]  (Abnormal) Collected: 12/04/20 0459    Specimen: Blood Updated: 12/04/20 0523    Narrative:      The following orders were created for panel order CBC & Differential.  Procedure                               Abnormality         Status                     ---------                               -----------         ------                     CBC Auto Differential[[id number]]        Abnormal            Final result                 Please view results for these tests on the individual orders.    CBC Auto Differential [[id number]]  (Abnormal) Collected: 12/04/20 0459    Specimen: Blood Updated: 12/04/20 0523     WBC 6.77 10*3/mm3      RBC 3.82 10*6/mm3      Hemoglobin 12.5 g/dL      Hematocrit 37.6 %      MCV 98.4 fL      MCH 32.7 pg      MCHC 33.2 g/dL      RDW 13.1 %      RDW-SD 46.1 fl      MPV 11.6 fL      Platelets 153 10*3/mm3      Neutrophil % 71.3 %      Lymphocyte % 20.2 %      Monocyte % 6.8 %      Eosinophil % 0.4 %      Basophil % 0.3 %      Immature Grans % 1.0 %      Neutrophils, Absolute 4.82 10*3/mm3      Lymphocytes, Absolute 1.37 10*3/mm3      Monocytes, Absolute 0.46 10*3/mm3      Eosinophils, Absolute 0.03 10*3/mm3      Basophils, Absolute 0.02 10*3/mm3      Immature Grans, Absolute 0.07  10*3/mm3      nRBC 0.0 /100 WBC     Basic Metabolic Panel [[id number]]  (Abnormal) Collected: 12/03/20 1244    Specimen: Blood Updated: 12/03/20 1335     Glucose 109 mg/dL      BUN 31 mg/dL      Creatinine 1.82 mg/dL      Sodium 136 mmol/L      Potassium 3.6 mmol/L      Chloride 98 mmol/L      CO2 24.4 mmol/L      Calcium 8.6 mg/dL      eGFR Non African Amer 36 mL/min/1.73      BUN/Creatinine Ratio 17.0     Anion Gap 13.6 mmol/L     Narrative:      GFR Normal >60  Chronic Kidney Disease <60  Kidney Failure <15      Magnesium [[id number]]  (Normal) Collected: 12/03/20 1244    Specimen: Blood Updated: 12/03/20 1335     Magnesium 2.2 mg/dL     CBC Auto Differential [[id number]]  (Abnormal) Collected: 12/03/20 1244    Specimen: Blood Updated: 12/03/20 1310     WBC 7.15 10*3/mm3      RBC 4.20 10*6/mm3      Hemoglobin 13.4 g/dL      Hematocrit 40.1 %      MCV 95.5 fL      MCH 31.9 pg      MCHC 33.4 g/dL      RDW 13.0 %      RDW-SD 45.2 fl      MPV 11.3 fL      Platelets 197 10*3/mm3      Neutrophil % 62.7 %      Lymphocyte % 27.0 %      Monocyte % 8.5 %      Eosinophil % 0.4 %      Basophil % 0.3 %      Immature Grans % 1.1 %      Neutrophils, Absolute 4.48 10*3/mm3      Lymphocytes, Absolute 1.93 10*3/mm3      Monocytes, Absolute 0.61 10*3/mm3      Eosinophils, Absolute 0.03 10*3/mm3      Basophils, Absolute 0.02 10*3/mm3      Immature Grans, Absolute 0.08 10*3/mm3      nRBC 0.0 /100 WBC     Respiratory Panel PCR w/COVID-19(SARS-CoV-2) SARAI/BOB/SCAR/PAD/COR/MAD/CJ In-House, NP Swab in [facility], 3-4 HR TAT - Swab, Nasopharynx [[id number]]  (Abnormal) Collected: 12/02/20 1358    Specimen: Swab from Nasopharynx Updated: 12/02/20 1802     ADENOVIRUS, PCR Not Detected     Coronavirus 229E Not Detected     Coronavirus HKU1 Not Detected     Coronavirus NL63 Not Detected     Coronavirus OC43 Not Detected     COVID19 Detected     Human Metapneumovirus Not Detected     Human Rhinovirus/Enterovirus Not Detected     Influenza A PCR Not  Detected     Influenza B PCR Not Detected     Parainfluenza Virus 1 Not Detected     Parainfluenza Virus 2 Not Detected     Parainfluenza Virus 3 Not Detected     Parainfluenza Virus 4 Not Detected     RSV, PCR Not Detected     Bordetella pertussis pcr Not Detected     Bordetella parapertussis PCR Not Detected     Chlamydophila pneumoniae PCR Not Detected     Mycoplasma pneumo by PCR Not Detected    Narrative:      Fact sheet for providers: https://docs.[employer]/wp-content/uploads/KMJ6541-2543-CH7.1-EUA-Provider-Fact-Sheet-3.pdf    Fact sheet for patients: https://docs.[employer]/wp-content/uploads/NOD7905-8807-EP4.1-EUA-Patient-Fact-Sheet-1.pdf    Urinalysis With Microscopic If Indicated (No Culture) - Urine, Clean Catch [[id number]]  (Abnormal) Collected: 12/02/20 1545    Specimen: Urine, Clean Catch Updated: 12/02/20 1601     Color, UA Yellow     Appearance, UA Clear     pH, UA <=5.0     Specific Gravity, UA 1.014     Glucose, UA Negative     Ketones, UA Negative     Bilirubin, UA Negative     Blood, UA Trace     Protein, UA Trace     Leuk Esterase, UA Negative     Nitrite, UA Negative     Urobilinogen, UA 0.2 E.U./dL    Urinalysis, Microscopic Only - Urine, Clean Catch [[id number]] Collected: 12/02/20 1545    Specimen: Urine, Clean Catch Updated: 12/02/20 1601     RBC, UA 0-2 /HPF      WBC, UA 0-2 /HPF      Bacteria, UA None Seen /HPF      Squamous Epithelial Cells, UA 0-2 /HPF      Hyaline Casts, UA 0-2 /LPF      Methodology Automated Microscopy    Procalcitonin [[id number]]  (Normal) Collected: 12/02/20 1357    Specimen: Blood Updated: 12/02/20 1446     Procalcitonin 0.22 ng/mL     Narrative:      As a Marker for Sepsis (Non-Neonates):   1. <0.5 ng/mL represents a low risk of severe sepsis and/or septic shock.  1. >2 ng/mL represents a high risk of severe sepsis and/or septic shock.    As a Marker for Lower Respiratory Tract Infections that require antibiotic therapy:  PCT on Admission     Antibiotic  "Therapy             6-12 Hrs later  > 0.5                Strongly Recommended            >0.25 - <0.5         Recommended  0.1 - 0.25           Discouraged                   Remeasure/reassess PCT  <0.1                 Strongly Discouraged          Remeasure/reassess PCT      As 28 day mortality risk marker: \"Change in Procalcitonin Result\" (> 80 % or <=80 %) if Day 0 (or Day 1) and Day 4 values are available. Refer to http://www.[facility] [place]-pct-calculator.com/   Change in PCT <=80 %   A decrease of PCT levels below or equal to 80 % defines a positive change in PCT test result representing a higher risk for 28-day all-cause mortality of patients diagnosed with severe sepsis or septic shock.  Change in PCT > 80 %   A decrease of PCT levels of more than 80 % defines a negative change in PCT result representing a lower risk for 28-day all-cause mortality of patients diagnosed with severe sepsis or septic shock.                Results may be falsely decreased if patient taking Biotin.     Comprehensive Metabolic Panel [[id number]]  (Abnormal) Collected: 12/02/20 1357    Specimen: Blood Updated: 12/02/20 1441     Glucose 100 mg/dL      BUN 33 mg/dL      Creatinine 2.15 mg/dL      Sodium 134 mmol/L      Potassium 4.0 mmol/L      Chloride 95 mmol/L      CO2 26.4 mmol/L      Calcium 9.1 mg/dL      Total Protein 7.8 g/dL      Albumin 4.20 g/dL      ALT (SGPT) 29 U/L      AST (SGOT) 55 U/L      Alkaline Phosphatase 68 U/L      Total Bilirubin 0.5 mg/dL      eGFR Non African Amer 29 mL/min/1.73      Globulin 3.6 gm/dL      A/G Ratio 1.2 g/dL      BUN/Creatinine Ratio 15.3     Anion Gap 12.6 mmol/L     Narrative:      GFR Normal >60  Chronic Kidney Disease <60  Kidney Failure <15      Lactate Dehydrogenase [[id number]]  (Abnormal) Collected: 12/02/20 1357    Specimen: Blood Updated: 12/02/20 1441      U/L     C-reactive Protein [[id number]]  (Abnormal) Collected: 12/02/20 1357    Specimen: Blood Updated: 12/02/20 1441     " C-Reactive Protein 6.09 mg/dL     Troponin [[id number]]  (Normal) Collected: 12/02/20 1357    Specimen: Blood Updated: 12/02/20 1441     Troponin T <0.010 ng/mL     Narrative:      Troponin T Reference Range:  <= 0.03 ng/mL-   Negative for AMI  >0.03 ng/mL-     Abnormal for myocardial necrosis.  Clinicians would have to utilize clinical acumen, EKG, Troponin and serial changes to determine if it is an Acute Myocardial Infarction or myocardial injury due to an underlying chronic condition.       Results may be falsely decreased if patient taking Biotin.      BNP [[id number]]  (Normal) Collected: 12/02/20 1357    Specimen: Blood Updated: 12/02/20 1438     proBNP 641.4 pg/mL     Narrative:      Among patients with dyspnea, NT-proBNP is highly sensitive for the detection of acute congestive heart failure. In addition NT-proBNP of <300 pg/ml effectively rules out acute congestive heart failure with 99% negative predictive value.    Results may be falsely decreased if patient taking Biotin.      Lactic Acid, Plasma [[id number]]  (Normal) Collected: 12/02/20 1401    Specimen: Blood Updated: 12/02/20 1424     Lactate 1.4 mmol/L     CBC & Differential [[id number]]  (Abnormal) Collected: 12/02/20 1357    Specimen: Blood Updated: 12/02/20 1413    Narrative:      The following orders were created for panel order CBC & Differential.  Procedure                               Abnormality         Status                     ---------                               -----------         ------                     CBC Auto Differential[[id number]]        Abnormal            Final result                 Please view results for these tests on the individual orders.    CBC Auto Differential [[id number]]  (Abnormal) Collected: 12/02/20 1357    Specimen: Blood Updated: 12/02/20 1413     WBC 7.46 10*3/mm3      RBC 4.22 10*6/mm3      Hemoglobin 13.9 g/dL      Hematocrit 40.3 %      MCV 95.5 fL      MCH 32.9 pg      MCHC 34.5 g/dL      RDW 13.4 %       RDW-SD 46.7 fl      MPV 11.8 fL      Platelets 239 10*3/mm3      Neutrophil % 70.0 %      Lymphocyte % 20.9 %      Monocyte % 7.4 %      Eosinophil % 0.1 %      Basophil % 0.3 %      Immature Grans % 1.3 %      Neutrophils, Absolute 5.22 10*3/mm3      Lymphocytes, Absolute 1.56 10*3/mm3      Monocytes, Absolute 0.55 10*3/mm3      Eosinophils, Absolute 0.01 10*3/mm3      Basophils, Absolute 0.02 10*3/mm3      Immature Grans, Absolute 0.10 10*3/mm3      nRBC 0.0 /100 WBC         Data Review:  Results from last 7 days   Lab Units 12/04/20  1028 12/03/20  1244 12/02/20  1357   SODIUM mmol/L 137 136 134*   POTASSIUM mmol/L 3.5 3.6 4.0   CHLORIDE mmol/L 104 98 95*   CO2 mmol/L 22.0 24.4 26.4   BUN mg/dL 21 31* 33*   CREATININE mg/dL 1.36* 1.82* 2.15*   GLUCOSE mg/dL 120* 109* 100*   CALCIUM mg/dL 8.1* 8.6 9.1     Results from last 7 days   Lab Units 12/04/20  0459 12/03/20  1244 12/02/20  1357   WBC 10*3/mm3 6.77 7.15 7.46   HEMOGLOBIN g/dL 12.5* 13.4 13.9   HEMATOCRIT % 37.6 40.1 40.3   PLATELETS 10*3/mm3 153 197 239             Lab Results   Lab Value Date/Time    TROPONINT <0.010 12/02/2020 1357    TROPONINT <0.010 07/23/2017 1204    TROPONINT 1.30 (C) 07/27/2015 0640    TROPONINT 0.90 (C) 07/26/2015 1115    TROPONINT 0.46 (C) 07/26/2015 0205         Results from last 7 days   Lab Units 12/04/20  1028 12/02/20  1357   ALK PHOS U/L 52 68   BILIRUBIN mg/dL 0.4 0.5   ALT (SGPT) U/L 25 29   AST (SGOT) U/L 56* 55*             No results found for: POCGLU        Past Medical History:   Diagnosis Date   • Abdominal aortic aneurysm (CMS/HCC)    • Acute myocardial infarction (CMS/HCC)    • Acute renal failure (CMS/HCC)    • Anemia    • Aneurysm (CMS/HCC)    • Atrial fibrillation (CMS/HCC)    • Cardiomyopathy (CMS/HCC)    • Chest pain    • Coronary artery disease     July 2015-    • Depression    • GERD (gastroesophageal reflux disease)    • Hyperlipidemia    • Hypertension    • Hypothyroidism    • Lung cancer (CMS/HCC) 2016     radiation    • Mitral regurgitation    • Myocardial infarction (CMS/HCC)    • Pleural effusion    • Pleural effusion, bilateral    • RLS (restless legs syndrome)    • Sleep apnea    • Ulcerative colitis (CMS/HCC)    • Ulcerative colitis (CMS/HCC)        Assessment:  Active Hospital Problems    Diagnosis  POA   • Pneumonia due to COVID-19 virus [U07.1, J12.89]  Yes      Resolved Hospital Problems   No resolved problems to display.       Plan:  Continue with IV fluid hydration follow-up Covid lab testing.  If he is starting to eat and drink a little bit better may be he can go home with his family tomorrow    Sanjeev Mullen MD  12/4/2020  14:29 EST

## 2020-12-04 NOTE — PLAN OF CARE
Goal Outcome Evaluation:  Plan of Care Reviewed With: patient     Outcome Summary: VSS. O2 SAT STABLE ON RA. REQUIRED COUGH MEDICATION EVERY 4H. SAID HE FELT LIKE HE WAS READY TO GO HOME.

## 2020-12-04 NOTE — PLAN OF CARE
Problem: Adult Inpatient Plan of Care  Goal: Plan of Care Review  Outcome: Ongoing, Progressing  Flowsheets (Taken 12/4/2020 1728)  Progress: no change  Plan of Care Reviewed With: patient  Outcome Summary: Pt has slept most of the day. Temp up to 101.2 this afternoon. Medicated with Tylenol. O2 sat mid 90s on RA. Lungs diminished. Will continue to monitor . Safety maintained.   Goal Outcome Evaluation:  Plan of Care Reviewed With: patient  Progress: no change  Outcome Summary: Pt has slept most of the day. Temp up to 101.2 this afternoon. Medicated with Tylenol. O2 sat mid 90s on RA. Lungs diminished. Will continue to monitor . Safety maintained.

## 2020-12-04 NOTE — SIGNIFICANT NOTE
12/04/20 1616   OTHER   Discipline physical therapy assistant   Rehab Time/Intention   Session Not Performed other (see comments)  (pt was sound asleep and unarousable. Will f/u with pt over the weekend.)   Recommendation   PT - Next Appointment 12/05/20

## 2020-12-04 NOTE — PAYOR COMM NOTE
"Juan C Malone (85 y.o. Male)         PLEASE SEE ATTACHED CLINICAL REVIEW.     REF#[id number]    PLEASE CALL  OR  [phone] WITH INPT AUTH AND DAYS APPROVED.     THANK YOU    JUANITA HARRISON LPN CCP    Date of Birth Social Security Number Address Home Phone MRN    1935  [address] [facility] [address] [phone] [id number]    Advent Marital Status          None        Admission Date Admission Type Admitting Provider Attending Provider Department, Room/Bed    20 Emergency Stingl, MD Sebas Siegel Lyle E, MD [address], [address]/1    Discharge Date Discharge Disposition Discharge Destination                       Attending Provider: Sanjeev Mullen MD    Allergies: Latex    Isolation: Enh Drop/Con   Infection: COVID (confirmed) (20)   Code Status: CPR    Ht: 180.3 cm (71\")   Wt: 80 kg (176 lb 5.9 oz)    Admission Cmt: None   Principal Problem: None                Active Insurance as of 2020     Primary Coverage     Payor Plan Insurance Group Employer/Plan Group    AETNA MEDICARE REPLACEMENT AETNA MEDICARE REPLACEMENT [id number]     Payor Plan Address Payor Plan Phone Number Payor Plan Fax Number Effective Dates    [address] [phone]  2019 - None Entered    [facility] [id number]       Subscriber Name Subscriber Birth Date Member ID       JUAN C MALONE 1935 [id number]                 Emergency Contacts      (Rel.) Home Phone Work Phone Mobile Phone    Malka Minor (Daughter) [phone] -- [phone]               History & Physical      Ginna Diggs MD at [phone]          HISTORY AND PHYSICAL   [facility]        Patient Identification:  Name: Juan C Malone  Age: 85 y.o.  Sex: male  :  1935  MRN: [id number]                     Primary Care Physician: Katherine Lester APRN    Chief Complaint:  85 year old gentleman who presented to the " emergency room with weakness and poor po intake for several days; he has had malaise and no energy;he has had a cough and some shortness of breath; his wife was diagnosed with covid; he has had body aches but denies fever or chills    History of Present Illness:   As above    Past Medical History:  Past Medical History:   Diagnosis Date   • Abdominal aortic aneurysm (CMS/HCC)    • Acute myocardial infarction (CMS/HCC)    • Acute renal failure (CMS/HCC)    • Anemia    • Aneurysm (CMS/HCC)    • Atrial fibrillation (CMS/HCC)    • Cardiomyopathy (CMS/HCC)    • Chest pain    • Coronary artery disease     July 2015-    • Depression    • GERD (gastroesophageal reflux disease)    • Hyperlipidemia    • Hypertension    • Hypothyroidism    • Lung cancer (CMS/HCC) 2016    radiation    • Mitral regurgitation    • Myocardial infarction (CMS/HCC)    • Pleural effusion    • Pleural effusion, bilateral    • RLS (restless legs syndrome)    • Sleep apnea    • Ulcerative colitis (CMS/HCC)    • Ulcerative colitis (CMS/HCC)      Past Surgical History:  Past Surgical History:   Procedure Laterality Date   • BACK SURGERY      lumbar   • CARDIAC CATHETERIZATION     • CORONARY ANGIOPLASTY     • CORONARY ARTERY BYPASS GRAFT  07/27/2015    X 5  Dr Louis   • HERNIA REPAIR      inguinal - left   • MITRAL VALVE REPAIR/REPLACEMENT  07/27/2015    Dr Louis   • NASAL SEPTUM SURGERY      benign growth removed   • OTHER SURGICAL HISTORY      Cardiovasc Endosc W/ Video-Assist Harv Veins Coron Art Byp   • PROSTATE SURGERY     • SKIN BIOPSY      benign      Home Meds:  Medications Prior to Admission   Medication Sig Dispense Refill Last Dose   • atorvastatin (LIPITOR) 40 MG tablet TAKE 1 TABLET EVERY NIGHT 90 tablet 3 12/1/2020 at Unknown time   • carvedilol (COREG) 3.125 MG tablet TAKE 1 TABLET TWICE A DAY WITH MEALS 180 tablet 3 12/2/2020 at Unknown time   • cyanocobalamin (VITAMIN B-12) 2000 MCG tablet tablet Take 1 tablet by mouth Daily. 30 tablet   12/2/2020 at Unknown time   • Eliquis 2.5 MG tablet tablet TAKE 1 TABLET EVERY 12 HOURS 180 tablet 1 12/2/2020 at Unknown time   • escitalopram (LEXAPRO) 20 MG tablet TAKE 1 TABLET DAILY 90 tablet 3 12/2/2020 at Unknown time   • furosemide (LASIX) 40 MG tablet TAKE 1 TABLET DAILY 90 tablet 3 12/2/2020 at Unknown time   • levothyroxine (SYNTHROID, LEVOTHROID) 88 MCG tablet TAKE 1 TABLET DAILY 90 tablet 3 12/2/2020 at Unknown time   • omeprazole (priLOSEC) 40 MG capsule TAKE 1 CAPSULE DAILY 90 capsule 3 12/2/2020 at Unknown time   • sotalol (BETAPACE) 80 MG tablet Take 1 tablet by mouth Daily. 90 tablet 3 12/2/2020 at Unknown time   • fenofibrate (TRICOR) 145 MG tablet TAKE 1 TABLET DAILY 90 tablet 3 Unknown at Unknown time       Allergies:  Allergies   Allergen Reactions   • Latex Itching     Immunizations:  Immunization History   Administered Date(s) Administered   • Flu Mist 10/18/2016, 10/30/2017   • Flu Vaccine Quad PF >18YRS 09/21/2009, 09/23/2010   • Fluzone High Dose =>65 Years ([employer] ONLY) 10/08/2018     Social History:   Social History     Social History Narrative   • Not on file     Social History     Socioeconomic History   • Marital status:      Spouse name: Not on file   • Number of children: Not on file   • Years of education: Not on file   • Highest education level: Not on file   Tobacco Use   • Smoking status: Former Smoker     Years: 5.00     Types: Cigarettes   • Smokeless tobacco: Never Used   • Tobacco comment: 1 cig day x 5 years   Substance and Sexual Activity   • Alcohol use: No   • Drug use: No   • Sexual activity: Defer       Family History:  Family History   Problem Relation Age of Onset   • Coronary artery disease Brother    • Heart attack Brother    • Cancer Mother    • Lung cancer Father         Review of Systems  See history of present illness and past medical history.  Patient denies headache, dizziness, syncope, falls, trauma, change in vision, change in hearing, change in  "taste, changes in weight, changes in appetite, focal weakness, numbness, or paresthesia.  Patient denies chest pain, palpitations, dyspnea, orthopnea, PND, cough, sinus pressure, rhinorrhea, epistaxis, hemoptysis, nausea, vomiting,hematemesis, diarrhea, constipation or hematchezia.  Denies cold or heat intolerance, polydipsia, polyuria, polyphagia. Denies hematuria, pyuria, dysuria, hesitancy, frequency or urgency. Denies consumption of raw and under cooked meats foods or change in water source.  Denies fever, chills, sweats, night sweats.  Denies missing any routine medications. Remainder of ROS is negative.    Objective:  T Max 24 hrs: Temp (24hrs), Av.6 °F (36.4 °C), Min:97.6 °F (36.4 °C), Max:97.6 °F (36.4 °C)    Vitals Ranges:   Temp:  [97.6 °F (36.4 °C)] 97.6 °F (36.4 °C)  Heart Rate:  [58-71] 67  Resp:  [16-20] 18  BP: (107-140)/(57-82) 115/64      Exam:  /64 (BP Location: Left arm, Patient Position: Lying)   Pulse 67   Temp 97.6 °F (36.4 °C) (Tympanic)   Resp 18   Ht 180.3 cm (71\")   Wt 80 kg (176 lb 5.9 oz)   SpO2 93%   BMI 24.60 kg/m²     General Appearance:    Alert, cooperative, no distress, appears stated age; chronically ill-appearing   Head:    Normocephalic, without obvious abnormality, atraumatic   Eyes:    PERRL, conjunctivae/corneas clear, EOM's intact, both eyes   Ears:    Normal external ear canals, both ears   Nose:   Nares normal, septum midline, mucosa normal, no drainage    or sinus tenderness   Throat:   Lips, mucosa, and tongue normal   Neck:   Supple, symmetrical, trachea midline, no adenopathy;     thyroid:  no enlargement/tenderness/nodules; no carotid    bruit or JVD   Back:     Symmetric, no curvature, ROM normal, no CVA tenderness   Lungs:     Decreased breath sounds bilaterally, respirations unlabored   Chest Wall:    No tenderness or deformity    Heart:    Regular rate and rhythm, S1 and S2 normal, no murmur, rub   or gallop   Abdomen:     Soft, nontender, bowel " sounds active all four quadrants,     no masses, no hepatomegaly, no splenomegaly   Extremities:   Extremities normal, atraumatic, no cyanosis or edema   Pulses:   2+ and symmetric all extremities   Skin:   Skin color, texture, turgor normal, no rashes or lesions   Lymph nodes:   Cervical, supraclavicular, and axillary nodes normal   Neurologic:   CNII-XII intact, normal strength, sensation intact throughout      .    Patient Active Problem List   Diagnosis Code   • Coronary arteriosclerosis in native artery I25.10   • Atrial fibrillation (CMS/HCC) I48.91   • Acute non-Q wave ST elevation myocardial infarction (STEMI) involving left anterior descending (LAD) coronary artery I21.02   • Mitral valve insufficiency I34.0   • S/P CABG (coronary artery bypass graft) Z95.1   • S/P mitral valve repair Z98.890   • Depression F32.9   • Hypothyroidism E03.9   • Hyperlipidemia E78.5   • Pulmonary nodule R91.1   • Adenocarcinoma of right lung (CMS/HCC) C34.91   • Benign nodular prostatic hyperplasia with lower urinary tract symptoms N40.1   • Confusion R41.0   • Post concussion syndrome F07.81   • Vitamin D deficiency E55.9   • Well adult health check Z00.00   • Impaired fasting glucose R73.01   • AAA (abdominal aortic aneurysm) (CMS/HCC) I71.4   • Ascending aortic aneurysm (CMS/HCC) I71.2   • Macrocytic anemia D53.9   • Other heart failure (CMS/HCC) I50.89   • HTN (hypertension), benign I10   • Pneumonia due to COVID-19 virus U07.1, J12.89       Assessment:    Pneumonia due to COVID-19 virus  acute kidney injury  Hypothyroidism  Hypertension  Atrial fibrillation  ckd3  Weakness  S/p fall  Cad    Plan:  Will hold nephrotoxic meds  Hydrate gently  Check covid progression labs  Recommended that his daughter get tested for covid as well  Monitor bp  Pt.ot to see  Tylenol for myalgias  D.w patient and dr gabby Diggs MD  12/2/2020  21:38 EST      Electronically signed by Ginna Diggs MD at 12/02/20 4332           Emergency Department Notes      Yina Pollack, RN at 12/02/20 1308        Pt to ED from home, felt weak recently, no LOC, + head injury, fell in bathroom, denies pain. On eliquis. Wife does have covid, pt denies cough, SOA.     Pt placed in mask at triage, all staff wearing appropriate ppe       Electronically signed by Yina Pollack RN at 12/02/20 1309     Kaila Acevedo RN at 12/02/20 1314        Pt noted to have mask on when this RN entered the room.  This RN wore appropriate PPE throughout our encounter. Hand hygiene performed upon entering and exiting room.       Kaila Acevedo RN  12/02/20 1314      Electronically signed by Kaila Acevedo RN at 12/02/20 1314     Efrain Headley MD at 12/02/20 1346           EMERGENCY DEPARTMENT ENCOUNTER    Room Number:  18/18  Date of encounter:  12/2/2020  PCP: Katherine Lester APRN  Historian: Patient      HPI:  Chief Complaint: Malaise weakness, cough, fall/head injury  A complete HPI/ROS/PMH/PSH/SH/FH are unobtainable due to: The patient is very hard of hearing    Context: Bhaskar Ibarra is a 85 y.o. male who presents to the ED c/o generalized weakness with decreased p.o. intake for the past several days.  Wife has Covid.  He has had a nonproductive cough and has been short of breath.  Today, he was in the bathroom and fell-he does not think he passed out, but he cannot really tell me why he fell either.  He has not been eating or drinking much.  He does not think he has had a fever.  He does complain of myalgias/arthralgias.      The patient was placed in a mask in triage, hand hygiene was performed before and after my interaction with the patient.  I wore a mask, safety glasses and gloves during my entire interaction with the patient.    PAST MEDICAL HISTORY  Active Ambulatory Problems     Diagnosis Date Noted   • Coronary arteriosclerosis in native artery 03/15/2016   • Atrial fibrillation (CMS/HCC) 03/15/2016   • Acute non-Q wave ST elevation  myocardial infarction (STEMI) involving left anterior descending (LAD) coronary artery 03/15/2016   • Mitral valve insufficiency 03/15/2016   • S/P CABG (coronary artery bypass graft) 03/25/2016   • S/P mitral valve repair 03/25/2016   • Depression 07/01/2016   • Hypothyroidism 07/01/2016   • Hyperlipidemia 07/01/2016   • Pulmonary nodule 11/16/2016   • Adenocarcinoma of right lung (CMS/HCC) 12/08/2016   • Benign nodular prostatic hyperplasia with lower urinary tract symptoms 01/20/2017   • Confusion 07/23/2017   • Post concussion syndrome 07/28/2017   • Vitamin D deficiency 07/28/2017   • Well adult health check 04/10/2018   • Impaired fasting glucose 08/07/2018   • AAA (abdominal aortic aneurysm) (CMS/[facility]) 08/07/2018   • Ascending aortic aneurysm (CMS/HCC) 09/23/2018   • Macrocytic anemia 02/04/2019   • Other heart failure (CMS/[facility]) 02/04/2019   • HTN (hypertension), benign 10/24/2019     Resolved Ambulatory Problems     Diagnosis Date Noted   • DM (diabetes mellitus screen) 01/20/2017   • Osteoporosis 07/28/2017     Past Medical History:   Diagnosis Date   • Abdominal aortic aneurysm (CMS/HCC)    • Acute myocardial infarction (CMS/HCC)    • Acute renal failure (CMS/[facility])    • Anemia    • Aneurysm (CMS/HCC)    • Cardiomyopathy (CMS/HCC)    • Chest pain    • Coronary artery disease    • GERD (gastroesophageal reflux disease)    • Hypertension    • Lung cancer (CMS/HCC) 2016   • Mitral regurgitation    • Myocardial infarction (CMS/[facility])    • Pleural effusion    • Pleural effusion, bilateral    • RLS (restless legs syndrome)    • Sleep apnea    • Ulcerative colitis (CMS/HCC)    • Ulcerative colitis (CMS/HCC)          PAST SURGICAL HISTORY  Past Surgical History:   Procedure Laterality Date   • BACK SURGERY      lumbar   • CARDIAC CATHETERIZATION     • CORONARY ANGIOPLASTY     • CORONARY ARTERY BYPASS GRAFT  07/27/2015    X 5  Dr Louis   • HERNIA REPAIR      inguinal - left   • MITRAL VALVE REPAIR/REPLACEMENT   07/27/2015    Dr Louis   • NASAL SEPTUM SURGERY      benign growth removed   • OTHER SURGICAL HISTORY      Cardiovasc Endosc W/ Video-Assist Harv Veins Coron Art Byp   • PROSTATE SURGERY     • SKIN BIOPSY      benign         FAMILY HISTORY  Family History   Problem Relation Age of Onset   • Coronary artery disease Brother    • Heart attack Brother    • Cancer Mother    • Lung cancer Father          SOCIAL HISTORY  Social History     Socioeconomic History   • Marital status:      Spouse name: Not on file   • Number of children: Not on file   • Years of education: Not on file   • Highest education level: Not on file   Tobacco Use   • Smoking status: Former Smoker     Years: 5.00     Types: Cigarettes   • Smokeless tobacco: Never Used   • Tobacco comment: 1 cig day x 5 years   Substance and Sexual Activity   • Alcohol use: No   • Drug use: No   • Sexual activity: Defer         ALLERGIES  Latex        REVIEW OF SYSTEMS  Review of Systems   Reason unable to perform ROS: He is very hard of hearing.   Constitutional: Positive for appetite change and fatigue.   Respiratory: Positive for cough and shortness of breath.    Cardiovascular: Negative for chest pain.   Gastrointestinal: Negative for abdominal pain, diarrhea and vomiting.   Genitourinary: Negative for difficulty urinating and dysuria.   Musculoskeletal: Positive for arthralgias and myalgias.        All systems reviewed and negative except for those discussed in HPI.       PHYSICAL EXAM    I have reviewed the triage vital signs and nursing notes.    ED Triage Vitals [12/02/20 1309]   Temp Heart Rate Resp BP SpO2   97.6 °F (36.4 °C) 58 16 140/82 99 %      Temp src Heart Rate Source Patient Position BP Location FiO2 (%)   Tympanic -- -- -- --       Physical Exam   Constitutional: Pt. is awake and alert. No distress.   HENT: Normocephalic and atraumatic,  EOM are normal. Pupils are equal, round, and reactive to light.  Neck: Normal range of motion. Neck  supple. No JVD present.   Cardiovascular: Normal rate, regular rhythm and normal heart sounds. Exam reveals no gallop and no friction rub.   No murmur heard.  Pulmonary/Chest: Effort normal and breath sounds normal. No stridor. No respiratory distress. No wheezes, no rales.   Abdominal: Soft. Bowel sounds are normal. No distension. There is no tenderness. There is no rebound and no guarding.   Musculoskeletal: Normal range of motion. No edema, tenderness or deformity.   Neurological: Pt. is awake and alert.  He has generalized weakness without focal deficits.   Skin: Skin is warm and dry. No rash noted. Pt. is not diaphoretic. No erythema.   Psychiatric: He is pleasant and cooperative.  Nursing note and vitals reviewed.          PROCEDURES    Procedures      MEDICATIONS GIVEN IN ER    Medications   pantoprazole (PROTONIX) EC tablet 40 mg (40 mg Oral Given 12/2/20 1820)         PROGRESS, DATA ANALYSIS, CONSULTS, AND MEDICAL DECISION MAKING    Any/all labs have been independently reviewed by me.  Any/all radiology studies have been reviewed by me and discussed with radiologist dictating the report.   EKG's independently viewed and interpreted by me.  Discussion below represents my analysis of pertinent findings related to patient's condition, differential diagnosis, treatment plan and final disposition.      ED Course as of Dec 02 1904   Wed Dec 02, 2020   1435 CT of the brain was independently viewed by me and discussed with Dr. Garcia (radiology)-there are no acute processes identified.  For official interpretation, see dictated report.    [WC]   1435 WBC: 7.46 [WC]   1435 Hemoglobin: 13.9 [WC]   1435 Hematocrit: 40.3 [WC]   1436 Platelets: 239 [WC]   1439 proBNP: 641.4 [WC]   1442 Troponin T: <0.010 [WC]   1442 LDH(!): 362 [WC]   1442 C-Reactive Protein(!): 6.09 [WC]   1442 BUN(!): 33 [WC]   1442 Creatinine(!): 2.15 [WC]   1449 Procalcitonin: 0.22 [WC]   1450 EKG performed at 1448 and interpreted by me shows normal  sinus rhythm with a heart rate of 65 bpm.  OR interval is borderline prolonged.  There are nonspecific ST-T changes.  There is no significant change when compared to 7/23/2017.    [WC]   1803 COVID19(!!): Detected [WC]   1902 Case discussed with Dr. Diggs ([facility]) - she agrees to admit the patient to a telemetry bed.    [WC]      ED Course User Index  [WC] Efrain Headley MD       AS OF 19:04 EST VITALS:    BP - 107/57  HR - 70  TEMP - 97.6 °F (36.4 °C) (Tympanic)  02 SATS - 93%        DIAGNOSIS  Final diagnoses:   Pneumonia due to COVID-19 virus   ALEXANDRU (acute kidney injury) (CMS/[facility])         DISPOSITION  Admitted-telemetry           Efrain Headley MD  12/02/20 1904      Electronically signed by Efrain Headley MD at 12/02/20 1904     Trisha Molina at 12/02/20 1509          [id number] daughter     Trisha Molina  12/02/20 1510      Electronically signed by Trisha Molina at 12/02/20 1510     Yina Pollack RN at 12/02/20 1747        Spoke to daughter, updated on current status, tests ordered as of this time. Asked to call back for update approx 1 hour. Identity and access code used for verification.       Yina Pollack RN  12/02/20 1747      Electronically signed by Yina Pollack RN at 12/02/20 1747     Julia Martínez RN at 12/02/20 1826        Pt updated at this time that he is positive for COVID-19. Pt given protonix tablet and given sarah mist and turkey box at this time. Pt sitting up in the bed eating. Pt also updated that he will be admitted.     This RN wearing proper PPE, mask and glasses, during pt encounter. Pt wearing mask and reminded pull mask over nose when appropriate. Hand hygiene performed before and after pt encounter.        Julia Martínez, RN  12/02/20 1828      Electronically signed by Julia Martínez RN at 12/02/20 1828     Kaila Acevedo RN at 12/02/20 1937          Nursing report ED to floor  Bhaskar Ibarra  85 y.o.  male    HPI (triage note):   Chief Complaint    Patient presents with   • Head Injury     on eliquis    • Fall   • Weakness - Generalized   • Exposure To Known Illness     wife has covid        Admitting doctor:   Ginna Diggs MD    Admitting diagnosis:   The primary encounter diagnosis was Pneumonia due to COVID-19 virus. A diagnosis of ALEXANDRU (acute kidney injury) (CMS/[facility]) was also pertinent to this visit.    Code status:   Current Code Status     Date Active Code Status Order ID Comments User Context       Prior    Advance Care Planning Activity          Allergies:   Latex    Weight:       12/02/20  1324   Weight: 84.8 kg (186 lb 14.4 oz)       Most recent vitals:   Vitals:    12/02/20 1800 12/02/20 1920 12/02/20 1921 12/02/20 1922   BP:  116/57     Pulse: 70 69 71 68   Resp:  17     Temp:       TempSrc:       SpO2: 93% 94% 92% 95%   Weight:       Height:           Active LDAs/IV Access:   Lines, Drains & Airways    Active LDAs     Name:   Placement date:   Placement time:   Site:   Days:    Peripheral IV 12/02/20 1337 Right Antecubital   12/02/20    1337    Antecubital   less than 1                Meds given in ED:   Medications   pantoprazole (PROTONIX) EC tablet 40 mg (40 mg Oral Given 12/2/20 1820)       Imaging results:  Ct Head Without Contrast    Result Date: 12/2/2020  1. No acute intracranial abnormality is seen with no acute skull fracture or intracranial hemorrhage identified. 2. 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 3. There is complete opacification of the left sphenoid sinus with circumferential mucosal thickening and some central chronic inspissated secretions and bony thickening of the walls of the left sphenoid sinus from chronic sphenoid sinus disease and there is nodular opacity  anterior to the left sphenoid ostium that could be a polyp.  This could be correlated with direct visualization. The remainder of the head CT is unremarkable.  Radiation dose reduction techniques were utilized, including automated exposure control and exposure modulation based on body size.       Xr Chest Ap    Result Date: 12/2/2020   Minimal patchy densities at the lung bases could be areas of developing pneumonia, including possibility of viral pneumonia, follow-up recommended. Ectatic aorta.  This report was finalized on 12/2/2020 3:13 PM by Dr. Al Vo M.D.        Ambulatory status:   - up with assist    Social issues:   Social History     Socioeconomic History   • Marital status:      Spouse name: Not on file   • Number of children: Not on file   • Years of education: Not on file   • Highest education level: Not on file   Tobacco Use   • Smoking status: Former Smoker     Years: 5.00     Types: Cigarettes   • Smokeless tobacco: Never Used   • Tobacco comment: 1 cig day x 5 years   Substance and Sexual Activity   • Alcohol use: No   • Drug use: No   • Sexual activity: Defer    Nursing report ED to floor       Kaila Acevedo RN  12/02/20 1938      Electronically signed by Kaila Acevedo RN at 12/02/20 1938       {Outbreak/Travel/Exposure Documentation......;  Question Available Choices Patient Response   Outbreak Screen: Do you currently have a new onset of the following symptoms?        Fever/Chils, Cough, Shortness of air, Loss of taste or smell, No, Unknown  (!) Cough (12/02/20 2052)   Outbreak Screen: In the last 14 days, have you had contact with anyone who is ill, has show any of the symptoms listed above and/or has been diagnosis with the 2019 Novel Coronavirus? This includes any immediate household members but excludes any patients with whom you have been in contact within your normal work duties wearing proper PPE, if you are a healthcare worker.  Yes, No, Unknown              (!) Yes  (12/02/20 2052)   Outbreak Screen: Who was notified?    Free text  (not recorded)   Travel Screen: Have you traveled in the last month? If so, to what country have you traveled? If [place] what state? Yes, No, Unknown  List of all countries  List of all States No (12/02/20 2053)  (not recorded)  (not recorded)   Infection Risk: Do you currently have the following symptoms?  (If cough is selected, the Tuberculosis Screen is performed.) Cough, Fever, Rash, No (!) Cough (12/02/20 2053)   Tuberculosis Screen: Do you have any of the following Tuberculosis Risks?  · Have you lived or spent time with anyone who had or may have TB?  · Have you lived in or visited any of the following areas for more than one month: [place], [place], [place], [place], [place] or [place]?  · Do you have HIV/AIDS?  · Have you lived in or worked in a nursing home, homeless shelter, correctional facility, or substance abuse treatment facility?   · No    If Yes do you have any of the following symptoms? Yes responses display to the right    If Yes, symptoms listed are:  Cough greater than or equal to 3 weeks, Loss of appetite, Unexplained weight loss, Night sweats, Bloody sputum or hemoptysis, Hoarseness, Fever, Fatigue, Chest pain, No No (12/02/20 2053)  (not recorded)   Exposure Screen: Have you been exposed to any of these contagious diseases in the last month? Measles, Chickenpox, Meningitis, Pertussis, Whooping Cough, No No (12/02/20 2053)     Vital Signs (last 2 days)     Date/Time   Temp   Temp src   Pulse   Resp   BP   Patient Position   SpO2    12/04/20 0948   --   --   60   --   --   --   --    12/04/20 0745   100.2 (37.9)   Oral   58   16   131/59   Lying   91    12/03/20 2240   100.5 (38.1)   Oral   60   18   136/69   Lying   93    12/03/20 1946   100.5 (38.1)   Oral   64   18   128/61   Lying   93    12/03/20 1412   99.3 (37.4)   Oral   56   20   --   --   95    12/03/20 1318   --   Oral   56   16   119/53   Lying    --    12/03/20 0746   99.7 (37.6)   Oral   53   16   127/63   Lying   --    12/02/20 2259   98.4 (36.9)   Oral   59   16   104/55   Lying   93    12/02/20 2100   --   --   67   18   115/64   Lying   93    12/02/20 1922   --   --   68   --   --   --   95    12/02/20 1921   --   --   71   --   --   --   92    12/02/20 1920   --   --   69   17   116/57   --   94    12/02/20 1800   --   --   70   --   --   --   93    12/02/20 1750   --   --   65   18   107/57   --   95    12/02/20 1520   --   --   68   20   131/63   --   92    12/02/20 13:24:26   --   --   67   18   130/70   --   97    12/02/20 1309   97.6 (36.4)   Tympanic   58   16   140/82   --   99            Oxygen Therapy (last 2 days)     Date/Time   SpO2   Device (Oxygen Therapy)   Flow (L/min)   Oxygen Concentration (%)   ETCO2 (mmHg)    12/04/20 0950   --   room air   --   --   --    12/04/20 0745   91   room air   --   --   --    12/03/20 2240   93   room air   --   --   --    12/03/20 2103   --   room air   --   --   --    12/03/20 1946   93   room air   --   --   --    12/03/20 1412   95   room air   --   --   --    12/03/20 0940   --   room air   --   --   --    12/03/20 0015   --   room air   --   --   --    12/02/20 2259   93   room air   --   --   --    12/02/20 2130   --   room air   --   --   --    12/02/20 2100   93   room air   --   --   --    12/02/20 1922   95   --   --   --   --    12/02/20 1921   92   --   --   --   --    12/02/20 1920   94   --   --   --   --    12/02/20 1800   93   --   --   --   --    12/02/20 1750   95   --   --   --   --    12/02/20 1520   92   --   --   --   --    12/02/20 13:24:26   97   --   --   --   --    12/02/20 1309   99   room air   --   --   --            Intake & Output (last 2 days)       12/02 0701 - 12/03 0700 12/03 0701 - 12/04 0700 12/04 0701 - 12/05 0700    P.O.  540 120    I.V. (mL/kg)  1557 (19.5)     Total Intake(mL/kg)  2097 (26.2) 120 (1.5)    Urine (mL/kg/hr)  400 (0.2) 200 (0.5)    Total Output  400  200    Net  +1697 -80           Urine Unmeasured Occurrence  3 x         Lines, Drains & Airways    Active LDAs     Name:   Placement date:   Placement time:   Site:   Days:    Peripheral IV 12/02/20 1337 Right Antecubital   12/02/20    1337    Antecubital   1         Inactive LDAs     None                  Lab Results (all)     Procedure Component Value Units Date/Time    Comprehensive Metabolic Panel [[id number]]  (Abnormal) Collected: 12/04/20 1028    Specimen: Blood Updated: 12/04/20 1136     Glucose 120 mg/dL      BUN 21 mg/dL      Creatinine 1.36 mg/dL      Sodium 137 mmol/L      Potassium 3.5 mmol/L      Chloride 104 mmol/L      CO2 22.0 mmol/L      Calcium 8.1 mg/dL      Total Protein 6.3 g/dL      Albumin 3.40 g/dL      ALT (SGPT) 25 U/L      AST (SGOT) 56 U/L      Alkaline Phosphatase 52 U/L      Total Bilirubin 0.4 mg/dL      eGFR Non African Amer 50 mL/min/1.73      Globulin 2.9 gm/dL      A/G Ratio 1.2 g/dL      BUN/Creatinine Ratio 15.4     Anion Gap 11.0 mmol/L     Narrative:      Lactate Dehydrogenase [[id number]]  (Abnormal) Collected: 12/04/20 1028    Specimen: Blood Updated: 12/04/20 1134      U/L     Ferritin [[id number]]  (Abnormal) Collected: 12/04/20 0459    Specimen: Blood Updated: 12/04/20 0601     Ferritin 849.00 ng/mL     Narrative:      C-reactive Protein [[id number]]  (Abnormal) Collected: 12/04/20 0459    Specimen: Blood Updated: 12/04/20 0557     C-Reactive Protein 9.12 mg/dL     CBC & Differential [[id number]]  (Abnormal) Collected: 12/04/20 0459    Specimen: Blood Updated: 12/04/20 0523    Narrative:      CBC Auto Differential [[id number]]  (Abnormal) Collected: 12/04/20 0459    Specimen: Blood Updated: 12/04/20 0523     WBC 6.77 10*3/mm3      RBC 3.82 10*6/mm3      Hemoglobin 12.5 g/dL      Hematocrit 37.6 %      MCV 98.4 fL      MCH 32.7 pg      MCHC 33.2 g/dL      RDW 13.1 %      RDW-SD 46.1 fl      MPV 11.6 fL      Platelets 153 10*3/mm3      Neutrophil % 71.3 %      Lymphocyte  % 20.2 %      Monocyte % 6.8 %      Eosinophil % 0.4 %      Basophil % 0.3 %      Immature Grans % 1.0 %      Neutrophils, Absolute 4.82 10*3/mm3      Lymphocytes, Absolute 1.37 10*3/mm3      Monocytes, Absolute 0.46 10*3/mm3      Eosinophils, Absolute 0.03 10*3/mm3      Basophils, Absolute 0.02 10*3/mm3      Immature Grans, Absolute 0.07 10*3/mm3      nRBC 0.0 /100 WBC     Basic Metabolic Panel [[id number]]  (Abnormal) Collected: 12/03/20 1244    Specimen: Blood Updated: 12/03/20 1335     Glucose 109 mg/dL      BUN 31 mg/dL      Creatinine 1.82 mg/dL      Sodium 136 mmol/L      Potassium 3.6 mmol/L      Chloride 98 mmol/L      CO2 24.4 mmol/L      Calcium 8.6 mg/dL      eGFR Non African Amer 36 mL/min/1.73      BUN/Creatinine Ratio 17.0     Anion Gap 13.6 mmol/L     Narrative:      GFR Normal >60  Chronic Kidney Disease <60  Kidney Failure <15      Magnesium [[id number]]  (Normal) Collected: 12/03/20 1244    Specimen: Blood Updated: 12/03/20 1335     Magnesium 2.2 mg/dL     CBC Auto Differential [[id number]]  (Abnormal) Collected: 12/03/20 1244    Specimen: Blood Updated: 12/03/20 1310     WBC 7.15 10*3/mm3      RBC 4.20 10*6/mm3      Hemoglobin 13.4 g/dL      Hematocrit 40.1 %      MCV 95.5 fL      MCH 31.9 pg      MCHC 33.4 g/dL      RDW 13.0 %      RDW-SD 45.2 fl      MPV 11.3 fL      Platelets 197 10*3/mm3      Neutrophil % 62.7 %      Lymphocyte % 27.0 %      Monocyte % 8.5 %      Eosinophil % 0.4 %      Basophil % 0.3 %      Immature Grans % 1.1 %      Neutrophils, Absolute 4.48 10*3/mm3      Lymphocytes, Absolute 1.93 10*3/mm3      Monocytes, Absolute 0.61 10*3/mm3      Eosinophils, Absolute 0.03 10*3/mm3      Basophils, Absolute 0.02 10*3/mm3      Immature Grans, Absolute 0.08 10*3/mm3      nRBC 0.0 /100 WBC     Respiratory Panel PCR w/COVID-19(SARS-CoV-2) SARAI/BOB/SCAR/PAD/COR/MAD/CJ In-House, NP Swab in UTM/VTM, 3-4 HR TAT - Swab, Nasopharynx [[id number]]  (Abnormal) Collected: 12/02/20 0353    Specimen: Swab  from Nasopharynx Updated: 12/02/20 1802     ADENOVIRUS, PCR Not Detected     Coronavirus 229E Not Detected     Coronavirus HKU1 Not Detected     Coronavirus NL63 Not Detected     Coronavirus OC43 Not Detected     COVID19 Detected     Human Metapneumovirus Not Detected     Human Rhinovirus/Enterovirus Not Detected     Influenza A PCR Not Detected     Influenza B PCR Not Detected     Parainfluenza Virus 1 Not Detected     Parainfluenza Virus 2 Not Detected     Parainfluenza Virus 3 Not Detected     Parainfluenza Virus 4 Not Detected     RSV, PCR Not Detected     Bordetella pertussis pcr Not Detected     Bordetella parapertussis PCR Not Detected     Chlamydophila pneumoniae PCR Not Detected     Mycoplasma pneumo by PCR Not Detected    Narrative:      Urinalysis With Microscopic If Indicated (No Culture) - Urine, Clean Catch [[id number]]  (Abnormal) Collected: 12/02/20 1545    Specimen: Urine, Clean Catch Updated: 12/02/20 1601     Color, UA Yellow     Appearance, UA Clear     pH, UA <=5.0     Specific Gravity, UA 1.014     Glucose, UA Negative     Ketones, UA Negative     Bilirubin, UA Negative     Blood, UA Trace     Protein, UA Trace     Leuk Esterase, UA Negative     Nitrite, UA Negative     Urobilinogen, UA 0.2 E.U./dL    Urinalysis, Microscopic Only - Urine, Clean Catch [[id number]] Collected: 12/02/20 1545    Specimen: Urine, Clean Catch Updated: 12/02/20 1601     RBC, UA 0-2 /HPF      WBC, UA 0-2 /HPF      Bacteria, UA None Seen /HPF      Squamous Epithelial Cells, UA 0-2 /HPF      Hyaline Casts, UA 0-2 /LPF      Methodology Automated Microscopy    Procalcitonin [[id number]]  (Normal) Collected: 12/02/20 1357    Specimen: Blood Updated: 12/02/20 1446     Procalcitonin 0.22 ng/mL     Narrative:      Comprehensive Metabolic Panel [[id number]]  (Abnormal) Collected: 12/02/20 1357    Specimen: Blood Updated: 12/02/20 1441     Glucose 100 mg/dL      BUN 33 mg/dL      Creatinine 2.15 mg/dL      Sodium 134 mmol/L       Potassium 4.0 mmol/L      Chloride 95 mmol/L      CO2 26.4 mmol/L      Calcium 9.1 mg/dL      Total Protein 7.8 g/dL      Albumin 4.20 g/dL      ALT (SGPT) 29 U/L      AST (SGOT) 55 U/L      Alkaline Phosphatase 68 U/L      Total Bilirubin 0.5 mg/dL      eGFR Non African Amer 29 mL/min/1.73      Globulin 3.6 gm/dL      A/G Ratio 1.2 g/dL      BUN/Creatinine Ratio 15.3     Anion Gap 12.6 mmol/L     Narrative:      GFR Normal >60  Chronic Kidney Disease <60  Kidney Failure <15      Lactate Dehydrogenase [[id number]]  (Abnormal) Collected: 12/02/20 1357    Specimen: Blood Updated: 12/02/20 1441      U/L     C-reactive Protein [[id number]]  (Abnormal) Collected: 12/02/20 1357    Specimen: Blood Updated: 12/02/20 1441     C-Reactive Protein 6.09 mg/dL     Troponin [[id number]]  (Normal) Collected: 12/02/20 1357    Specimen: Blood Updated: 12/02/20 1441     Troponin T <0.010 ng/mL     Narrative:      BNP [[id number]]  (Normal) Collected: 12/02/20 1357    Specimen: Blood Updated: 12/02/20 1438     proBNP 641.4 pg/mL     Narrative:      Lactic Acid, Plasma [[id number]]  (Normal) Collected: 12/02/20 1401    Specimen: Blood Updated: 12/02/20 1424     Lactate 1.4 mmol/L     CBC & Differential [[id number]]  (Abnormal) Collected: 12/02/20 1357    Specimen: Blood Updated: 12/02/20 1413    Narrative:      CBC Auto Differential [[id number]]  (Abnormal) Collected: 12/02/20 1357    Specimen: Blood Updated: 12/02/20 1413     WBC 7.46 10*3/mm3      RBC 4.22 10*6/mm3      Hemoglobin 13.9 g/dL      Hematocrit 40.3 %      MCV 95.5 fL      MCH 32.9 pg      MCHC 34.5 g/dL      RDW 13.4 %      RDW-SD 46.7 fl      MPV 11.8 fL      Platelets 239 10*3/mm3      Neutrophil % 70.0 %      Lymphocyte % 20.9 %      Monocyte % 7.4 %      Eosinophil % 0.1 %      Basophil % 0.3 %      Immature Grans % 1.3 %      Neutrophils, Absolute 5.22 10*3/mm3      Lymphocytes, Absolute 1.56 10*3/mm3      Monocytes, Absolute 0.55 10*3/mm3      Eosinophils, Absolute  0.01 10*3/mm3      Basophils, Absolute 0.02 10*3/mm3      Immature Grans, Absolute 0.10 10*3/mm3      nRBC 0.0 /100 WBC           Imaging Results (Most Recent)     Procedure Component Value Units Date/Time    CT Head Without Contrast [[id number]] Collected: 12/02/20 1446     Updated: 12/03/20 1134    Narrative:      EMERGENCY NONCONTRAST HEAD CT 12/02/2020     CLINICAL HISTORY: Patient fell, hit back of head, minor head injury, has  some weakness, patient is on Eliquis - blood thinners.     TECHNIQUE: Spiral CT images were obtained from the base of the skull to  the vertex without intravenous contrast. Images were reformatted and  submitted in 3 mm thick axial CT section with brain algorithm and 2 mm  thick axial CT section with high-resolution bone algorithm and 2 mm  thick sagittal and coronal reconstructions were performed and submitted  in brain algorithm.     FINDINGS: There is mild low-density in the periventricular white matter  consistent with mild small vessel disease.  There is a 6 mm old lacunar  infarct in the body of the right caudate nucleus. There is a 15 x 5 mm  area of encephalomalacia compatible with old infarct in the posterior  inferior lateral left cerebellum in the left PICA territory. There is a  focal area of benign dystrophic calcification in the central portion of  the right side of anjel measuring 4 x 3 mm in size, felt to be a benign  dystrophic calcification of no acute significance. The remainder of the  brain parenchyma is normal in attenuation. The ventricles are normal in  size. I see no focal mass effect. There is no midline shift. No  extra-axial fluid collections are identified. There is no evidence of  acute intracranial hemorrhage. There is subtotal opacification of the  left sphenoid sinus with fluid and mucosal thickening and bony  thickening of the walls of the left sphenoid sinus from chronic left  sphenoid sinus disease.  The remainder of the paranasal sinuses and  mastoid  air cells and middle ear cavities are clear. There is a tiny  scalp hematoma over the lateral left occipital bone from today's head  trauma.  No underlying acute skull fracture is identified.       Impression:      1. No acute intracranial abnormality is seen with no acute skull  fracture or intracranial hemorrhage identified.  2. Mild small vessel disease in the cerebral white matter, a 6 mm old  lacunar infarct in the body of the right caudate nucleus and there is a  15 x 5 mm old posterior inferior lateral left cerebellar infarct in the  left PICA territory and calcified plaques in the cavernous internal  carotid arteries.  3. There is complete opacification of the left sphenoid sinus with  circumferential mucosal thickening and some central chronic inspissated  secretions and bony thickening of the walls of the left sphenoid sinus  from chronic sphenoid sinus disease and there is nodular opacity  anterior to the left sphenoid ostium that could be a polyp.  This could  be correlated with direct visualization. The remainder of the head CT is  unremarkable.      Radiation dose reduction techniques were utilized, including automated  exposure control and exposure modulation based on body size.     This report was finalized on 12/3/2020 11:31 AM by Dr. Bhaskar De Guzman M.D.       XR Chest AP [[id number]] Collected: 12/02/20 1507     Updated: 12/02/20 1516    Narrative:      XR CHEST AP-     INDICATIONS: Cough and fever, possible Covid 19     TECHNIQUE: Frontal view of the chest     COMPARISON: 04/09/2012, correlated with CT from 07/16/2020     FINDINGS:     The heart size is borderline. The aorta appears ectatic, calcified.  Sternotomy wires are noted. The pulmonary vasculature is unremarkable. A  previous CT demonstrated right retrocardiac opacity is not well  characterized on this exam. Minimal patchy densities at the lung bases  could be areas of developing pneumonia, including possibility of viral  pneumonia,  follow-up recommended. No pleural effusion or pneumothorax.  No acute osseous process.       Impression:         Minimal patchy densities at the lung bases could be areas of developing  pneumonia, including possibility of viral pneumonia, follow-up  recommended. Ectatic aorta.     This report was finalized on 12/2/2020 3:13 PM by Dr. Al Vo M.D.           ECG/EMG Results (all)     Procedure Component Value Units Date/Time    ECG 12 Lead [[id number]] Collected: 12/02/20 1448     Updated: 12/02/20 2015     QT Interval 504 ms     Narrative:      HEART RATE= 65  bpm  RR Interval= 920  ms  OH Interval= 238  ms  P Horizontal Axis= 16  deg  P Front Axis= 46  deg  QRSD Interval= 97  ms  QT Interval= 504  ms  QRS Axis= 20  deg  T Wave Axis= 61  deg  - ABNORMAL ECG -  Sinus rhythm  Prolonged OH interval  Prolonged QT interval  No change from prior tracing  Electronically Signed By: [facility] ([facility] 02-Dec-2020 20:12:02  Date and Time of Study: 2020-12-02 14:48:26          Orders (all)      Start     Ordered    12/05/20 0600  CBC & Differential  Morning Draw      12/04/20 1201    12/05/20 0600  Comprehensive Metabolic Panel  Morning Draw      12/04/20 1201    12/05/20 0600  C-reactive Protein  Morning Draw      12/04/20 1201    12/05/20 0600  Ferritin  Morning Draw      12/04/20 1201    12/05/20 0600  Lactate Dehydrogenase  Morning Draw      12/04/20 1201    12/04/20 0607  Lactate Dehydrogenase  Morning Draw      12/03/20 1459    12/04/20 0603  Comprehensive Metabolic Panel  Morning Draw      12/03/20 1459    12/04/20 0600  CBC & Differential  Morning Draw      12/03/20 1459    12/04/20 0600  C-reactive Protein  Morning Draw      12/03/20 1459    12/04/20 0600  Ferritin  Morning Draw      12/03/20 1459    12/04/20 0600  Type & Screen  Morning Draw      12/03/20 1644    12/04/20 0600  CBC Auto Differential  PROCEDURE ONCE      12/04/20 0003    12/03/20 1130  benzonatate (TESSALON) capsule 100 mg  Every 8  Hours PRN      12/03/20 0948    12/03/20 1009  Basic Metabolic Panel  Morning Draw      12/02/20 2134    12/03/20 1009  CBC Auto Differential  Morning Draw      12/02/20 2134    12/03/20 1009  Magnesium  Morning Draw      12/02/20 2134 12/03/20 0950  Ok to give betapace with HR at 54  Nursing Communication  Once     Comments: Ok to give betapace with HR at 54    12/03/20 0949    12/03/20 0946  guaiFENesin-dextromethorphan (ROBITUSSIN DM) 100-10 MG/5ML syrup 10 mL  Every 4 Hours PRN      12/03/20 0948    12/03/20 0900  vitamin B-12 (CYANOCOBALAMIN) tablet 2,000 mcg  Daily      12/02/20 2137 12/03/20 0900  escitalopram (LEXAPRO) tablet 20 mg  Daily      12/02/20 2137 12/03/20 0900  sotalol (BETAPACE) tablet 80 mg  Daily      12/02/20 2137 12/03/20 0700  pantoprazole (PROTONIX) EC tablet 40 mg  Every Morning      12/02/20 2137 12/03/20 0600  levothyroxine (SYNTHROID, LEVOTHROID) tablet 88 mcg  Every Early Morning      12/02/20 2137 12/03/20 0000  Vital Signs  Every 4 Hours      12/02/20 2134 12/02/20 2230  sodium chloride 0.9 % flush 10 mL  Every 12 Hours Scheduled      12/02/20 2134 12/02/20 2230  sodium chloride 0.9 % infusion  Continuous      12/02/20 2136 12/02/20 2230  atorvastatin (LIPITOR) tablet 40 mg  Nightly      12/02/20 2137 12/02/20 2230  carvedilol (COREG) tablet 3.125 mg  2 Times Daily With Meals      12/02/20 2137 12/02/20 2230  apixaban (ELIQUIS) tablet 2.5 mg  Every 12 Hours      12/02/20 2137 12/02/20 2135  Code Status and Medical Interventions:  Continuous      12/02/20 2134 12/02/20 2135  Intake & Output  Every Shift      12/02/20 2134 12/02/20 2135  Weigh Patient  Once      12/02/20 2134 12/02/20 2135  Oxygen Therapy- Nasal Cannula; Titrate for SPO2: 90% - 95%  Continuous      12/02/20 2134 12/02/20 2135  OT Consult: Eval & Treat  Once      12/02/20 2134 12/02/20 2135  PT Consult: Eval & Treat As Tolerated; Discharge Placement Assessment  Once       12/02/20 2134 12/02/20 2135  Insert Peripheral IV  Once      12/02/20 2134 12/02/20 2135  Saline Lock & Maintain IV Access  Continuous      12/02/20 2134 12/02/20 2135  Place Sequential Compression Device  Once      12/02/20 2134 12/02/20 2135  Maintain Sequential Compression Device  Continuous      12/02/20 2134 12/02/20 2135  Diet Regular; Cardiac  Diet Effective Now      12/02/20 2134 12/02/20 2134  sodium chloride 0.9 % flush 10 mL  As Needed      12/02/20 2134 12/02/20 2134  acetaminophen (TYLENOL) tablet 650 mg  Every 4 Hours PRN      12/02/20 2134 12/02/20 2134  acetaminophen (TYLENOL) 160 MG/5ML solution 650 mg  Every 4 Hours PRN      12/02/20 2134 12/02/20 2134  acetaminophen (TYLENOL) suppository 650 mg  Every 4 Hours PRN      12/02/20 2134 12/02/20 2134  ondansetron (ZOFRAN) injection 4 mg  Every 6 Hours PRN      12/02/20 2134 12/02/20 1904  Inpatient Admission  Once      12/02/20 1904    12/02/20 1812  pantoprazole (PROTONIX) EC tablet 40 mg  Once      12/02/20 1810    12/02/20 1559  Urinalysis, Microscopic Only - Urine, Clean Catch  Once      12/02/20 1558    12/02/20 1344  ECG 12 Lead  Once      12/02/20 1343    12/02/20 1344  Urinalysis With Microscopic If Indicated (No Culture) - Urine, Clean Catch  Once      12/02/20 1343    12/02/20 1344  BNP  Once      12/02/20 1343    12/02/20 1344  Troponin  Once      12/02/20 1343    12/02/20 1343  CT Head Without Contrast  1 Time Imaging      12/02/20 1343    12/02/20 1342  CBC & Differential  STAT      12/02/20 1341    12/02/20 1342  Comprehensive Metabolic Panel  STAT      12/02/20 1341    12/02/20 1342  Lactate Dehydrogenase  STAT      12/02/20 1341    12/02/20 1342  Procalcitonin  STAT      12/02/20 1341    12/02/20 1342  Respiratory Panel PCR w/COVID-19(SARS-CoV-2) SARAI/BOB/SCAR/PAD/COR/MAD/CJ In-House, NP Swab in UTM/VTM, 3-4 HR TAT - Swab, Nasopharynx  STAT      12/02/20 1341    12/02/20 1342  C-reactive Protein  STAT   "    20 1341    20 1342  Lactic Acid, Plasma  STAT      20 1341    20 1342  XR Chest AP  1 Time Imaging      20 1341    20 1342  CBC Auto Differential  PROCEDURE ONCE      20 1341    Unscheduled  Up With Assistance  As Needed      20 2134                   Physician Progress Notes (all)      Sanjeev Mullen MD at 20 1108          DAILY PROGRESS NOTE  [facility]    Patient Identification:  Name: Bhaskar Ibarra  Age: 85 y.o.  Sex: male  :  1935  MRN: [id number]         Primary Care Physician: Katherine Lester APRN    Subjective:  Interval History: He is weak and has poor appetite.    Objective:    Scheduled Meds:apixaban, 2.5 mg, Oral, Q12H  atorvastatin, 40 mg, Oral, Nightly  carvedilol, 3.125 mg, Oral, BID With Meals  escitalopram, 20 mg, Oral, Daily  levothyroxine, 88 mcg, Oral, Q AM  pantoprazole, 40 mg, Oral, QAM  sodium chloride, 10 mL, Intravenous, Q12H  sotalol, 80 mg, Oral, Daily  cyanocobalamin, 2,000 mcg, Oral, Daily      Continuous Infusions:sodium chloride, 75 mL/hr, Last Rate: 75 mL/hr (20)        Vital signs in last 24 hours:  Temp:  [99.3 °F (37.4 °C)-100.5 °F (38.1 °C)] 100.2 °F (37.9 °C)  Heart Rate:  [56-64] 60  Resp:  [16-20] 16  BP: (119-136)/(53-69) 131/59    Intake/Output:    Intake/Output Summary (Last 24 hours) at 2020 1108  Last data filed at 2020 0636  Gross per 24 hour   Intake 1857 ml   Output 400 ml   Net 1457 ml       Exam:  /59 (BP Location: Left arm, Patient Position: Lying)   Pulse 60   Temp 100.2 °F (37.9 °C) (Oral)   Resp 16   Ht 180.3 cm (71\")   Wt 80 kg (176 lb 5.9 oz)   SpO2 91%   BMI 24.60 kg/m²     General Appearance:    Alert, cooperative, no distress   Head:    Normocephalic, without obvious abnormality, atraumatic   Eyes:       Throat:   Lips, tongue, gums normal   Neck:   Supple, symmetrical, trachea midline, no JVD   Lungs:     Clear to auscultation " bilaterally, respirations unlabored   Chest Wall:    No tenderness or deformity    Heart:    Regular rate and rhythm, S1 and S2 normal, no murmur,no  Rub or gallop   Abdomen:     Soft, nontender, bowel sounds active, no masses, no organomegaly    Extremities:   Extremities normal, atraumatic, no cyanosis or edema   Pulses:      Skin:   Skin is warm and dry,  no rashes or palpable lesions   Neurologic:   no focal deficits noted    No results found for: POCGLU        Past Medical History:   Diagnosis Date   • Abdominal aortic aneurysm (CMS/HCC)    • Acute myocardial infarction (CMS/HCC)    • Acute renal failure (CMS/HCC)    • Anemia    • Aneurysm (CMS/HCC)    • Atrial fibrillation (CMS/HCC)    • Cardiomyopathy (CMS/HCC)    • Chest pain    • Coronary artery disease     July 2015-    • Depression    • GERD (gastroesophageal reflux disease)    • Hyperlipidemia    • Hypertension    • Hypothyroidism    • Lung cancer (CMS/HCC) 2016    radiation    • Mitral regurgitation    • Myocardial infarction (CMS/HCC)    • Pleural effusion    • Pleural effusion, bilateral    • RLS (restless legs syndrome)    • Sleep apnea    • Ulcerative colitis (CMS/HCC)    • Ulcerative colitis (CMS/HCC)        Assessment:  Active Hospital Problems    Diagnosis  POA   • Pneumonia due to COVID-19 virus [U07.1, J12.89]  Yes      Resolved Hospital Problems   No resolved problems to display.       Plan:  Continue with IV fluid hydration follow-up Covid lab testing.  If he is starting to eat and drink a little bit better may be he can go home with his family in a day or 2.    Sanjeev Mullen MD  12/4/2020  11:08 EST    Electronically signed by Sanjeev Mullen MD at 12/04/20 1200

## 2020-12-04 NOTE — PROGRESS NOTES
"DAILY PROGRESS NOTE  [facility]    Patient Identification:  Name: Bhaskar Ibarra  Age: 85 y.o.  Sex: male  :  1935  MRN: [id number]         Primary Care Physician: Katherine Lester APRN    Subjective:  Interval History: He is weak and has poor appetite.    Objective:    Scheduled Meds:apixaban, 2.5 mg, Oral, Q12H  atorvastatin, 40 mg, Oral, Nightly  carvedilol, 3.125 mg, Oral, BID With Meals  escitalopram, 20 mg, Oral, Daily  levothyroxine, 88 mcg, Oral, Q AM  pantoprazole, 40 mg, Oral, QAM  sodium chloride, 10 mL, Intravenous, Q12H  sotalol, 80 mg, Oral, Daily  cyanocobalamin, 2,000 mcg, Oral, Daily      Continuous Infusions:sodium chloride, 75 mL/hr, Last Rate: 75 mL/hr (206)        Vital signs in last 24 hours:  Temp:  [99.3 °F (37.4 °C)-100.5 °F (38.1 °C)] 100.2 °F (37.9 °C)  Heart Rate:  [56-64] 60  Resp:  [16-20] 16  BP: (119-136)/(53-69) 131/59    Intake/Output:    Intake/Output Summary (Last 24 hours) at 2020 1108  Last data filed at 2020 0636  Gross per 24 hour   Intake 1857 ml   Output 400 ml   Net 1457 ml       Exam:  /59 (BP Location: Left arm, Patient Position: Lying)   Pulse 60   Temp 100.2 °F (37.9 °C) (Oral)   Resp 16   Ht 180.3 cm (71\")   Wt 80 kg (176 lb 5.9 oz)   SpO2 91%   BMI 24.60 kg/m²     General Appearance:    Alert, cooperative, no distress   Head:    Normocephalic, without obvious abnormality, atraumatic   Eyes:       Throat:   Lips, tongue, gums normal   Neck:   Supple, symmetrical, trachea midline, no JVD   Lungs:     Clear to auscultation bilaterally, respirations unlabored   Chest Wall:    No tenderness or deformity    Heart:    Regular rate and rhythm, S1 and S2 normal, no murmur,no  Rub or gallop   Abdomen:     Soft, nontender, bowel sounds active, no masses, no organomegaly    Extremities:   Extremities normal, atraumatic, no cyanosis or edema   Pulses:      Skin:   Skin is warm and dry,  no rashes or palpable lesions "   Neurologic:   no focal deficits noted      Lab Results (last 72 hours)     Procedure Component Value Units Date/Time    Comprehensive Metabolic Panel [[id number]] Collected: 12/04/20 1028    Specimen: Blood Updated: 12/04/20 1057    Lactate Dehydrogenase [[id number]] Collected: 12/04/20 1028    Specimen: Blood Updated: 12/04/20 1057    Ferritin [[id number]]  (Abnormal) Collected: 12/04/20 0459    Specimen: Blood Updated: 12/04/20 0601     Ferritin 849.00 ng/mL     Narrative:      Results may be falsely decreased if patient taking Biotin.      C-reactive Protein [[id number]]  (Abnormal) Collected: 12/04/20 0459    Specimen: Blood Updated: 12/04/20 0557     C-Reactive Protein 9.12 mg/dL     CBC & Differential [[id number]]  (Abnormal) Collected: 12/04/20 0459    Specimen: Blood Updated: 12/04/20 0523    Narrative:      The following orders were created for panel order CBC & Differential.  Procedure                               Abnormality         Status                     ---------                               -----------         ------                     CBC Auto Differential[[id number]]        Abnormal            Final result                 Please view results for these tests on the individual orders.    CBC Auto Differential [[id number]]  (Abnormal) Collected: 12/04/20 0459    Specimen: Blood Updated: 12/04/20 0523     WBC 6.77 10*3/mm3      RBC 3.82 10*6/mm3      Hemoglobin 12.5 g/dL      Hematocrit 37.6 %      MCV 98.4 fL      MCH 32.7 pg      MCHC 33.2 g/dL      RDW 13.1 %      RDW-SD 46.1 fl      MPV 11.6 fL      Platelets 153 10*3/mm3      Neutrophil % 71.3 %      Lymphocyte % 20.2 %      Monocyte % 6.8 %      Eosinophil % 0.4 %      Basophil % 0.3 %      Immature Grans % 1.0 %      Neutrophils, Absolute 4.82 10*3/mm3      Lymphocytes, Absolute 1.37 10*3/mm3      Monocytes, Absolute 0.46 10*3/mm3      Eosinophils, Absolute 0.03 10*3/mm3      Basophils, Absolute 0.02 10*3/mm3      Immature Grans, Absolute 0.07  10*3/mm3      nRBC 0.0 /100 WBC     Basic Metabolic Panel [[id number]]  (Abnormal) Collected: 12/03/20 1244    Specimen: Blood Updated: 12/03/20 1335     Glucose 109 mg/dL      BUN 31 mg/dL      Creatinine 1.82 mg/dL      Sodium 136 mmol/L      Potassium 3.6 mmol/L      Chloride 98 mmol/L      CO2 24.4 mmol/L      Calcium 8.6 mg/dL      eGFR Non African Amer 36 mL/min/1.73      BUN/Creatinine Ratio 17.0     Anion Gap 13.6 mmol/L     Narrative:      GFR Normal >60  Chronic Kidney Disease <60  Kidney Failure <15      Magnesium [[id number]]  (Normal) Collected: 12/03/20 1244    Specimen: Blood Updated: 12/03/20 1335     Magnesium 2.2 mg/dL     CBC Auto Differential [[id number]]  (Abnormal) Collected: 12/03/20 1244    Specimen: Blood Updated: 12/03/20 1310     WBC 7.15 10*3/mm3      RBC 4.20 10*6/mm3      Hemoglobin 13.4 g/dL      Hematocrit 40.1 %      MCV 95.5 fL      MCH 31.9 pg      MCHC 33.4 g/dL      RDW 13.0 %      RDW-SD 45.2 fl      MPV 11.3 fL      Platelets 197 10*3/mm3      Neutrophil % 62.7 %      Lymphocyte % 27.0 %      Monocyte % 8.5 %      Eosinophil % 0.4 %      Basophil % 0.3 %      Immature Grans % 1.1 %      Neutrophils, Absolute 4.48 10*3/mm3      Lymphocytes, Absolute 1.93 10*3/mm3      Monocytes, Absolute 0.61 10*3/mm3      Eosinophils, Absolute 0.03 10*3/mm3      Basophils, Absolute 0.02 10*3/mm3      Immature Grans, Absolute 0.08 10*3/mm3      nRBC 0.0 /100 WBC     Respiratory Panel PCR w/COVID-19(SARS-CoV-2) SARAI/BOB/SCAR/PAD/COR/MAD/CJ In-House, NP Swab in [facility], 3-4 HR TAT - Swab, Nasopharynx [[id number]]  (Abnormal) Collected: 12/02/20 1358    Specimen: Swab from Nasopharynx Updated: 12/02/20 1802     ADENOVIRUS, PCR Not Detected     Coronavirus 229E Not Detected     Coronavirus HKU1 Not Detected     Coronavirus NL63 Not Detected     Coronavirus OC43 Not Detected     COVID19 Detected     Human Metapneumovirus Not Detected     Human Rhinovirus/Enterovirus Not Detected     Influenza A PCR Not  Detected     Influenza B PCR Not Detected     Parainfluenza Virus 1 Not Detected     Parainfluenza Virus 2 Not Detected     Parainfluenza Virus 3 Not Detected     Parainfluenza Virus 4 Not Detected     RSV, PCR Not Detected     Bordetella pertussis pcr Not Detected     Bordetella parapertussis PCR Not Detected     Chlamydophila pneumoniae PCR Not Detected     Mycoplasma pneumo by PCR Not Detected    Narrative:      Fact sheet for providers: https://docs.[employer]/wp-content/uploads/COV9985-9322-JD0.1-EUA-Provider-Fact-Sheet-3.pdf    Fact sheet for patients: https://docs.[employer]/wp-content/uploads/SBU1372-5391-GF5.1-EUA-Patient-Fact-Sheet-1.pdf    Urinalysis With Microscopic If Indicated (No Culture) - Urine, Clean Catch [[id number]]  (Abnormal) Collected: 12/02/20 1545    Specimen: Urine, Clean Catch Updated: 12/02/20 1601     Color, UA Yellow     Appearance, UA Clear     pH, UA <=5.0     Specific Gravity, UA 1.014     Glucose, UA Negative     Ketones, UA Negative     Bilirubin, UA Negative     Blood, UA Trace     Protein, UA Trace     Leuk Esterase, UA Negative     Nitrite, UA Negative     Urobilinogen, UA 0.2 E.U./dL    Urinalysis, Microscopic Only - Urine, Clean Catch [[id number]] Collected: 12/02/20 1545    Specimen: Urine, Clean Catch Updated: 12/02/20 1601     RBC, UA 0-2 /HPF      WBC, UA 0-2 /HPF      Bacteria, UA None Seen /HPF      Squamous Epithelial Cells, UA 0-2 /HPF      Hyaline Casts, UA 0-2 /LPF      Methodology Automated Microscopy    Procalcitonin [[id number]]  (Normal) Collected: 12/02/20 1357    Specimen: Blood Updated: 12/02/20 1446     Procalcitonin 0.22 ng/mL     Narrative:      As a Marker for Sepsis (Non-Neonates):   1. <0.5 ng/mL represents a low risk of severe sepsis and/or septic shock.  1. >2 ng/mL represents a high risk of severe sepsis and/or septic shock.    As a Marker for Lower Respiratory Tract Infections that require antibiotic therapy:  PCT on Admission     Antibiotic  "Therapy             6-12 Hrs later  > 0.5                Strongly Recommended            >0.25 - <0.5         Recommended  0.1 - 0.25           Discouraged                   Remeasure/reassess PCT  <0.1                 Strongly Discouraged          Remeasure/reassess PCT      As 28 day mortality risk marker: \"Change in Procalcitonin Result\" (> 80 % or <=80 %) if Day 0 (or Day 1) and Day 4 values are available. Refer to http://www.[facility]/   Change in PCT <=80 %   A decrease of PCT levels below or equal to 80 % defines a positive change in PCT test result representing a higher risk for 28-day all-cause mortality of patients diagnosed with severe sepsis or septic shock.  Change in PCT > 80 %   A decrease of PCT levels of more than 80 % defines a negative change in PCT result representing a lower risk for 28-day all-cause mortality of patients diagnosed with severe sepsis or septic shock.                Results may be falsely decreased if patient taking Biotin.     Comprehensive Metabolic Panel [[id number]]  (Abnormal) Collected: 12/02/20 1357    Specimen: Blood Updated: 12/02/20 1441     Glucose 100 mg/dL      BUN 33 mg/dL      Creatinine 2.15 mg/dL      Sodium 134 mmol/L      Potassium 4.0 mmol/L      Chloride 95 mmol/L      CO2 26.4 mmol/L      Calcium 9.1 mg/dL      Total Protein 7.8 g/dL      Albumin 4.20 g/dL      ALT (SGPT) 29 U/L      AST (SGOT) 55 U/L      Alkaline Phosphatase 68 U/L      Total Bilirubin 0.5 mg/dL      eGFR Non African Amer 29 mL/min/1.73      Globulin 3.6 gm/dL      A/G Ratio 1.2 g/dL      BUN/Creatinine Ratio 15.3     Anion Gap 12.6 mmol/L     Narrative:      GFR Normal >60  Chronic Kidney Disease <60  Kidney Failure <15      Lactate Dehydrogenase [[id number]]  (Abnormal) Collected: 12/02/20 1357    Specimen: Blood Updated: 12/02/20 1441      U/L     C-reactive Protein [[id number]]  (Abnormal) Collected: 12/02/20 1357    Specimen: Blood Updated: 12/02/20 1441     " C-Reactive Protein 6.09 mg/dL     Troponin [[id number]]  (Normal) Collected: 12/02/20 1357    Specimen: Blood Updated: 12/02/20 1441     Troponin T <0.010 ng/mL     Narrative:      Troponin T Reference Range:  <= 0.03 ng/mL-   Negative for AMI  >0.03 ng/mL-     Abnormal for myocardial necrosis.  Clinicians would have to utilize clinical acumen, EKG, Troponin and serial changes to determine if it is an Acute Myocardial Infarction or myocardial injury due to an underlying chronic condition.       Results may be falsely decreased if patient taking Biotin.      BNP [[id number]]  (Normal) Collected: 12/02/20 1357    Specimen: Blood Updated: 12/02/20 1438     proBNP 641.4 pg/mL     Narrative:      Among patients with dyspnea, NT-proBNP is highly sensitive for the detection of acute congestive heart failure. In addition NT-proBNP of <300 pg/ml effectively rules out acute congestive heart failure with 99% negative predictive value.    Results may be falsely decreased if patient taking Biotin.      Lactic Acid, Plasma [[id number]]  (Normal) Collected: 12/02/20 1401    Specimen: Blood Updated: 12/02/20 1424     Lactate 1.4 mmol/L     CBC & Differential [[id number]]  (Abnormal) Collected: 12/02/20 1357    Specimen: Blood Updated: 12/02/20 1413    Narrative:      The following orders were created for panel order CBC & Differential.  Procedure                               Abnormality         Status                     ---------                               -----------         ------                     CBC Auto Differential[[id number]]        Abnormal            Final result                 Please view results for these tests on the individual orders.    CBC Auto Differential [[id number]]  (Abnormal) Collected: 12/02/20 1357    Specimen: Blood Updated: 12/02/20 1413     WBC 7.46 10*3/mm3      RBC 4.22 10*6/mm3      Hemoglobin 13.9 g/dL      Hematocrit 40.3 %      MCV 95.5 fL      MCH 32.9 pg      MCHC 34.5 g/dL      RDW 13.4 %       RDW-SD 46.7 fl      MPV 11.8 fL      Platelets 239 10*3/mm3      Neutrophil % 70.0 %      Lymphocyte % 20.9 %      Monocyte % 7.4 %      Eosinophil % 0.1 %      Basophil % 0.3 %      Immature Grans % 1.3 %      Neutrophils, Absolute 5.22 10*3/mm3      Lymphocytes, Absolute 1.56 10*3/mm3      Monocytes, Absolute 0.55 10*3/mm3      Eosinophils, Absolute 0.01 10*3/mm3      Basophils, Absolute 0.02 10*3/mm3      Immature Grans, Absolute 0.10 10*3/mm3      nRBC 0.0 /100 WBC         Data Review:  Results from last 7 days   Lab Units 12/03/20  1244 12/02/20  1357   SODIUM mmol/L 136 134*   POTASSIUM mmol/L 3.6 4.0   CHLORIDE mmol/L 98 95*   CO2 mmol/L 24.4 26.4   BUN mg/dL 31* 33*   CREATININE mg/dL 1.82* 2.15*   GLUCOSE mg/dL 109* 100*   CALCIUM mg/dL 8.6 9.1     Results from last 7 days   Lab Units 12/04/20  0459 12/03/20  1244 12/02/20  1357   WBC 10*3/mm3 6.77 7.15 7.46   HEMOGLOBIN g/dL 12.5* 13.4 13.9   HEMATOCRIT % 37.6 40.1 40.3   PLATELETS 10*3/mm3 153 197 239             Lab Results   Lab Value Date/Time    TROPONINT <0.010 12/02/2020 1357    TROPONINT <0.010 07/23/2017 1204    TROPONINT 1.30 (C) 07/27/2015 0640    TROPONINT 0.90 (C) 07/26/2015 1115    TROPONINT 0.46 (C) 07/26/2015 0205         Results from last 7 days   Lab Units 12/02/20  1357   ALK PHOS U/L 68   BILIRUBIN mg/dL 0.5   ALT (SGPT) U/L 29   AST (SGOT) U/L 55*             No results found for: POCGLU        Past Medical History:   Diagnosis Date   • Abdominal aortic aneurysm (CMS/HCC)    • Acute myocardial infarction (CMS/HCC)    • Acute renal failure (CMS/HCC)    • Anemia    • Aneurysm (CMS/HCC)    • Atrial fibrillation (CMS/HCC)    • Cardiomyopathy (CMS/HCC)    • Chest pain    • Coronary artery disease     July 2015-    • Depression    • GERD (gastroesophageal reflux disease)    • Hyperlipidemia    • Hypertension    • Hypothyroidism    • Lung cancer (CMS/HCC) 2016    radiation    • Mitral regurgitation    • Myocardial infarction (CMS/HCC)    •  Pleural effusion    • Pleural effusion, bilateral    • RLS (restless legs syndrome)    • Sleep apnea    • Ulcerative colitis (CMS/HCC)    • Ulcerative colitis (CMS/HCC)        Assessment:  Active Hospital Problems    Diagnosis  POA   • Pneumonia due to COVID-19 virus [U07.1, J12.89]  Yes      Resolved Hospital Problems   No resolved problems to display.       Plan:  Continue with IV fluid hydration follow-up Covid lab testing.  If he is starting to eat and drink a little bit better may be he can go home with his family in a day or 2.    Sanjeev Mullen MD  12/4/2020  11:08 EST

## 2020-12-04 NOTE — TELEPHONE ENCOUNTER
Caller: [facility]    Best call back number: [phone]    What orders are you requesting: HOME HEALTH NURSE REQUESTING TO HAVE VERBAL OKAY FOR HOME HEALTH     PLEASE CALL BACK AND ADVISE    PT ADMITTED FOR COVID-19 AND PNEUMONIA

## 2020-12-05 PROBLEM — N17.9 AKI (ACUTE KIDNEY INJURY): Status: ACTIVE | Noted: 2020-12-05

## 2020-12-05 PROBLEM — I50.32 CHRONIC DIASTOLIC CHF (CONGESTIVE HEART FAILURE) (HCC): Status: ACTIVE | Noted: 2020-12-05

## 2020-12-05 LAB
ALBUMIN SERPL-MCNC: 3 G/DL (ref 3.5–5.2)
ALBUMIN/GLOB SERPL: 1 G/DL
ALP SERPL-CCNC: 52 U/L (ref 39–117)
ALT SERPL W P-5'-P-CCNC: 24 U/L (ref 1–41)
ANION GAP SERPL CALCULATED.3IONS-SCNC: 7.2 MMOL/L (ref 5–15)
AST SERPL-CCNC: 52 U/L (ref 1–40)
BASOPHILS # BLD AUTO: 0.01 10*3/MM3 (ref 0–0.2)
BASOPHILS NFR BLD AUTO: 0.1 % (ref 0–1.5)
BILIRUB SERPL-MCNC: 0.5 MG/DL (ref 0–1.2)
BUN SERPL-MCNC: 18 MG/DL (ref 8–23)
BUN/CREAT SERPL: 14.1 (ref 7–25)
CALCIUM SPEC-SCNC: 7.7 MG/DL (ref 8.6–10.5)
CHLORIDE SERPL-SCNC: 101 MMOL/L (ref 98–107)
CO2 SERPL-SCNC: 22.8 MMOL/L (ref 22–29)
CREAT SERPL-MCNC: 1.28 MG/DL (ref 0.76–1.27)
CRP SERPL-MCNC: 14.22 MG/DL (ref 0–0.5)
DEPRECATED RDW RBC AUTO: 44.4 FL (ref 37–54)
EOSINOPHIL # BLD AUTO: 0.02 10*3/MM3 (ref 0–0.4)
EOSINOPHIL NFR BLD AUTO: 0.3 % (ref 0.3–6.2)
ERYTHROCYTE [DISTWIDTH] IN BLOOD BY AUTOMATED COUNT: 13 % (ref 12.3–15.4)
FERRITIN SERPL-MCNC: 786 NG/ML (ref 30–400)
GFR SERPL CREATININE-BSD FRML MDRD: 53 ML/MIN/1.73
GLOBULIN UR ELPH-MCNC: 3.1 GM/DL
GLUCOSE SERPL-MCNC: 112 MG/DL (ref 65–99)
HCT VFR BLD AUTO: 34.7 % (ref 37.5–51)
HGB BLD-MCNC: 11.7 G/DL (ref 13–17.7)
IMM GRANULOCYTES # BLD AUTO: 0.08 10*3/MM3 (ref 0–0.05)
IMM GRANULOCYTES NFR BLD AUTO: 1.1 % (ref 0–0.5)
LDH SERPL-CCNC: 371 U/L (ref 135–225)
LYMPHOCYTES # BLD AUTO: 1.2 10*3/MM3 (ref 0.7–3.1)
LYMPHOCYTES NFR BLD AUTO: 16.1 % (ref 19.6–45.3)
MCH RBC QN AUTO: 31.8 PG (ref 26.6–33)
MCHC RBC AUTO-ENTMCNC: 33.7 G/DL (ref 31.5–35.7)
MCV RBC AUTO: 94.3 FL (ref 79–97)
MONOCYTES # BLD AUTO: 0.44 10*3/MM3 (ref 0.1–0.9)
MONOCYTES NFR BLD AUTO: 5.9 % (ref 5–12)
NEUTROPHILS NFR BLD AUTO: 5.72 10*3/MM3 (ref 1.7–7)
NEUTROPHILS NFR BLD AUTO: 76.5 % (ref 42.7–76)
NRBC BLD AUTO-RTO: 0 /100 WBC (ref 0–0.2)
PLATELET # BLD AUTO: 181 10*3/MM3 (ref 140–450)
PMV BLD AUTO: 10.9 FL (ref 6–12)
POTASSIUM SERPL-SCNC: 3.5 MMOL/L (ref 3.5–5.2)
PROT SERPL-MCNC: 6.1 G/DL (ref 6–8.5)
RBC # BLD AUTO: 3.68 10*6/MM3 (ref 4.14–5.8)
SODIUM SERPL-SCNC: 131 MMOL/L (ref 136–145)
WBC # BLD AUTO: 7.47 10*3/MM3 (ref 3.4–10.8)

## 2020-12-05 PROCEDURE — 97530 THERAPEUTIC ACTIVITIES: CPT

## 2020-12-05 PROCEDURE — 83615 LACTATE (LD) (LDH) ENZYME: CPT | Performed by: HOSPITALIST

## 2020-12-05 PROCEDURE — 82728 ASSAY OF FERRITIN: CPT | Performed by: HOSPITALIST

## 2020-12-05 PROCEDURE — 63710000001 DEXAMETHASONE PER 0.25 MG: Performed by: INTERNAL MEDICINE

## 2020-12-05 PROCEDURE — 80053 COMPREHEN METABOLIC PANEL: CPT | Performed by: HOSPITALIST

## 2020-12-05 PROCEDURE — 85025 COMPLETE CBC W/AUTO DIFF WBC: CPT | Performed by: HOSPITALIST

## 2020-12-05 PROCEDURE — 86140 C-REACTIVE PROTEIN: CPT | Performed by: HOSPITALIST

## 2020-12-05 RX ORDER — DIPHENOXYLATE HYDROCHLORIDE AND ATROPINE SULFATE 2.5; .025 MG/1; MG/1
2 TABLET ORAL 4 TIMES DAILY PRN
Status: DISCONTINUED | OUTPATIENT
Start: 2020-12-05 | End: 2020-12-12 | Stop reason: HOSPADM

## 2020-12-05 RX ADMIN — DEXAMETHASONE 6 MG: 4 TABLET ORAL at 13:56

## 2020-12-05 RX ADMIN — LEVOTHYROXINE SODIUM 88 MCG: 88 TABLET ORAL at 06:00

## 2020-12-05 RX ADMIN — ACETAMINOPHEN 650 MG: 325 TABLET, FILM COATED ORAL at 02:30

## 2020-12-05 RX ADMIN — SOTALOL HYDROCHLORIDE 80 MG: 80 TABLET ORAL at 10:04

## 2020-12-05 RX ADMIN — SODIUM CHLORIDE, PRESERVATIVE FREE 10 ML: 5 INJECTION INTRAVENOUS at 21:25

## 2020-12-05 RX ADMIN — CARVEDILOL 3.12 MG: 3.12 TABLET, FILM COATED ORAL at 10:04

## 2020-12-05 RX ADMIN — APIXABAN 2.5 MG: 2.5 TABLET, FILM COATED ORAL at 10:04

## 2020-12-05 RX ADMIN — REMDESIVIR 200 MG: 100 INJECTION, POWDER, LYOPHILIZED, FOR SOLUTION INTRAVENOUS at 21:25

## 2020-12-05 RX ADMIN — ATORVASTATIN CALCIUM 40 MG: 20 TABLET, FILM COATED ORAL at 21:25

## 2020-12-05 RX ADMIN — SODIUM CHLORIDE 75 ML/HR: 9 INJECTION, SOLUTION INTRAVENOUS at 02:33

## 2020-12-05 RX ADMIN — ACETAMINOPHEN 650 MG: 325 TABLET, FILM COATED ORAL at 10:16

## 2020-12-05 RX ADMIN — Medication 2000 MCG: at 10:04

## 2020-12-05 RX ADMIN — APIXABAN 2.5 MG: 2.5 TABLET, FILM COATED ORAL at 21:30

## 2020-12-05 RX ADMIN — SODIUM CHLORIDE, PRESERVATIVE FREE 10 ML: 5 INJECTION INTRAVENOUS at 10:05

## 2020-12-05 RX ADMIN — PANTOPRAZOLE SODIUM 40 MG: 40 TABLET, DELAYED RELEASE ORAL at 06:00

## 2020-12-05 RX ADMIN — ACETAMINOPHEN 650 MG: 325 TABLET, FILM COATED ORAL at 14:05

## 2020-12-05 RX ADMIN — ESCITALOPRAM 20 MG: 20 TABLET, FILM COATED ORAL at 10:04

## 2020-12-05 RX ADMIN — BENZONATATE 100 MG: 100 CAPSULE ORAL at 10:04

## 2020-12-05 NOTE — PLAN OF CARE
Goal Outcome Evaluation:  Plan of Care Reviewed With: patient  Progress: no change  Outcome Summary: Pt has no c/o pain. Tylenol given for low grade temp. IVF cont. Pt on room air and in NSR/SB. Continue to monitor. Safety maintained.

## 2020-12-05 NOTE — PLAN OF CARE
Goal Outcome Evaluation:  Plan of Care Reviewed With: patient  Progress: no change  Outcome Summary: Pt with limited gait distance and exercise partcipation secondary to fatigue and R thigh pain. PT will continue to follow to address strength, mobility, and gait.Patient was intermittently wearing a face mask during this therapy encounter. Therapist used appropriate personal protective equipment including eye protection, mask, and gloves.  Mask used was standard procedure mask. Appropriate PPE was worn during the entire therapy session. Hand hygiene was completed before and after therapy session. Patient is not in enhanced droplet precautions.

## 2020-12-05 NOTE — THERAPY TREATMENT NOTE
Patient Name: Bhaskar Ibarra  : 1935    MRN: [id number]                              Today's Date: 2020       Admit Date: 2020    Visit Dx:     ICD-10-CM ICD-9-CM   1. Pneumonia due to COVID-19 virus  U07.1 480.8    J12.89    2. ALEXANDRU (acute kidney injury) (CMS/HCC)  N17.9 584.9     Patient Active Problem List   Diagnosis   • Coronary arteriosclerosis in native artery   • Atrial fibrillation (CMS/HCC)   • Acute non-Q wave ST elevation myocardial infarction (STEMI) involving left anterior descending (LAD) coronary artery   • Mitral valve insufficiency   • S/P CABG (coronary artery bypass graft)   • S/P mitral valve repair   • Depression   • Hypothyroidism   • Hyperlipidemia   • Pulmonary nodule   • Adenocarcinoma of right lung (CMS/HCC)   • Benign nodular prostatic hyperplasia with lower urinary tract symptoms   • Confusion   • Post concussion syndrome   • Vitamin D deficiency   • Well adult health check   • Impaired fasting glucose   • AAA (abdominal aortic aneurysm) (CMS/HCC)   • Ascending aortic aneurysm (CMS/HCC)   • Macrocytic anemia   • Other heart failure (CMS/HCC)   • HTN (hypertension), benign   • Pneumonia due to COVID-19 virus     Past Medical History:   Diagnosis Date   • Abdominal aortic aneurysm (CMS/HCC)    • Acute myocardial infarction (CMS/HCC)    • Acute renal failure (CMS/HCC)    • Anemia    • Aneurysm (CMS/HCC)    • Atrial fibrillation (CMS/HCC)    • Cardiomyopathy (CMS/HCC)    • Chest pain    • Coronary artery disease     2015-    • Depression    • GERD (gastroesophageal reflux disease)    • Hyperlipidemia    • Hypertension    • Hypothyroidism    • Lung cancer (CMS/HCC) 2016    radiation    • Mitral regurgitation    • Myocardial infarction (CMS/HCC)    • Pleural effusion    • Pleural effusion, bilateral    • RLS (restless legs syndrome)    • Sleep apnea    • Ulcerative colitis (CMS/HCC)    • Ulcerative colitis (CMS/HCC)      Past Surgical History:   Procedure Laterality  Date   • BACK SURGERY      lumbar   • CARDIAC CATHETERIZATION     • CORONARY ANGIOPLASTY     • CORONARY ARTERY BYPASS GRAFT  07/27/2015    X 5  Dr Louis   • HERNIA REPAIR      inguinal - left   • MITRAL VALVE REPAIR/REPLACEMENT  07/27/2015    Dr Louis   • NASAL SEPTUM SURGERY      benign growth removed   • OTHER SURGICAL HISTORY      Cardiovasc Endosc W/ Video-Assist Harv Veins Coron Art Byp   • PROSTATE SURGERY     • SKIN BIOPSY      benign     General Information     Row Name 12/05/20 1012          Physical Therapy Time and Intention    Document Type  therapy note (daily note)  -     Mode of Treatment  individual therapy;physical therapy  -     Row Name 12/05/20 1012          General Information    Existing Precautions/Restrictions  fall  -     Row Name 12/05/20 1012          Cognition    Orientation Status (Cognition)  oriented x 3  -     Row Name 12/05/20 1012          Safety Issues, Functional Mobility    Impairments Affecting Function (Mobility)  balance;endurance/activity tolerance;strength  -       User Key  (r) = Recorded By, (t) = Taken By, (c) = Cosigned By    Initials Name Provider Type     Kristen Segura, PT Physical Therapist        Mobility     Row Name 12/05/20 1012          Bed Mobility    Bed Mobility  supine-sit;sit-supine  -     Supine-Sit [place] (Bed Mobility)  verbal cues;nonverbal cues (demo/gesture);minimum assist (75% patient effort)  -     Sit-Supine [place] (Bed Mobility)  verbal cues;nonverbal cues (demo/gesture);contact guard  -     Assistive Device (Bed Mobility)  bed rails;head of bed elevated  -     Row Name 12/05/20 1012          Sit-Stand Transfer    Sit-Stand [place] (Transfers)  verbal cues;nonverbal cues (demo/gesture);contact guard  -     Assistive Device (Sit-Stand Transfers)  walker, front-wheeled  -     Row Name 12/05/20 1012          Gait/Stairs (Locomotion)    [place] Level (Gait)  verbal cues;nonverbal cues  (demo/gesture);contact guard  -     Assistive Device (Gait)  walker, front-wheeled  -     Distance in Feet (Gait)  30  -CH     Deviations/Abnormal Patterns (Gait)  anushka decreased;gait speed decreased;stride length decreased  -     Bilateral Gait Deviations  forward flexed posture  -       User Key  (r) = Recorded By, (t) = Taken By, (c) = Cosigned By    Initials Name Provider Type    Kristen So, PT Physical Therapist        Obj/Interventions     Row Name 12/05/20 1013          Motor Skills    Therapeutic Exercise  -- 10 reps B LE AP and LAQ  -CH       User Key  (r) = Recorded By, (t) = Taken By, (c) = Cosigned By    Initials Name Provider Type     Kristen Segura, PT Physical Therapist        Goals/Plan    No documentation.       Clinical Impression     Row Name 12/05/20 1013          Pain Scale: FACES Pre/Post-Treatment    Pain: FACES Scale, Pretreatment  0-->no hurt  -     Posttreatment Pain Rating  2-->hurts little bit  -     Pain Location - Side  Right  -     Pain Location - Orientation  lower  -     Pain Location  extremity  -     Pre/Posttreatment Pain Comment  R thigh pain with LAQ  -CH     Row Name 12/05/20 1013          Plan of Care Review    Plan of Care Reviewed With  patient  -     Outcome Summary  Pt with limited gait distance and exercise partcipation secondary to fatigue and R thigh pain. PT will continue to follow to address strength, mobility, and gait.  -     Row Name 12/05/20 1013          Positioning and Restraints    Pre-Treatment Position  in bed  -     Post Treatment Position  bed  -     In Bed  supine;sitting EOB;call light within reach;encouraged to call for assist  -       User Key  (r) = Recorded By, (t) = Taken By, (c) = Cosigned By    Initials Name Provider Type     Kristen Segura, PT Physical Therapist        Outcome Measures     Row Name 12/05/20 1015          How much help from another person do you currently need...    Turning from  your back to your side while in flat bed without using bedrails?  3  -CH     Moving from lying on back to sitting on the side of a flat bed without bedrails?  3  -CH     Moving to and from a bed to a chair (including a wheelchair)?  3  -CH     Standing up from a chair using your arms (e.g., wheelchair, bedside chair)?  3  -CH     Climbing 3-5 steps with a railing?  2  -CH     To walk in hospital room?  3  -CH     AM-PAC 6 Clicks Score (PT)  17  -       User Key  (r) = Recorded By, (t) = Taken By, (c) = Cosigned By    Initials Name Provider Type     Kristen Segura PT Physical Therapist        Physical Therapy Education                 Title: PT OT SLP Therapies (Done)     Topic: Physical Therapy (Done)     Point: Mobility training (Done)     Learning Progress Summary           Patient Acceptance, E,TB,D, VU,NR by  at 12/5/2020 1015    Acceptance, E,TB,D, VU,NR by  at 12/3/2020 1306                   Point: Home exercise program (Done)     Learning Progress Summary           Patient Acceptance, E,TB,D, VU,NR by  at 12/5/2020 1015    Acceptance, E,TB,D, VU,NR by  at 12/3/2020 1306                   Point: Body mechanics (Done)     Learning Progress Summary           Patient Acceptance, E,TB,D, VU,NR by  at 12/5/2020 1015    Acceptance, E,TB,D, VU,NR by  at 12/3/2020 1306                   Point: Precautions (Done)     Learning Progress Summary           Patient Acceptance, E,TB,D, VU,NR by  at 12/5/2020 1015    Acceptance, E,TB,D, VU,NR by  at 12/3/2020 1306                               User Key     Initials Effective Dates Name Provider Type Discipline     04/03/18 -  Kristen Segura, PT Physical Therapist PT              PT Recommendation and Plan  Planned Therapy Interventions (PT): balance training, bed mobility training, gait training, home exercise program, patient/family education, strengthening, transfer training  Plan of Care Reviewed With: patient  Outcome Summary: Pt with  limited gait distance and exercise partcipation secondary to fatigue and R thigh pain. PT will continue to follow to address strength, mobility, and gait.     Time Calculation:   PT Charges     Row Name 12/05/20 1016             Time Calculation    Start Time  0932  -      Stop Time  0950  -      Time Calculation (min)  18 min  -      PT Received On  12/05/20  -      PT - Next Appointment  12/07/20  -         Time Calculation- PT    Total Timed Code Minutes- PT  16 minute(s)  -        User Key  (r) = Recorded By, (t) = Taken By, (c) = Cosigned By    Initials Name Provider Type     Kristen Segura PT Physical Therapist        Therapy Charges for Today     Code Description Service Date Service Provider Modifiers Qty    [id number]  PT THERAPEUTIC ACT EA 15 MIN 12/5/2020 Kristen Segura PT GP 1          PT G-Codes  Outcome Measure Options: AM-PAC 6 Clicks Daily Activity (OT)  AM-PAC 6 Clicks Score (PT): 17  AM-PAC 6 Clicks Score (OT): 21    Kristen Segura PT  12/5/2020

## 2020-12-05 NOTE — PROGRESS NOTES
Name: Bhaskar Ibarra ADMIT: 2020   : 1935  PCP: Katherine Lester GEOFF    MRN: [id number] LOS: 2 days   AGE/SEX: 85 y.o. male  ROOM: [facility]   Subjective   Chief Complaint   Patient presents with   • Head Injury     on eliquis    • Fall   • Weakness - Generalized   • Exposure To Known Illness     wife has covid      Still with fever  +chills  +myalgias  Feels poorly    ROS  +f/c  No n/v  No cp/palp  +soa/cough    Objective   Vital Signs  Temp:  [98.2 °F (36.8 °C)-101.2 °F (38.4 °C)] 98.2 °F (36.8 °C)  Heart Rate:  [57-83] 60  Resp:  [18-26] 25  BP: (113-147)/(65-77) 147/77  SpO2:  [91 %-94 %] 91 %  on   ;   Device (Oxygen Therapy): room air  Body mass index is 24.6 kg/m².    Physical Exam  Constitutional:       Appearance: He is ill-appearing.   HENT:      Head: Normocephalic and atraumatic.   Cardiovascular:      Rate and Rhythm: Normal rate and regular rhythm.   Pulmonary:      Effort: Respiratory distress (mild) present.      Breath sounds: No wheezing.   Abdominal:      Palpations: Abdomen is soft.      Tenderness: There is no abdominal tenderness.   Musculoskeletal:      Right lower leg: No edema.      Left lower leg: No edema.   Skin:     General: Skin is warm.     awake  Poor memory  Oriented to self    Results Review:       I reviewed the patient's new clinical results.  Results from last 7 days   Lab Units 20  0801 20  0459 20  1244 20  1357   WBC 10*3/mm3 7.47 6.77 7.15 7.46   HEMOGLOBIN g/dL 11.7* 12.5* 13.4 13.9   PLATELETS 10*3/mm3 181 153 197 239     Results from last 7 days   Lab Units 20  0801 20  1028 20  1244 20  1357   SODIUM mmol/L 131* 137 136 134*   POTASSIUM mmol/L 3.5 3.5 3.6 4.0   CHLORIDE mmol/L 101 104 98 95*   CO2 mmol/L 22.8 22.0 24.4 26.4   BUN mg/dL 18 21 31* 33*   CREATININE mg/dL 1.28* 1.36* 1.82* 2.15*   GLUCOSE mg/dL 112* 120* 109* 100*   Estimated Creatinine Clearance: 47.7 mL/min (A) (by C-G formula based on SCr  of 1.28 mg/dL (H)).  Results from last 7 days   Lab Units 12/05/20  0801 12/04/20  1028 12/02/20  1357   ALBUMIN g/dL 3.00* 3.40* 4.20   BILIRUBIN mg/dL 0.5 0.4 0.5   ALK PHOS U/L 52 52 68   AST (SGOT) U/L 52* 56* 55*   ALT (SGPT) U/L 24 25 29     Results from last 7 days   Lab Units 12/05/20  0801 12/04/20  1028 12/03/20  1244 12/02/20  1357   CALCIUM mg/dL 7.7* 8.1* 8.6 9.1   ALBUMIN g/dL 3.00* 3.40*  --  4.20   MAGNESIUM mg/dL  --   --  2.2  --      Results from last 7 days   Lab Units 12/02/20  1401 12/02/20  1357   PROCALCITONIN ng/mL  --  0.22   LACTATE mmol/L 1.4  --        Coag     HbA1C   Lab Results   Component Value Date    HGBA1C 6.37 (H) 08/13/2018    HGBA1C 6.15 (H) 01/20/2017    HGBA1C 6.2 (H) 07/27/2015     Infection   Results from last 7 days   Lab Units 12/02/20  1357   PROCALCITONIN ng/mL 0.22     Radiology(recent) No radiology results for the last day  Troponin T   Date Value Ref Range Status   12/02/2020 <0.010 0.000 - 0.030 ng/mL Final     No components found for: TSH;2    apixaban, 2.5 mg, Oral, Q12H  atorvastatin, 40 mg, Oral, Nightly  carvedilol, 3.125 mg, Oral, BID With Meals  dexamethasone, 6 mg, Oral, Daily With Breakfast  escitalopram, 20 mg, Oral, Daily  levothyroxine, 88 mcg, Oral, Q AM  pantoprazole, 40 mg, Oral, QAM  sodium chloride, 10 mL, Intravenous, Q12H  sotalol, 80 mg, Oral, Daily  cyanocobalamin, 2,000 mcg, Oral, Daily      sodium chloride, 75 mL/hr, Last Rate: 75 mL/hr (12/05/20 0638)    Diet Regular; Cardiac      Assessment/Plan      Active Hospital Problems    Diagnosis  POA   • **Pneumonia due to COVID-19 virus [U07.1, J12.89]  Yes   • ALEXANDRU (acute kidney injury) (CMS/HCC) [N17.9]  Yes   • HTN (hypertension), benign [I10]  Yes   • S/P CABG (coronary artery bypass graft) [Z95.1]  Not Applicable   • Paroxysmal atrial fibrillation (CMS/[facility]) [I48.0]  Yes   • Coronary arteriosclerosis in native artery [I25.10]  Yes      Resolved Hospital Problems   No resolved problems to  display.       · Supportive care for COVID including oxygen if needed to keep sats greater than 90%.  · With borderline hypoxia sats down to 91% will plan on dexamethasone 6mg daily  · Continue above meds  · I discussed the patients findings and my recommendations with patient and nursing staff.     VTE Prophylaxis - eliquis  Code Status - Full code.     DW RN  DW family  Close monitoring      Wilber Logan MD  [facility]  12/05/20  12:44 EST

## 2020-12-05 NOTE — PLAN OF CARE
Goal Outcome Evaluation:  Plan of Care Reviewed With: patient  Progress: declining  Outcome Summary: Increased temperture this afternoon. Tylenol given temperature improved. Aches and pains improved after tylenol. Sleeping most of day, with poor meal intake but drinking water and pepsi. Daughters updated. Monitor vital signs.

## 2020-12-06 PROBLEM — J96.01 ACUTE RESPIRATORY FAILURE WITH HYPOXIA: Status: ACTIVE | Noted: 2020-12-06

## 2020-12-06 LAB
ALBUMIN SERPL-MCNC: 3 G/DL (ref 3.5–5.2)
ALP SERPL-CCNC: 54 U/L (ref 39–117)
ALT SERPL W P-5'-P-CCNC: 27 U/L (ref 1–41)
ANION GAP SERPL CALCULATED.3IONS-SCNC: 10.6 MMOL/L (ref 5–15)
AST SERPL-CCNC: 47 U/L (ref 1–40)
BILIRUB CONJ SERPL-MCNC: 0.2 MG/DL (ref 0–0.3)
BILIRUB INDIRECT SERPL-MCNC: 0.2 MG/DL
BILIRUB SERPL-MCNC: 0.4 MG/DL (ref 0–1.2)
BUN SERPL-MCNC: 20 MG/DL (ref 8–23)
BUN/CREAT SERPL: 16.7 (ref 7–25)
CALCIUM SPEC-SCNC: 8 MG/DL (ref 8.6–10.5)
CHLORIDE SERPL-SCNC: 108 MMOL/L (ref 98–107)
CO2 SERPL-SCNC: 21.4 MMOL/L (ref 22–29)
CREAT SERPL-MCNC: 1.2 MG/DL (ref 0.76–1.27)
GFR SERPL CREATININE-BSD FRML MDRD: 58 ML/MIN/1.73
GLUCOSE SERPL-MCNC: 129 MG/DL (ref 65–99)
POTASSIUM SERPL-SCNC: 4.3 MMOL/L (ref 3.5–5.2)
PROT SERPL-MCNC: 6.3 G/DL (ref 6–8.5)
SODIUM SERPL-SCNC: 140 MMOL/L (ref 136–145)

## 2020-12-06 PROCEDURE — 63710000001 DEXAMETHASONE PER 0.25 MG: Performed by: INTERNAL MEDICINE

## 2020-12-06 PROCEDURE — 80076 HEPATIC FUNCTION PANEL: CPT | Performed by: INTERNAL MEDICINE

## 2020-12-06 PROCEDURE — 80048 BASIC METABOLIC PNL TOTAL CA: CPT | Performed by: INTERNAL MEDICINE

## 2020-12-06 RX ADMIN — ATORVASTATIN CALCIUM 40 MG: 20 TABLET, FILM COATED ORAL at 20:52

## 2020-12-06 RX ADMIN — REMDESIVIR 100 MG: 100 INJECTION, POWDER, LYOPHILIZED, FOR SOLUTION INTRAVENOUS at 17:38

## 2020-12-06 RX ADMIN — ESCITALOPRAM 20 MG: 20 TABLET, FILM COATED ORAL at 09:38

## 2020-12-06 RX ADMIN — ACETAMINOPHEN 650 MG: 325 TABLET, FILM COATED ORAL at 14:55

## 2020-12-06 RX ADMIN — SODIUM CHLORIDE, PRESERVATIVE FREE 10 ML: 5 INJECTION INTRAVENOUS at 22:26

## 2020-12-06 RX ADMIN — Medication 2000 MCG: at 09:37

## 2020-12-06 RX ADMIN — ACETAMINOPHEN 650 MG: 325 TABLET, FILM COATED ORAL at 23:29

## 2020-12-06 RX ADMIN — SOTALOL HYDROCHLORIDE 80 MG: 80 TABLET ORAL at 09:37

## 2020-12-06 RX ADMIN — PANTOPRAZOLE SODIUM 40 MG: 40 TABLET, DELAYED RELEASE ORAL at 06:17

## 2020-12-06 RX ADMIN — SODIUM CHLORIDE, PRESERVATIVE FREE 10 ML: 5 INJECTION INTRAVENOUS at 09:45

## 2020-12-06 RX ADMIN — LEVOTHYROXINE SODIUM 88 MCG: 88 TABLET ORAL at 06:17

## 2020-12-06 RX ADMIN — APIXABAN 2.5 MG: 2.5 TABLET, FILM COATED ORAL at 09:37

## 2020-12-06 RX ADMIN — DEXAMETHASONE 6 MG: 4 TABLET ORAL at 09:37

## 2020-12-06 RX ADMIN — APIXABAN 2.5 MG: 2.5 TABLET, FILM COATED ORAL at 20:53

## 2020-12-06 NOTE — PROGRESS NOTES
Name: Bhaskar Ibarra ADMIT: 2020   : 1935  PCP: Katherine Lester APRN    MRN: [id number] LOS: 3 days   AGE/SEX: 85 y.o. male  ROOM: [facility]   Subjective   Chief Complaint   Patient presents with   • Head Injury     on eliquis    • Fall   • Weakness - Generalized   • Exposure To Known Illness     wife has covid      Still with fever  +chills  +myalgias  Feels poorly but better today than yesterday  Up to 5L yesterday evening  Started on remdesivir  Down to 2L today    ROS  +f/c  No n/v  No cp/palp  +soa/cough    Objective   Vital Signs  Temp:  [97 °F (36.1 °C)-101.6 °F (38.7 °C)] 97.6 °F (36.4 °C)  Heart Rate:  [46-65] 52  Resp:  [20-26] 22  BP: (114-143)/(61-87) 130/61  SpO2:  [84 %-98 %] 94 %  on  Flow (L/min):  [2-5] 2;   Device (Oxygen Therapy): nasal cannula  Body mass index is 24.6 kg/m².    Physical Exam  Constitutional:       Appearance: He is ill-appearing.   HENT:      Head: Normocephalic and atraumatic.   Cardiovascular:      Rate and Rhythm: Normal rate and regular rhythm.   Pulmonary:      Effort: Respiratory distress (mild) present.      Breath sounds: No wheezing.   Abdominal:      Palpations: Abdomen is soft.      Tenderness: There is no abdominal tenderness.   Musculoskeletal:      Right lower leg: No edema.      Left lower leg: No edema.   Skin:     General: Skin is warm.     awake  Poor memory  Oriented to self    Results Review:       I reviewed the patient's new clinical results.  Results from last 7 days   Lab Units 20  0801 20  0459 20  1244 20  1357   WBC 10*3/mm3 7.47 6.77 7.15 7.46   HEMOGLOBIN g/dL 11.7* 12.5* 13.4 13.9   PLATELETS 10*3/mm3 181 153 197 239     Results from last 7 days   Lab Units 20  0708 20  0801 20  1028 20  1244   SODIUM mmol/L 140 131* 137 136   POTASSIUM mmol/L 4.3 3.5 3.5 3.6   CHLORIDE mmol/L 108* 101 104 98   CO2 mmol/L 21.4* 22.8 22.0 24.4   BUN mg/dL 20 18 21 31*   CREATININE mg/dL 1.20 1.28*  1.36* 1.82*   GLUCOSE mg/dL 129* 112* 120* 109*   Estimated Creatinine Clearance: 50.9 mL/min (by C-G formula based on SCr of 1.2 mg/dL).  Results from last 7 days   Lab Units 12/06/20  0708 12/05/20  0801 12/04/20  1028 12/02/20  1357   ALBUMIN g/dL 3.00* 3.00* 3.40* 4.20   BILIRUBIN mg/dL 0.4 0.5 0.4 0.5   ALK PHOS U/L 54 52 52 68   AST (SGOT) U/L 47* 52* 56* 55*   ALT (SGPT) U/L 27 24 25 29     Results from last 7 days   Lab Units 12/06/20  0708 12/05/20  0801 12/04/20  1028 12/03/20  1244 12/02/20  1357   CALCIUM mg/dL 8.0* 7.7* 8.1* 8.6 9.1   ALBUMIN g/dL 3.00* 3.00* 3.40*  --  4.20   MAGNESIUM mg/dL  --   --   --  2.2  --      Results from last 7 days   Lab Units 12/02/20  1401 12/02/20  1357   PROCALCITONIN ng/mL  --  0.22   LACTATE mmol/L 1.4  --        Coag     HbA1C   Lab Results   Component Value Date    HGBA1C 6.37 (H) 08/13/2018    HGBA1C 6.15 (H) 01/20/2017    HGBA1C 6.2 (H) 07/27/2015     Infection   Results from last 7 days   Lab Units 12/02/20  1357   PROCALCITONIN ng/mL 0.22     Radiology(recent) No radiology results for the last day  No results found for: TROPONINT, TROPONINI, BNP  No components found for: TSH;2    apixaban, 2.5 mg, Oral, Q12H  atorvastatin, 40 mg, Oral, Nightly  carvedilol, 3.125 mg, Oral, BID With Meals  dexamethasone, 6 mg, Oral, Daily With Breakfast  escitalopram, 20 mg, Oral, Daily  levothyroxine, 88 mcg, Oral, Q AM  pantoprazole, 40 mg, Oral, QAM  remdesivir, 100 mg, Intravenous, Q24H  sodium chloride, 10 mL, Intravenous, Q12H  sotalol, 80 mg, Oral, Daily  cyanocobalamin, 2,000 mcg, Oral, Daily      Pharmacy Consult - Remdesivir,     Diet Regular; Cardiac      Assessment/Plan      Active Hospital Problems    Diagnosis  POA   • **Pneumonia due to COVID-19 virus [U07.1, J12.89]  Yes   • ALEXANDRU (acute kidney injury) (CMS/[facility]) [N17.9]  Yes   • Chronic diastolic CHF (congestive heart failure) (CMS/[facility]) [I50.32]  Yes   • HTN (hypertension), benign [I10]  Yes   • S/P CABG (coronary  artery bypass graft) [Z95.1]  Not Applicable   • Paroxysmal atrial fibrillation (CMS/HCC) [I48.0]  Yes   • Coronary arteriosclerosis in native artery [I25.10]  Yes      Resolved Hospital Problems   No resolved problems to display.       · Supportive care for COVID including oxygen if needed to keep sats greater than 90%.  · dexamethasone 6mg daily and remdesivir  · Continue above meds  · Monitor volume status  · I discussed the patients findings and my recommendations with patient and nursing staff.     VTE Prophylaxis - eliquis  Code Status - Full code.     DW RN  Close monitoring      Wilber Logan MD  [facility]  12/06/20  12:44 EST

## 2020-12-06 NOTE — PLAN OF CARE
Goal Outcome Evaluation:  Plan of Care Reviewed With: patient  Progress: improving  Outcome Summary: VSS. Appetite improved. Oxygen decreased to 2LNC this am. SATs 94-95%. Patient requested to try no oxygen and NC removed. SATs have been 93% on room air. Monitor vital signs, SATs, and labs. Remdesivir infusing.

## 2020-12-06 NOTE — PLAN OF CARE
Goal Outcome Evaluation:  Plan of Care Reviewed With: patient  Progress: declining  Outcome Summary: Pt has no c/o pain. Pt sats in the mid 90s on 5L. Remdesivir started. Pt rested well overnight. IVF d/c. VSS. Continue to monitor. Safety maintained.

## 2020-12-06 NOTE — PROGRESS NOTES
[facility]  Clinical Pharmacy Department     Remdesivir Review Note    Bhaskar Ibarra is a 85 y.o. male with confirmed COVID-19 infection on day 3 of hospitalization.     Consulting Provider:  Dr. Logan  Date of Confirmed SARS-CoV-2: 20  Date of Symptom Onset: ~20 - patient reports symptoms for several days prior to admission  Planned Duration of Therapy: 5 days  Other Antimicrobials: None  Hydroxychloroquine or chloroquine prior to arrival: No    Allergies  Allergies as of 2020 - Reviewed 2020   Allergen Reaction Noted   • Latex Itching 2016       Microbiology:  Microbiology Results (last 10 days)     Procedure Component Value - Date/Time    Respiratory Panel PCR w/COVID-19(SARS-CoV-2) SARAI/BOB/SCAR/PAD/COR/MAD/CJ In-House, NP Swab in UTM/VTM, 3-4 HR TAT - Swab, Nasopharynx [[id number]]  (Abnormal) Collected: 20 1358    Lab Status: Final result Specimen: Swab from Nasopharynx Updated: 20 1802     ADENOVIRUS, PCR Not Detected     Coronavirus 229E Not Detected     Coronavirus HKU1 Not Detected     Coronavirus NL63 Not Detected     Coronavirus OC43 Not Detected     COVID19 Detected     Human Metapneumovirus Not Detected     Human Rhinovirus/Enterovirus Not Detected     Influenza A PCR Not Detected     Influenza B PCR Not Detected     Parainfluenza Virus 1 Not Detected     Parainfluenza Virus 2 Not Detected     Parainfluenza Virus 3 Not Detected     Parainfluenza Virus 4 Not Detected     RSV, PCR Not Detected     Bordetella pertussis pcr Not Detected     Bordetella parapertussis PCR Not Detected     Chlamydophila pneumoniae PCR Not Detected     Mycoplasma pneumo by PCR Not Detected    Narrative:      Fact sheet for providers: https://docs.[employer]/wp-content/uploads/VNY7050-3059-VP7.1-EUA-Provider-Fact-Sheet-3.pdf    Fact sheet for patients: https://docs.[employer]/wp-content/uploads/OME1423-4983-NJ9.1-EUA-Patient-Fact-Sheet-1.pdf          Radiology/Imagin/2  - CXR    IMPRESSION:     Minimal patchy densities at the lung bases could be areas of developing  pneumonia, including possibility of viral pneumonia, follow-up  recommended. Ectatic aorta.    Vitals/Labs/I&O  Temp:  [97 °F (36.1 °C)-101.6 °F (38.7 °C)] 97 °F (36.1 °C)  Heart Rate:  [48-65] 52  Resp:  [20-26] 25  BP: (113-147)/(70-87) 121/87    Results from last 7 days   Lab Units 12/05/20  0801 12/04/20  0459 12/03/20  1244   WBC 10*3/mm3 7.47 6.77 7.15     Results from last 7 days   Lab Units 12/02/20  1357   PROCALCITONIN ng/mL 0.22     Results from last 7 days   Lab Units 12/05/20  0801 12/04/20  1028 12/02/20  1357   AST (SGOT) U/L 52* 56* 55*      Results from last 7 days   Lab Units 12/05/20  0801 12/04/20  1028 12/02/20  1357   ALT (SGPT) U/L 24 25 29       Estimated Creatinine Clearance: 47.7 mL/min (A) (by C-G formula based on SCr of 1.28 mg/dL (H)).  Results from last 7 days   Lab Units 12/05/20  0801 12/04/20  1028 12/03/20  1244   BUN mg/dL 18 21 31*   CREATININE mg/dL 1.28* 1.36* 1.82*     Intake & Output (last 3 days)       12/03 0701 - 12/04 0700 12/04 0701 - 12/05 0700 12/05 0701 - 12/06 0700    P.O. 540 480     I.V. (mL/kg) 1557 (19.5)  875 (10.9)    Total Intake(mL/kg) 2097 (26.2) 480 (6) 875 (10.9)    Urine (mL/kg/hr) 400 (0.2) 450 (0.2) 725 (0.7)    Stool   0    Total Output 400 450 725    Net +1697 +30 +150           Urine Unmeasured Occurrence 3 x  0 x    Stool Unmeasured Occurrence   2 x          Assessment/Plan:    Patient is hospitalized with confirmed, severe COVID-19 infection and started on remdesivir 200 mg IV once followed by 100 mg IV daily for 4 days (5 day total duration). All inclusions, exclusions, and monitoring requirements listed below have been reviewed.    1. Patient is hospitalized with confirmed COVID-19 infection  2. Patient is requiring ?2 L of oxygen to maintain oxygen saturations of ?94%   3. Baseline and daily LFTs and Scr have been ordered prior to remdesivir  initiation  4. ALT is not ? 5 times the upper limit of normal  5. Patient is not on concomitant hydroxychloroquine or chloroquine       Thank you for involving pharmacy in this patient's care. Please contact pharmacy with any questions or concerns.                           Lennox Carlson [facility]  Clinical Pharmacist  12/05/20 20:03 EST

## 2020-12-07 LAB
ALBUMIN SERPL-MCNC: 3 G/DL (ref 3.5–5.2)
ALP SERPL-CCNC: 56 U/L (ref 39–117)
ALT SERPL W P-5'-P-CCNC: 37 U/L (ref 1–41)
ANION GAP SERPL CALCULATED.3IONS-SCNC: 10.9 MMOL/L (ref 5–15)
AST SERPL-CCNC: 61 U/L (ref 1–40)
BILIRUB CONJ SERPL-MCNC: 0.2 MG/DL (ref 0–0.3)
BILIRUB INDIRECT SERPL-MCNC: 0.1 MG/DL
BILIRUB SERPL-MCNC: 0.3 MG/DL (ref 0–1.2)
BUN SERPL-MCNC: 26 MG/DL (ref 8–23)
BUN/CREAT SERPL: 23.4 (ref 7–25)
CALCIUM SPEC-SCNC: 8 MG/DL (ref 8.6–10.5)
CHLORIDE SERPL-SCNC: 109 MMOL/L (ref 98–107)
CO2 SERPL-SCNC: 19.1 MMOL/L (ref 22–29)
CREAT SERPL-MCNC: 1.11 MG/DL (ref 0.76–1.27)
CRP SERPL-MCNC: 13.45 MG/DL (ref 0–0.5)
FERRITIN SERPL-MCNC: 902 NG/ML (ref 30–400)
GFR SERPL CREATININE-BSD FRML MDRD: 63 ML/MIN/1.73
GLUCOSE SERPL-MCNC: 147 MG/DL (ref 65–99)
POTASSIUM SERPL-SCNC: 4 MMOL/L (ref 3.5–5.2)
PROT SERPL-MCNC: 6.1 G/DL (ref 6–8.5)
SODIUM SERPL-SCNC: 139 MMOL/L (ref 136–145)

## 2020-12-07 PROCEDURE — 63710000001 DEXAMETHASONE PER 0.25 MG: Performed by: INTERNAL MEDICINE

## 2020-12-07 PROCEDURE — 97110 THERAPEUTIC EXERCISES: CPT

## 2020-12-07 PROCEDURE — 86140 C-REACTIVE PROTEIN: CPT | Performed by: INTERNAL MEDICINE

## 2020-12-07 PROCEDURE — 80048 BASIC METABOLIC PNL TOTAL CA: CPT | Performed by: INTERNAL MEDICINE

## 2020-12-07 PROCEDURE — 82728 ASSAY OF FERRITIN: CPT | Performed by: INTERNAL MEDICINE

## 2020-12-07 PROCEDURE — 80076 HEPATIC FUNCTION PANEL: CPT | Performed by: INTERNAL MEDICINE

## 2020-12-07 PROCEDURE — 97535 SELF CARE MNGMENT TRAINING: CPT

## 2020-12-07 RX ADMIN — SODIUM CHLORIDE, PRESERVATIVE FREE 10 ML: 5 INJECTION INTRAVENOUS at 21:09

## 2020-12-07 RX ADMIN — Medication 2000 MCG: at 08:07

## 2020-12-07 RX ADMIN — ESCITALOPRAM 20 MG: 20 TABLET, FILM COATED ORAL at 08:07

## 2020-12-07 RX ADMIN — ATORVASTATIN CALCIUM 40 MG: 20 TABLET, FILM COATED ORAL at 21:09

## 2020-12-07 RX ADMIN — PANTOPRAZOLE SODIUM 40 MG: 40 TABLET, DELAYED RELEASE ORAL at 06:34

## 2020-12-07 RX ADMIN — CARVEDILOL 3.12 MG: 3.12 TABLET, FILM COATED ORAL at 08:07

## 2020-12-07 RX ADMIN — APIXABAN 2.5 MG: 2.5 TABLET, FILM COATED ORAL at 21:09

## 2020-12-07 RX ADMIN — DEXAMETHASONE 6 MG: 4 TABLET ORAL at 08:07

## 2020-12-07 RX ADMIN — REMDESIVIR 100 MG: 100 INJECTION, POWDER, LYOPHILIZED, FOR SOLUTION INTRAVENOUS at 16:20

## 2020-12-07 RX ADMIN — APIXABAN 2.5 MG: 2.5 TABLET, FILM COATED ORAL at 10:15

## 2020-12-07 RX ADMIN — ACETAMINOPHEN 650 MG: 325 TABLET, FILM COATED ORAL at 16:31

## 2020-12-07 RX ADMIN — SODIUM CHLORIDE, PRESERVATIVE FREE 10 ML: 5 INJECTION INTRAVENOUS at 08:06

## 2020-12-07 RX ADMIN — SODIUM CHLORIDE, PRESERVATIVE FREE 10 ML: 5 INJECTION INTRAVENOUS at 16:20

## 2020-12-07 RX ADMIN — CARVEDILOL 3.12 MG: 3.12 TABLET, FILM COATED ORAL at 17:12

## 2020-12-07 RX ADMIN — LEVOTHYROXINE SODIUM 88 MCG: 88 TABLET ORAL at 06:34

## 2020-12-07 RX ADMIN — SOTALOL HYDROCHLORIDE 80 MG: 80 TABLET ORAL at 08:07

## 2020-12-07 NOTE — PLAN OF CARE
Goal Outcome Evaluation:  Plan of Care Reviewed With: patient  Progress: improving  Outcome Summary: Pt has no c/o pain. Pt on RA but 1L O2 needed at night with sats in low-mid 90s. Pt had an episode of dizziness when turning in the bed but it resolved quickly. P encouraged to make sure he is eating and drinking enough. VSS. Continue to monitor. Safety maintained.

## 2020-12-07 NOTE — THERAPY TREATMENT NOTE
Patient Name: Bhaskar Ibarra  : 1935    MRN: [id number]                              Today's Date: 2020       Admit Date: 2020    Visit Dx:     ICD-10-CM ICD-9-CM   1. Pneumonia due to COVID-19 virus  U07.1 480.8    J12.89    2. ALEXANDRU (acute kidney injury) (CMS/HCC)  N17.9 584.9     Patient Active Problem List   Diagnosis   • Coronary arteriosclerosis in native artery   • Paroxysmal atrial fibrillation (CMS/HCC)   • Acute non-Q wave ST elevation myocardial infarction (STEMI) involving left anterior descending (LAD) coronary artery   • Mitral valve insufficiency   • S/P CABG (coronary artery bypass graft)   • S/P mitral valve repair   • Depression   • Hypothyroidism   • Hyperlipidemia   • Pulmonary nodule   • Adenocarcinoma of right lung (CMS/HCC)   • Benign nodular prostatic hyperplasia with lower urinary tract symptoms   • Confusion   • Post concussion syndrome   • Vitamin D deficiency   • Well adult health check   • Impaired fasting glucose   • AAA (abdominal aortic aneurysm) (CMS/HCC)   • Ascending aortic aneurysm (CMS/HCC)   • Macrocytic anemia   • Other heart failure (CMS/HCC)   • HTN (hypertension), benign   • Pneumonia due to COVID-19 virus   • ALEXANDRU (acute kidney injury) (CMS/HCC)   • Chronic diastolic CHF (congestive heart failure) (CMS/HCC)   • Acute respiratory failure with hypoxia (CMS/HCC)     Past Medical History:   Diagnosis Date   • Abdominal aortic aneurysm (CMS/HCC)    • Acute myocardial infarction (CMS/HCC)    • Acute renal failure (CMS/HCC)    • Anemia    • Aneurysm (CMS/HCC)    • Atrial fibrillation (CMS/HCC)    • Cardiomyopathy (CMS/HCC)    • Chest pain    • Coronary artery disease     2015-    • Depression    • GERD (gastroesophageal reflux disease)    • Hyperlipidemia    • Hypertension    • Hypothyroidism    • Lung cancer (CMS/HCC) 2016    radiation    • Mitral regurgitation    • Myocardial infarction (CMS/HCC)    • Pleural effusion    • Pleural effusion, bilateral    •  RLS (restless legs syndrome)    • Sleep apnea    • Ulcerative colitis (CMS/HCC)    • Ulcerative colitis (CMS/HCC)      Past Surgical History:   Procedure Laterality Date   • BACK SURGERY      lumbar   • CARDIAC CATHETERIZATION     • CORONARY ANGIOPLASTY     • CORONARY ARTERY BYPASS GRAFT  07/27/2015    X 5  Dr Louis   • HERNIA REPAIR      inguinal - left   • MITRAL VALVE REPAIR/REPLACEMENT  07/27/2015    Dr Louis   • NASAL SEPTUM SURGERY      benign growth removed   • OTHER SURGICAL HISTORY      Cardiovasc Endosc W/ Video-Assist Harv Veins Coron Art Byp   • PROSTATE SURGERY     • SKIN BIOPSY      benign     General Information     Row Name 12/07/20 1107          Physical Therapy Time and Intention    Document Type  therapy note (daily note)  -MS     Mode of Treatment  physical therapy;individual therapy  -MS     Row Name 12/07/20 1107          General Information    Patient Profile Reviewed  yes  -MS     Existing Precautions/Restrictions  (S) fall;oxygen therapy device and L/min Exit alarm; COVID Isolation  -MS     Row Name 12/07/20 1107          Cognition    Orientation Status (Cognition)  oriented to;person;place  -MS     Row Name 12/07/20 1107          Safety Issues, Functional Mobility    Comment, Safety Issues/Impairments (Mobility)  Gait belt used for safety.  -MS       User Key  (r) = Recorded By, (t) = Taken By, (c) = Cosigned By    Initials Name Provider Type    MS Cyrus Tenorio, PT Physical Therapist        Mobility     Row Name 12/07/20 1107          Bed Mobility    Supine-Sit [place] (Bed Mobility)  contact guard  -MS     Sit-Supine [place] (Bed Mobility)  contact guard  -MS     Row Name 12/07/20 1107          Sit-Stand Transfer    Sit-Stand [place] (Transfers)  contact guard  -MS     Assistive Device (Sit-Stand Transfers)  walker, front-wheeled  -MS     Row Name 12/07/20 1107          Gait/Stairs (Locomotion)    [place] Level (Gait)  contact guard  -MS     Assistive Device  (Gait)  walker, front-wheeled  -MS     Distance in Feet (Gait)  30 feet  -MS     Deviations/Abnormal Patterns (Gait)  anushka decreased  -MS     Bilateral Gait Deviations  forward flexed posture  -MS     Comment (Gait/Stairs)  Verbal/tactile cues for posture correction.   Limited in gait distance due to increased SOA.  -MS       User Key  (r) = Recorded By, (t) = Taken By, (c) = Cosigned By    Initials Name Provider Type    MS Jona Cyrus ANGEL, PT Physical Therapist        Obj/Interventions     Row Name 12/07/20 1108          Motor Skills    Therapeutic Exercise  -- BUE/LE ther. ex. program x 15 reps completed (Ankle Pumps, Hip Flexion, LAQ's, Shld Shrugs, Scap. Retractions)  -MS       User Key  (r) = Recorded By, (t) = Taken By, (c) = Cosigned By    Initials Name Provider Type    MS Jona Cyrus ANGEL, PT Physical Therapist        Goals/Plan    No documentation.       Clinical Impression     Row Name 12/07/20 1109          Pain    Additional Documentation  Pain Scale: Numbers Pre/Post-Treatment (Group)  -MS     Row Name 12/07/20 1109          Pain Scale: Numbers Pre/Post-Treatment    Pretreatment Pain Rating  0/10 - no pain  -MS     Posttreatment Pain Rating  0/10 - no pain  -MS     Row Name 12/07/20 1109          Vital Signs    Pre SpO2 (%)  94  -MS     O2 Delivery Pre Treatment  supplemental O2  -MS     Intra SpO2 (%)  88  -MS     O2 Delivery Intra Treatment  supplemental O2  -MS     Post SpO2 (%)  93  -MS     O2 Delivery Post Treatment  supplemental O2  -MS     Pre Patient Position  Supine  -MS     Intra Patient Position  Standing  -MS     Post Patient Position  Supine  -MS     Row Name 12/07/20 1109          Positioning and Restraints    Pre-Treatment Position  in bed  -MS     Post Treatment Position  bed  -MS     In Bed  notified nsg;supine;call light within reach;encouraged to call for assist;exit alarm on All lines intact.  -MS       User Key  (r) = Recorded By, (t) = Taken By, (c) = Cosigned By    Initials  Name Provider Type    MS Tenorio Cyrus ANGEL, PT Physical Therapist        Outcome Measures     Row Name 12/07/20 1110          How much help from another person do you currently need...    Turning from your back to your side while in flat bed without using bedrails?  3  -MS     Moving from lying on back to sitting on the side of a flat bed without bedrails?  3  -MS     Moving to and from a bed to a chair (including a wheelchair)?  3  -MS     Standing up from a chair using your arms (e.g., wheelchair, bedside chair)?  3  -MS     Climbing 3-5 steps with a railing?  2  -MS     To walk in hospital room?  3  -MS     AM-PAC 6 Clicks Score (PT)  17  -MS     Row Name 12/07/20 1110          Functional Assessment    Outcome Measure Options  AM-PAC 6 Clicks Basic Mobility (PT)  -MS       User Key  (r) = Recorded By, (t) = Taken By, (c) = Cosigned By    Initials Name Provider Type    Heath Howardtosha ANGEL, PT Physical Therapist        Physical Therapy Education                 Title: PT OT SLP Therapies (Done)     Topic: Physical Therapy (Done)     Point: Mobility training (Done)     Learning Progress Summary           Patient Acceptance, E,D, VU,NR by MS at 12/7/2020 1110    Acceptance, E,TB,D, VU,NR by  at 12/5/2020 1015    Acceptance, E,TB,D, VU,NR by  at 12/3/2020 1306                   Point: Home exercise program (Done)     Learning Progress Summary           Patient Acceptance, E,D, VU,NR by MS at 12/7/2020 1110    Acceptance, E,TB,D, VU,NR by  at 12/5/2020 1015    Acceptance, E,TB,D, VU,NR by  at 12/3/2020 1306                   Point: Body mechanics (Done)     Learning Progress Summary           Patient Acceptance, E,D, VU,NR by MS at 12/7/2020 1110    Acceptance, E,TB,D, VU,NR by  at 12/5/2020 1015    Acceptance, E,TB,D, VU,NR by  at 12/3/2020 1306                   Point: Precautions (Done)     Learning Progress Summary           Patient Acceptance, E,D, VU,NR by MS at 12/7/2020 1110    Acceptance, E,TB,D,  VU,NR by  at 12/5/2020 1015    Acceptance, E,TB,D, VU,NR by  at 12/3/2020 1306                               User Key     Initials Effective Dates Name Provider Type [facility] 04/03/18 -  Kristen Segura, PT Physical Therapist PT    MS 04/03/18 -  Cyrus Tenorio PT Physical Therapist PT              PT Recommendation and Plan     Plan of Care Reviewed With: patient  Outcome Summary: Pt. able to ambulate 30 feet, CGA x 1, with use of Rwx this date. Pt. requires CGA x 1 for bed mobility and CGA x 1 for sit <-> stand transfers. BUE/LE ther. ex. program x 15 reps completed for general strengthening.  Verbal/tactile cues for posture correction during upright mobility.     Time Calculation:   PT Charges     Row Name 12/07/20 1111             Time Calculation    Start Time  1012  -MS      Stop Time  1037  -MS      Time Calculation (min)  25 min  -MS      PT Received On  12/07/20  -MS      PT - Next Appointment  12/09/20  -MS         Time Calculation- PT    Total Timed Code Minutes- PT  24 minute(s)  -MS        User Key  (r) = Recorded By, (t) = Taken By, (c) = Cosigned By    Initials Name Provider Type    MS TenorioCyrus, PT Physical Therapist        Therapy Charges for Today     Code Description Service Date Service Provider Modifiers Qty    71270870382 HC PT THER PROC EA 15 MIN 12/7/2020 Cyrus Tenorio PT GP 2          PT G-Codes  Outcome Measure Options: AM-PAC 6 Clicks Basic Mobility (PT)  AM-PAC 6 Clicks Score (PT): 17  AM-PAC 6 Clicks Score (OT): 21    Cyrus Tenorio PT  12/7/2020

## 2020-12-07 NOTE — PROGRESS NOTES
Name: Bhaskar Ibarra ADMIT: 2020   : 1935  PCP: Katherine Lester GEOFF    MRN: [id number] LOS: 4 days   AGE/SEX: 85 y.o. male  ROOM: [facility]   Subjective   Chief Complaint   Patient presents with   • Head Injury     on eliquis    • Fall   • Weakness - Generalized   • Exposure To Known Illness     wife has covid      Feels poorly but better today than admission  on remdesivir  On steroids  On oxygen 1-2L    ROS  +f/c  No n/v  No cp/palp  +soa/cough    Objective   Vital Signs  Temp:  [96.9 °F (36.1 °C)-97.7 °F (36.5 °C)] 96.9 °F (36.1 °C)  Heart Rate:  [50-56] 56  Resp:  [20-22] 21  BP: (128-145)/(65-75) 128/65  SpO2:  [92 %-95 %] 93 %  on  Flow (L/min):  [1-2] 1;   Device (Oxygen Therapy): nasal cannula  Body mass index is 24.6 kg/m².    Physical Exam  Constitutional:       Appearance: He is ill-appearing.   HENT:      Head: Normocephalic and atraumatic.   Cardiovascular:      Rate and Rhythm: Normal rate.   Pulmonary:      Effort: Respiratory distress (mild) present.      Breath sounds: No wheezing.   Abdominal:      Palpations: Abdomen is soft.      Tenderness: There is no abdominal tenderness.   Musculoskeletal:      Right lower leg: No edema.      Left lower leg: No edema.   Skin:     General: Skin is warm.     awake  Poor memory      Results Review:       I reviewed the patient's new clinical results.  Results from last 7 days   Lab Units 20  0801 20  0459 20  1244 20  1357   WBC 10*3/mm3 7.47 6.77 7.15 7.46   HEMOGLOBIN g/dL 11.7* 12.5* 13.4 13.9   PLATELETS 10*3/mm3 181 153 197 239     Results from last 7 days   Lab Units 20  0343 20  0708 20  0801 20  1028   SODIUM mmol/L 139 140 131* 137   POTASSIUM mmol/L 4.0 4.3 3.5 3.5   CHLORIDE mmol/L 109* 108* 101 104   CO2 mmol/L 19.1* 21.4* 22.8 22.0   BUN mg/dL 26* 20 18 21   CREATININE mg/dL 1.11 1.20 1.28* 1.36*   GLUCOSE mg/dL 147* 129* 112* 120*   Estimated Creatinine Clearance: 55.1 mL/min (by  C-G formula based on SCr of 1.11 mg/dL).  Results from last 7 days   Lab Units 12/07/20  0343 12/06/20  0708 12/05/20  0801 12/04/20  1028   ALBUMIN g/dL 3.00* 3.00* 3.00* 3.40*   BILIRUBIN mg/dL 0.3 0.4 0.5 0.4   ALK PHOS U/L 56 54 52 52   AST (SGOT) U/L 61* 47* 52* 56*   ALT (SGPT) U/L 37 27 24 25     Results from last 7 days   Lab Units 12/07/20  0343 12/06/20  0708 12/05/20  0801 12/04/20  1028 12/03/20  1244   CALCIUM mg/dL 8.0* 8.0* 7.7* 8.1* 8.6   ALBUMIN g/dL 3.00* 3.00* 3.00* 3.40*  --    MAGNESIUM mg/dL  --   --   --   --  2.2     Results from last 7 days   Lab Units 12/02/20  1401 12/02/20  1357   PROCALCITONIN ng/mL  --  0.22   LACTATE mmol/L 1.4  --        Coag     HbA1C   Lab Results   Component Value Date    HGBA1C 6.37 (H) 08/13/2018    HGBA1C 6.15 (H) 01/20/2017    HGBA1C 6.2 (H) 07/27/2015     Infection   Results from last 7 days   Lab Units 12/02/20  1357   PROCALCITONIN ng/mL 0.22     Radiology(recent) No radiology results for the last day  No results found for: TROPONINT, TROPONINI, BNP  No components found for: TSH;2    apixaban, 2.5 mg, Oral, Q12H  atorvastatin, 40 mg, Oral, Nightly  carvedilol, 3.125 mg, Oral, BID With Meals  dexamethasone, 6 mg, Oral, Daily With Breakfast  escitalopram, 20 mg, Oral, Daily  levothyroxine, 88 mcg, Oral, Q AM  pantoprazole, 40 mg, Oral, QAM  remdesivir, 100 mg, Intravenous, Q24H  sodium chloride, 10 mL, Intravenous, Q12H  sotalol, 80 mg, Oral, Daily  cyanocobalamin, 2,000 mcg, Oral, Daily      Pharmacy Consult - Remdesivir,     Diet Regular; Cardiac      Assessment/Plan      Active Hospital Problems    Diagnosis  POA   • **Pneumonia due to COVID-19 virus [U07.1, J12.89]  Yes   • Acute respiratory failure with hypoxia (CMS/[facility]) [J96.01]  Yes   • ALEXANDRU (acute kidney injury) (CMS/[facility]) [N17.9]  Yes   • Chronic diastolic CHF (congestive heart failure) (CMS/[facility]) [I50.32]  Yes   • HTN (hypertension), benign [I10]  Yes   • S/P CABG (coronary artery bypass graft) [Z95.1]   Not Applicable   • Paroxysmal atrial fibrillation (CMS/HCC) [I48.0]  Yes   • Coronary arteriosclerosis in native artery [I25.10]  Yes      Resolved Hospital Problems   No resolved problems to display.       · Supportive care for COVID including oxygen if needed to keep sats greater than 90%.  · dexamethasone 6mg daily and remdesivir  · Continue above meds  · Monitor volume status  · I discussed the patients findings and my recommendations with patient and nursing staff.     VTE Prophylaxis - eliquis       DW RN  DW pharmacy  Close monitoring      Wilber Logan MD  [facility]  12/07/20  12:44 EST

## 2020-12-07 NOTE — PLAN OF CARE
Problem: Adult Inpatient Plan of Care  Goal: Plan of Care Review  Recent Flowsheet Documentation  Taken 12/7/2020 1452 by Whitney Gaspar OT     Goal Outcome Evaluation:  Plan of Care Reviewed With: patient  Progress: improving  Outcome Summary: Pt tolerates OT session well. Pt sits EOB to work on ADL and UE ther ex. Pt requires mod A today to thread LEs for changing underwear as pt attempts to complete in supine position. Pt does show improvement in mobility to sink side and bathroom today CGA with rwx. Pt on 1L O2 via NC and drops to 88% with standing ADL and mobility with quick recovery. Will benefit from continued OT to safely d/c home with assist.    Appropriate PPE worn during encounter following enhanced droplet precautions. Hand hygiene completed. Pt wore a mask.

## 2020-12-07 NOTE — PLAN OF CARE
Goal Outcome Evaluation:  Plan of Care Reviewed With: patient  Progress: improving  Outcome Summary: Patient with + Covid, flat affect, denies respiratory distress.  On room air during day and eats very little.   Complains of generalized pain requiring tylenol.

## 2020-12-07 NOTE — PAYOR COMM NOTE
"Juan C Malone (85 y.o. Male)     PLEASE SEE ATTACHED ADDITIONAL CLNS FOR RECONSIDERATION.         REF#[id number]    PLEASE CALL   OR  [phone] WITH INPT AUTH AND DAYS APPROVED.     THANK YOU    JUANITA HARRISON LPN CCP    Date of Birth Social Security Number Address Home Phone MRN    1935  [address] [id number] [phone] [id number]    Taoism Marital Status          None        Admission Date Admission Type Admitting Provider Attending Provider Department, Room/Bed    12/2/20 Emergency Stingl, MD Desmond Siegel Alan David, MD [address], [address]    Discharge Date Discharge Disposition Discharge Destination                       Attending Provider: Wilber Logan MD    Allergies: Latex    Isolation: Enh Drop/Con   Infection: COVID (confirmed) (12/02/20)   Code Status: CPR    Ht: 180.3 cm (71\")   Wt: 80 kg (176 lb 5.9 oz)    Admission Cmt: None   Principal Problem: Pneumonia due to COVID-19 virus [U07.1,J12.89]                 Active Insurance as of 12/2/2020     Primary Coverage     Payor Plan Insurance Group Employer/Plan Group    AETNA MEDICARE REPLACEMENT AETNA MEDICARE REPLACEMENT [id number]     Payor Plan Address Payor Plan Phone Number Payor Plan Fax Number Effective Dates    [address] [phone]  1/1/2019 - None Entered    [facility] [address]       Subscriber Name Subscriber Birth Date Member ID       JUAN C MALONE 1935 [id number]                 Emergency Contacts      (Rel.) Home Phone Work Phone Mobile Phone    Malka Minor (Daughter) [phone] -- [phone]    JulietYung arredondohanie (Daughter) -- -- [phone]            Oxygen Therapy (last 3 days)     Date/Time   SpO2   Device (Oxygen Therapy)   Flow (L/min)   Oxygen Concentration (%)   ETCO2 (mmHg)    12/07/20 0802   92   nasal cannula   1   --   --    12/06/20 2315   93   nasal cannula   1   --   --    12/06/20 " 2314   95   --   --   --   --    12/06/20 2052   94   room air   --   --   --    12/06/20 2045   --   room air   --   --   --    12/06/20 1843   93   room air   --   --   --    12/06/20 1800   93   room air   --   --   --    12/06/20 1746   94   nasal cannula   2   --   --    12/06/20 1315   94   nasal cannula   2   --   --    12/06/20 0944   94   nasal cannula   2   --   --    12/06/20 0942   (!) 89   room air   --   --   --    12/06/20 0823   98   nasal cannula   5   --   --    12/06/20 0030   --   nasal cannula   5   --   --    12/06/20 0006   --   nasal cannula   5   --   --    12/05/20 2342   96   nasal cannula   5   --   --    12/05/20 2059   --   nasal cannula   5   --   --    12/05/20 2003 98   nasal cannula   5   --   --    12/05/20 1841   98   nasal cannula   5   --   --    12/05/20 1823   94   nasal cannula   5   --   --    12/05/20 1817   (!) 84   nasal cannula   4   --   --    12/05/20 1816   92   --   4   --   --    12/05/20 1654   95   nasal cannula   4   --   --    12/05/20 1512   93   nasal cannula   4   --   --    12/05/20 1358   (!) 88   room air   --   --   --    12/05/20 1322   92   room air   --   --   --    12/05/20 1154   91   room air   --   --   --    12/05/20 1004   94   room air   --   --   --    12/05/20 1000   --   room air   --   --   --    12/05/20 0904   94   room air   --   --   --    12/05/20 0215   94   --   --   --   --    12/04/20 2334   91   --   --   --   --    12/04/20 2317   --   room air   --   --   --    12/04/20 2015   --   room air   --   --   --    12/04/20 1750   93   room air   --   --   --    12/04/20 1448   --   room air   --   --   --    12/04/20 1347   94   room air   --   --   --    12/04/20 0950   --   room air   --   --   --    12/04/20 0745   91   room air   --   --   --            Intake & Output (last 3 days)       12/04 0701 - 12/05 0700 12/05 0701 - 12/06 0700 12/06 0701 - 12/07 0700 12/07 0701 - 12/08 0700    P.O. 480 60      I.V. (mL/kg)  875 (10.9)       Total Intake(mL/kg) 480 (6) 935 (11.7)      Urine (mL/kg/hr) 450 (0.2) 1225 (0.6) 1250 (0.7)     Stool  0 0     Total Output 450 1225 1250     Net +30 -290 -1250             Urine Unmeasured Occurrence  0 x 0 x     Stool Unmeasured Occurrence  3 x 1 x          Lines, Drains & Airways    Active LDAs     Name:   Placement date:   Placement time:   Site:   Days:    Peripheral IV 20 1510 Left Forearm   20    1510    Forearm   less than 1         Inactive LDAs     Name:   Placement date:   Placement time:   Removal date:   Removal time:   Site:   Days:    [REMOVED] Peripheral IV 20 1337 Right Antecubital   20    1337    20    1455    Antecubital   4                           Physician Progress Notes (last 72 hours) (Notes from 20 0841 through 20 0841)      Wilber Logan MD at 20 1254              Name: Bhaskar Ibarra ADMIT: 2020   : 1935  PCP: Katherine Lester APRN    MRN: [id number] LOS: 3 days   AGE/SEX: 85 y.o. male  ROOM: [facility] [place]/   Subjective   Chief Complaint   Patient presents with   • Head Injury     on eliquis    • Fall   • Weakness - Generalized   • Exposure To Known Illness     wife has covid      Still with fever  +chills  +myalgias  Feels poorly but better today than yesterday  Up to 5L yesterday evening  Started on remdesivir  Down to 2L today    ROS  +f/c  No n/v  No cp/palp  +soa/cough    Objective   Vital Signs  Temp:  [97 °F (36.1 °C)-101.6 °F (38.7 °C)] 97.6 °F (36.4 °C)  Heart Rate:  [46-65] 52  Resp:  [20-26] 22  BP: (114-143)/(61-87) 130/61  SpO2:  [84 %-98 %] 94 %  on  Flow (L/min):  [2-5] 2;   Device (Oxygen Therapy): nasal cannula  Body mass index is 24.6 kg/m².    Physical Exam  Constitutional:       Appearance: He is ill-appearing.   HENT:      Head: Normocephalic and atraumatic.   Cardiovascular:      Rate and Rhythm: Normal rate and regular rhythm.   Pulmonary:      Effort: Respiratory distress (mild) present.       Breath sounds: No wheezing.   Abdominal:      Palpations: Abdomen is soft.      Tenderness: There is no abdominal tenderness.   Musculoskeletal:      Right lower leg: No edema.      Left lower leg: No edema.   Skin:     General: Skin is warm.     awake  Poor memory  Oriented to self    Results Review:       I reviewed the patient's new clinical results.  Results from last 7 days   Lab Units 12/05/20  0801 12/04/20  0459 12/03/20  1244 12/02/20  1357   WBC 10*3/mm3 7.47 6.77 7.15 7.46   HEMOGLOBIN g/dL 11.7* 12.5* 13.4 13.9   PLATELETS 10*3/mm3 181 153 197 239     Results from last 7 days   Lab Units 12/06/20  0708 12/05/20  0801 12/04/20  1028 12/03/20  1244   SODIUM mmol/L 140 131* 137 136   POTASSIUM mmol/L 4.3 3.5 3.5 3.6   CHLORIDE mmol/L 108* 101 104 98   CO2 mmol/L 21.4* 22.8 22.0 24.4   BUN mg/dL 20 18 21 31*   CREATININE mg/dL 1.20 1.28* 1.36* 1.82*   GLUCOSE mg/dL 129* 112* 120* 109*   Estimated Creatinine Clearance: 50.9 mL/min (by C-G formula based on SCr of 1.2 mg/dL).  Results from last 7 days   Lab Units 12/06/20  0708 12/05/20  0801 12/04/20  1028 12/02/20  1357   ALBUMIN g/dL 3.00* 3.00* 3.40* 4.20   BILIRUBIN mg/dL 0.4 0.5 0.4 0.5   ALK PHOS U/L 54 52 52 68   AST (SGOT) U/L 47* 52* 56* 55*   ALT (SGPT) U/L 27 24 25 29     Results from last 7 days   Lab Units 12/06/20  0708 12/05/20  0801 12/04/20  1028 12/03/20  1244 12/02/20  1357   CALCIUM mg/dL 8.0* 7.7* 8.1* 8.6 9.1   ALBUMIN g/dL 3.00* 3.00* 3.40*  --  4.20   MAGNESIUM mg/dL  --   --   --  2.2  --      Results from last 7 days   Lab Units 12/02/20  1401 12/02/20  1357   PROCALCITONIN ng/mL  --  0.22   LACTATE mmol/L 1.4  --        Coag     HbA1C   Lab Results   Component Value Date    HGBA1C 6.37 (H) 08/13/2018    HGBA1C 6.15 (H) 01/20/2017    HGBA1C 6.2 (H) 07/27/2015     Infection   Results from last 7 days   Lab Units 12/02/20  1357   PROCALCITONIN ng/mL 0.22     Radiology(recent) No radiology results for the last day  No results found for:  TROPONINT, TROPONINI, BNP  No components found for: TSH;2    apixaban, 2.5 mg, Oral, Q12H  atorvastatin, 40 mg, Oral, Nightly  carvedilol, 3.125 mg, Oral, BID With Meals  dexamethasone, 6 mg, Oral, Daily With Breakfast  escitalopram, 20 mg, Oral, Daily  levothyroxine, 88 mcg, Oral, Q AM  pantoprazole, 40 mg, Oral, QAM  remdesivir, 100 mg, Intravenous, Q24H  sodium chloride, 10 mL, Intravenous, Q12H  sotalol, 80 mg, Oral, Daily  cyanocobalamin, 2,000 mcg, Oral, Daily      Pharmacy Consult - Remdesivir,     Diet Regular; Cardiac      Assessment/Plan     Active Hospital Problems    Diagnosis  POA   • **Pneumonia due to COVID-19 virus [U07.1, J12.89]  Yes   • ALEXANDRU (acute kidney injury) (CMS/[facility]) [N17.9]  Yes   • Chronic diastolic CHF (congestive heart failure) (CMS/[facility]) [I50.32]  Yes   • HTN (hypertension), benign [I10]  Yes   • S/P CABG (coronary artery bypass graft) [Z95.1]  Not Applicable   • Paroxysmal atrial fibrillation (CMS/[facility]) [I48.0]  Yes   • Coronary arteriosclerosis in native artery [I25.10]  Yes      Resolved Hospital Problems   No resolved problems to display.       · Supportive care for COVID including oxygen if needed to keep sats greater than 90%.  · dexamethasone 6mg daily and remdesivir  · Continue above meds  · Monitor volume status  · I discussed the patients findings and my recommendations with patient and nursing staff.     VTE Prophylaxis - eliquis  Code Status - Full code.     DW RN  Close monitoring      Wilber Loagn MD  [facility]  20  12:44 EST    Electronically signed by Wilber Logan MD at 20 1256     Wilber Logan MD at 20 1244              Name: Bhaskar Ibarra ADMIT: 2020   : 1935  PCP: Katherine Lester APRN    MRN: [id number] LOS: 2 days   AGE/SEX: 85 y.o. male  ROOM: [facility]   Subjective   Chief Complaint   Patient presents with   • Head Injury     on eliquis    • Fall   • Weakness - Generalized   • Exposure To  Known Illness     wife has covid      Still with fever  +chills  +myalgias  Feels poorly    ROS  +f/c  No n/v  No cp/palp  +soa/cough    Objective   Vital Signs  Temp:  [98.2 °F (36.8 °C)-101.2 °F (38.4 °C)] 98.2 °F (36.8 °C)  Heart Rate:  [57-83] 60  Resp:  [18-26] 25  BP: (113-147)/(65-77) 147/77  SpO2:  [91 %-94 %] 91 %  on   ;   Device (Oxygen Therapy): room air  Body mass index is 24.6 kg/m².    Physical Exam  Constitutional:       Appearance: He is ill-appearing.   HENT:      Head: Normocephalic and atraumatic.   Cardiovascular:      Rate and Rhythm: Normal rate and regular rhythm.   Pulmonary:      Effort: Respiratory distress (mild) present.      Breath sounds: No wheezing.   Abdominal:      Palpations: Abdomen is soft.      Tenderness: There is no abdominal tenderness.   Musculoskeletal:      Right lower leg: No edema.      Left lower leg: No edema.   Skin:     General: Skin is warm.     awake  Poor memory  Oriented to self    Results Review:       I reviewed the patient's new clinical results.  Results from last 7 days   Lab Units 12/05/20  0801 12/04/20  0459 12/03/20  1244 12/02/20  1357   WBC 10*3/mm3 7.47 6.77 7.15 7.46   HEMOGLOBIN g/dL 11.7* 12.5* 13.4 13.9   PLATELETS 10*3/mm3 181 153 197 239     Results from last 7 days   Lab Units 12/05/20  0801 12/04/20  1028 12/03/20  1244 12/02/20  1357   SODIUM mmol/L 131* 137 136 134*   POTASSIUM mmol/L 3.5 3.5 3.6 4.0   CHLORIDE mmol/L 101 104 98 95*   CO2 mmol/L 22.8 22.0 24.4 26.4   BUN mg/dL 18 21 31* 33*   CREATININE mg/dL 1.28* 1.36* 1.82* 2.15*   GLUCOSE mg/dL 112* 120* 109* 100*   Estimated Creatinine Clearance: 47.7 mL/min (A) (by C-G formula based on SCr of 1.28 mg/dL (H)).  Results from last 7 days   Lab Units 12/05/20  0801 12/04/20  1028 12/02/20  1357   ALBUMIN g/dL 3.00* 3.40* 4.20   BILIRUBIN mg/dL 0.5 0.4 0.5   ALK PHOS U/L 52 52 68   AST (SGOT) U/L 52* 56* 55*   ALT (SGPT) U/L 24 25 29     Results from last 7 days   Lab Units 12/05/20  0801  12/04/20  1028 12/03/20  1244 12/02/20  1357   CALCIUM mg/dL 7.7* 8.1* 8.6 9.1   ALBUMIN g/dL 3.00* 3.40*  --  4.20   MAGNESIUM mg/dL  --   --  2.2  --      Results from last 7 days   Lab Units 12/02/20  1401 12/02/20  1357   PROCALCITONIN ng/mL  --  0.22   LACTATE mmol/L 1.4  --        Coag     HbA1C   Lab Results   Component Value Date    HGBA1C 6.37 (H) 08/13/2018    HGBA1C 6.15 (H) 01/20/2017    HGBA1C 6.2 (H) 07/27/2015     Infection   Results from last 7 days   Lab Units 12/02/20  1357   PROCALCITONIN ng/mL 0.22     Radiology(recent) No radiology results for the last day  Troponin T   Date Value Ref Range Status   12/02/2020 <0.010 0.000 - 0.030 ng/mL Final     No components found for: TSH;2    apixaban, 2.5 mg, Oral, Q12H  atorvastatin, 40 mg, Oral, Nightly  carvedilol, 3.125 mg, Oral, BID With Meals  dexamethasone, 6 mg, Oral, Daily With Breakfast  escitalopram, 20 mg, Oral, Daily  levothyroxine, 88 mcg, Oral, Q AM  pantoprazole, 40 mg, Oral, QAM  sodium chloride, 10 mL, Intravenous, Q12H  sotalol, 80 mg, Oral, Daily  cyanocobalamin, 2,000 mcg, Oral, Daily      sodium chloride, 75 mL/hr, Last Rate: 75 mL/hr (12/05/20 0638)    Diet Regular; Cardiac      Assessment/Plan     Active Hospital Problems    Diagnosis  POA   • **Pneumonia due to COVID-19 virus [U07.1, J12.89]  Yes   • ALEXANDRU (acute kidney injury) (CMS/[facility]) [N17.9]  Yes   • HTN (hypertension), benign [I10]  Yes   • S/P CABG (coronary artery bypass graft) [Z95.1]  Not Applicable   • Paroxysmal atrial fibrillation (CMS/[facility]) [I48.0]  Yes   • Coronary arteriosclerosis in native artery [I25.10]  Yes      Resolved Hospital Problems   No resolved problems to display.       · Supportive care for COVID including oxygen if needed to keep sats greater than 90%.  · With borderline hypoxia sats down to 91% will plan on dexamethasone 6mg daily  · Continue above meds  · I discussed the patients findings and my recommendations with patient and nursing staff.     VTE  "Prophylaxis - eliquis  Code Status - Full code.     DW RN  DW family  Close monitoring      Wilber Logan MD  [facility]  20  12:44 EST    Electronically signed by Wilber Logan MD at 20 1302     Sanjeev Mullen MD at 20 1429          DAILY PROGRESS NOTE  [facility]    Patient Identification:  Name: Bhaskar Ibarra  Age: 85 y.o.  Sex: male  :  1935  MRN: [id number]         Primary Care Physician: Katherine Lester APRN    Subjective:  Interval History: He is weak and has poor appetite.    Objective:    Scheduled Meds:apixaban, 2.5 mg, Oral, Q12H  atorvastatin, 40 mg, Oral, Nightly  carvedilol, 3.125 mg, Oral, BID With Meals  escitalopram, 20 mg, Oral, Daily  levothyroxine, 88 mcg, Oral, Q AM  pantoprazole, 40 mg, Oral, QAM  sodium chloride, 10 mL, Intravenous, Q12H  sotalol, 80 mg, Oral, Daily  cyanocobalamin, 2,000 mcg, Oral, Daily      Continuous Infusions:sodium chloride, 75 mL/hr, Last Rate: 75 mL/hr (20)        Vital signs in last 24 hours:  Temp:  [100.2 °F (37.9 °C)-101.2 °F (38.4 °C)] 101.2 °F (38.4 °C)  Heart Rate:  [58-64] 61  Resp:  [16-18] 18  BP: (128-141)/(59-69) 141/65    Intake/Output:    Intake/Output Summary (Last 24 hours) at 2020 1429  Last data filed at 2020 1347  Gross per 24 hour   Intake 960 ml   Output 600 ml   Net 360 ml       Exam:  /65 (BP Location: Right arm, Patient Position: Lying)   Pulse 61   Temp (!) 101.2 °F (38.4 °C) (Oral)   Resp 18   Ht 180.3 cm (71\")   Wt 80 kg (176 lb 5.9 oz)   SpO2 94%   BMI 24.60 kg/m²     General Appearance:    Alert, cooperative, no distress   Head:    Normocephalic, without obvious abnormality, atraumatic   Eyes:       Throat:   Lips, tongue, gums normal   Neck:   Supple, symmetrical, trachea midline, no JVD   Lungs:     Clear to auscultation bilaterally, respirations unlabored   Chest Wall:    No tenderness or deformity    Heart:    Regular " rate and rhythm, S1 and S2 normal, no murmur,no  Rub or gallop   Abdomen:     Soft, nontender, bowel sounds active, no masses, no organomegaly    Extremities:   Extremities normal, atraumatic, no cyanosis or edema   Pulses:      Skin:   Skin is warm and dry,  no rashes or palpable lesions   Neurologic:   no focal deficits noted      Lab Results (last 72 hours)     Procedure Component Value Units Date/Time    Comprehensive Metabolic Panel [[id number]] Collected: 12/04/20 1028    Specimen: Blood Updated: 12/04/20 1057    Lactate Dehydrogenase [[id number]] Collected: 12/04/20 1028    Specimen: Blood Updated: 12/04/20 1057    Ferritin [[id number]]  (Abnormal) Collected: 12/04/20 0459    Specimen: Blood Updated: 12/04/20 0601     Ferritin 849.00 ng/mL     Narrative:      Results may be falsely decreased if patient taking Biotin.      C-reactive Protein [[id number]]  (Abnormal) Collected: 12/04/20 0459    Specimen: Blood Updated: 12/04/20 0557     C-Reactive Protein 9.12 mg/dL     CBC & Differential [[id number]]  (Abnormal) Collected: 12/04/20 0459    Specimen: Blood Updated: 12/04/20 0523    Narrative:      CBC Auto Differential [[id number]]  (Abnormal) Collected: 12/04/20 0459    Specimen: Blood Updated: 12/04/20 0523     WBC 6.77 10*3/mm3      RBC 3.82 10*6/mm3      Hemoglobin 12.5 g/dL      Hematocrit 37.6 %      MCV 98.4 fL      MCH 32.7 pg      MCHC 33.2 g/dL      RDW 13.1 %      RDW-SD 46.1 fl      MPV 11.6 fL      Platelets 153 10*3/mm3      Neutrophil % 71.3 %      Lymphocyte % 20.2 %      Monocyte % 6.8 %      Eosinophil % 0.4 %      Basophil % 0.3 %      Immature Grans % 1.0 %      Neutrophils, Absolute 4.82 10*3/mm3      Lymphocytes, Absolute 1.37 10*3/mm3      Monocytes, Absolute 0.46 10*3/mm3      Eosinophils, Absolute 0.03 10*3/mm3      Basophils, Absolute 0.02 10*3/mm3      Immature Grans, Absolute 0.07 10*3/mm3      nRBC 0.0 /100 WBC     Basic Metabolic Panel [[id number]]  (Abnormal) Collected: 12/03/20 1241     Specimen: Blood Updated: 12/03/20 1335     Glucose 109 mg/dL      BUN 31 mg/dL      Creatinine 1.82 mg/dL      Sodium 136 mmol/L      Potassium 3.6 mmol/L      Chloride 98 mmol/L      CO2 24.4 mmol/L      Calcium 8.6 mg/dL      eGFR Non African Amer 36 mL/min/1.73      BUN/Creatinine Ratio 17.0     Anion Gap 13.6 mmol/L     Narrative:      Magnesium [[id number]]  (Normal) Collected: 12/03/20 1244    Specimen: Blood Updated: 12/03/20 1335     Magnesium 2.2 mg/dL     CBC Auto Differential [[id number]]  (Abnormal) Collected: 12/03/20 1244    Specimen: Blood Updated: 12/03/20 1310     WBC 7.15 10*3/mm3      RBC 4.20 10*6/mm3      Hemoglobin 13.4 g/dL      Hematocrit 40.1 %      MCV 95.5 fL      MCH 31.9 pg      MCHC 33.4 g/dL      RDW 13.0 %      RDW-SD 45.2 fl      MPV 11.3 fL      Platelets 197 10*3/mm3      Neutrophil % 62.7 %      Lymphocyte % 27.0 %      Monocyte % 8.5 %      Eosinophil % 0.4 %      Basophil % 0.3 %      Immature Grans % 1.1 %      Neutrophils, Absolute 4.48 10*3/mm3      Lymphocytes, Absolute 1.93 10*3/mm3      Monocytes, Absolute 0.61 10*3/mm3      Eosinophils, Absolute 0.03 10*3/mm3      Basophils, Absolute 0.02 10*3/mm3      Immature Grans, Absolute 0.08 10*3/mm3      nRBC 0.0 /100 WBC     Respiratory Panel PCR w/COVID-19(SARS-CoV-2) SARAI/BOB/SCAR/PAD/COR/MAD/C[facility] In-House, NP Swab in UTM/VTM, 3-4 HR TAT - Swab, Nasopharynx [[id number]]  (Abnormal) Collected: 12/02/20 1358    Specimen: Swab from Nasopharynx Updated: 12/02/20 1802     ADENOVIRUS, PCR Not Detected     Coronavirus 229E Not Detected     Coronavirus HKU1 Not Detected     Coronavirus NL63 Not Detected     Coronavirus OC43 Not Detected     COVID19 Detected     Human Metapneumovirus Not Detected     Human Rhinovirus/Enterovirus Not Detected     Influenza A PCR Not Detected     Influenza B PCR Not Detected     Parainfluenza Virus 1 Not Detected     Parainfluenza Virus 2 Not Detected     Parainfluenza Virus 3 Not Detected     Parainfluenza  Virus 4 Not Detected     RSV, PCR Not Detected     Bordetella pertussis pcr Not Detected     Bordetella parapertussis PCR Not Detected     Chlamydophila pneumoniae PCR Not Detected     Mycoplasma pneumo by PCR Not Detected    Narrative:      Urinalysis With Microscopic If Indicated (No Culture) - Urine, Clean Catch [[id number]]  (Abnormal) Collected: 12/02/20 1545    Specimen: Urine, Clean Catch Updated: 12/02/20 1601     Color, UA Yellow     Appearance, UA Clear     pH, UA <=5.0     Specific Gravity, UA 1.014     Glucose, UA Negative     Ketones, UA Negative     Bilirubin, UA Negative     Blood, UA Trace     Protein, UA Trace     Leuk Esterase, UA Negative     Nitrite, UA Negative     Urobilinogen, UA 0.2 E.U./dL    Urinalysis, Microscopic Only - Urine, Clean Catch [[id number]] Collected: 12/02/20 1545    Specimen: Urine, Clean Catch Updated: 12/02/20 1601     RBC, UA 0-2 /HPF      WBC, UA 0-2 /HPF      Bacteria, UA None Seen /HPF      Squamous Epithelial Cells, UA 0-2 /HPF      Hyaline Casts, UA 0-2 /LPF      Methodology Automated Microscopy    Procalcitonin [[id number]]  (Normal) Collected: 12/02/20 1357    Specimen: Blood Updated: 12/02/20 1446     Procalcitonin 0.22 ng/mL     Narrative:      Comprehensive Metabolic Panel [[id number]]  (Abnormal) Collected: 12/02/20 1357    Specimen: Blood Updated: 12/02/20 1441     Glucose 100 mg/dL      BUN 33 mg/dL      Creatinine 2.15 mg/dL      Sodium 134 mmol/L      Potassium 4.0 mmol/L      Chloride 95 mmol/L      CO2 26.4 mmol/L      Calcium 9.1 mg/dL      Total Protein 7.8 g/dL      Albumin 4.20 g/dL      ALT (SGPT) 29 U/L      AST (SGOT) 55 U/L      Alkaline Phosphatase 68 U/L      Total Bilirubin 0.5 mg/dL      eGFR Non African Amer 29 mL/min/1.73      Globulin 3.6 gm/dL      A/G Ratio 1.2 g/dL      BUN/Creatinine Ratio 15.3     Anion Gap 12.6 mmol/L     Narrative:      Lactate Dehydrogenase [[id number]]  (Abnormal) Collected: 12/02/20 1357    Specimen: Blood Updated:  12/02/20 1441      U/L     C-reactive Protein [[id number]]  (Abnormal) Collected: 12/02/20 1357    Specimen: Blood Updated: 12/02/20 1441     C-Reactive Protein 6.09 mg/dL     Troponin [[id number]]  (Normal) Collected: 12/02/20 1357    Specimen: Blood Updated: 12/02/20 1441     Troponin T <0.010 ng/mL     Narrative:      BNP [[id number]]  (Normal) Collected: 12/02/20 1357    Specimen: Blood Updated: 12/02/20 1438     proBNP 641.4 pg/mL     Narrative:      Lactic Acid, Plasma [[id number]]  (Normal) Collected: 12/02/20 1401    Specimen: Blood Updated: 12/02/20 1424     Lactate 1.4 mmol/L     CBC & Differential [[id number]]  (Abnormal) Collected: 12/02/20 1357    Specimen: Blood Updated: 12/02/20 1413    Narrative:      CBC Auto Differential [[id number]]  (Abnormal) Collected: 12/02/20 1357    Specimen: Blood Updated: 12/02/20 1413     WBC 7.46 10*3/mm3      RBC 4.22 10*6/mm3      Hemoglobin 13.9 g/dL      Hematocrit 40.3 %      MCV 95.5 fL      MCH 32.9 pg      MCHC 34.5 g/dL      RDW 13.4 %      RDW-SD 46.7 fl      MPV 11.8 fL      Platelets 239 10*3/mm3      Neutrophil % 70.0 %      Lymphocyte % 20.9 %      Monocyte % 7.4 %      Eosinophil % 0.1 %      Basophil % 0.3 %      Immature Grans % 1.3 %      Neutrophils, Absolute 5.22 10*3/mm3      Lymphocytes, Absolute 1.56 10*3/mm3      Monocytes, Absolute 0.55 10*3/mm3      Eosinophils, Absolute 0.01 10*3/mm3      Basophils, Absolute 0.02 10*3/mm3      Immature Grans, Absolute 0.10 10*3/mm3      nRBC 0.0 /100 WBC         Data Review:  Results from last 7 days   Lab Units 12/04/20  1028 12/03/20  1244 12/02/20  1357   SODIUM mmol/L 137 136 134*   POTASSIUM mmol/L 3.5 3.6 4.0   CHLORIDE mmol/L 104 98 95*   CO2 mmol/L 22.0 24.4 26.4   BUN mg/dL 21 31* 33*   CREATININE mg/dL 1.36* 1.82* 2.15*   GLUCOSE mg/dL 120* 109* 100*   CALCIUM mg/dL 8.1* 8.6 9.1     Results from last 7 days   Lab Units 12/04/20  0459 12/03/20  1244 12/02/20  1357   WBC 10*3/mm3 6.77 7.15 7.46    HEMOGLOBIN g/dL 12.5* 13.4 13.9   HEMATOCRIT % 37.6 40.1 40.3   PLATELETS 10*3/mm3 153 197 239             Lab Results   Lab Value Date/Time    TROPONINT <0.010 12/02/2020 1357    TROPONINT <0.010 07/23/2017 1204    TROPONINT 1.30 (C) 07/27/2015 0640    TROPONINT 0.90 (C) 07/26/2015 1115    TROPONINT 0.46 (C) 07/26/2015 0205         Results from last 7 days   Lab Units 12/04/20  1028 12/02/20  1357   ALK PHOS U/L 52 68   BILIRUBIN mg/dL 0.4 0.5   ALT (SGPT) U/L 25 29   AST (SGOT) U/L 56* 55*             No results found for: POCGLU        Past Medical History:   Diagnosis Date   • Abdominal aortic aneurysm (CMS/HCC)    • Acute myocardial infarction (CMS/HCC)    • Acute renal failure (CMS/HCC)    • Anemia    • Aneurysm (CMS/HCC)    • Atrial fibrillation (CMS/HCC)    • Cardiomyopathy (CMS/HCC)    • Chest pain    • Coronary artery disease     July 2015-    • Depression    • GERD (gastroesophageal reflux disease)    • Hyperlipidemia    • Hypertension    • Hypothyroidism    • Lung cancer (CMS/HCC) 2016    radiation    • Mitral regurgitation    • Myocardial infarction (CMS/HCC)    • Pleural effusion    • Pleural effusion, bilateral    • RLS (restless legs syndrome)    • Sleep apnea    • Ulcerative colitis (CMS/HCC)    • Ulcerative colitis (CMS/HCC)        Assessment:  Active Hospital Problems    Diagnosis  POA   • Pneumonia due to COVID-19 virus [U07.1, J12.89]  Yes      Resolved Hospital Problems   No resolved problems to display.       Plan:  Continue with IV fluid hydration follow-up Covid lab testing.  If he is starting to eat and drink a little bit better may be he can go home with his family tomorrow    Sanjeev Mullen MD  12/4/2020  14:29 EST    Electronically signed by Sanjeev Mullen MD at 12/04/20 7400       All medication doses during the admission are shown, including meds that are no longer on order.   Scheduled Meds Sorted by Name  for Bhaskar Ibarra as of 12/5/20 through 12/7/20    1 Day 3 Days 7 Days 10  Days <  Today >     Legend:                          Inactive     Active     Other Encounter     Linked                 Medications 12/05/20 12/06/20 12/07/20   apixaban (ELIQUIS) tablet 2.5 mg   Dose: 2.5 mg  Freq: Every 12 Hours Route: PO  Indications of Use: ATRIAL FIBRILLATION  Start: 12/02/20 2230    Admin Instructions:   Tablet may be crushed and suspended in 60 mL of water or D5W and immediately delivered via NG tube. Avoid grapefruit juice.    1004   2130        0937   2053        1030   2230          atorvastatin (LIPITOR) tablet 40 mg   Dose: 40 mg  Freq: Nightly Route: PO  Start: 12/02/20 2230    Admin Instructions:   Avoid grapefruit juice.    2125 2052 2100            carvedilol (COREG) tablet 3.125 mg   Dose: 3.125 mg  Freq: 2 Times Daily With Meals Route: PO  Start: 12/02/20 2230    Admin Instructions:   Hold if HR less than 60  Give with food.    1004   (5863) [C]        (2534) [C]   (1436)        0875   1800          dexamethasone (DECADRON) tablet 6 mg   Dose: 6 mg  Freq: Daily With Breakfast Route: PO  Start: 12/05/20 1330 End: 12/15/20 0759    Admin Instructions:   Take with food.    135          0937          0807            escitalopram (LEXAPRO) tablet 20 mg   Dose: 20 mg  Freq: Daily Route: PO  Start: 12/03/20 0900    Admin Instructions:   Caution: Look alike/sound alike drug alert.    1004          0938          0807            levothyroxine (SYNTHROID, LEVOTHROID) tablet 88 mcg   Dose: 88 mcg  Freq: Every Early Morning Route: PO  Start: 12/03/20 0600    Admin Instructions:   Take on empty stomach.    0600 0617 0634            pantoprazole (PROTONIX) EC tablet 40 mg   Dose: 40 mg  Freq: Every Morning Route: PO  Start: 12/03/20 0700    Admin Instructions:   Swallow whole; do not crush, split, or chew.    0600 0617 0634            pantoprazole (PROTONIX) EC tablet 40 mg   Dose: 40 mg  Freq: Once Route: PO  Start: 12/02/20 1812 End:  12/02/20 1820    Admin Instructions:   Swallow whole; do not crush, split, or chew.         remdesivir 200 mg in sodium chloride 0.9 % 250 mL IVPB (powder vial)   Dose: 200 mg  Freq: Every 24 Hours Route: IV  Start: 12/05/20 2030 End: 12/05/20 2225    Admin Instructions:   Ensure all of the drug is administered. Flush line with 30mL NS after administration.    2125              Followed by  remdesivir 100 mg in sodium chloride 0.9 % 250 mL IVPB (powder vial)   Dose: 100 mg  Freq: Every 24 Hours Route: IV  Start: 12/06/20 1700 End: 12/10/20 1659    Admin Instructions:   Ensure all of the drug is administered. Flush line with 30mL NS after administration.     1738          1700            sodium chloride 0.9 % flush 10 mL   Dose: 10 mL  Freq: Every 12 Hours Scheduled Route: IV  Start: 12/02/20 2230    1005   2125        0945   2226        0806   2100          sotalol (BETAPACE) tablet 80 mg   Dose: 80 mg  Freq: Daily Route: PO  Start: 12/03/20 0900    Admin Instructions:   Do not give within 2 hours of alumnium- or magnesium-containing antacids  Per Black Box Warning, RN will release PRN EKG order for first 5 Sotalol doses for new orders or dose changes    1004          0937          0807            vitamin B-12 (CYANOCOBALAMIN) tablet 2,000 mcg   Dose: 2,000 mcg  Freq: Daily Route: PO  Start: 12/03/20 0900    1004          0937          0807            Medications 12/05/20 12/06/20 12/07/20       Continuous Meds Sorted by Name  for Bhaskar Ibarra as of 12/5/20 through 12/7/20   Legend:                          Inactive     Active     Other Encounter     Linked                 Medications 12/05/20 12/06/20 12/07/20   Pharmacy Consult - Remdesivir   Freq: Continuous PRN Route: XX  PRN Reason: Consult  Start: 12/05/20 1940 End: 12/10/20 1939         sodium chloride 0.9 % infusion   Rate: 75 mL/hr Dose: 75 mL/hr  Freq: Continuous Route: IV  Last Dose: Stopped (12/05/20 1323)  Start: 12/02/20 2230 End: 12/05/20 1302     0212   0233   0436     0638   1302-D/C'd  1323                PRN Meds Sorted by Name  for Bhaskar Ibarra as of 12/5/20 through 12/7/20   Legend:                          Inactive     Active     Other Encounter     Linked                 Medications 12/05/20 12/06/20 12/07/20   acetaminophen (TYLENOL) tablet 650 mg   Dose: 650 mg  Freq: Every 4 Hours PRN Route: PO  PRN Reason: Mild Pain   Start: 12/02/20 2134    Admin Instructions:   Do not exceed 4 grams of acetaminophen in a 24 hr period.    If given for pain, use the following pain scale:   Mild Pain = Pain Score of 1-3, CPOT 1-2  Moderate Pain = Pain Score of 4-6, CPOT 3-4  Severe Pain = Pain Score of 7-10, CPOT 5-8    0230   8035 2528 4464 8366           Or  acetaminophen (TYLENOL) 160 MG/5ML solution 650 mg   Dose: 650 mg  Freq: Every 4 Hours PRN Route: PO  PRN Reason: Mild Pain   Start: 12/02/20 2134    Admin Instructions:   Do not exceed 4 grams of acetaminophen in a 24 hr period.    If given for pain, use the following pain scale:   Mild Pain = Pain Score of 1-3, CPOT 1-2  Moderate Pain = Pain Score of 4-6, CPOT 3-4  Severe Pain = Pain Score of 7-10, CPOT 5-8                    Or  acetaminophen (TYLENOL) suppository 650 mg   Dose: 650 mg  Freq: Every 4 Hours PRN Route: RE  PRN Reason: Mild Pain   Start: 12/02/20 2134    Admin Instructions:   Do not exceed 4 grams of acetaminophen in a 24 hr period.    If given for pain, use the following pain scale:   Mild Pain = Pain Score of 1-3, CPOT 1-2  Moderate Pain = Pain Score of 4-6, CPOT 3-4  Severe Pain = Pain Score of 7-10, CPOT 5-8                    benzonatate (TESSALON) capsule 100 mg   Dose: 100 mg  Freq: Every 8 Hours PRN Route: PO  PRN Reason: Cough  Start: 12/03/20 1130    Admin Instructions:   Swallow whole. Do not crush, chew, or open capsule.    1004              diphenoxylate-atropine (LOMOTIL) 2.5-0.025 MG per tablet 2 tablet   Dose: 2 tablet  Freq: 4 Times Daily PRN Route: PO  PRN  Reason: Diarrhea  Start: 12/05/20 1354    Admin Instructions:   Maximum 8 tablets per 24 hours.           guaiFENesin-dextromethorphan (ROBITUSSIN DM) 100-10 MG/5ML syrup 10 mL   Dose: 10 mL  Freq: Every 4 Hours PRN Route: PO  PRN Reason: Cough  Start: 12/03/20 0946    Admin Instructions:   Caution: Look alike/sound alike drug alert         ondansetron (ZOFRAN) injection 4 mg   Dose: 4 mg  Freq: Every 6 Hours PRN Route: IV  PRN Reasons: Nausea,Vomiting  Start: 12/02/20 2134    Admin Instructions:   If BOTH ondansetron (ZOFRAN) and promethazine (PHENERGAN) are ordered use ondansetron first and THEN promethazine IF ondansetron is ineffective.         Pharmacy Consult - Remdesivir   Freq: Continuous PRN Route: XX  PRN Reason: Consult  Start: 12/05/20 1940 End: 12/10/20 1939         sodium chloride 0.9 % flush 10 mL   Dose: 10 mL  Freq: As Needed Route: IV  PRN Reason: Line Care  Start: 12/02/20 2134         Medications 12/05/20 12/06/20 12/07/20

## 2020-12-07 NOTE — PLAN OF CARE
Goal Outcome Evaluation:  Plan of Care Reviewed With: patient  Progress: improving  Outcome Summary: Pt. able to ambulate 30 feet, CGA x 1, with use of Rwx this date. Pt. requires CGA x 1 for bed mobility and CGA x 1 for sit <-> stand transfers. BUE/LE ther. ex. program x 15 reps completed for general strengthening.  Verbal/tactile cues for posture correction during upright mobility.    Patient was wearing a face mask during this therapy encounter. Therapist used appropriate personal protective equipment including eye protection, mask, and gloves.  Mask used was standard procedure mask. Appropriate PPE was worn during the entire therapy session. Hand hygiene was completed before and after therapy session. Patient is in enhanced droplet precautions.

## 2020-12-07 NOTE — THERAPY TREATMENT NOTE
Patient Name: Bhaskar Ibarra  : 1935    MRN: [id number]                              Today's Date: 2020       Admit Date: 2020    Visit Dx:     ICD-10-CM ICD-9-CM   1. Pneumonia due to COVID-19 virus  U07.1 480.8    J12.89    2. ALEXANDRU (acute kidney injury) (CMS/HCC)  N17.9 584.9     Patient Active Problem List   Diagnosis   • Coronary arteriosclerosis in native artery   • Paroxysmal atrial fibrillation (CMS/HCC)   • Acute non-Q wave ST elevation myocardial infarction (STEMI) involving left anterior descending (LAD) coronary artery   • Mitral valve insufficiency   • S/P CABG (coronary artery bypass graft)   • S/P mitral valve repair   • Depression   • Hypothyroidism   • Hyperlipidemia   • Pulmonary nodule   • Adenocarcinoma of right lung (CMS/HCC)   • Benign nodular prostatic hyperplasia with lower urinary tract symptoms   • Confusion   • Post concussion syndrome   • Vitamin D deficiency   • Well adult health check   • Impaired fasting glucose   • AAA (abdominal aortic aneurysm) (CMS/HCC)   • Ascending aortic aneurysm (CMS/HCC)   • Macrocytic anemia   • Other heart failure (CMS/HCC)   • HTN (hypertension), benign   • Pneumonia due to COVID-19 virus   • ALEXANDRU (acute kidney injury) (CMS/HCC)   • Chronic diastolic CHF (congestive heart failure) (CMS/HCC)   • Acute respiratory failure with hypoxia (CMS/HCC)     Past Medical History:   Diagnosis Date   • Abdominal aortic aneurysm (CMS/HCC)    • Acute myocardial infarction (CMS/HCC)    • Acute renal failure (CMS/HCC)    • Anemia    • Aneurysm (CMS/HCC)    • Atrial fibrillation (CMS/HCC)    • Cardiomyopathy (CMS/HCC)    • Chest pain    • Coronary artery disease     2015-    • Depression    • GERD (gastroesophageal reflux disease)    • Hyperlipidemia    • Hypertension    • Hypothyroidism    • Lung cancer (CMS/HCC) 2016    radiation    • Mitral regurgitation    • Myocardial infarction (CMS/HCC)    • Pleural effusion    • Pleural effusion, bilateral    •  RLS (restless legs syndrome)    • Sleep apnea    • Ulcerative colitis (CMS/HCC)    • Ulcerative colitis (CMS/HCC)      Past Surgical History:   Procedure Laterality Date   • BACK SURGERY      lumbar   • CARDIAC CATHETERIZATION     • CORONARY ANGIOPLASTY     • CORONARY ARTERY BYPASS GRAFT  07/27/2015    X 5  Dr Louis   • HERNIA REPAIR      inguinal - left   • MITRAL VALVE REPAIR/REPLACEMENT  07/27/2015    Dr Louis   • NASAL SEPTUM SURGERY      benign growth removed   • OTHER SURGICAL HISTORY      Cardiovasc Endosc W/ Video-Assist Harv Veins Coron Art Byp   • PROSTATE SURGERY     • SKIN BIOPSY      benign     General Information     Row Name 12/07/20 1447          OT Time and Intention    Document Type  therapy note (daily note)  -     Mode of Treatment  individual therapy  -     Row Name 12/07/20 1447          General Information    Patient Profile Reviewed  yes  -SM     Prior Level of Function  independent:;ADL's  -     Existing Precautions/Restrictions  fall;oxygen therapy device and L/min  -     Barriers to Rehab  hearing deficit  -     Row Name 12/07/20 1447          Cognition    Orientation Status (Cognition)  oriented x 3  -     Row Name 12/07/20 1447          Safety Issues, Functional Mobility    Safety Issues Affecting Function (Mobility)  --  -     Impairments Affecting Function (Mobility)  balance;endurance/activity tolerance;strength;sensation/sensory awareness  -       User Key  (r) = Recorded By, (t) = Taken By, (c) = Cosigned By    Initials Name Provider Type     Whitney Gaspar, ANGELICA Occupational Therapist        Mobility/ADL's     Row Name 12/07/20 1448          Bed Mobility    Supine-Sit [place] (Bed Mobility)  standby assist  -     Sit-Supine [place] (Bed Mobility)  standby assist  -     Assistive Device (Bed Mobility)  bed rails  -     Row Name 12/07/20 1448          Transfers    Transfers  sit-stand transfer  -     Sit-Stand [place] (Transfers)  contact  guard  -[facility] Name 12/07/20 1448          Functional Mobility    Functional Mobility- Ind. Level  contact guard assist  -     Functional Mobility- Device  rolling walker  -     Functional Mobility- Comment  mobility to bathroom/sink for ADL.  -[facility] Name 12/07/20 1448          Activities of Daily Living    BADL Assessment/Intervention  bathing;lower body dressing  -[facility] Name 12/07/20 1448          Lower Body Dressing Assessment/Training    [place] Level (Lower Body Dressing)  lower body dressing skills;moderate assist (50% patient effort) underware.  -     Position (Lower Body Dressing)  edge of bed sitting;supine  -     Comment (Lower Body Dressing)  initally sitting up EOB, then returns to supine. Needs assist to thread B legs.  -[facility] Name 12/07/20 1448          Bathing Assessment/Intervention    [place] Level (Bathing)  bathing skills;minimum assist (75% patient effort)  -     Position (Bathing)  edge of bed sitting  -       User Key  (r) = Recorded By, (t) = Taken By, (c) = Cosigned By    Initials Name Provider Type     Whitney Gaspar OT Occupational Therapist        Obj/Interventions     [facility] Name 12/07/20 1451          Shoulder (Therapeutic Exercise)    Shoulder (Therapeutic Exercise)  AROM (active range of motion)  -     Shoulder AROM (Therapeutic Exercise)  bilateral;flexion;extension;aBduction;aDduction;2 sets;10 repetitions  -[facility] Name 12/07/20 1451          Balance    Static Sitting Balance  WFL;sitting, edge of bed  -     Static Standing Balance  mild impairment;supported  -     Dynamic Standing Balance  mild impairment;unsupported  -     Balance Interventions  standing;sitting;occupation based/functional task  -     Comment, Balance  CGA for standing balance during ADL.  -[facility] Name 12/07/20 1451          Therapeutic Exercise    Therapeutic Exercise  shoulder  -       User Key  (r) = Recorded By, (t) = Taken By, (c) = Cosigned By     Initials Name Provider Type    Whitney Mi OT Occupational Therapist        Goals/Plan    No documentation.       Clinical Impression     Row Name 12/07/20 1452          Pain Scale: Numbers Pre/Post-Treatment    Pretreatment Pain Rating  0/10 - no pain  -     Posttreatment Pain Rating  0/10 - no pain  -     Row Name 12/07/20 1452          Plan of Care Review    Plan of Care Reviewed With  patient  -     Progress  improving  -     Outcome Summary  Pt tolerates OT session well. Pt sits EOB to work on ADL and UE ther ex. Pt requires mod A today to thread LEs for changing underwear as pt attempts to complete in supine position. Pt does show improvement in mobility to sink side and bathroom today CGA with rwx. Pt on 1L O2 via NC and drops to 88% with standing ADL and mobility with quick recovery. Will benefit from continued OT to safely d/c home with assist.  -     Row Name 12/07/20 1452          Therapy Assessment/Plan (OT)    Rehab Potential (OT)  good, to achieve stated therapy goals  -     Criteria for Skilled Therapeutic Interventions Met (OT)  yes  -     Therapy Frequency (OT)  3 times/wk  -     Row Name 12/07/20 1452          Therapy Plan Review/Discharge Plan (OT)    Equipment Needs Upon Discharge (OT)  shower chair  -     Row Name 12/07/20 1452          Vital Signs    Pre SpO2 (%)  91 1L  -SM     O2 Delivery Pre Treatment  supplemental O2  -SM     Intra SpO2 (%)  88  -SM     O2 Delivery Intra Treatment  supplemental O2  -SM     Post SpO2 (%)  93  -SM     O2 Delivery Post Treatment  supplemental O2  -SM     Recovery Time  15 seconds.  -     Row Name 12/07/20 1452          Positioning and Restraints    Pre-Treatment Position  in bed  -SM     Post Treatment Position  bed  -SM     In Bed  call light within reach;encouraged to call for assist;exit alarm on  -       User Key  (r) = Recorded By, (t) = Taken By, (c) = Cosigned By    Initials Name Provider Type    Whitney Mi OT  Occupational Therapist        Outcome Measures     Row Name 12/07/20 1457          How much help from another is currently needed...    Putting on and taking off regular lower body clothing?  3  -SM     Bathing (including washing, rinsing, and drying)  3  -SM     Toileting (which includes using toilet bed pan or urinal)  3  -SM     Putting on and taking off regular upper body clothing  4  -SM     Taking care of personal grooming (such as brushing teeth)  4  -SM     Eating meals  4  -SM     AM-PAC 6 Clicks Score (OT)  21  -SM     Row Name 12/07/20 1457          Functional Assessment    Outcome Measure Options  AM-PAC 6 Clicks Daily Activity (OT)  -SM       User Key  (r) = Recorded By, (t) = Taken By, (c) = Cosigned By    Initials Name Provider Type    SM Whitney Gaspar OT Occupational Therapist        Occupational Therapy Education                 Title: PT OT SLP Therapies (Done)     Topic: Occupational Therapy (Done)     Point: ADL training (Done)     Description:   Instruct learner(s) on proper safety adaptation and remediation techniques during self care or transfers.   Instruct in proper use of assistive devices.              Learning Progress Summary           Patient Acceptance, E, VU,DU by ALFREDO at 12/3/2020 1345                               User Key     Initials Effective Dates Name Provider Type Discipline    ALFREDO 07/15/20 -  Valeria Figueroa OT Occupational Therapist OT              OT Recommendation and Plan  Therapy Frequency (OT): 3 times/wk  Plan of Care Review  Plan of Care Reviewed With: patient  Progress: improving  Outcome Summary: Pt tolerates OT session well. Pt sits EOB to work on ADL and UE ther ex. Pt requires mod A today to thread LEs for changing underwear as pt attempts to complete in supine position. Pt does show improvement in mobility to sink side and bathroom today CGA with rwx. Pt on 1L O2 via NC and drops to 88% with standing ADL and mobility with quick recovery. Will benefit  from continued OT to safely d/c home with assist.     Time Calculation:   Time Calculation- OT     Row Name 12/07/20 1501             Time Calculation- OT    OT Start Time  1343  -      OT Stop Time  1410  -      OT Time Calculation (min)  27 min  -      Total Timed Code Minutes- OT  27 minute(s)  -      OT Received On  12/07/20  -      OT - Next Appointment  12/09/20  -        User Key  (r) = Recorded By, (t) = Taken By, (c) = Cosigned By    Initials Name Provider Type     Whitney Gaspar OT Occupational Therapist        Therapy Charges for Today     Code Description Service Date Service Provider Modifiers Qty    04184777479 HC OT SELF CARE/MGMT/TRAIN EA 15 MIN 12/7/2020 Whitney Gaspar OT GO 1    28184141678 HC OT THER PROC EA 15 MIN 12/7/2020 Whitney Gaspar OT GO 1               Whitney Gaspar OT  12/7/2020

## 2020-12-08 LAB
ALBUMIN SERPL-MCNC: 3 G/DL (ref 3.5–5.2)
ALP SERPL-CCNC: 63 U/L (ref 39–117)
ALT SERPL W P-5'-P-CCNC: 36 U/L (ref 1–41)
ANION GAP SERPL CALCULATED.3IONS-SCNC: 9.3 MMOL/L (ref 5–15)
AST SERPL-CCNC: 45 U/L (ref 1–40)
BILIRUB CONJ SERPL-MCNC: 0.2 MG/DL (ref 0–0.3)
BILIRUB INDIRECT SERPL-MCNC: 0.2 MG/DL
BILIRUB SERPL-MCNC: 0.4 MG/DL (ref 0–1.2)
BUN SERPL-MCNC: 27 MG/DL (ref 8–23)
BUN/CREAT SERPL: 25.5 (ref 7–25)
CALCIUM SPEC-SCNC: 8.3 MG/DL (ref 8.6–10.5)
CHLORIDE SERPL-SCNC: 108 MMOL/L (ref 98–107)
CK SERPL-CCNC: 303 U/L (ref 20–200)
CO2 SERPL-SCNC: 20.7 MMOL/L (ref 22–29)
CREAT SERPL-MCNC: 1.06 MG/DL (ref 0.76–1.27)
CRP SERPL-MCNC: 6.7 MG/DL (ref 0–0.5)
D DIMER PPP FEU-MCNC: 1.52 MCGFEU/ML (ref 0–0.49)
FERRITIN SERPL-MCNC: 828 NG/ML (ref 30–400)
GFR SERPL CREATININE-BSD FRML MDRD: 66 ML/MIN/1.73
GLUCOSE BLDC GLUCOMTR-MCNC: 157 MG/DL (ref 70–130)
GLUCOSE SERPL-MCNC: 126 MG/DL (ref 65–99)
LDH SERPL-CCNC: 419 U/L (ref 135–225)
POTASSIUM SERPL-SCNC: 3.9 MMOL/L (ref 3.5–5.2)
PROT SERPL-MCNC: 6.3 G/DL (ref 6–8.5)
SODIUM SERPL-SCNC: 138 MMOL/L (ref 136–145)

## 2020-12-08 PROCEDURE — 82962 GLUCOSE BLOOD TEST: CPT

## 2020-12-08 PROCEDURE — 86140 C-REACTIVE PROTEIN: CPT | Performed by: INTERNAL MEDICINE

## 2020-12-08 PROCEDURE — 80048 BASIC METABOLIC PNL TOTAL CA: CPT | Performed by: INTERNAL MEDICINE

## 2020-12-08 PROCEDURE — 80076 HEPATIC FUNCTION PANEL: CPT | Performed by: INTERNAL MEDICINE

## 2020-12-08 PROCEDURE — 82550 ASSAY OF CK (CPK): CPT | Performed by: INTERNAL MEDICINE

## 2020-12-08 PROCEDURE — 63710000001 DEXAMETHASONE PER 0.25 MG: Performed by: INTERNAL MEDICINE

## 2020-12-08 PROCEDURE — 83615 LACTATE (LD) (LDH) ENZYME: CPT | Performed by: INTERNAL MEDICINE

## 2020-12-08 PROCEDURE — 82728 ASSAY OF FERRITIN: CPT | Performed by: INTERNAL MEDICINE

## 2020-12-08 PROCEDURE — 85379 FIBRIN DEGRADATION QUANT: CPT | Performed by: INTERNAL MEDICINE

## 2020-12-08 RX ADMIN — ESCITALOPRAM 20 MG: 20 TABLET, FILM COATED ORAL at 09:51

## 2020-12-08 RX ADMIN — SOTALOL HYDROCHLORIDE 80 MG: 80 TABLET ORAL at 09:51

## 2020-12-08 RX ADMIN — SODIUM CHLORIDE, PRESERVATIVE FREE 10 ML: 5 INJECTION INTRAVENOUS at 21:36

## 2020-12-08 RX ADMIN — APIXABAN 2.5 MG: 2.5 TABLET, FILM COATED ORAL at 09:51

## 2020-12-08 RX ADMIN — Medication 2000 MCG: at 09:51

## 2020-12-08 RX ADMIN — PANTOPRAZOLE SODIUM 40 MG: 40 TABLET, DELAYED RELEASE ORAL at 09:52

## 2020-12-08 RX ADMIN — ACETAMINOPHEN 650 MG: 325 TABLET, FILM COATED ORAL at 21:36

## 2020-12-08 RX ADMIN — REMDESIVIR 100 MG: 100 INJECTION, POWDER, LYOPHILIZED, FOR SOLUTION INTRAVENOUS at 17:46

## 2020-12-08 RX ADMIN — DIPHENOXYLATE HYDROCHLORIDE AND ATROPINE SULFATE 2 TABLET: 2.5; .025 TABLET ORAL at 21:35

## 2020-12-08 RX ADMIN — APIXABAN 2.5 MG: 2.5 TABLET, FILM COATED ORAL at 21:36

## 2020-12-08 RX ADMIN — SODIUM CHLORIDE, PRESERVATIVE FREE 10 ML: 5 INJECTION INTRAVENOUS at 09:52

## 2020-12-08 RX ADMIN — DEXAMETHASONE 6 MG: 4 TABLET ORAL at 09:51

## 2020-12-08 RX ADMIN — LEVOTHYROXINE SODIUM 88 MCG: 88 TABLET ORAL at 09:51

## 2020-12-08 RX ADMIN — ATORVASTATIN CALCIUM 40 MG: 20 TABLET, FILM COATED ORAL at 21:35

## 2020-12-08 RX ADMIN — ACETAMINOPHEN 650 MG: 325 TABLET, FILM COATED ORAL at 06:23

## 2020-12-08 NOTE — PROGRESS NOTES
Continued Stay Note  [facility]     Patient Name: Bhaskar Ibarra  MRN: [id number]  Today's Date: 12/8/2020    Admit Date: 12/2/2020    Discharge Plan     Row Name 12/08/20 1342       Plan    Plan  Home with daughter, [facility] and private care giver services 4 hours per day.    Plan Comments  Spoke with Reina Minor dtshahid [phone] via phone regarding discharge plan.  At this time plan is still for patient to return home, where his wife is recovering from COVID.  Per Reina, she along with 4 hours a day of private caregivers and [facility] services she will be able to manage both her parents.  She is agreeable to [facility].  She will be able to transport home. If oxygen is needed at the time of discharge she would like to use [place].  Will continue to monitor for new or changing discharge needs.        Discharge Codes    No documentation.             Irasema Rodriguez RN

## 2020-12-08 NOTE — PROGRESS NOTES
"DAILY PROGRESS NOTE  [facility]    Patient Identification:  Name: Bhaskar Ibarra  Age: 85 y.o.  Sex: male  :  1935  MRN: [id number]         Primary Care Physician: Katherine Lester APRN      Subjective  No new complaints.  Overall apparently not feeling any better.  Is actually communicating very little.    Objective:  General Appearance:  Comfortable, well-appearing, in no acute distress and not in pain.    Vital signs: (most recent): Blood pressure 151/77, pulse 57, temperature 98.2 °F (36.8 °C), temperature source Oral, resp. rate 18, height 180.3 cm (71\"), weight 80 kg (176 lb 5.9 oz), SpO2 93 %.    Lungs:  Normal effort and normal respiratory rate.  (Breath sounds somewhat diminished.  Occasional fine rales.)  Heart: Normal rate.  Regular rhythm.    Extremities: There is no dependent edema.    Neurological: Patient is alert.  (Cooperative.).    Skin:  Warm and dry.                Vital signs in last 24 hours:  Temp:  [94.3 °F (34.6 °C)-98.2 °F (36.8 °C)] 98.2 °F (36.8 °C)  Heart Rate:  [51-57] 57  Resp:  [18-20] 18  BP: (134-151)/(68-77) 151/77    Intake/Output:    Intake/Output Summary (Last 24 hours) at 2020 1748  Last data filed at 2020 1327  Gross per 24 hour   Intake 315 ml   Output 625 ml   Net -310 ml         Results from last 7 days   Lab Units 20  0801 20  0459 20  1244 20  1357   WBC 10*3/mm3 7.47 6.77 7.15 7.46   HEMOGLOBIN g/dL 11.7* 12.5* 13.4 13.9   PLATELETS 10*3/mm3 181 153 197 239     Results from last 7 days   Lab Units 20  0523 20  0343 20  0708 20  0801 20  1028 20  1244 20  1357   SODIUM mmol/L 138 139 140 131* 137 136 134*   POTASSIUM mmol/L 3.9 4.0 4.3 3.5 3.5 3.6 4.0   CHLORIDE mmol/L 108* 109* 108* 101 104 98 95*   CO2 mmol/L 20.7* 19.1* 21.4* 22.8 22.0 24.4 26.4   BUN mg/dL 27* 26* 20 18 21 31* 33*   CREATININE mg/dL 1.06 1.11 1.20 1.28* 1.36* 1.82* 2.15*   GLUCOSE mg/dL 126* 147* " 129* 112* 120* 109* 100*   Estimated Creatinine Clearance: 57.7 mL/min (by C-G formula based on SCr of 1.06 mg/dL).  Results from last 7 days   Lab Units 12/08/20  0523 12/07/20  0343 12/06/20  0708 12/05/20  0801 12/04/20  1028 12/03/20  1244 12/02/20  1357   CALCIUM mg/dL 8.3* 8.0* 8.0* 7.7* 8.1* 8.6 9.1   ALBUMIN g/dL 3.00* 3.00* 3.00* 3.00* 3.40*  --  4.20   MAGNESIUM mg/dL  --   --   --   --   --  2.2  --      Results from last 7 days   Lab Units 12/08/20  0523 12/07/20  0343 12/06/20  0708 12/05/20  0801 12/04/20  1028 12/02/20  1357   ALBUMIN g/dL 3.00* 3.00* 3.00* 3.00* 3.40* 4.20   BILIRUBIN mg/dL 0.4 0.3 0.4 0.5 0.4 0.5   ALK PHOS U/L 63 56 54 52 52 68   AST (SGOT) U/L 45* 61* 47* 52* 56* 55*   ALT (SGPT) U/L 36 37 27 24 25 29       Assessment:  Pneumonia secondary to COVID-19: Patient completes his 5-day course of remdesivir tomorrow.  Inflammatory parameters improving.  Acute respiratory failure with hypoxemia secondary above: Continue dexamethasone and oxygen.  ALEXANDRU: Creatinine back to baseline at present.  Paroxysmal atrial fibrillation:  Chronic diastolic CHF:  Monitor fluid status closely.    All problems new to this examiner.    Plan:  Please see above.    Jonny Mayer MD  12/8/2020  17:48 EST

## 2020-12-08 NOTE — PLAN OF CARE
Goal Outcome Evaluation:  Plan of Care Reviewed With: patient  Progress: no change  Outcome Summary: VSS. Coreg held for HR in 50's. SATs 90-93% on 3LNC. Slept most of day. 25% of meals today. Monitor SATs, labs, and vital signs.

## 2020-12-09 LAB
ALBUMIN SERPL-MCNC: 2.8 G/DL (ref 3.5–5.2)
ALP SERPL-CCNC: 72 U/L (ref 39–117)
ALT SERPL W P-5'-P-CCNC: 39 U/L (ref 1–41)
ANION GAP SERPL CALCULATED.3IONS-SCNC: 7.7 MMOL/L (ref 5–15)
AST SERPL-CCNC: 40 U/L (ref 1–40)
BILIRUB CONJ SERPL-MCNC: 0.3 MG/DL (ref 0–0.3)
BILIRUB INDIRECT SERPL-MCNC: 0.3 MG/DL
BILIRUB SERPL-MCNC: 0.6 MG/DL (ref 0–1.2)
BUN SERPL-MCNC: 26 MG/DL (ref 8–23)
BUN/CREAT SERPL: 24.1 (ref 7–25)
CALCIUM SPEC-SCNC: 8.2 MG/DL (ref 8.6–10.5)
CHLORIDE SERPL-SCNC: 106 MMOL/L (ref 98–107)
CO2 SERPL-SCNC: 22.3 MMOL/L (ref 22–29)
CREAT SERPL-MCNC: 1.08 MG/DL (ref 0.76–1.27)
CRP SERPL-MCNC: 8.32 MG/DL (ref 0–0.5)
FERRITIN SERPL-MCNC: 822 NG/ML (ref 30–400)
GFR SERPL CREATININE-BSD FRML MDRD: 65 ML/MIN/1.73
GLUCOSE SERPL-MCNC: 117 MG/DL (ref 65–99)
POTASSIUM SERPL-SCNC: 3.7 MMOL/L (ref 3.5–5.2)
PROT SERPL-MCNC: 5.8 G/DL (ref 6–8.5)
SODIUM SERPL-SCNC: 136 MMOL/L (ref 136–145)

## 2020-12-09 PROCEDURE — 63710000001 DEXAMETHASONE PER 0.25 MG: Performed by: INTERNAL MEDICINE

## 2020-12-09 PROCEDURE — 80048 BASIC METABOLIC PNL TOTAL CA: CPT | Performed by: INTERNAL MEDICINE

## 2020-12-09 PROCEDURE — 80076 HEPATIC FUNCTION PANEL: CPT | Performed by: INTERNAL MEDICINE

## 2020-12-09 PROCEDURE — 86140 C-REACTIVE PROTEIN: CPT | Performed by: INTERNAL MEDICINE

## 2020-12-09 PROCEDURE — 82728 ASSAY OF FERRITIN: CPT | Performed by: INTERNAL MEDICINE

## 2020-12-09 RX ADMIN — REMDESIVIR 100 MG: 100 INJECTION, POWDER, LYOPHILIZED, FOR SOLUTION INTRAVENOUS at 14:29

## 2020-12-09 RX ADMIN — SODIUM CHLORIDE, PRESERVATIVE FREE 10 ML: 5 INJECTION INTRAVENOUS at 08:12

## 2020-12-09 RX ADMIN — ESCITALOPRAM 20 MG: 20 TABLET, FILM COATED ORAL at 10:02

## 2020-12-09 RX ADMIN — APIXABAN 2.5 MG: 2.5 TABLET, FILM COATED ORAL at 21:51

## 2020-12-09 RX ADMIN — APIXABAN 2.5 MG: 2.5 TABLET, FILM COATED ORAL at 10:02

## 2020-12-09 RX ADMIN — ATORVASTATIN CALCIUM 40 MG: 20 TABLET, FILM COATED ORAL at 21:51

## 2020-12-09 RX ADMIN — LEVOTHYROXINE SODIUM 88 MCG: 88 TABLET ORAL at 10:02

## 2020-12-09 RX ADMIN — SOTALOL HYDROCHLORIDE 80 MG: 80 TABLET ORAL at 10:01

## 2020-12-09 RX ADMIN — CARVEDILOL 3.12 MG: 3.12 TABLET, FILM COATED ORAL at 17:24

## 2020-12-09 RX ADMIN — PANTOPRAZOLE SODIUM 40 MG: 40 TABLET, DELAYED RELEASE ORAL at 10:01

## 2020-12-09 RX ADMIN — SODIUM CHLORIDE, PRESERVATIVE FREE 10 ML: 5 INJECTION INTRAVENOUS at 21:52

## 2020-12-09 RX ADMIN — Medication 2000 MCG: at 10:02

## 2020-12-09 RX ADMIN — DEXAMETHASONE 6 MG: 4 TABLET ORAL at 10:01

## 2020-12-09 RX ADMIN — ACETAMINOPHEN 650 MG: 325 TABLET, FILM COATED ORAL at 17:25

## 2020-12-09 NOTE — PLAN OF CARE
Goal Outcome Evaluation:  Plan of Care Reviewed With: patient  Progress: no change  Outcome Summary: VSS. Able to give 18:00 dose of Coreg. SATS on 2LNC 90-93%. Sleeping most of day. Up to bathroom with walker multiple times. Monitor vital signs and labs.

## 2020-12-09 NOTE — PAYOR COMM NOTE
"Juan C Malone (85 y.o. Male)     PLEASE SEE ATTACHED CLINICAL REVIEW.     REF#[id number]    THANK YOU    JUANITA HARRISON LPN CCP    Date of Birth Social Security Number Address Home Phone MRN    1935  [id number] [facility] [id number] [phone] [id number]    Alevism Marital Status          None        Admission Date Admission Type Admitting Provider Attending Provider Department, Room/Bed    12/2/20 Emergency Wilber Logan MD Broaddus, Emmett J., MD [address], [address]    Discharge Date Discharge Disposition Discharge Destination                       Attending Provider: Jonny Mayer MD    Allergies: Latex    Isolation: Enh Drop/Con   Infection: COVID (confirmed) (12/02/20)   Code Status: CPR    Ht: 180.3 cm (71\")   Wt: 80 kg (176 lb 5.9 oz)    Admission Cmt: None   Principal Problem: Pneumonia due to COVID-19 virus [U07.1,J12.89]                 Active Insurance as of 12/2/2020     Primary Coverage     Payor Plan Insurance Group Employer/Plan Group    AETNA MEDICARE REPLACEMENT AETNA MEDICARE REPLACEMENT [id number]     Payor Plan Address Payor Plan Phone Number Payor Plan Fax Number Effective Dates    [address] [phone]  1/1/2019 - None Entered    [facility] [id number]       Subscriber Name Subscriber Birth Date Member ID       JUAN C MALONE 1935 [id number]                 Emergency Contacts      (Rel.) Home Phone Work Phone Mobile Phone    Malka Minor (Daughter) [phone] -- [phone]    Donna Chung (Daughter) -- -- [phone]            Oxygen Therapy (last day)     Date/Time   SpO2   Device (Oxygen Therapy)   Flow (L/min)   Oxygen Concentration (%)   ETCO2 (mmHg)    12/09/20 0200   --   nasal cannula   3   --   --    12/08/20 2324   95   nasal cannula   3   --   --    12/08/20 1945   --   nasal cannula   3   --   --    12/08/20 1921   --   nasal cannula   3   --   --    12/08/20 1441   --   " "nasal cannula   3   --   --    20 1327   93   nasal cannula   2   --   --    20 1010   93   nasal cannula   2   --   --    20 0806   91   room air   --   --   --    20 0200   --   room air   --   --   --              Intake & Output (last day)        0701 -  0700  0701 - 12/10 0700    P.O. 225     Total Intake(mL/kg) 225 (2.8)     Urine (mL/kg/hr) 500 (0.3)     Stool      Total Output 500     Net -275               Lines, Drains & Airways    Active LDAs     Name:   Placement date:   Placement time:   Site:   Days:    Peripheral IV 20 Left Forearm   20    151    Forearm   2                       Physician Progress Notes (last 24 hours) (Notes from 20 through 20)      Jonny Mayer MD at 20 1747          DAILY PROGRESS NOTE  [facility]    Patient Identification:  Name: Bhaskar Ibarra  Age: 85 y.o.  Sex: male  :  1935  MRN: [id number]         Primary Care Physician: Katherine Lester APRN      Subjective  No new complaints.  Overall apparently not feeling any better.  Is actually communicating very little.    Objective:  General Appearance:  Comfortable, well-appearing, in no acute distress and not in pain.    Vital signs: (most recent): Blood pressure 151/77, pulse 57, temperature 98.2 °F (36.8 °C), temperature source Oral, resp. rate 18, height 180.3 cm (71\"), weight 80 kg (176 lb 5.9 oz), SpO2 93 %.    Lungs:  Normal effort and normal respiratory rate.  (Breath sounds somewhat diminished.  Occasional fine rales.)  Heart: Normal rate.  Regular rhythm.    Extremities: There is no dependent edema.    Neurological: Patient is alert.  (Cooperative.).    Skin:  Warm and dry.                Vital signs in last 24 hours:  Temp:  [94.3 °F (34.6 °C)-98.2 °F (36.8 °C)] 98.2 °F (36.8 °C)  Heart Rate:  [51-57] 57  Resp:  [18-20] 18  BP: (134-151)/(68-77) 151/77    Intake/Output:    Intake/Output Summary (Last " 24 hours) at 12/8/2020 1748  Last data filed at 12/8/2020 1327  Gross per 24 hour   Intake 315 ml   Output 625 ml   Net -310 ml         Results from last 7 days   Lab Units 12/05/20  0801 12/04/20  0459 12/03/20  1244 12/02/20  1357   WBC 10*3/mm3 7.47 6.77 7.15 7.46   HEMOGLOBIN g/dL 11.7* 12.5* 13.4 13.9   PLATELETS 10*3/mm3 181 153 197 239     Results from last 7 days   Lab Units 12/08/20  0523 12/07/20  0343 12/06/20  0708 12/05/20  0801 12/04/20  1028 12/03/20  1244 12/02/20  1357   SODIUM mmol/L 138 139 140 131* 137 136 134*   POTASSIUM mmol/L 3.9 4.0 4.3 3.5 3.5 3.6 4.0   CHLORIDE mmol/L 108* 109* 108* 101 104 98 95*   CO2 mmol/L 20.7* 19.1* 21.4* 22.8 22.0 24.4 26.4   BUN mg/dL 27* 26* 20 18 21 31* 33*   CREATININE mg/dL 1.06 1.11 1.20 1.28* 1.36* 1.82* 2.15*   GLUCOSE mg/dL 126* 147* 129* 112* 120* 109* 100*   Estimated Creatinine Clearance: 57.7 mL/min (by C-G formula based on SCr of 1.06 mg/dL).  Results from last 7 days   Lab Units 12/08/20 0523 12/07/20 0343 12/06/20  0708 12/05/20  0801 12/04/20  1028 12/03/20  1244 12/02/20  1357   CALCIUM mg/dL 8.3* 8.0* 8.0* 7.7* 8.1* 8.6 9.1   ALBUMIN g/dL 3.00* 3.00* 3.00* 3.00* 3.40*  --  4.20   MAGNESIUM mg/dL  --   --   --   --   --  2.2  --      Results from last 7 days   Lab Units 12/08/20 0523 12/07/20  0343 12/06/20  0708 12/05/20  0801 12/04/20  1028 12/02/20  1357   ALBUMIN g/dL 3.00* 3.00* 3.00* 3.00* 3.40* 4.20   BILIRUBIN mg/dL 0.4 0.3 0.4 0.5 0.4 0.5   ALK PHOS U/L 63 56 54 52 52 68   AST (SGOT) U/L 45* 61* 47* 52* 56* 55*   ALT (SGPT) U/L 36 37 27 24 25 29       Assessment:  Pneumonia secondary to COVID-19: Patient completes his 5-day course of remdesivir tomorrow.  Inflammatory parameters improving.  Acute respiratory failure with hypoxemia secondary above: Continue dexamethasone and oxygen.  ALEXANDRU: Creatinine back to baseline at present.  Paroxysmal atrial fibrillation:  Chronic diastolic CHF:  Monitor fluid status closely.    All problems new to  this examiner.    Plan:  Please see above.    Jonny Mayer MD  12/8/2020  17:48 EST      Electronically signed by Jonny Mayer MD at 12/08/20 9195      All medication doses during the admission are shown, including meds that are no longer on order.   Scheduled Meds Sorted by Name  for Bhaskar Ibarra as of 12/7/20 through 12/9/20    1 Day 3 Days 7 Days 10 Days <  Today >     Legend:                          Inactive     Active     Other Encounter     Linked                 Medications 12/07/20 12/08/20 12/09/20   apixaban (ELIQUIS) tablet 2.5 mg   Dose: 2.5 mg  Freq: Every 12 Hours Route: PO  Indications of Use: ATRIAL FIBRILLATION  Start: 12/02/20 2230    Admin Instructions:   Tablet may be crushed and suspended in 60 mL of water or D5W and immediately delivered via NG tube. Avoid grapefruit juice.    1015   2109       0951   2136        1030   2230          atorvastatin (LIPITOR) tablet 40 mg   Dose: 40 mg  Freq: Nightly Route: PO  Start: 12/02/20 2230    Admin Instructions:   Avoid grapefruit juice.    2109 2135          2100            carvedilol (COREG) tablet 3.125 mg   Dose: 3.125 mg  Freq: 2 Times Daily With Meals Route: PO  Start: 12/02/20 2230    Admin Instructions:   Hold if HR less than 60  Give with food.    0807   5626 (4735) [C]   (8529) [C]        0800   1800          dexamethasone (DECADRON) tablet 6 mg   Dose: 6 mg  Freq: Daily With Breakfast Route: PO  Start: 12/05/20 1330 End: 12/15/20 0759    Admin Instructions:   Take with food.    0807 0951          0800            escitalopram (LEXAPRO) tablet 20 mg   Dose: 20 mg  Freq: Daily Route: PO  Start: 12/03/20 0900    Admin Instructions:   Caution: Look alike/sound alike drug alert.    0807 0951          0900            levothyroxine (SYNTHROID, LEVOTHROID) tablet 88 mcg   Dose: 88 mcg  Freq: Every Early Morning Route: PO  Start: 12/03/20 0600    Admin Instructions:   Take on empty stomach.    0603           0951 [C]          0600            pantoprazole (PROTONIX) EC tablet 40 mg   Dose: 40 mg  Freq: Every Morning Route: PO  Start: 12/03/20 0700    Admin Instructions:   Swallow whole; do not crush, split, or chew.    0634          0952          0700            pantoprazole (PROTONIX) EC tablet 40 mg   Dose: 40 mg  Freq: Once Route: PO  Start: 12/02/20 1812 End: 12/02/20 1820    Admin Instructions:   Swallow whole; do not crush, split, or chew.         remdesivir 200 mg in sodium chloride 0.9 % 250 mL IVPB (powder vial)   Dose: 200 mg  Freq: Every 24 Hours Route: IV  Start: 12/05/20 2030 End: 12/05/20 2225    Admin Instructions:   Ensure all of the drug is administered. Flush line with 30mL NS after administration.         Followed by  remdesivir 100 mg in sodium chloride 0.9 % 250 mL IVPB (powder vial)   Dose: 100 mg  Freq: Every 24 Hours Route: IV  Start: 12/06/20 1700 End: 12/10/20 1459    Admin Instructions:   Ensure all of the drug is administered. Flush line with 30mL NS after administration.    1620          1746          1500            sodium chloride 0.9 % flush 10 mL   Dose: 10 mL  Freq: Every 12 Hours Scheduled Route: IV  Start: 12/02/20 2230    0806   2109        0952   2136        0900   2100          sotalol (BETAPACE) tablet 80 mg   Dose: 80 mg  Freq: Daily Route: PO  Start: 12/03/20 0900    Admin Instructions:   Do not give within 2 hours of alumnium- or magnesium-containing antacids  Per Black Box Warning, RN will release PRN EKG order for first 5 Sotalol doses for new orders or dose changes    0807          0951          0900            vitamin B-12 (CYANOCOBALAMIN) tablet 2,000 mcg   Dose: 2,000 mcg  Freq: Daily Route: PO  Start: 12/03/20 0900    0807          0951          0900            Medications 12/07/20 12/08/20 12/09/20       Continuous Meds Sorted by Name  for Bhaskar Ibarra as of 12/7/20 through 12/9/20   Legend:                          Inactive     Active     Other Encounter      Linked                 Medications 12/07/20 12/08/20 12/09/20   Pharmacy Consult - Remdesivir   Freq: Continuous PRN Route: XX  PRN Reason: Consult  Start: 12/05/20 1940 End: 12/10/20 1939         sodium chloride 0.9 % infusion   Rate: 75 mL/hr Dose: 75 mL/hr  Freq: Continuous Route: IV  Last Dose: Stopped (12/05/20 1323)  Start: 12/02/20 2230 End: 12/05/20 1302             PRN Meds Sorted by Name  for Bhaskar Ibarra as of 12/7/20 through 12/9/20   Legend:                          Inactive     Active     Other Encounter     Linked                 Medications 12/07/20 12/08/20 12/09/20   acetaminophen (TYLENOL) tablet 650 mg   Dose: 650 mg  Freq: Every 4 Hours PRN Route: PO  PRN Reason: Mild Pain   Start: 12/02/20 2134    Admin Instructions:   Do not exceed 4 grams of acetaminophen in a 24 hr period.    If given for pain, use the following pain scale:   Mild Pain = Pain Score of 1-3, CPOT 1-2  Moderate Pain = Pain Score of 4-6, CPOT 3-4  Severe Pain = Pain Score of 7-10, CPOT 5-8    3861          5153   2136           Or  acetaminophen (TYLENOL) 160 MG/5ML solution 650 mg   Dose: 650 mg  Freq: Every 4 Hours PRN Route: PO  PRN Reason: Mild Pain   Start: 12/02/20 2134    Admin Instructions:   Do not exceed 4 grams of acetaminophen in a 24 hr period.    If given for pain, use the following pain scale:   Mild Pain = Pain Score of 1-3, CPOT 1-2  Moderate Pain = Pain Score of 4-6, CPOT 3-4  Severe Pain = Pain Score of 7-10, CPOT 5-8                      Or  acetaminophen (TYLENOL) suppository 650 mg   Dose: 650 mg  Freq: Every 4 Hours PRN Route: RE  PRN Reason: Mild Pain   Start: 12/02/20 2134    Admin Instructions:   Do not exceed 4 grams of acetaminophen in a 24 hr period.    If given for pain, use the following pain scale:   Mild Pain = Pain Score of 1-3, CPOT 1-2  Moderate Pain = Pain Score of 4-6, CPOT 3-4  Severe Pain = Pain Score of 7-10, CPOT 5-8                      benzonatate (TESSALON) capsule 100 mg   Dose:  100 mg  Freq: Every 8 Hours PRN Route: PO  PRN Reason: Cough  Start: 12/03/20 1130    Admin Instructions:   Swallow whole. Do not crush, chew, or open capsule.         diphenoxylate-atropine (LOMOTIL) 2.5-0.025 MG per tablet 2 tablet   Dose: 2 tablet  Freq: 4 Times Daily PRN Route: PO  PRN Reason: Diarrhea  Start: 12/05/20 1354    Admin Instructions:   Maximum 8 tablets per 24 hours.       2135             guaiFENesin-dextromethorphan (ROBITUSSIN DM) 100-10 MG/5ML syrup 10 mL   Dose: 10 mL  Freq: Every 4 Hours PRN Route: PO  PRN Reason: Cough  Start: 12/03/20 0946    Admin Instructions:   Caution: Look alike/sound alike drug alert         ondansetron (ZOFRAN) injection 4 mg   Dose: 4 mg  Freq: Every 6 Hours PRN Route: IV  PRN Reasons: Nausea,Vomiting  Start: 12/02/20 2134    Admin Instructions:   If BOTH ondansetron (ZOFRAN) and promethazine (PHENERGAN) are ordered use ondansetron first and THEN promethazine IF ondansetron is ineffective.         Pharmacy Consult - Remdesivir   Freq: Continuous PRN Route: XX  PRN Reason: Consult  Start: 12/05/20 1940 End: 12/10/20 1939         sodium chloride 0.9 % flush 10 mL   Dose: 10 mL  Freq: As Needed Route: IV  PRN Reason: Line Care  Start: 12/02/20 2134    1620              Medications 12/07/20 12/08/20 12/09/20

## 2020-12-09 NOTE — PROGRESS NOTES
"DAILY PROGRESS NOTE  [facility]    Patient Identification:  Name: Bhaskar Ibarra  Age: 85 y.o.  Sex: male  :  1935  MRN: [id number]         Primary Care Physician: Katherine Lester APRN      Subjective  No new complaints.  Feels about the same.    Objective:  General Appearance:  Comfortable, well-appearing, in no acute distress and not in pain.    Vital signs: (most recent): Blood pressure 123/70, pulse 60, temperature 98.6 °F (37 °C), temperature source Oral, resp. rate 20, height 180.3 cm (71\"), weight 80 kg (176 lb 5.9 oz), SpO2 91 %.    Lungs:  Normal effort and normal respiratory rate.  Breath sounds clear to auscultation.    Heart: Normal rate.  Regular rhythm.    Extremities: There is no dependent edema.    Neurological: Patient is alert.  (Oriented to person and place.  Flat affect.).    Skin:  Warm and dry.                Vital signs in last 24 hours:  Temp:  [94.9 °F (34.9 °C)-99.3 °F (37.4 °C)] 98.6 °F (37 °C)  Heart Rate:  [52-60] 60  Resp:  [20-28] 20  BP: (123-153)/(67-85) 123/70    Intake/Output:    Intake/Output Summary (Last 24 hours) at 2020 1738  Last data filed at 2020 1700  Gross per 24 hour   Intake 150 ml   Output 1000 ml   Net -850 ml         Results from last 7 days   Lab Units 20  0801 20  0459 20  1244   WBC 10*3/mm3 7.47 6.77 7.15   HEMOGLOBIN g/dL 11.7* 12.5* 13.4   PLATELETS 10*3/mm3 181 153 197     Results from last 7 days   Lab Units 20  0906 20  0523 20  0343 20  0708 20  0801 20  1028 20  1244   SODIUM mmol/L 136 138 139 140 131* 137 136   POTASSIUM mmol/L 3.7 3.9 4.0 4.3 3.5 3.5 3.6   CHLORIDE mmol/L 106 108* 109* 108* 101 104 98   CO2 mmol/L 22.3 20.7* 19.1* 21.4* 22.8 22.0 24.4   BUN mg/dL 26* 27* 26* 20 18 21 31*   CREATININE mg/dL 1.08 1.06 1.11 1.20 1.28* 1.36* 1.82*   GLUCOSE mg/dL 117* 126* 147* 129* 112* 120* 109*   Estimated Creatinine Clearance: 56.6 mL/min (by C-G formula " based on SCr of 1.08 mg/dL).  Results from last 7 days   Lab Units 12/09/20  0906 12/08/20 0523 12/07/20 0343 12/06/20 0708 12/05/20  0801 12/04/20  1028 12/03/20  1244   CALCIUM mg/dL 8.2* 8.3* 8.0* 8.0* 7.7* 8.1* 8.6   ALBUMIN g/dL 2.80* 3.00* 3.00* 3.00* 3.00* 3.40*  --    MAGNESIUM mg/dL  --   --   --   --   --   --  2.2     Results from last 7 days   Lab Units 12/09/20 0906 12/08/20 0523 12/07/20 0343 12/06/20  0708 12/05/20  0801 12/04/20  1028   ALBUMIN g/dL 2.80* 3.00* 3.00* 3.00* 3.00* 3.40*   BILIRUBIN mg/dL 0.6 0.4 0.3 0.4 0.5 0.4   ALK PHOS U/L 72 63 56 54 52 52   AST (SGOT) U/L 40 45* 61* 47* 52* 56*   ALT (SGPT) U/L 39 36 37 27 24 25       Assessment:  Pneumonia secondary to COVID-19: Patient completes his 5-day course of remdesivir tomorrow.  Clinically looks about the same.  Continue to monitor..  Acute respiratory failure with hypoxemia secondary above: Continue dexamethasone and oxygen.  ALEXANDRU: Creatinine back to baseline at present.  Paroxysmal atrial fibrillation:  Chronic diastolic CHF:  Monitor fluid status closely.      Plan:  Please see above.    Patient was wearing facemask when I entered the room and throughout our encounter.  I wore protective equipment throughout this patient encounter including a face mask and gloves.  Hand hygiene was performed before donning protective equipment and after removal when leaving the room.      Jonny Mayer MD  12/9/2020  17:38 EST

## 2020-12-10 LAB
ALBUMIN SERPL-MCNC: 3.1 G/DL (ref 3.5–5.2)
ALP SERPL-CCNC: 74 U/L (ref 39–117)
ALT SERPL W P-5'-P-CCNC: 33 U/L (ref 1–41)
ANION GAP SERPL CALCULATED.3IONS-SCNC: 7.6 MMOL/L (ref 5–15)
AST SERPL-CCNC: 35 U/L (ref 1–40)
BASOPHILS # BLD AUTO: 0.04 10*3/MM3 (ref 0–0.2)
BASOPHILS NFR BLD AUTO: 0.4 % (ref 0–1.5)
BILIRUB CONJ SERPL-MCNC: 0.3 MG/DL (ref 0–0.3)
BILIRUB INDIRECT SERPL-MCNC: 0.3 MG/DL
BILIRUB SERPL-MCNC: 0.6 MG/DL (ref 0–1.2)
BUN SERPL-MCNC: 26 MG/DL (ref 8–23)
BUN/CREAT SERPL: 26.5 (ref 7–25)
CALCIUM SPEC-SCNC: 8.1 MG/DL (ref 8.6–10.5)
CHLORIDE SERPL-SCNC: 104 MMOL/L (ref 98–107)
CO2 SERPL-SCNC: 23.4 MMOL/L (ref 22–29)
CREAT SERPL-MCNC: 0.98 MG/DL (ref 0.76–1.27)
CRP SERPL-MCNC: 9.53 MG/DL (ref 0–0.5)
D DIMER PPP FEU-MCNC: 3.6 MCGFEU/ML (ref 0–0.49)
DEPRECATED RDW RBC AUTO: 45.2 FL (ref 37–54)
EOSINOPHIL # BLD AUTO: 0 10*3/MM3 (ref 0–0.4)
EOSINOPHIL NFR BLD AUTO: 0 % (ref 0.3–6.2)
ERYTHROCYTE [DISTWIDTH] IN BLOOD BY AUTOMATED COUNT: 13.2 % (ref 12.3–15.4)
FERRITIN SERPL-MCNC: 850 NG/ML (ref 30–400)
GFR SERPL CREATININE-BSD FRML MDRD: 73 ML/MIN/1.73
GLUCOSE SERPL-MCNC: 124 MG/DL (ref 65–99)
HCT VFR BLD AUTO: 37 % (ref 37.5–51)
HGB BLD-MCNC: 12.8 G/DL (ref 13–17.7)
IMM GRANULOCYTES # BLD AUTO: 0.44 10*3/MM3 (ref 0–0.05)
IMM GRANULOCYTES NFR BLD AUTO: 4.6 % (ref 0–0.5)
LDH SERPL-CCNC: 413 U/L (ref 135–225)
LYMPHOCYTES # BLD AUTO: 1.26 10*3/MM3 (ref 0.7–3.1)
LYMPHOCYTES NFR BLD AUTO: 13.2 % (ref 19.6–45.3)
MCH RBC QN AUTO: 32.3 PG (ref 26.6–33)
MCHC RBC AUTO-ENTMCNC: 34.6 G/DL (ref 31.5–35.7)
MCV RBC AUTO: 93.4 FL (ref 79–97)
MONOCYTES # BLD AUTO: 0.45 10*3/MM3 (ref 0.1–0.9)
MONOCYTES NFR BLD AUTO: 4.7 % (ref 5–12)
NEUTROPHILS NFR BLD AUTO: 7.35 10*3/MM3 (ref 1.7–7)
NEUTROPHILS NFR BLD AUTO: 77.1 % (ref 42.7–76)
NRBC BLD AUTO-RTO: 0.1 /100 WBC (ref 0–0.2)
PLATELET # BLD AUTO: 311 10*3/MM3 (ref 140–450)
PMV BLD AUTO: 10.9 FL (ref 6–12)
POTASSIUM SERPL-SCNC: 3.8 MMOL/L (ref 3.5–5.2)
PROCALCITONIN SERPL-MCNC: 0.1 NG/ML (ref 0–0.25)
PROT SERPL-MCNC: 6.2 G/DL (ref 6–8.5)
RBC # BLD AUTO: 3.96 10*6/MM3 (ref 4.14–5.8)
SODIUM SERPL-SCNC: 135 MMOL/L (ref 136–145)
WBC # BLD AUTO: 9.54 10*3/MM3 (ref 3.4–10.8)

## 2020-12-10 PROCEDURE — 85379 FIBRIN DEGRADATION QUANT: CPT | Performed by: HOSPITALIST

## 2020-12-10 PROCEDURE — 97530 THERAPEUTIC ACTIVITIES: CPT

## 2020-12-10 PROCEDURE — 82728 ASSAY OF FERRITIN: CPT | Performed by: INTERNAL MEDICINE

## 2020-12-10 PROCEDURE — 84145 PROCALCITONIN (PCT): CPT | Performed by: HOSPITALIST

## 2020-12-10 PROCEDURE — 97110 THERAPEUTIC EXERCISES: CPT

## 2020-12-10 PROCEDURE — 83615 LACTATE (LD) (LDH) ENZYME: CPT | Performed by: HOSPITALIST

## 2020-12-10 PROCEDURE — 97535 SELF CARE MNGMENT TRAINING: CPT

## 2020-12-10 PROCEDURE — 85025 COMPLETE CBC W/AUTO DIFF WBC: CPT | Performed by: HOSPITALIST

## 2020-12-10 PROCEDURE — 80048 BASIC METABOLIC PNL TOTAL CA: CPT | Performed by: INTERNAL MEDICINE

## 2020-12-10 PROCEDURE — 80076 HEPATIC FUNCTION PANEL: CPT | Performed by: INTERNAL MEDICINE

## 2020-12-10 PROCEDURE — 86140 C-REACTIVE PROTEIN: CPT | Performed by: INTERNAL MEDICINE

## 2020-12-10 PROCEDURE — 63710000001 DEXAMETHASONE PER 0.25 MG: Performed by: INTERNAL MEDICINE

## 2020-12-10 RX ADMIN — PANTOPRAZOLE SODIUM 40 MG: 40 TABLET, DELAYED RELEASE ORAL at 06:30

## 2020-12-10 RX ADMIN — CARVEDILOL 3.12 MG: 3.12 TABLET, FILM COATED ORAL at 17:09

## 2020-12-10 RX ADMIN — ATORVASTATIN CALCIUM 40 MG: 20 TABLET, FILM COATED ORAL at 20:34

## 2020-12-10 RX ADMIN — SOTALOL HYDROCHLORIDE 80 MG: 80 TABLET ORAL at 09:01

## 2020-12-10 RX ADMIN — LEVOTHYROXINE SODIUM 88 MCG: 88 TABLET ORAL at 06:30

## 2020-12-10 RX ADMIN — Medication 2000 MCG: at 09:01

## 2020-12-10 RX ADMIN — SODIUM CHLORIDE, PRESERVATIVE FREE 10 ML: 5 INJECTION INTRAVENOUS at 20:34

## 2020-12-10 RX ADMIN — APIXABAN 2.5 MG: 2.5 TABLET, FILM COATED ORAL at 12:00

## 2020-12-10 RX ADMIN — ESCITALOPRAM 20 MG: 20 TABLET, FILM COATED ORAL at 09:01

## 2020-12-10 RX ADMIN — CARVEDILOL 3.12 MG: 3.12 TABLET, FILM COATED ORAL at 09:01

## 2020-12-10 RX ADMIN — APIXABAN 2.5 MG: 2.5 TABLET, FILM COATED ORAL at 20:34

## 2020-12-10 RX ADMIN — SODIUM CHLORIDE, PRESERVATIVE FREE 10 ML: 5 INJECTION INTRAVENOUS at 09:02

## 2020-12-10 RX ADMIN — DEXAMETHASONE 6 MG: 4 TABLET ORAL at 09:04

## 2020-12-10 NOTE — PROGRESS NOTES
"DAILY PROGRESS NOTE  [facility]    Patient Identification:  Name: Bhaskar Ibarra  Age: 85 y.o.  Sex: male  :  1935  MRN: [id number]         Primary Care Physician: Katherine Lester APRN      Subjective  Patient voices no new complaints.    Objective:  General Appearance:  Comfortable, well-appearing, in no acute distress and not in pain.    Vital signs: (most recent): Blood pressure 157/78, pulse 60, temperature 96.1 °F (35.6 °C), temperature source Oral, resp. rate 20, height 180.3 cm (71\"), weight 80 kg (176 lb 5.9 oz), SpO2 91 %.    Lungs:  Normal effort and normal respiratory rate.  There are decreased breath sounds.  (Presently on 4 L nasal cannula and satting about 94%.)  Heart: Normal rate.  Regular rhythm.    Extremities: There is no dependent edema.    Neurological: (Awake.  Oriented.  Very flat affect however speech is slow and in low tones.).    Skin:  Warm and dry.                Vital signs in last 24 hours:  Temp:  [96.1 °F (35.6 °C)-98.6 °F (37 °C)] 96.1 °F (35.6 °C)  Heart Rate:  [54-60] 60  Resp:  [20] 20  BP: (105-157)/(69-78) 157/78    Intake/Output:    Intake/Output Summary (Last 24 hours) at 12/10/2020 1145  Last data filed at 12/10/2020 0905  Gross per 24 hour   Intake 100 ml   Output 500 ml   Net -400 ml         Results from last 7 days   Lab Units 12/10/20  0720 20  0801 20  0459 20  1244   WBC 10*3/mm3 9.54 7.47 6.77 7.15   HEMOGLOBIN g/dL 12.8* 11.7* 12.5* 13.4   PLATELETS 10*3/mm3 311 181 153 197     Results from last 7 days   Lab Units 12/10/20  0720 20  0906 20  0523 20  0343 20  0708 20  0801 20  1028   SODIUM mmol/L 135* 136 138 139 140 131* 137   POTASSIUM mmol/L 3.8 3.7 3.9 4.0 4.3 3.5 3.5   CHLORIDE mmol/L 104 106 108* 109* 108* 101 104   CO2 mmol/L 23.4 22.3 20.7* 19.1* 21.4* 22.8 22.0   BUN mg/dL 26* 26* 27* 26* 20 18 21   CREATININE mg/dL 0.98 1.08 1.06 1.11 1.20 1.28* 1.36*   GLUCOSE mg/dL 124* " 117* 126* 147* 129* 112* 120*   Estimated Creatinine Clearance: 62.4 mL/min (by C-G formula based on SCr of 0.98 mg/dL).  Results from last 7 days   Lab Units 12/10/20  0720 12/09/20  0906 12/08/20  0523 12/07/20  0343 12/06/20  0708 12/05/20  0801 12/04/20  1028 12/03/20  1244   CALCIUM mg/dL 8.1* 8.2* 8.3* 8.0* 8.0* 7.7* 8.1* 8.6   ALBUMIN g/dL 3.10* 2.80* 3.00* 3.00* 3.00* 3.00* 3.40*  --    MAGNESIUM mg/dL  --   --   --   --   --   --   --  2.2     Results from last 7 days   Lab Units 12/10/20  0720 12/09/20  0906 12/08/20  0523 12/07/20  0343 12/06/20  0708 12/05/20  0801 12/04/20  1028   ALBUMIN g/dL 3.10* 2.80* 3.00* 3.00* 3.00* 3.00* 3.40*   BILIRUBIN mg/dL 0.6 0.6 0.4 0.3 0.4 0.5 0.4   ALK PHOS U/L 74 72 63 56 54 52 52   AST (SGOT) U/L 35 40 45* 61* 47* 52* 56*   ALT (SGPT) U/L 33 39 36 37 27 24 25       Assessment:  Pneumonia secondary to COVID-19: Patient completes his 5-day course of remdesivir Today.   Clinically looks about the same -stable but not improving.  Inflammatory parameters also not showing a downward trend yet.    Acute respiratory failure with hypoxemia secondary above: Continue dexamethasone and oxygen.  ALEXANDRU: Creatinine back to baseline at present.  Paroxysmal atrial fibrillation: Rate controlled.  Eliquis.  Chronic diastolic CHF:  Monitor fluid status closely.  Depression: Slow speech, flat affect... Encouraged increase activity.  Encourage patient to open the shades and expose himself to more sunlight.  Recheck TSH level.  Access center evaluation.      Plan:  Please see above.  Called and discussed the case with his daughter including my concerns are he is depression.  Discussed access center involvement and she agrees.  Discussed with RN and CCP in floor rounds.    Patient was wearing facemask when I entered the room and throughout our encounter.  I wore protective equipment throughout this patient encounter including a face mask and gloves.  Hand hygiene was performed before donning  protective equipment and after removal when leaving the room.    Jonny Mayer MD  12/10/2020  11:45 EST

## 2020-12-10 NOTE — THERAPY TREATMENT NOTE
Patient Name: Bhaskar Ibarra  : 1935    MRN: [id number]                              Today's Date: 12/10/2020       Admit Date: 2020    Visit Dx:     ICD-10-CM ICD-9-CM   1. Pneumonia due to COVID-19 virus  U07.1 480.8    J12.89    2. ALEXANDRU (acute kidney injury) (CMS/HCC)  N17.9 584.9     Patient Active Problem List   Diagnosis   • Coronary arteriosclerosis in native artery   • Paroxysmal atrial fibrillation (CMS/HCC)   • Acute non-Q wave ST elevation myocardial infarction (STEMI) involving left anterior descending (LAD) coronary artery   • Mitral valve insufficiency   • S/P CABG (coronary artery bypass graft)   • S/P mitral valve repair   • Depression   • Hypothyroidism   • Hyperlipidemia   • Pulmonary nodule   • Adenocarcinoma of right lung (CMS/HCC)   • Benign nodular prostatic hyperplasia with lower urinary tract symptoms   • Confusion   • Post concussion syndrome   • Vitamin D deficiency   • Well adult health check   • Impaired fasting glucose   • AAA (abdominal aortic aneurysm) (CMS/HCC)   • Ascending aortic aneurysm (CMS/HCC)   • Macrocytic anemia   • Other heart failure (CMS/HCC)   • HTN (hypertension), benign   • Pneumonia due to COVID-19 virus   • ALEXANDRU (acute kidney injury) (CMS/HCC)   • Chronic diastolic CHF (congestive heart failure) (CMS/HCC)   • Acute respiratory failure with hypoxia (CMS/HCC)     Past Medical History:   Diagnosis Date   • Abdominal aortic aneurysm (CMS/HCC)    • Acute myocardial infarction (CMS/HCC)    • Acute renal failure (CMS/HCC)    • Anemia    • Aneurysm (CMS/HCC)    • Atrial fibrillation (CMS/HCC)    • Cardiomyopathy (CMS/HCC)    • Chest pain    • Coronary artery disease     2015-    • Depression    • GERD (gastroesophageal reflux disease)    • Hyperlipidemia    • Hypertension    • Hypothyroidism    • Lung cancer (CMS/HCC) 2016    radiation    • Mitral regurgitation    • Myocardial infarction (CMS/HCC)    • Pleural effusion    • Pleural effusion, bilateral    •  RLS (restless legs syndrome)    • Sleep apnea    • Ulcerative colitis (CMS/HCC)    • Ulcerative colitis (CMS/HCC)      Past Surgical History:   Procedure Laterality Date   • BACK SURGERY      lumbar   • CARDIAC CATHETERIZATION     • CORONARY ANGIOPLASTY     • CORONARY ARTERY BYPASS GRAFT  07/27/2015    X 5  Dr Louis   • HERNIA REPAIR      inguinal - left   • MITRAL VALVE REPAIR/REPLACEMENT  07/27/2015    Dr Louis   • NASAL SEPTUM SURGERY      benign growth removed   • OTHER SURGICAL HISTORY      Cardiovasc Endosc W/ Video-Assist Harv Veins Coron Art Byp   • PROSTATE SURGERY     • SKIN BIOPSY      benign     General Information     Row Name 12/10/20 1054          OT Time and Intention    Document Type  therapy note (daily note)  -     Mode of Treatment  occupational therapy  -     Row Name 12/10/20 1054          General Information    Patient Profile Reviewed  yes  -     Existing Precautions/Restrictions  fall;oxygen therapy device and L/min  -     Row Name 12/10/20 1054          Cognition    Orientation Status (Cognition)  oriented x 3  -     Row Name 12/10/20 1054          Safety Issues, Functional Mobility    Safety Issues Affecting Function (Mobility)  ability to follow commands;insight into deficits/self-awareness;positioning of assistive device;awareness of need for assistance  -     Impairments Affecting Function (Mobility)  balance;endurance/activity tolerance;strength  -       User Key  (r) = Recorded By, (t) = Taken By, (c) = Cosigned By    Initials Name Provider Type     Kobi Kiser OT Occupational Therapist        Mobility/ADL's     Row Name 12/10/20 1055          Bed Mobility    Bed Mobility  sit-supine  -BL     Sit-Supine Brule (Bed Mobility)  contact guard  -     Assistive Device (Bed Mobility)  bed rails  -     Row Name 12/10/20 1055          Transfers    Transfers  bed-chair transfer  -     Bed-Chair Brule (Transfers)  minimum assist (75% patient effort)   -BL     Assistive Device (Bed-Chair Transfers)  walker, front-wheeled  -     Sit-Stand Clarke (Transfers)  contact guard  -[facility] Name 12/10/20 1055          Sit-Stand Transfer    Assistive Device (Sit-Stand Transfers)  walker, front-wheeled  -BL     Row Name 12/10/20 1055          Functional Mobility    Functional Mobility-Distance (Feet)  25  -     Functional Mobility- Comment  Pt ambulated to room sink to complete oral care with rolling walker and 1 LOB episode.  -[facility] Name 12/10/20 1055          Activities of Daily Living    BADL Assessment/Intervention  grooming  -BL     Row Name 12/10/20 1055          Lower Body Dressing Assessment/Training    Clarke Level (Lower Body Dressing)  -- Pt decline LB dressing this date x2 attempts by OT  -[facility] Name 12/10/20 1055          Grooming Assessment/Training    Clarke Level (Grooming)  wash face, hands;oral care regimen  -     Position (Grooming)  edge of bed sitting;supported standing  -       User Key  (r) = Recorded By, (t) = Taken By, (c) = Cosigned By    Initials Name Provider Type    Kobi Dai OT Occupational Therapist        Obj/Interventions     Row Name 12/10/20 1057          Balance    Balance Assessment  standing dynamic balance  -     Dynamic Standing Balance  mild impairment  -     Comment, Balance  Min A-CGA at times for standing ADL task with use of rolling walker  -       User Key  (r) = Recorded By, (t) = Taken By, (c) = Cosigned By    Initials Name Provider Type    Kobi Dai OT Occupational Therapist        Goals/Plan    No documentation.       Clinical Impression     Row Name 12/10/20 1058          Pain Assessment    Additional Documentation  Pain Scale: Numbers Pre/Post-Treatment (Group)  -BL     Row Name 12/10/20 1058          Pain Scale: Numbers Pre/Post-Treatment    Pretreatment Pain Rating  0/10 - no pain  -     Posttreatment Pain Rating  0/10 - no pain  -BL     Row Name 12/10/20  1058          Plan of Care Review    Plan of Care Reviewed With  patient  -BL     Progress  improving  -BL     Outcome Summary  Pt seen by OT this date. Pt in sidelying position in bed upon arrival. Pt performed bed mobility with CGA to sit EOB. Pt performed grooming task seated EOB prior to standing with CGA to ambulate to sink with rolling walker with Min A to complete oral care. Pt with 1 LOB epsiode with rolling walker this date. Pt declined LB dressing x2 attempts this date. Pt left up in chair, needs in reach, alarm on. Pt to continue with skilled OT services to address goals. Pt wore mask, OT wore mask, gloves, gown. glasses, hand hygiene performed.  -BL     Row Name 12/10/20 1058          Therapy Assessment/Plan (OT)    Criteria for Skilled Therapeutic Interventions Met (OT)  yes  -BL     Row Name 12/10/20 1058          Vital Signs    Pre SpO2 (%)  90  -BL     O2 Delivery Pre Treatment  nasal cannula  -BL     Intra SpO2 (%)  88  -BL     O2 Delivery Intra Treatment  nasal cannula  -BL     Post SpO2 (%)  92  -BL     O2 Delivery Post Treatment  nasal cannula  -BL     Pre Patient Position  Side Lying  -BL     Intra Patient Position  Standing  -BL     Post Patient Position  Sitting  -BL     Row Name 12/10/20 1058          Positioning and Restraints    Pre-Treatment Position  in bed  -BL     Post Treatment Position  chair  -BL     In Chair  notified nsg;reclined;call light within reach;encouraged to call for assist;exit alarm on  -BL       User Key  (r) = Recorded By, (t) = Taken By, (c) = Cosigned By    Initials Name Provider Type    BL Kobi Kiser, ANGELICA Occupational Therapist        Outcome Measures     Row Name 12/10/20 1102          How much help from another is currently needed...    Putting on and taking off regular lower body clothing?  3  -BL     Bathing (including washing, rinsing, and drying)  3  -BL     Toileting (which includes using toilet bed pan or urinal)  3  -BL     Putting on and taking off  regular upper body clothing  4  -BL     Taking care of personal grooming (such as brushing teeth)  4  -BL     Eating meals  4  -BL     AM-PAC 6 Clicks Score (OT)  21  -BL     Row Name 12/10/20 1102          Functional Assessment    Outcome Measure Options  AM-PAC 6 Clicks Daily Activity (OT)  -BL       User Key  (r) = Recorded By, (t) = Taken By, (c) = Cosigned By    Initials Name Provider Type    BL Kobi Kiser OT Occupational Therapist        Occupational Therapy Education                 Title: PT OT SLP Therapies (Done)     Topic: Occupational Therapy (Done)     Point: ADL training (Done)     Description:   Instruct learner(s) on proper safety adaptation and remediation techniques during self care or transfers.   Instruct in proper use of assistive devices.              Learning Progress Summary           Patient Acceptance, E, VU by AK at 12/10/2020 0617    Acceptance, TB,E, VU by AK at 12/8/2020 0220    Acceptance, E, VU,DU by ALFREDO at 12/3/2020 1345                               User Key     Initials Effective Dates Name Provider Type Discipline    AK 03/17/17 -  Zakia Moody, RN Registered Nurse Nurse    ALFREDO 07/15/20 -  Valeria Figueroa OT Occupational Therapist OT              OT Recommendation and Plan     Plan of Care Review  Plan of Care Reviewed With: patient  Progress: improving  Outcome Summary: Pt seen by OT this date. Pt in sidelying position in bed upon arrival. Pt performed bed mobility with CGA to sit EOB. Pt performed grooming task seated EOB prior to standing with CGA to ambulate to sink with rolling walker with Min A to complete oral care. Pt with 1 LOB epsiode with rolling walker this date. Pt declined LB dressing x2 attempts this date. Pt left up in chair, needs in reach, alarm on. Pt to continue with skilled OT services to address goals. Pt wore mask, OT wore mask, gloves, gown. glasses, hand hygiene performed.     Time Calculation:   Time Calculation- OT     Row Name 12/10/20 1104              Time Calculation- OT    OT Start Time  0930  -      OT Stop Time  0955  -      OT Time Calculation (min)  25 min  -      Total Timed Code Minutes- OT  25 minute(s)  -      OT Received On  12/10/20  -      OT - Next Appointment  12/11/20  -        User Key  (r) = Recorded By, (t) = Taken By, (c) = Cosigned By    Initials Name Provider Type     Kobi Kiser OT Occupational Therapist        Therapy Charges for Today     Code Description Service Date Service Provider Modifiers Qty    14772240280 HC OT THERAPEUTIC ACT EA 15 MIN 12/10/2020 Kobi Kiser OT GO 1    68735515580 HC OT SELF CARE/MGMT/TRAIN EA 15 MIN 12/10/2020 Kobi Kiser OT GO 1               Alan Kiser OT  12/10/2020

## 2020-12-10 NOTE — THERAPY TREATMENT NOTE
Patient Name: Bhaskar Ibarra  : 1935    MRN: [id number]                              Today's Date: 12/10/2020       Admit Date: 2020    Visit Dx:     ICD-10-CM ICD-9-CM   1. Pneumonia due to COVID-19 virus  U07.1 480.8    J12.89    2. ALEXANDRU (acute kidney injury) (CMS/HCC)  N17.9 584.9     Patient Active Problem List   Diagnosis   • Coronary arteriosclerosis in native artery   • Paroxysmal atrial fibrillation (CMS/HCC)   • Acute non-Q wave ST elevation myocardial infarction (STEMI) involving left anterior descending (LAD) coronary artery   • Mitral valve insufficiency   • S/P CABG (coronary artery bypass graft)   • S/P mitral valve repair   • Depression   • Hypothyroidism   • Hyperlipidemia   • Pulmonary nodule   • Adenocarcinoma of right lung (CMS/HCC)   • Benign nodular prostatic hyperplasia with lower urinary tract symptoms   • Confusion   • Post concussion syndrome   • Vitamin D deficiency   • Well adult health check   • Impaired fasting glucose   • AAA (abdominal aortic aneurysm) (CMS/HCC)   • Ascending aortic aneurysm (CMS/HCC)   • Macrocytic anemia   • Other heart failure (CMS/HCC)   • HTN (hypertension), benign   • Pneumonia due to COVID-19 virus   • ALEXANDRU (acute kidney injury) (CMS/HCC)   • Chronic diastolic CHF (congestive heart failure) (CMS/HCC)   • Acute respiratory failure with hypoxia (CMS/HCC)     Past Medical History:   Diagnosis Date   • Abdominal aortic aneurysm (CMS/HCC)    • Acute myocardial infarction (CMS/HCC)    • Acute renal failure (CMS/HCC)    • Anemia    • Aneurysm (CMS/HCC)    • Atrial fibrillation (CMS/HCC)    • Cardiomyopathy (CMS/HCC)    • Chest pain    • Coronary artery disease     2015-    • Depression    • GERD (gastroesophageal reflux disease)    • Hyperlipidemia    • Hypertension    • Hypothyroidism    • Lung cancer (CMS/HCC) 2016    radiation    • Mitral regurgitation    • Myocardial infarction (CMS/HCC)    • Pleural effusion    • Pleural effusion, bilateral    •  RLS (restless legs syndrome)    • Sleep apnea    • Ulcerative colitis (CMS/HCC)    • Ulcerative colitis (CMS/HCC)      Past Surgical History:   Procedure Laterality Date   • BACK SURGERY      lumbar   • CARDIAC CATHETERIZATION     • CORONARY ANGIOPLASTY     • CORONARY ARTERY BYPASS GRAFT  07/27/2015    X 5  Dr Louis   • HERNIA REPAIR      inguinal - left   • MITRAL VALVE REPAIR/REPLACEMENT  07/27/2015    Dr Louis   • NASAL SEPTUM SURGERY      benign growth removed   • OTHER SURGICAL HISTORY      Cardiovasc Endosc W/ Video-Assist Harv Veins Coron Art Byp   • PROSTATE SURGERY     • SKIN BIOPSY      benign     General Information     Row Name 12/10/20 1045          Physical Therapy Time and Intention    Document Type  therapy note (daily note)  -MS     Mode of Treatment  physical therapy;individual therapy  -MS     Row Name 12/10/20 1045          General Information    Patient Profile Reviewed  yes  -MS     Existing Precautions/Restrictions  (S) fall;oxygen therapy device and L/min Exit alarm; COVID Isolation  -MS     Barriers to Rehab  none identified  -MS     Row Name 12/10/20 1045          Safety Issues, Functional Mobility    Comment, Safety Issues/Impairments (Mobility)  Gait belt used for safety.  -MS       User Key  (r) = Recorded By, (t) = Taken By, (c) = Cosigned By    Initials Name Provider Type    MS JonaHeathew JEANNE, PT Physical Therapist        Mobility     Row Name 12/10/20 1045          Bed Mobility    Sit-Supine Toole (Bed Mobility)  contact guard  -MS     Row Name 12/10/20 1045          Sit-Stand Transfer    Sit-Stand Toole (Transfers)  contact guard  -MS     Assistive Device (Sit-Stand Transfers)  walker, front-wheeled  -MS     Row Name 12/10/20 1045          Gait/Stairs (Locomotion)    Toole Level (Gait)  contact guard  -MS     Assistive Device (Gait)  walker, front-wheeled  -MS     Distance in Feet (Gait)  12 feet  -MS     Deviations/Abnormal Patterns (Gait)  anushka  decreased;stride length decreased  -MS     Bilateral Gait Deviations  forward flexed posture  -MS     Comment (Gait/Stairs)  verbal/tactile cues for posture correction.  At about 10 feet of ambulation pt. c/o increasing dizziness and began to lose his balance. Pt. had to take several sidesteps to get to bed in order to sit down.  Once sitting pt. returned to bed supine immediately  -MS       User Key  (r) = Recorded By, (t) = Taken By, (c) = Cosigned By    Initials Name Provider Type    Cyrus Howard, PT Physical Therapist        Obj/Interventions     Row Name 12/10/20 1047          Motor Skills    Therapeutic Exercise  -- BLE ther. ex. program x 20 reps completed (Ankle pumps, Hip Flexion, LAQ's, Isometric Hip Add)  -MS       User Key  (r) = Recorded By, (t) = Taken By, (c) = Cosigned By    Initials Name Provider Type    Cyrus Howard, PT Physical Therapist        Goals/Plan    No documentation.       Clinical Impression     Row Name 12/10/20 1048          Pain Scale: Numbers Pre/Post-Treatment    Pretreatment Pain Rating  0/10 - no pain  -MS     Posttreatment Pain Rating  0/10 - no pain  -MS     Row Name 12/10/20 1048          Positioning and Restraints    Pre-Treatment Position  sitting in chair/recliner  -MS     Post Treatment Position  bed  -MS     In Bed  notified nsg;supine;call light within reach;encouraged to call for assist;exit alarm on;with nsg Nursing called into room immediately after getting pt. supine in bed.  vitals taken by nurse and V.S.S. Pt. reports dizziness subsided.  -MS       User Key  (r) = Recorded By, (t) = Taken By, (c) = Cosigned By    Initials Name Provider Type    Cyrus Howard, PT Physical Therapist        Outcome Measures     Row Name 12/10/20 1049          How much help from another person do you currently need...    Turning from your back to your side while in flat bed without using bedrails?  3  -MS     Moving from lying on back to sitting on the side of a  flat bed without bedrails?  3  -MS     Moving to and from a bed to a chair (including a wheelchair)?  3  -MS     Standing up from a chair using your arms (e.g., wheelchair, bedside chair)?  3  -MS     Climbing 3-5 steps with a railing?  3  -MS     To walk in hospital room?  3  -MS     AM-PAC 6 Clicks Score (PT)  18  -MS       User Key  (r) = Recorded By, (t) = Taken By, (c) = Cosigned By    Initials Name Provider Type    Cyrus Howard, PT Physical Therapist        Physical Therapy Education                 Title: PT OT SLP Therapies (Done)     Topic: Physical Therapy (Done)     Point: Mobility training (Done)     Learning Progress Summary           Patient Acceptance, E,D, VU,NR by MS at 12/10/2020 1049    Acceptance, E, VU by AK at 12/10/2020 0617    Acceptance, TB,E, VU by AK at 12/8/2020 0220    Acceptance, E,D, VU,NR by MS at 12/7/2020 1110    Acceptance, E,TB,D, VU,NR by  at 12/5/2020 1015    Acceptance, E,TB,D, VU,NR by  at 12/3/2020 1306                   Point: Home exercise program (Done)     Learning Progress Summary           Patient Acceptance, E,D, VU,NR by MS at 12/10/2020 1049    Acceptance, E, VU by AK at 12/10/2020 0617    Acceptance, TB,E, VU by AK at 12/8/2020 0220    Acceptance, E,D, VU,NR by MS at 12/7/2020 1110    Acceptance, E,TB,D, VU,NR by  at 12/5/2020 1015    Acceptance, E,TB,D, VU,NR by  at 12/3/2020 1306                   Point: Body mechanics (Done)     Learning Progress Summary           Patient Acceptance, E,D, VU,NR by MS at 12/10/2020 1049    Acceptance, E, VU by AK at 12/10/2020 0617    Acceptance, TB,E, VU by AK at 12/8/2020 0220    Acceptance, E,D, VU,NR by MS at 12/7/2020 1110    Acceptance, E,TB,D, VU,NR by  at 12/5/2020 1015    Acceptance, E,TB,D, VU,NR by  at 12/3/2020 1306                   Point: Precautions (Done)     Learning Progress Summary           Patient Acceptance, CEDRIC BOLTON VU,NR by MS at 12/10/2020 1049    Acceptance, VIVIAN BOLTON by AK at 12/10/2020 0617     Acceptance, TB,E, VU by AK at 12/8/2020 0220    Acceptance, E,D, VU,NR by MS at 12/7/2020 1110    Acceptance, E,TB,D, VU,NR by  at 12/5/2020 1015    Acceptance, E,TB,D, VU,NR by  at 12/3/2020 1306                               User Key     Initials Effective Dates Name Provider Type Discipline     04/03/18 -  Kristen Segura, PT Physical Therapist PT    MS 04/03/18 -  Cyrus Tenorio, PT Physical Therapist PT    AK 03/17/17 -  Zakia Moody, RN Registered Nurse Nurse              PT Recommendation and Plan     Plan of Care Reviewed With: patient  Outcome Summary: Pt. able to ambulate 12 feet, CGA x 1, with use of Rwx this date.  Pt. requires CGA x 1 for bed mobility and CGA x 1 for sit <-> stand transfers. BLE ther. ex. program x 20 reps completed for general strengthening.  At 10 feet of ambulation pt. began to feel increasingly dizzy, limiting his upright mobility this date.  Nursing notified and came to room where vitals were checked and all was stable.  Pt. reported once supine that his dizziness subsided.     Time Calculation:   PT Charges     Row Name 12/10/20 1051             Time Calculation    Start Time  0945  -MS      Stop Time  1010  -MS      Time Calculation (min)  25 min  -MS      PT Received On  12/10/20  -MS      PT - Next Appointment  12/12/20  -MS         Time Calculation- PT    Total Timed Code Minutes- PT  24 minute(s)  -MS        User Key  (r) = Recorded By, (t) = Taken By, (c) = Cosigned By    Initials Name Provider Type    MS TenorioCyrus, PT Physical Therapist        Therapy Charges for Today     Code Description Service Date Service Provider Modifiers Qty    23912691152 HC PT THER PROC EA 15 MIN 12/10/2020 Cyrus Tenorio, PT GP 2          PT G-Codes  Outcome Measure Options: AM-PAC 6 Clicks Daily Activity (OT)  AM-PAC 6 Clicks Score (PT): 18  AM-PAC 6 Clicks Score (OT): 21    Cyrus Tenorio PT  12/10/2020

## 2020-12-10 NOTE — PLAN OF CARE
Goal Outcome Evaluation:  Plan of Care Reviewed With: patient  Progress: improving  Outcome Summary: Pt seen by OT this date. Pt in sidelying position in bed upon arrival. Pt performed bed mobility with CGA to sit EOB. Pt performed grooming task seated EOB prior to standing with CGA to ambulate to sink with rolling walker with Min A to complete oral care. Pt with 1 LOB epsiode with rolling walker this date. Pt declined LB dressing x2 attempts this date. Pt left up in chair, needs in reach, alarm on. Pt to continue with skilled OT services to address goals. Pt wore mask, OT wore mask, gloves, gown. glasses, hand hygiene performed.

## 2020-12-10 NOTE — PLAN OF CARE
Goal Outcome Evaluation:  Plan of Care Reviewed With: patient  Progress: no change  Outcome Summary: Pt. able to ambulate 12 feet, CGA x 1, with use of Rwx this date.  Pt. requires CGA x 1 for bed mobility and CGA x 1 for sit <-> stand transfers. BLE ther. ex. program x 20 reps completed for general strengthening.  At 10 feet of ambulation pt. began to feel increasingly dizzy, limiting his upright mobility this date.  Nursing notified and came to room where vitals were checked and all was stable.  Pt. reported once supine that his dizziness subsided.    Patient was wearing a face mask during this therapy encounter. Therapist used appropriate personal protective equipment including eye protection, mask, and gloves.  Mask used was standard procedure mask. Appropriate PPE was worn during the entire therapy session. Hand hygiene was completed before and after therapy session. Patient is in enhanced droplet precautions.

## 2020-12-10 NOTE — PLAN OF CARE
Goal Outcome Evaluation:  Plan of Care Reviewed With: patient, daughter  Progress: no change  Outcome Summary: VSS. Patient seems very depressed and defeated, access consulted and spoke with family and  to try an raise patients spirits. Patient had a dizzy spell with PT today. Oxygen still required, usin anywhere from 2-4L throughout the day. No complaints of pain. Safety maintained. Will continue to monitor.

## 2020-12-11 LAB
ALBUMIN SERPL-MCNC: 2.7 G/DL (ref 3.5–5.2)
ALP SERPL-CCNC: 81 U/L (ref 39–117)
ALT SERPL W P-5'-P-CCNC: 35 U/L (ref 1–41)
ANION GAP SERPL CALCULATED.3IONS-SCNC: 9.8 MMOL/L (ref 5–15)
AST SERPL-CCNC: 32 U/L (ref 1–40)
BILIRUB CONJ SERPL-MCNC: 0.2 MG/DL (ref 0–0.3)
BILIRUB INDIRECT SERPL-MCNC: 0.4 MG/DL
BILIRUB SERPL-MCNC: 0.6 MG/DL (ref 0–1.2)
BUN SERPL-MCNC: 32 MG/DL (ref 8–23)
BUN/CREAT SERPL: 28.1 (ref 7–25)
CALCIUM SPEC-SCNC: 8.2 MG/DL (ref 8.6–10.5)
CHLORIDE SERPL-SCNC: 108 MMOL/L (ref 98–107)
CO2 SERPL-SCNC: 23.2 MMOL/L (ref 22–29)
CREAT SERPL-MCNC: 1.14 MG/DL (ref 0.76–1.27)
CRP SERPL-MCNC: 5.98 MG/DL (ref 0–0.5)
FERRITIN SERPL-MCNC: 874 NG/ML (ref 30–400)
GFR SERPL CREATININE-BSD FRML MDRD: 61 ML/MIN/1.73
GLUCOSE SERPL-MCNC: 133 MG/DL (ref 65–99)
LDH SERPL-CCNC: 454 U/L (ref 135–225)
POTASSIUM SERPL-SCNC: 4.1 MMOL/L (ref 3.5–5.2)
PROCALCITONIN SERPL-MCNC: 0.09 NG/ML (ref 0–0.25)
PROT SERPL-MCNC: 6.3 G/DL (ref 6–8.5)
SODIUM SERPL-SCNC: 141 MMOL/L (ref 136–145)
TSH SERPL DL<=0.05 MIU/L-ACNC: 1.25 UIU/ML (ref 0.27–4.2)

## 2020-12-11 PROCEDURE — 83615 LACTATE (LD) (LDH) ENZYME: CPT | Performed by: HOSPITALIST

## 2020-12-11 PROCEDURE — 63710000001 DEXAMETHASONE PER 0.25 MG: Performed by: INTERNAL MEDICINE

## 2020-12-11 PROCEDURE — 86140 C-REACTIVE PROTEIN: CPT | Performed by: INTERNAL MEDICINE

## 2020-12-11 PROCEDURE — 82728 ASSAY OF FERRITIN: CPT | Performed by: INTERNAL MEDICINE

## 2020-12-11 PROCEDURE — 80048 BASIC METABOLIC PNL TOTAL CA: CPT | Performed by: INTERNAL MEDICINE

## 2020-12-11 PROCEDURE — 97110 THERAPEUTIC EXERCISES: CPT

## 2020-12-11 PROCEDURE — 90791 PSYCH DIAGNOSTIC EVALUATION: CPT | Performed by: SOCIAL WORKER

## 2020-12-11 PROCEDURE — 84443 ASSAY THYROID STIM HORMONE: CPT | Performed by: HOSPITALIST

## 2020-12-11 PROCEDURE — 80076 HEPATIC FUNCTION PANEL: CPT | Performed by: HOSPITALIST

## 2020-12-11 PROCEDURE — 84145 PROCALCITONIN (PCT): CPT | Performed by: HOSPITALIST

## 2020-12-11 RX ORDER — MECLIZINE HCL 12.5 MG/1
12.5 TABLET ORAL 3 TIMES DAILY
Status: DISCONTINUED | OUTPATIENT
Start: 2020-12-11 | End: 2020-12-12 | Stop reason: HOSPADM

## 2020-12-11 RX ADMIN — ESCITALOPRAM 20 MG: 20 TABLET, FILM COATED ORAL at 10:14

## 2020-12-11 RX ADMIN — LEVOTHYROXINE SODIUM 88 MCG: 88 TABLET ORAL at 05:53

## 2020-12-11 RX ADMIN — SOTALOL HYDROCHLORIDE 80 MG: 80 TABLET ORAL at 10:15

## 2020-12-11 RX ADMIN — MECLIZINE HYDROCHLORIDE 12.5 MG: 12.5 TABLET, FILM COATED ORAL at 21:36

## 2020-12-11 RX ADMIN — SODIUM CHLORIDE, PRESERVATIVE FREE 10 ML: 5 INJECTION INTRAVENOUS at 21:40

## 2020-12-11 RX ADMIN — PANTOPRAZOLE SODIUM 40 MG: 40 TABLET, DELAYED RELEASE ORAL at 05:53

## 2020-12-11 RX ADMIN — ACETAMINOPHEN 650 MG: 325 TABLET, FILM COATED ORAL at 21:36

## 2020-12-11 RX ADMIN — Medication 2000 MCG: at 10:13

## 2020-12-11 RX ADMIN — APIXABAN 2.5 MG: 2.5 TABLET, FILM COATED ORAL at 21:36

## 2020-12-11 RX ADMIN — ATORVASTATIN CALCIUM 40 MG: 20 TABLET, FILM COATED ORAL at 21:36

## 2020-12-11 RX ADMIN — APIXABAN 2.5 MG: 2.5 TABLET, FILM COATED ORAL at 10:13

## 2020-12-11 RX ADMIN — CARVEDILOL 3.12 MG: 3.12 TABLET, FILM COATED ORAL at 10:14

## 2020-12-11 RX ADMIN — ACETAMINOPHEN 650 MG: 325 TABLET, FILM COATED ORAL at 10:15

## 2020-12-11 RX ADMIN — SODIUM CHLORIDE, PRESERVATIVE FREE 10 ML: 5 INJECTION INTRAVENOUS at 10:15

## 2020-12-11 RX ADMIN — CARVEDILOL 3.12 MG: 3.12 TABLET, FILM COATED ORAL at 21:36

## 2020-12-11 RX ADMIN — DEXAMETHASONE 6 MG: 4 TABLET ORAL at 10:15

## 2020-12-11 NOTE — CONSULTS
"Patient is an 85 year old  male admitted to the hospital due to generalized weakness with decreased po intake after several days.  He wife had COVID. He had a fall at home.  He was admitted to the hospital on 12/2  He was found to be COVID positive.  Staff and physicians report that he has been weak and has not been very engaging in conversation.  There are concerns he is depressed.  Pt states \"I'm not depressed\" , \"I'm not anxious\" when ask to rate any depression or anxiety.  He denied any SI or HI.  He states \"I just want to get out of here and be w/ my family.\"  He denies any SI or HI.  He denies any hx of suicide attempts. Pt is requiring O2.     Patient has no outpatient mental health provider. He states that he has seen a psychiatrist in the past and has been on Prozac.  Chart indicates that pt is on Lexapro 20 mg.  He denied any hx of IP psychiatric treatment. He states he has not smoked tobacco in a long time.  He denies use or ETOH or illicit drugs.      Patient lives w/ his wife.  He has 3 adult children. 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  He has a [employer] education and served 4 years in the [employer].    Patient was O x 4. No SI or HI. He did not initiate any conversation. His speech was soft.  He denied any A/V hallucinations.   Pt may benefit from a antidepressant evaluation by a psychiatrist.  [facility] will follow briefly.  If medical team wish for a medication evaluation, a consult for Dr. White will be needed.     "

## 2020-12-11 NOTE — THERAPY TREATMENT NOTE
Patient Name: Bhaskar Ibarra  : 1935    MRN: [id number]                              Today's Date: 2020       Admit Date: 2020    Visit Dx:     ICD-10-CM ICD-9-CM   1. Pneumonia due to COVID-19 virus  U07.1 480.8    J12.89    2. ALEXANDRU (acute kidney injury) (CMS/HCC)  N17.9 584.9     Patient Active Problem List   Diagnosis   • Coronary arteriosclerosis in native artery   • Paroxysmal atrial fibrillation (CMS/HCC)   • Acute non-Q wave ST elevation myocardial infarction (STEMI) involving left anterior descending (LAD) coronary artery   • Mitral valve insufficiency   • S/P CABG (coronary artery bypass graft)   • S/P mitral valve repair   • Depression   • Hypothyroidism   • Hyperlipidemia   • Pulmonary nodule   • Adenocarcinoma of right lung (CMS/HCC)   • Benign nodular prostatic hyperplasia with lower urinary tract symptoms   • Confusion   • Post concussion syndrome   • Vitamin D deficiency   • Well adult health check   • Impaired fasting glucose   • AAA (abdominal aortic aneurysm) (CMS/HCC)   • Ascending aortic aneurysm (CMS/HCC)   • Macrocytic anemia   • Other heart failure (CMS/HCC)   • HTN (hypertension), benign   • Pneumonia due to COVID-19 virus   • ALEXANDRU (acute kidney injury) (CMS/HCC)   • Chronic diastolic CHF (congestive heart failure) (CMS/HCC)   • Acute respiratory failure with hypoxia (CMS/HCC)     Past Medical History:   Diagnosis Date   • Abdominal aortic aneurysm (CMS/HCC)    • Acute myocardial infarction (CMS/HCC)    • Acute renal failure (CMS/HCC)    • Anemia    • Aneurysm (CMS/HCC)    • Atrial fibrillation (CMS/HCC)    • Cardiomyopathy (CMS/HCC)    • Chest pain    • Coronary artery disease     2015-    • Depression    • GERD (gastroesophageal reflux disease)    • Hyperlipidemia    • Hypertension    • Hypothyroidism    • Lung cancer (CMS/HCC) 2016    radiation    • Mitral regurgitation    • Myocardial infarction (CMS/HCC)    • Pleural effusion    • Pleural effusion, bilateral    •  RLS (restless legs syndrome)    • Sleep apnea    • Ulcerative colitis (CMS/HCC)    • Ulcerative colitis (CMS/HCC)      Past Surgical History:   Procedure Laterality Date   • BACK SURGERY      lumbar   • CARDIAC CATHETERIZATION     • CORONARY ANGIOPLASTY     • CORONARY ARTERY BYPASS GRAFT  07/27/2015    X 5  Dr Louis   • HERNIA REPAIR      inguinal - left   • MITRAL VALVE REPAIR/REPLACEMENT  07/27/2015    Dr Louis   • NASAL SEPTUM SURGERY      benign growth removed   • OTHER SURGICAL HISTORY      Cardiovasc Endosc W/ Video-Assist Harv Veins Coron Art Byp   • PROSTATE SURGERY     • SKIN BIOPSY      benign     General Information     Row Name 12/11/20 1605          Physical Therapy Time and Intention    Document Type  therapy note (daily note)  -MS     Mode of Treatment  physical therapy;individual therapy  -MS     Row Name 12/11/20 1605          General Information    Patient Profile Reviewed  yes  -MS     Existing Precautions/Restrictions  (S) fall COVID Isolation  -MS     Barriers to Rehab  none identified  -MS     Row Name 12/11/20 1605          Cognition    Orientation Status (Cognition)  oriented x 3  -MS     Row Name 12/11/20 1605          Safety Issues, Functional Mobility    Comment, Safety Issues/Impairments (Mobility)  Gait belt used for safety.  -MS       User Key  (r) = Recorded By, (t) = Taken By, (c) = Cosigned By    Initials Name Provider Type    MS TenorioCyrus wilkins, PT Physical Therapist        Mobility     Row Name 12/11/20 1605          Bed Mobility    Supine-Sit [place] (Bed Mobility)  contact guard  -MS     Sit-Supine [place] (Bed Mobility)  contact guard  -MS     Row Name 12/11/20 1605          Sit-Stand Transfer    Sit-Stand [place] (Transfers)  minimum assist (75% patient effort)  -MS     Assistive Device (Sit-Stand Transfers)  walker, front-wheeled  -MS     Row Name 12/11/20 1605          Gait/Stairs (Locomotion)    [place] Level (Gait)  contact guard  -MS     Assistive  Device (Gait)  walker, front-wheeled  -MS     Distance in Feet (Gait)  25 feet  -MS     Deviations/Abnormal Patterns (Gait)  anushka decreased;stride length decreased  -MS     Bilateral Gait Deviations  forward flexed posture  -MS     Comment (Gait/Stairs)  Verbal/tactile cues for posture correction.  Also assisted in/out of bathroom this PM.  -MS       User Key  (r) = Recorded By, (t) = Taken By, (c) = Cosigned By    Initials Name Provider Type    Cyrus Howard, PT Physical Therapist        Obj/Interventions     Row Name 12/11/20 1606          Motor Skills    Therapeutic Exercise  -- BLE ther. ex. program x 10 reps completed (Ankle Pumps, Hip Flexion, LAQ's)  -MS       User Key  (r) = Recorded By, (t) = Taken By, (c) = Cosigned By    Initials Name Provider Type    Cyrus Howard, PT Physical Therapist        Goals/Plan    No documentation.       Clinical Impression     Row Name 12/11/20 1607          Pain Scale: Numbers Pre/Post-Treatment    Pretreatment Pain Rating  0/10 - no pain  -MS     Posttreatment Pain Rating  0/10 - no pain  -MS     Row Name 12/11/20 1607          Positioning and Restraints    Pre-Treatment Position  in bed  -MS     Post Treatment Position  bed  -MS     In Bed  notified nsg;supine;call light within reach;encouraged to call for assist;exit alarm on;with nsg All lines intact.  -MS       User Key  (r) = Recorded By, (t) = Taken By, (c) = Cosigned By    Initials Name Provider Type    Cyrus Howard, PT Physical Therapist        Outcome Measures     Row Name 12/11/20 1607          How much help from another person do you currently need...    Turning from your back to your side while in flat bed without using bedrails?  3  -MS     Moving from lying on back to sitting on the side of a flat bed without bedrails?  3  -MS     Moving to and from a bed to a chair (including a wheelchair)?  3  -MS     Standing up from a chair using your arms (e.g., wheelchair, bedside chair)?  3  -MS      Climbing 3-5 steps with a railing?  3  -MS     To walk in hospital room?  3  -MS     AM-PAC 6 Clicks Score (PT)  18  -MS       User Key  (r) = Recorded By, (t) = Taken By, (c) = Cosigned By    Initials Name Provider Type    MS Jona Cyrus JEANNE, PT Physical Therapist        Physical Therapy Education                 Title: PT OT SLP Therapies (Done)     Topic: Physical Therapy (Done)     Point: Mobility training (Done)     Learning Progress Summary           Patient Acceptance, D,E, VU,NR by MS at 12/11/2020 1607    Acceptance, E,D, VU,NR by MS at 12/10/2020 1049    Acceptance, E, VU by AK at 12/10/2020 0617    Acceptance, TB,E, VU by AK at 12/8/2020 0220    Acceptance, E,D, VU,NR by MS at 12/7/2020 1110    Acceptance, E,TB,D, VU,NR by  at 12/5/2020 1015    Acceptance, E,TB,D, VU,NR by  at 12/3/2020 1306                   Point: Home exercise program (Done)     Learning Progress Summary           Patient Acceptance, D,E, VU,NR by MS at 12/11/2020 1607    Acceptance, E,D, VU,NR by MS at 12/10/2020 1049    Acceptance, E, VU by AK at 12/10/2020 0617    Acceptance, TB,E, VU by AK at 12/8/2020 0220    Acceptance, E,D, VU,NR by MS at 12/7/2020 1110    Acceptance, E,TB,D, VU,NR by  at 12/5/2020 1015    Acceptance, E,TB,D, VU,NR by  at 12/3/2020 1306                   Point: Body mechanics (Done)     Learning Progress Summary           Patient Acceptance, D,E, VU,NR by MS at 12/11/2020 1607    Acceptance, E,D, VU,NR by MS at 12/10/2020 1049    Acceptance, E, VU by AK at 12/10/2020 0617    Acceptance, TB,E, VU by AK at 12/8/2020 0220    Acceptance, E,D, VU,NR by MS at 12/7/2020 1110    Acceptance, E,TB,D, VU,NR by  at 12/5/2020 1015    Acceptance, E,TB,D, VU,NR by  at 12/3/2020 1306                   Point: Precautions (Done)     Learning Progress Summary           Patient Acceptance, D,E, VU,NR by MS at 12/11/2020 1607    Acceptance, E,D, VU,NR by MS at 12/10/2020 1049    Acceptance, E, VU by AK at 12/10/2020  0617    Acceptance, TB,E, VU by AK at 12/8/2020 0220    Acceptance, E,D, VU,NR by MS at 12/7/2020 1110    Acceptance, E,TB,D, VU,NR by  at 12/5/2020 1015    Acceptance, E,TB,D, VU,NR by  at 12/3/2020 1306                               User Key     Initials Effective Dates Name Provider Type Discipline     04/03/18 -  Kristen Segura, PT Physical Therapist PT    MS 04/03/18 -  Cyrus Tenorio, PT Physical Therapist PT    AK 03/17/17 -  Zakia Moody, RN Registered Nurse Nurse              PT Recommendation and Plan     Plan of Care Reviewed With: patient  Outcome Summary: Pt. able to ambulate 25 feet, CGA x 1, with use of RWx this date.  Pt. requires CGA x 1 for bed mobility and Min. assist x1 for sit <-> stand transfers.  BLE ther. ex. program x 10 reps completed for general strengthening.  Verbal/tactile cues given during ambulation for posture correction.     Time Calculation:   PT Charges     Row Name 12/11/20 1609             Time Calculation    Start Time  1350  -MS      Stop Time  1415  -MS      Time Calculation (min)  25 min  -MS      PT Received On  12/11/20  -MS      PT - Next Appointment  12/14/20  -MS         Time Calculation- PT    Total Timed Code Minutes- PT  24 minute(s)  -MS        User Key  (r) = Recorded By, (t) = Taken By, (c) = Cosigned By    Initials Name Provider Type    MS Tenorio, Cyrus ANGEL, PT Physical Therapist        Therapy Charges for Today     Code Description Service Date Service Provider Modifiers Qty    97968679009 HC PT THER PROC EA 15 MIN 12/10/2020 Cyrus Tenorio, PT GP 2    23097326090 HC PT THER PROC EA 15 MIN 12/11/2020 Cyrus Tenorio, PT GP 2          PT G-Codes  Outcome Measure Options: AM-PAC 6 Clicks Daily Activity (OT)  AM-PAC 6 Clicks Score (PT): 18  AM-PAC 6 Clicks Score (OT): 21    Cyrus Tenorio, DUSTIN  12/11/2020

## 2020-12-11 NOTE — PLAN OF CARE
Problem: Adult Inpatient Plan of Care  Goal: Plan of Care Review  Outcome: Ongoing, Progressing  Flowsheets (Taken 12/11/2020 0413)  Progress: no change  Plan of Care Reviewed With: patient  Outcome Summary: A&Ox4, VSS, patient is flat and distance, does not engage in conversation, low 90's on 2L O2, no complaints of pain, will continue to monitor.

## 2020-12-11 NOTE — PLAN OF CARE
Goal Outcome Evaluation:  Plan of Care Reviewed With: patient  Progress: no change  Outcome Summary: Pt. able to ambulate 25 feet, CGA x 1, with use of RWx this date.  Pt. requires CGA x 1 for bed mobility and Min. assist x1 for sit <-> stand transfers.  BLE ther. ex. program x 10 reps completed for general strengthening.  Verbal/tactile cues given during ambulation for posture correction.    Patient was wearing a face mask during this therapy encounter. Therapist used appropriate personal protective equipment including eye protection, mask, and gloves.  Mask used was standard procedure mask. Appropriate PPE was worn during the entire therapy session. Hand hygiene was completed before and after therapy session. Patient is in enhanced droplet precautions.

## 2020-12-11 NOTE — PROGRESS NOTES
Continued Stay Note  [facility]     Patient Name: Bhaskar Ibarra  MRN: [id number]  Today's Date: 12/11/2020    Admit Date: 12/2/2020    Discharge Plan     Row Name 12/11/20 1552       Plan    Plan  Home with daughter,[facility],private care giver service for 4 hours a day    Plan Comments  Followed up with pt's daughter Reina Minor dtg [phone] via phone to update pt's DCP/needs.  Reviewed pt's PT/OT notes with pt's daughter and that pt remains on 2-4L O2.  Pt's daughter stated that DC plan melendez remain for pt to return home with assist from her, [facility] and private care givers for 4 hours a day.  Informed pt's daughter that [facility] would follow up on pt's progress on 12/14/20 for ongoing assessment of DC needs.        Discharge Codes    No documentation.       Expected Discharge Date and Time     Expected Discharge Date Expected Discharge Time    Dec 12, 2020             MELBA Hicks

## 2020-12-12 ENCOUNTER — READMISSION MANAGEMENT (OUTPATIENT)
Dept: CALL CENTER | Facility: HOSPITAL | Age: 85
End: 2020-12-12

## 2020-12-12 VITALS
WEIGHT: 176.37 LBS | BODY MASS INDEX: 24.69 KG/M2 | OXYGEN SATURATION: 91 % | DIASTOLIC BLOOD PRESSURE: 75 MMHG | HEIGHT: 71 IN | TEMPERATURE: 96.6 F | SYSTOLIC BLOOD PRESSURE: 131 MMHG | HEART RATE: 55 BPM | RESPIRATION RATE: 18 BRPM

## 2020-12-12 LAB
ANION GAP SERPL CALCULATED.3IONS-SCNC: 10.3 MMOL/L (ref 5–15)
BUN SERPL-MCNC: 30 MG/DL (ref 8–23)
BUN/CREAT SERPL: 30.3 (ref 7–25)
CALCIUM SPEC-SCNC: 8.2 MG/DL (ref 8.6–10.5)
CHLORIDE SERPL-SCNC: 109 MMOL/L (ref 98–107)
CO2 SERPL-SCNC: 20.7 MMOL/L (ref 22–29)
CREAT SERPL-MCNC: 0.99 MG/DL (ref 0.76–1.27)
CRP SERPL-MCNC: 3.69 MG/DL (ref 0–0.5)
D DIMER PPP FEU-MCNC: 2.79 MCGFEU/ML (ref 0–0.49)
FERRITIN SERPL-MCNC: 916 NG/ML (ref 30–400)
GFR SERPL CREATININE-BSD FRML MDRD: 72 ML/MIN/1.73
GLUCOSE SERPL-MCNC: 122 MG/DL (ref 65–99)
POTASSIUM SERPL-SCNC: 4.4 MMOL/L (ref 3.5–5.2)
SODIUM SERPL-SCNC: 140 MMOL/L (ref 136–145)

## 2020-12-12 PROCEDURE — 86140 C-REACTIVE PROTEIN: CPT | Performed by: INTERNAL MEDICINE

## 2020-12-12 PROCEDURE — 80048 BASIC METABOLIC PNL TOTAL CA: CPT | Performed by: INTERNAL MEDICINE

## 2020-12-12 PROCEDURE — 63710000001 DEXAMETHASONE PER 0.25 MG: Performed by: INTERNAL MEDICINE

## 2020-12-12 PROCEDURE — 85379 FIBRIN DEGRADATION QUANT: CPT | Performed by: HOSPITALIST

## 2020-12-12 PROCEDURE — 82728 ASSAY OF FERRITIN: CPT | Performed by: INTERNAL MEDICINE

## 2020-12-12 RX ORDER — MECLIZINE HCL 12.5 MG/1
12.5 TABLET ORAL 3 TIMES DAILY PRN
Qty: 18 TABLET | Refills: 0 | Status: SHIPPED | OUTPATIENT
Start: 2020-12-12

## 2020-12-12 RX ORDER — FUROSEMIDE 40 MG/1
20 TABLET ORAL DAILY
Qty: 90 TABLET | Refills: 3
Start: 2020-12-12 | End: 2021-07-07

## 2020-12-12 RX ORDER — DEXAMETHASONE 6 MG/1
6 TABLET ORAL
Qty: 2 TABLET | Refills: 0 | Status: SHIPPED | OUTPATIENT
Start: 2020-12-13 | End: 2020-12-15

## 2020-12-12 RX ADMIN — PANTOPRAZOLE SODIUM 40 MG: 40 TABLET, DELAYED RELEASE ORAL at 06:15

## 2020-12-12 RX ADMIN — LEVOTHYROXINE SODIUM 88 MCG: 88 TABLET ORAL at 06:15

## 2020-12-12 RX ADMIN — MECLIZINE HYDROCHLORIDE 12.5 MG: 12.5 TABLET, FILM COATED ORAL at 10:20

## 2020-12-12 RX ADMIN — APIXABAN 2.5 MG: 2.5 TABLET, FILM COATED ORAL at 10:22

## 2020-12-12 RX ADMIN — CARVEDILOL 3.12 MG: 3.12 TABLET, FILM COATED ORAL at 10:19

## 2020-12-12 RX ADMIN — SOTALOL HYDROCHLORIDE 80 MG: 80 TABLET ORAL at 10:19

## 2020-12-12 RX ADMIN — ESCITALOPRAM 20 MG: 20 TABLET, FILM COATED ORAL at 10:19

## 2020-12-12 RX ADMIN — Medication 2000 MCG: at 10:19

## 2020-12-12 RX ADMIN — SODIUM CHLORIDE, PRESERVATIVE FREE 10 ML: 5 INJECTION INTRAVENOUS at 10:20

## 2020-12-12 RX ADMIN — DEXAMETHASONE 6 MG: 4 TABLET ORAL at 10:19

## 2020-12-12 NOTE — PROGRESS NOTES
Continued Stay Note  [facility]     Patient Name: Bhaskar Ibarra  MRN: [id number]  Today's Date: 12/12/2020    Admit Date: 12/2/2020    Discharge Plan     Row Name 12/12/20 1329       Plan    Plan  Home with daughter, [facility], private care giver service and [place] [facility] for home oxygen via private auto    Plan Comments  DME noted for oxygen 2L continuous.  CCP notified Celena at [facility] of DME for 2L oxygen continuous and patient is discharging home today and DME with all required documents faxed to [facility] with confirmation received.  Edilia with [facility] informed of patient's discharge home today.  Portable oxygen tank delivered to RN from [place] [facility].... Rose Calderon RN,CCP        Discharge Codes    No documentation.       Expected Discharge Date and Time     Expected Discharge Date Expected Discharge Time    Dec 12, 2020             Rose Calderon RN

## 2020-12-12 NOTE — PLAN OF CARE
Problem: Adult Inpatient Plan of Care  Goal: Plan of Care Review  Outcome: Met  Goal: Patient-Specific Goal (Individualized)  Outcome: Met  Goal: Absence of Hospital-Acquired Illness or Injury  Outcome: Met  Intervention: Identify and Manage Fall Risk  Description: Perform standard risk assessment with a validated tool or comprehensive approach appropriate to the patient on admission; reassess fall risk frequently, with change in status or transfer to another level of care.  Communicate fall injury risk to interprofessional healthcare team.  Determine need for increased observation, equipment and environmental modification, such as low bed and signage, as well as supportive, nonskid footwear.  Adjust safety measures to individual developmental age, stage and identified risk factors.  Reinforce the importance of safety and physical activity with patient and family.  Perform regular intentional rounding to assess need for position change, pain assessment, personal needs, including assistance with toileting.  Intervention: Prevent Skin Injury  Description: Assess skin risk on admission and at regular intervals throughout hospital stay.  Keep all areas of skin (especially folds) clean and dry.  Maintain adequate skin hydration.  Relieve and redistribute pressure and protect bony prominences; implement measures based on patient-specific risk factors.  Match turning and repositioning schedule to clinical condition.  Encourage weight shift frequently; assist with reposition if unable to complete independently.  Float heels off bed. Avoid pressure on the Achilles tendon.  Keep skin free from extended contact with medical devices.  Use aids (e.g., slide boards, mechanical lift) during transfer.  Intervention: Prevent and Manage VTE (venous thromboembolism) Risk  Description: Assess for VTE risk.  Encourage/assist with early ambulation.  Initiate and maintain compression or other therapy, as indicated based on identified  risk in accordance with organizational protocol/provider order.  Encourage both active and passive leg exercises while in bed, if unable to ambulate.  Intervention: Prevent Infection  Description: Maintain skin and mucous membrane integrity; promote hand, oral and pulmonary hygiene.  Optimize fluid balance, nutrition, sleep and glycemic control to maximize infection resistance.  Identify potential sources of infection early to prevent or mitigate progression of infection (e.g., wound, lines, devices).  Evaluate ongoing need for invasive devices; remove promptly when no longer indicated.  Goal: Optimal Comfort and Wellbeing  Outcome: Met  Intervention: Provide Person-Centered Care  Description: Use a family-focused approach to care.  Develop trust and rapport by proactively providing information, encouraging questions, addressing concerns and offering reassurance.  Acknowledge emotional response to hospitalization.  Recognize and utilize personal coping strategies.  Honor spiritual and cultural preferences.  Goal: Readiness for Transition of Care  Outcome: Met   Goal Outcome Evaluation:  Plan of Care Reviewed With: patient, daughter  Progress: improving  Outcome Summary: Discharging home today with family. VSS. On room air at rest, will need oxygen with activity, ordered placed. Still seems depressed but is happy to go home. Has been up in the chair and curtains open. CCP to bring portable o2 to patients room. safety maintained. will continued to monitor

## 2020-12-12 NOTE — PLAN OF CARE
Goal Outcome Evaluation:  Plan of Care Reviewed With: patient  Progress: no change  Outcome Summary: VSS. SATs on 2LNC 95%. Dr. Mayer decreased to 1LNC SATs at 93%. Remains quiet but talking today. Lights on in room most of day. Ambulating to bathroom with walker. Orthostatic BP positive. Dr. Mayer notified. Monitor labs, vital signs and lungs.

## 2020-12-12 NOTE — OUTREACH NOTE
Prep Survey      Responses   List of hospitals in [place] facility patient discharged from?  [facility]   Is LACE score < 7 ?  No   Eligibility  [facility]    Date of Admission  12/02/20   Date of Discharge  12/12/20   Discharge Disposition  Home-Health Care Svc   Discharge diagnosis  COVID    Does the patient have one of the following disease processes/diagnoses(primary or secondary)?  COVID-19   Does the patient have Home health ordered?  Yes   What is the Home health agency?   [facility]   Is there a DME ordered?  Yes   What DME was ordered?  Home O2 [employer]   Prep survey completed?  Yes          Marietta Riggins RN

## 2020-12-12 NOTE — DISCHARGE PLACEMENT REQUEST
"Juan C Malone (85 y.o. Male)     Date of Birth Social Security Number Address Home Phone MRN    1935  [id number] [id number] [phone] [id number]    [employer]ab[employer] Marital Status          None        Admission Date Admission Type Admitting Provider Attending Provider Department, Room/Bed    12/2/20 Emergency Wilber Logan MD Broaddus, Emmett J., MD [address], [address]    Discharge Date Discharge Disposition Discharge Destination         Home-Health Care [facility]              Attending Provider: Jonny Mayer MD    Allergies: Latex    Isolation: Enh Drop/Con   Infection: COVID (confirmed) (12/02/20)   Code Status: CPR    Ht: 180.3 cm (71\")   Wt: 80 kg (176 lb 5.9 oz)    Admission Cmt: None   Principal Problem: Pneumonia due to COVID-19 virus [U07.1,J12.89]                 Active Insurance as of 12/2/2020     Primary Coverage     Payor Plan Insurance Group Employer/Plan Group    AETNA MEDICARE REPLACEMENT AETNA MEDICARE REPLACEMENT [id number]     Payor Plan Address Payor Plan Phone Number Payor Plan Fax Number Effective Dates    [address] [phone]  1/1/2019 - None Entered    [facility] [id number]       Subscriber Name Subscriber Birth Date Member ID       JUAN C MALONE 1935 [id number]                 Emergency Contacts      (Rel.) Home Phone Work Phone Mobile Phone    Malka Minor (Daughter) [phone] -- [phone]    Donna Chung (Daughter) -- -- [phone]              "

## 2020-12-12 NOTE — DISCHARGE SUMMARY
PHYSICIAN DISCHARGE SUMMARY                                                                        [facility]    Patient Identification:  Name: Bhaskar Ibarra  Age: 85 y.o.  Sex: male  :  1935  MRN: [id number]  Primary Care Physician: Katherine Lester APRN    Admit date: 2020  Discharge date and time: 2020     Discharged Condition: good    Discharge Diagnoses:  Pneumonia secondary to COVID-19:    Acute respiratory failure with hypoxemia    ALEXANDRU:    Paroxysmal atrial fibrillation:    Chronic diastolic CHF:    Depression:   Mild to moderate orthostasis:    Vertigo:          Hospital Course:  Pleasant 85-year-old gentleman presented with generalized malaise and shortness of air and a history of exposure to Covid 19.  He did test positive for COVID-19.  Chest x-ray did show infiltrates.  Although initially had reasonable room air O2 sat he did develop hypoxemia and his maximal O2 requirement during hospitalization was 5 L nasal cannula.  He was noted to be febrile early on also with a maximal documented temperature 101.6.  He was treated with remdesivir and dexamethasone.  He had a slow recovery but today he is feeling better.  Room air O2 sats are 92% at rest however he does quickly desaturate with exertion however.  Overall he is feeling better also.  Inflammatory parameters are improving nicely.  There is significant issues with depression during hospitalization.  At one point he would keep the room dark all day long and keep the curtains closed would have minimal interaction and no eye contact.  This is a bit improved today.  I expect this to improve markedly once we get him out of the hospital back to the home environment.  He did have elevated BUN creatinine on presentation.  This responded very nicely to IV fluids and now he is maintaining his own without assistance of IV fluids.  Couple days ago he started  "noting spinning or dizzy feelings when he rolled over in bed.  Low-dose meclizine was started today he states the dizziness is gone.  He has completed his course of remdesivir.  He has 2 more days left of the dexamethasone.  I have been discussing the case with his daughter.  At this point then he can be discharged remainder of his treatment follow-up as an outpatient.        Consults:     Consults     Date and Time Order Name Status Description    12/11/2020 1924 Inpatient Psychiatrist Consult Completed           Discharge Exam:  General Appearance:  Comfortable, well-appearing, in no acute distress and not in pain.    Vital signs: (most recent): Blood pressure 119/63, pulse 55, temperature 96.6 °F (35.9 °C), temperature source Oral, resp. rate 20, height 180.3 cm (71\"), weight 80 kg (176 lb 5.9 oz), SpO2 90 %.    Lungs:  Normal effort and normal respiratory rate.  Breath sounds clear to auscultation.  (O2 sats now between 90 and 92 at rest on room air.  When I set him up he does drop to the mid 80s with minimal exertion.)  Heart: Normal rate.  Regular rhythm.    Extremities: There is no dependent edema.    Neurological: Patient is alert and oriented to person, place and time.  (Still with a very flat affect but much more interactive today.  Speech still slow.).    Skin:  Warm and dry.       Disposition:  Home    Patient Instructions:      Discharge Medications      New Medications      Instructions Start Date   dexamethasone 6 MG tablet  Commonly known as: DECADRON   6 mg, Oral, Daily With Breakfast   Start Date: December 13, 2020     meclizine 12.5 MG tablet  Commonly known as: ANTIVERT   12.5 mg, Oral, 3 Times Daily PRN         Changes to Medications      Instructions Start Date   furosemide 40 MG tablet  Commonly known as: LASIX  What changed: how much to take   20 mg, Oral, Daily         Continue These Medications      Instructions Start Date   atorvastatin 40 MG tablet  Commonly known as: LIPITOR   TAKE 1 " TABLET EVERY NIGHT      carvedilol 3.125 MG tablet  Commonly known as: COREG   TAKE 1 TABLET TWICE A DAY WITH MEALS      cyanocobalamin 2000 MCG tablet tablet  Commonly known as: VITAMIN B-12   2,000 mcg, Oral, Daily      Eliquis 2.5 MG tablet tablet  Generic drug: apixaban   TAKE 1 TABLET EVERY 12 HOURS      escitalopram 20 MG tablet  Commonly known as: LEXAPRO   TAKE 1 TABLET DAILY      fenofibrate 145 MG tablet  Commonly known as: TRICOR   TAKE 1 TABLET DAILY      levothyroxine 88 MCG tablet  Commonly known as: SYNTHROID, LEVOTHROID   TAKE 1 TABLET DAILY      omeprazole 40 MG capsule  Commonly known as: priLOSEC   TAKE 1 CAPSULE DAILY      sotalol 80 MG tablet  Commonly known as: BETAPACE   80 mg, Oral, Daily           Diet Instructions     Diet: Regular      Discharge Diet: Regular        Future Appointments   Date Time Provider Department Center   2/8/2021 11:15 AM Katherine Nichole APRN [facility]     Additional Instructions for the Follow-ups that You Need to Schedule     Ambulatory Referral to Home Health   As directed      Face to Face Visit Date: 12/12/2020    Follow-up provider for Plan of Care?: I treated the patient in an acute care facility and will not continue treatment after discharge.    Follow-up provider: KATHERINE NICHOLE [[id number]]    Reason/Clinical Findings: COVID-19 pneumonia.  Hypoxemia, deconditioning.    Describe mobility limitations that make leaving home difficult: Please see above.    Nursing/Therapeutic Services Requested: Skilled Nursing Physical Therapy    Skilled nursing orders: Cardiopulmonary assessments    PT orders: Therapeutic exercise Gait Training Transfer training Strengthening Home safety assessment    Weight Bearing Status: As Tolerated    Frequency: 1 Week 1         Discharge Follow-up with PCP   As directed       Currently Documented PCP:    Katherine Nichole APRN    PCP Phone Number:    [phone]     Follow Up Details: 1 week            Contact information for  follow-up providers     Katherine Lester, APRN .    Specialty: Internal Medicine  Why: 1 week  Contact information:  [address]  [address]  [facility] [address]  [phone]                   Contact information for after-discharge care     Home Medical Care     [facility] .    Service: Home Health Services  Contact information:  [address]  [facility] [address] [phone]                           Discharge Order (From admission, onward)     Start     Ordered    12/12/20 1312  Discharge patient  Once     Expected Discharge Date: 12/12/20    Discharge Disposition: [place]Health [place] [facility]    Physician of Record for Attribution - Please select from Treatment Team: JONNY COUCH [[id number]]    Review needed by CMO to determine Physician of Record: No       Question Answer Comment   Physician of Record for Attribution - Please select from Treatment Team JONNY COUCH    Review needed by CMO to determine Physician of Record No        12/12/20 1311                  Total time spent discharging patient including evaluation,post hospitalization follow up,  medication and post hospitalization instructions and education total time exceeds 30 minutes.    Signed:  Jonny Couch MD  12/12/2020  13:12 EST    EMR Dragon/Transcription disclaimer:   Much of this encounter note is an electronic transcription/translation of spoken language to printed text. The electronic translation of spoken language may permit erroneous, or at times, nonsensical words or phrases to be inadvertently transcribed; Although I have reviewed the note for such errors, some may still exist.

## 2020-12-12 NOTE — PROGRESS NOTES
"DAILY PROGRESS NOTE  [facility]    Patient Identification:  Name: Bhaskar Ibarra  Age: 85 y.o.  Sex: male  :  1935  MRN: [id number]         Primary Care Physician: Katherine Lester APRN      Subjective  Patient with no new complaints.  States dizziness is resolved.  Overall feeling better.    Objective:  General Appearance:  Comfortable, well-appearing, in no acute distress and not in pain.    Vital signs: (most recent): Blood pressure 119/63, pulse 55, temperature 96.6 °F (35.9 °C), temperature source Oral, resp. rate 20, height 180.3 cm (71\"), weight 80 kg (176 lb 5.9 oz), SpO2 90 %.    Lungs:  Normal effort and normal respiratory rate.  Breath sounds clear to auscultation.  (O2 sats now between 90 and 92 at rest on room air.  When I set him up he does drop to the mid 80s with minimal exertion.)  Heart: Normal rate.  Regular rhythm.    Extremities: There is no dependent edema.    Neurological: Patient is alert and oriented to person, place and time.  (Still with a very flat affect but much more interactive today.  Speech still slow.).    Skin:  Warm and dry.                Vital signs in last 24 hours:  Temp:  [96.6 °F (35.9 °C)-97.7 °F (36.5 °C)] 96.6 °F (35.9 °C)  Heart Rate:  [53-79] 55  Resp:  [20] 20  BP: ()/(63-73) 119/63    Intake/Output:    Intake/Output Summary (Last 24 hours) at 2020 1128  Last data filed at 2020 0832  Gross per 24 hour   Intake --   Output 1610 ml   Net -1610 ml         Results from last 7 days   Lab Units 12/10/20  0720   WBC 10*3/mm3 9.54   HEMOGLOBIN g/dL 12.8*   PLATELETS 10*3/mm3 311     Results from last 7 days   Lab Units 20  0611 20  0641 12/10/20  0720 20  0906 20  0523 20  0343 20  0708   SODIUM mmol/L 140 141 135* 136 138 139 140   POTASSIUM mmol/L 4.4 4.1 3.8 3.7 3.9 4.0 4.3   CHLORIDE mmol/L 109* 108* 104 106 108* 109* 108*   CO2 mmol/L 20.7* 23.2 23.4 22.3 20.7* 19.1* 21.4*   BUN mg/dL 30* " 32* 26* 26* 27* 26* 20   CREATININE mg/dL 0.99 1.14 0.98 1.08 1.06 1.11 1.20   GLUCOSE mg/dL 122* 133* 124* 117* 126* 147* 129*   Estimated Creatinine Clearance: 61.7 mL/min (by C-G formula based on SCr of 0.99 mg/dL).  Results from last 7 days   Lab Units 12/12/20  0611 12/11/20  0641 12/10/20  0720 12/09/20  0906 12/08/20  0523 12/07/20  0343 12/06/20  0708   CALCIUM mg/dL 8.2* 8.2* 8.1* 8.2* 8.3* 8.0* 8.0*   ALBUMIN g/dL  --  2.70* 3.10* 2.80* 3.00* 3.00* 3.00*     Results from last 7 days   Lab Units 12/11/20  0641 12/10/20  0720 12/09/20  0906 12/08/20 0523 12/07/20 0343 12/06/20  0708   ALBUMIN g/dL 2.70* 3.10* 2.80* 3.00* 3.00* 3.00*   BILIRUBIN mg/dL 0.6 0.6 0.6 0.4 0.3 0.4   ALK PHOS U/L 81 74 72 63 56 54   AST (SGOT) U/L 32 35 40 45* 61* 47*   ALT (SGPT) U/L 35 33 39 36 37 27       Assessment:  Pneumonia secondary to COVID-19: Remdesivir course completed.      Clinically overall looking better today and C-reactive protein improving.  Other acute phase reaction stable.  Now on room air at rest but with quick desats he will probably need home O2 for a week or 2.     Acute respiratory failure with hypoxemia secondary above: Please see above.    2 more doses of dexamethasone left.     ALEXANDRU: Creatinine back to baseline at present.     Paroxysmal atrial fibrillation: Rate controlled.  Eliquis.     Chronic diastolic CHF:  Monitor fluid status closely.     Depression: TSH normal.  Access center evaluation appreciated.  A little improved today.     Mild to moderate orthostasis: Does not appear dehydrated.  Orthostatic precautions hopefully will improve if he becomes more active.    Vertigo: Antivert added to his regime yesterday.  Patient feeling much better today.  Will back off to as needed Antivert at this point.    DVT prophylaxis: Patient is on Eliquis.      Plan:  Overall much improved.  If no significant surprises in the remaining labs and okay for discharge today on home O2.  Called and discussed discharge  plans with his daughters.  Patient will probably benefit from home PT also.      Patient was wearing facemask when I entered the room and throughout our encounter.  I wore protective equipment throughout this patient encounter including a face mask and gloves.  Hand hygiene was performed before donning protective equipment and after removal when leaving the room.    Jonny Mayer MD  12/12/2020  11:28 EST

## 2020-12-12 NOTE — PROGRESS NOTES
"DAILY PROGRESS NOTE  [facility]    Patient Identification:  Name: Bhaskar Ibarra  Age: 85 y.o.  Sex: male  :  1935  MRN: [id number]         Primary Care Physician: Katherine Lester APRN      Subjective  Patient complains of bouts of dizziness.  On questioning states that when he rolls over in bed he feels dizzy.  These are very brief episodes.  No nausea or vomiting.    Objective:  General Appearance:  Comfortable, well-appearing, in no acute distress and not in pain.    Vital signs: (most recent): Blood pressure 96/66, pulse 74, temperature 96.8 °F (36 °C), temperature source Oral, resp. rate 20, height 180.3 cm (71\"), weight 80 kg (176 lb 5.9 oz), SpO2 91 %.    Lungs:  Normal effort and normal respiratory rate.  Breath sounds clear to auscultation.  (O2 sat 94% on 1.5 L.  I turned him down to 1 L.)  Heart: Normal rate.  Regular rhythm.    Extremities: There is no dependent edema.    Neurological: Patient is alert.  (Oriented to person and place.  Still with a very flat affect but at least is more interactive today.  Has a rather negative outlook however.  Extraocular movements intact.  No nystagmus appreciated.).    Skin:  Warm and dry.                Vital signs in last 24 hours:  Temp:  [96.7 °F (35.9 °C)-97.2 °F (36.2 °C)] 96.8 °F (36 °C)  Heart Rate:  [53-79] 74  Resp:  [20] 20  BP: ()/(64-77) 96/66    Intake/Output:    Intake/Output Summary (Last 24 hours) at 2020 1910  Last data filed at 2020 0600  Gross per 24 hour   Intake 300 ml   Output 475 ml   Net -175 ml         Results from last 7 days   Lab Units 12/10/20  0720 20  0801   WBC 10*3/mm3 9.54 7.47   HEMOGLOBIN g/dL 12.8* 11.7*   PLATELETS 10*3/mm3 311 181     Results from last 7 days   Lab Units 20  0641 12/10/20  0720 20  0906 20  0523 20  0343 20  0708 20  0801   SODIUM mmol/L 141 135* 136 138 139 140 131*   POTASSIUM mmol/L 4.1 3.8 3.7 3.9 4.0 4.3 3.5   CHLORIDE " mmol/L 108* 104 106 108* 109* 108* 101   CO2 mmol/L 23.2 23.4 22.3 20.7* 19.1* 21.4* 22.8   BUN mg/dL 32* 26* 26* 27* 26* 20 18   CREATININE mg/dL 1.14 0.98 1.08 1.06 1.11 1.20 1.28*   GLUCOSE mg/dL 133* 124* 117* 126* 147* 129* 112*   Estimated Creatinine Clearance: 53.6 mL/min (by C-G formula based on SCr of 1.14 mg/dL).  Results from last 7 days   Lab Units 12/11/20  0641 12/10/20  0720 12/09/20  0906 12/08/20  0523 12/07/20  0343 12/06/20  0708 12/05/20  0801   CALCIUM mg/dL 8.2* 8.1* 8.2* 8.3* 8.0* 8.0* 7.7*   ALBUMIN g/dL 2.70* 3.10* 2.80* 3.00* 3.00* 3.00* 3.00*     Results from last 7 days   Lab Units 12/11/20  0641 12/10/20  0720 12/09/20  0906 12/08/20  0523 12/07/20  0343 12/06/20  0708 12/05/20  0801   ALBUMIN g/dL 2.70* 3.10* 2.80* 3.00* 3.00* 3.00* 3.00*   BILIRUBIN mg/dL 0.6 0.6 0.6 0.4 0.3 0.4 0.5   ALK PHOS U/L 81 74 72 63 56 54 52   AST (SGOT) U/L 32 35 40 45* 61* 47* 52*   ALT (SGPT) U/L 35 33 39 36 37 27 24       Assessment:  Pneumonia secondary to COVID-19: Remdesivir course completed.      Clinically overall looking a little better today and C-reactive protein improving.  Other acute phase reaction stable.    Acute respiratory failure with hypoxemia secondary above: Please see above.  Presently down to 1 L nasal cannula with O2 sat of 92%.  Overall improved.     Continue dexamethasone and oxygen.    ALEXANDRU: Creatinine back to baseline at present.    Paroxysmal atrial fibrillation: Rate controlled.  Eliquis.    Chronic diastolic CHF:  Monitor fluid status closely.    Depression: TSH normal.  Access center evaluation appreciated.    Mild to moderate orthostasis: Does not appear dehydrated.  Orthostatic precautions hopefully will improve if he becomes more active.    Plan:  Please see above.  Very possibly may be able to discharge him in the next day or 2.  Discussed with RN.  Called and spoke with his daughter.  Reviewed his clinical improvement.  Also discussed recommendations for psychiatric  evaluation when she is in agreement.    Jonny Mayer MD  12/11/2020  19:10 EST

## 2020-12-12 NOTE — CONSULTS
Requesting Physician:  Dr. Mayer  Reason for Consult: Depression    Date of admission: December 2, 2020  Date of assessment: December 12, 2020    Chief Complaint: None given    History of presenting illness: Patient is an 85 y.o. male with a reported past psychiatric history of depression seen on consultation for depression.  The patient had presented to the ED complaining of generalized weakness and poor p.o. intake.  His wife had previously been diagnosed with COVID.  He was subsequently found to have COVID as well.    The patient has been treated by his primary care physician for depression.  He is currently on Lexapro.  He currently denies that he is depressed.  He denies suicidal ideation and homicidal ideation.  There are no audiovisual hallucinations.    Past psychiatric history: No previous inpatient psychiatric treatment.  No history of suicide attempts.  No current outpatient provider.    Past medical history:  Diagnoses: Coronary artery disease, atrial fibrillation, mitral valve insufficiency, congestive heart failure, hypothyroidism, hyperlipidemia, prostatic hyperplasia, vitamin D deficiency, macrocytic anemia, hypertension, diabetes mellitus, acute kidney injury, chronic kidney disease.  Osteoporosis  Medications:   Lexapro 20 mg daily, Eliquis, Lipitor, Coreg, Synthroid, Antivert, Protonix, Betapace, vitamin B12  Medication allergies:  NKDA.    Social history: Noncontributory    Family history: Noncontributory    Substance abuse history: None    Vital Signs    Temp:  96.6  Heart Rate:  55  Resp:  20  BP:  119/63    Mental Status Exam: The patient is found lying in bed.  He is awake and alert.  He is dressed in hospital attire.  He appears his stated age.  Mood is okay.  Affect is congruent.  No SI/HI/AVH.  Thought processes are mostly goal-directed.  Judgement and insight are okay.    Assessment:   Major depressive disorder, mild, recurrent;  COVID pneumonia;  History of vitamin D  deficiency    Treatment Plan:     There have been concerns the patient may be having increased depression.  However, patient denies being more depressed.  Any increased depression is likely situational.  He does have a history of vitamin D deficiency and this should be rechecked.  Continue his current treatment with Lexapro 20 mg daily.    Thank you for this consultation.  Please contact access for any additional requests.

## 2020-12-12 NOTE — DISCHARGE INSTRUCTIONS
Meclizine tablets or capsules  What is this medicine?  MECLIZINE (URBAN alvarenga) is an antihistamine. It is used to prevent nausea, vomiting, or dizziness caused by motion sickness. It is also used to prevent and treat vertigo (extreme dizziness or a feeling that you or your surroundings are tilting or spinning around).  This medicine may be used for other purposes; ask your health care provider or pharmacist if you have questions.  COMMON BRAND NAME(S): Antivert, Dramamine Less Drowsy, Dramamine-N, Medivert, Meni-D  What should I tell my health care provider before I take this medicine?  They need to know if you have any of these conditions:  · glaucoma  · lung or breathing disease, like asthma  · problems urinating  · prostate disease  · stomach or intestine problems  · an unusual or allergic reaction to meclizine, other medicines, foods, dyes, or preservatives  · pregnant or trying to get pregnant  · breast-feeding  How should I use this medicine?  Take this medicine by mouth with a glass of water. Follow the directions on the prescription label. If you are using this medicine to prevent motion sickness, take the dose at least 1 hour before travel. If it upsets your stomach, take it with food or milk. Take your doses at regular intervals. Do not take your medicine more often than directed.  Talk to your pediatrician regarding the use of this medicine in children. Special care may be needed.  Overdosage: If you think you have taken too much of this medicine contact a poison control center or emergency room at once.  NOTE: This medicine is only for you. Do not share this medicine with others.  What if I miss a dose?  If you miss a dose, take it as soon as you can. If it is almost time for your next dose, take only that dose. Do not take double or extra doses.  What may interact with this medicine?  Do not take this medicine with any of the following medications:  · MAOIs like Carbex, Eldepryl, Marplan, Nardil, and  Parnate  This medicine may also interact with the following medications:  · alcohol  · antihistamines for allergy, cough and cold  · certain medicines for anxiety or sleep  · certain medicines for depression, like amitriptyline, fluoxetine, sertraline  · certain medicines for seizures like phenobarbital, primidone  · general anesthetics like halothane, isoflurane, methoxyflurane, propofol  · local anesthetics like lidocaine, pramoxine, tetracaine  · medicines that relax muscles for surgery  · narcotic medicines for pain  · phenothiazines like chlorpromazine, mesoridazine, prochlorperazine, thioridazine  This list may not describe all possible interactions. Give your health care provider a list of all the medicines, herbs, non-prescription drugs, or dietary supplements you use. Also tell them if you smoke, drink alcohol, or use illegal drugs. Some items may interact with your medicine.  What should I watch for while using this medicine?  Tell your doctor or healthcare professional if your symptoms do not start to get better or if they get worse.  You may get drowsy or dizzy. Do not drive, use machinery, or do anything that needs mental alertness until you know how this medicine affects you. Do not stand or sit up quickly, especially if you are an older patient. This reduces the risk of dizzy or fainting spells. Alcohol may interfere with the effect of this medicine. Avoid alcoholic drinks.  Your mouth may get dry. Chewing sugarless gum or sucking hard candy, and drinking plenty of water may help. Contact your doctor if the problem does not go away or is severe.  This medicine may cause dry eyes and blurred vision. If you wear contact lenses you may feel some discomfort. Lubricating drops may help. See your eye doctor if the problem does not go away or is severe.  What side effects may I notice from receiving this medicine?  Side effects that you should report to your doctor or health care professional as soon as  possible:  · feeling faint or lightheaded, falls  · fast, irregular heartbeat  Side effects that usually do not require medical attention (report to your doctor or health care professional if they continue or are bothersome):  · constipation  · headache  · trouble passing urine or change in the amount of urine  · trouble sleeping  · upset stomach  This list may not describe all possible side effects. Call your doctor for medical advice about side effects. You may report side effects to FDA at 6-702-VJM-0215.  Where should I keep my medicine?  Keep out of the reach of children.  Store at room temperature between 15 and 30 degrees C (59 and 86 degrees F). Keep container tightly closed. Throw away any unused medicine after the expiration date.  NOTE: This sheet is a summary. It may not cover all possible information. If you have questions about this medicine, talk to your doctor, pharmacist, or health care provider.  © 2020 Elsevier/Gold Standard (2017-01-18 19:41:02)  Dexamethasone tablets  What is this medicine?  DEXAMETHASONE (dex a METH a sone) is a corticosteroid. It is commonly used to treat inflammation of the skin, joints, lungs, and other organs. Common conditions treated include asthma, allergies, and arthritis. It is also used for other conditions, such as blood disorders and diseases of the adrenal glands.  This medicine may be used for other purposes; ask your health care provider or pharmacist if you have questions.  COMMON BRAND NAME(S): CUSHINGS SYNDROME DIAGNOSTIC, Decadron, Dexabliss, DexPak Jr TaperPak, DexPak TaperPak, Dxevo, Hemady, HiDex, TaperDex, ZCORT, Zema-Felix, ZoDex, ZonaCort 11 Day, ZonaCort 7 Day  What should I tell my health care provider before I take this medicine?  They need to know if you have any of these conditions:  · Cushing's syndrome  · diabetes  · glaucoma  · heart disease  · high blood pressure  · infection like herpes, measles, tuberculosis, or chickenpox  · kidney  disease  · liver disease  · mental illness  · myasthenia gravis  · osteoporosis  · previous heart attack  · seizures  · stomach or intestine problems  · thyroid disease  · an unusual or allergic reaction to dexamethasone, corticosteroids, other medicines, lactose, foods, dyes, or preservatives  · pregnant or trying to get pregnant  · breast-feeding  How should I use this medicine?  Take this medicine by mouth with a drink of water. Follow the directions on the prescription label. Take it with food or milk to avoid stomach upset. If you are taking this medicine once a day, take it in the morning. Do not take more medicine than you are told to take. Do not suddenly stop taking your medicine because you may develop a severe reaction. Your doctor will tell you how much medicine to take. If your doctor wants you to stop the medicine, the dose may be slowly lowered over time to avoid any side effects.  Talk to your pediatrician regarding the use of this medicine in children. Special care may be needed.  Patients over 65 years old may have a stronger reaction and need a smaller dose.  Overdosage: If you think you have taken too much of this medicine contact a poison control center or emergency room at once.  NOTE: This medicine is only for you. Do not share this medicine with others.  What if I miss a dose?  If you miss a dose, take it as soon as you can. If it is almost time for your next dose, talk to your doctor or health care professional. You may need to miss a dose or take an extra dose. Do not take double or extra doses without advice.  What may interact with this medicine?  Do not take this medicine with any of the following medications:  · live virus vaccines  This medicine may also interact with the following medications:  · aminoglutethimide  · amphotericin B  · aspirin and aspirin-like medicines  · certain antibiotics like erythromycin, clarithromycin, and troleandomycin  · certain antivirals for HIV or  hepatitis  · certain medicines for seizures like carbamazepine, phenobarbital, phenytoin  · certain medicines to treat myasthenia gravis  · cholestyramine  · cyclosporine  · digoxin  · diuretics  · ephedrine  · female hormones, like estrogen or progestins and birth control pills  · insulin or other medicines for diabetes  · isoniazid  · ketoconazole  · medicines that relax muscles for surgery  · mifepristone  · NSAIDs, medicines for pain and inflammation, like ibuprofen or naproxen  · rifampin  · skin tests for allergies  · thalidomide  · vaccines  · warfarin  This list may not describe all possible interactions. Give your health care provider a list of all the medicines, herbs, non-prescription drugs, or dietary supplements you use. Also tell them if you smoke, drink alcohol, or use illegal drugs. Some items may interact with your medicine.  What should I watch for while using this medicine?  Visit your health care professional for regular checks on your progress. Tell your health care professional if your symptoms do not start to get better or if they get worse. Your condition will be monitored carefully while you are receiving this medicine.  Wear a medical ID bracelet or chain. Carry a card that describes your disease and details of your medicine and dosage times.  This medicine may increase your risk of getting an infection. Call your health care professional for advice if you get a fever, chills, or sore throat, or other symptoms of a cold or flu. Do not treat yourself. Try to avoid being around people who are sick. Call your health care professional if you are around anyone with measles, chickenpox, or if you develop sores or blisters that do not heal properly.  If you are going to need surgery or other procedures, tell your doctor or health care professional that you have taken this medicine within the last 12 months.  Ask your doctor or health care professional about your diet. You may need to lower the  amount of salt you eat.  This medicine may increase blood sugar. Ask your healthcare provider if changes in diet or medicines are needed if you have diabetes.  What side effects may I notice from receiving this medicine?  Side effects that you should report to your doctor or health care professional as soon as possible:  · allergic reactions like skin rash, itching or hives, swelling of the face, lips, or tongue  · bloody or black, tarry stools  · changes in emotions or moods  · changes in vision  · confusion, excitement, restlessness  · depressed mood  · eye pain  · hallucinations  · fever or chills, cough, sore throat, pain or difficulty passing urine  · muscle weakness  · severe or sudden stomach or belly pain  · signs and symptoms of high blood sugar such as being more thirsty or hungry or having to urinate more than normal. You may also feel very tired or have blurry vision.  · signs and symptoms of infection like fever; chills; cough; sore throat; pain or trouble passing urine  · swelling of ankles, feet  · unusual bruising or bleeding  · wounds that do not heal  Side effects that usually do not require medical attention (report to your doctor or health care professional if they continue or are bothersome):  · increased appetite  · increased growth of face or body hair  · headache  · nausea, vomiting  · skin problems, acne, thin and shiny skin  · trouble sleeping  · weight gain  This list may not describe all possible side effects. Call your doctor for medical advice about side effects. You may report side effects to FDA at 4-899-PIQ-3939.  Where should I keep my medicine?  Keep out of the reach of children.  Store at room temperature between 20 and 25 degrees C (68 and 77 degrees F). Protect from light. Throw away any unused medicine after the expiration date.  NOTE: This sheet is a summary. It may not cover all possible information. If you have questions about this medicine, talk to your doctor, pharmacist,  or health care provider.  © 2020 Elsevier/Gold Standard (2020-06-30 14:23:34)

## 2020-12-13 ENCOUNTER — TRANSITIONAL CARE MANAGEMENT TELEPHONE ENCOUNTER (OUTPATIENT)
Dept: CALL CENTER | Facility: HOSPITAL | Age: 85
End: 2020-12-13

## 2020-12-13 NOTE — OUTREACH NOTE
Call Center TCM Note      Responses   [facility] [place] patient discharged from?  [place]   Does the patient have one of the following disease processes/diagnoses(primary or secondary)?  COVID-19   COVID-19 underlying condition?  None   TCM attempt successful?  Yes   Call start time  1124   Call end time  1132   General alerts for this patient  Please call Malka as patient doesn't hear his phone.   Discharge diagnosis  COVID    Person spoke with today (if not patient) and relationship  Malka-daughter    Meds reviewed with patient/caregiver?  Yes   Is the patient having any side effects they believe may be caused by any medication additions or changes?  No   Does the patient have all medications ordered at discharge?  Yes   Is the patient taking all medications as directed (includes completed medication regime)?  No   What is preventing the patient from taking all medications as directed?  Other [Not delivered at time of call]   Nursing Interventions  Nurse provided patient education   Does the patient have a primary care provider?   Yes   Does the patient have an appointment with their PCP or specialist within 7 days of discharge?  No   What is preventing the patient from scheduling follow up appointments within 7 days of discharge?  -- [Scheduling an appt today was too much for today. Please give daughter a day or two then schedule PCP appt. Also routed this to PCP office. ]   Nursing Interventions  Advised patient to make appointment [Routed to PCP office]   Has the patient kept scheduled appointments due by today?  N/A   What is the Home health agency?   [facility]   What DME was ordered?  Home [employer]   Has all DME been delivered?  Yes   DME comments  Pt is to wear 2 L of O2 continuous however he's using it PRN    Psychosocial issues?  No   Did the patient receive a copy of their discharge instructions?  Yes   Did the patient receive a copy of COVID-19 specific instructions?  Yes   Nursing interventions  Reviewed  instructions with patient, Assisted with MyChart setup   What is the patient's perception of their health status since discharge?  Improving [Still weak]   Does the patient have any of the following symptoms?  None   Nursing Interventions  Nurse provided patient education   Pulse Ox monitoring  Intermittent   Pulse Ox device source  Patient   O2 Sat comments  Not checked yet   O2 Sat: education provided  Sat levels, When to seek care   Is the patient/caregiver able to teach back steps to recovery at home?  Eat a well-balance diet, Set small, achievable goals for return to baseline health, Rest and rebuild strength, gradually increase activity   If the patient is a current smoker, are they able to teach back resources for cessation?  Not a smoker   Is the patient/caregiver able to teach back the hierarchy of who to call/visit for symptoms/problems? PCP, Specialist, Home health nurse, Urgent Care, ED, 911  Yes   TCM call completed?  Yes   Wrap up additional comments  Daughter is overwhelmed with everything going on, so much so she started crying. She's staying with her parents at this time. RN emailed her a [place] [employer] link so she can set one up for patient. Scheduling an appt was too much for today. Please give her a day or two then schedule PCP appt.           Noreen Hernandez RN    12/13/2020, 11:36 EST

## 2020-12-13 NOTE — OUTREACH NOTE
Call Center TCM Note      Responses   [facility] patient discharged from?  [place]   Does the patient have one of the following disease processes/diagnoses(primary or secondary)?  COVID-19   COVID-19 underlying condition?  None   TCM attempt successful?  No [Pt and Daughter Malka ]   Unsuccessful attempts  Attempt 1   Discharge diagnosis  DESHAUN Hernandez RN    12/13/2020, 10:19 EST

## 2020-12-14 ENCOUNTER — READMISSION MANAGEMENT (OUTPATIENT)
Dept: CALL CENTER | Facility: HOSPITAL | Age: 85
End: 2020-12-14

## 2020-12-14 NOTE — OUTREACH NOTE
COVID-19 Week 1 Survey      Responses   [facility] patient discharged from?  [place]   Does the patient have one of the following disease processes/diagnoses(primary or secondary)?  COVID-19   COVID-19 underlying condition?  None   Call Number  Call 2   Week 1 Call successful?  Yes   Call start time  1441   Call end time  1442   Is patient permission given to speak with other caregiver?  Yes   Person spoke with today (if not patient) and relationship  Malka-daughter   COVID-19 call completed?  Yes   Wrap up additional comments  Very brief call with daughter-states patient is feeling better today. Daughter asked that we call back tomorrow as she is not feeling well.          Lexus Jordan, RN

## 2020-12-14 NOTE — PROGRESS NOTES
Case Management Discharge Note      Final Note: Discharged home with oxygen from [facility] [place] [facility]    Provided Post Acute Provider List?: N/A  N/A Provider List Comment: The patient’s daughter was not provided with a HH/SNF list nor a print out of the HH/nursing home compare list from Medicare.gov as she states that her mother’s d/c plan is home with her assistance and [facility].  Provided Post Acute Provider Quality & Resource List?: N/A  N/A Quality & Resource List Comment: The patient’s daughter was not provided with a HH/SNF list nor a print out of the HH/nursing home compare list from Medicare.[employer] as she states that her mother’s d/c plan is home with her assistance and [facility].    Selected Continued Care - Discharged on 12/12/2020 Admission date: 12/2/2020 - Discharge disposition: Home-Health Care Svc    Destination    No services have been selected for the patient.              Durable Medical Equipment    No services have been selected for the patient.              Dialysis/Infusion    No services have been selected for the patient.              Home Medical Care Coordination complete    Service Provider Selected Services Address Phone Fax Patient Preferred    [facility] [place]  [facility] [address] [address] [phone] --          Therapy    No services have been selected for the patient.              Community Resources    No services have been selected for the patient.                  Transportation Services  Private: Car    Final Discharge Disposition Code: 06 - home with home health care

## 2020-12-14 NOTE — PAYOR COMM NOTE
"Juan C Malone (85 y.o. Male)     PLEASE SEE ATTACHED DC SUMMARY    REF#[id number]    THANK YOU    JUANITA HARRISON LPN CCP    Date of Birth Social Security Number Address Home Phone MRN    1935  [address] [address] [phone] [id number]    Latter day Marital Status          None        Admission Date Admission Type Admitting Provider Attending Provider Department, Room/Bed    20 Emergency Wilber Logan MD  [address], [address]1    Discharge Date Discharge Disposition Discharge Destination        2020 Home-Health Care Svc              Attending Provider: (none)   Allergies: Latex    Isolation: Enh Drop/Con   Infection: COVID (confirmed) (20)   Code Status: Prior    Ht: 180.3 cm (71\")   Wt: 80 kg (176 lb 5.9 oz)    Admission Cmt: None   Principal Problem: Pneumonia due to COVID-19 virus [U07.1,J12.89]                 Active Insurance as of 2020     Primary Coverage     Payor Plan Insurance Group Employer/Plan Group    AETNA MEDICARE REPLACEMENT AETNA MEDICARE REPLACEMENT [id number]     Payor Plan Address Payor Plan Phone Number Payor Plan Fax Number Effective Dates    [address] [phone]  2019 - None Entered    [facility] [address]       Subscriber Name Subscriber Birth Date Member ID       JUAN C MALONE 1935 [id number]                 Emergency Contacts      (Rel.) Home Phone Work Phone Mobile Phone    Malka Minor (Daughter) [phone] -- [phone]    Donna Chung (Daughter) -- -- [phone]               Discharge Summary      Jonny Mayer MD at 20 1312                                                                             PHYSICIAN DISCHARGE SUMMARY                                                                        [facility]    Patient Identification:  Name: Juan C Malone  Age: 85 y.o.  Sex: male  :  1935  MRN: " [id number]  Primary Care Physician: Katherine Lester APRN    Admit date: 12/2/2020  Discharge date and time: 12/12/2020     Discharged Condition: good    Discharge Diagnoses:  Pneumonia secondary to COVID-19:    Acute respiratory failure with hypoxemia    ALEXANDRU:    Paroxysmal atrial fibrillation:    Chronic diastolic CHF:    Depression:   Mild to moderate orthostasis:    Vertigo:          Hospital Course:  Pleasant 85-year-old gentleman presented with generalized malaise and shortness of air and a history of exposure to Covid 19.  He did test positive for COVID-19.  Chest x-ray did show infiltrates.  Although initially had reasonable room air O2 sat he did develop hypoxemia and his maximal O2 requirement during hospitalization was 5 L nasal cannula.  He was noted to be febrile early on also with a maximal documented temperature 101.6.  He was treated with remdesivir and dexamethasone.  He had a slow recovery but today he is feeling better.  Room air O2 sats are 92% at rest however he does quickly desaturate with exertion however.  Overall he is feeling better also.  Inflammatory parameters are improving nicely.  There is significant issues with depression during hospitalization.  At one point he would keep the room dark all day long and keep the curtains closed would have minimal interaction and no eye contact.  This is a bit improved today.  I expect this to improve markedly once we get him out of the hospital back to the home environment.  He did have elevated BUN creatinine on presentation.  This responded very nicely to IV fluids and now he is maintaining his own without assistance of IV fluids.  Couple days ago he started noting spinning or dizzy feelings when he rolled over in bed.  Low-dose meclizine was started today he states the dizziness is gone.  He has completed his course of remdesivir.  He has 2 more days left of the dexamethasone.  I have been discussing the case with his daughter.  At this point then  "he can be discharged remainder of his treatment follow-up as an outpatient.        Consults:     Consults     Date and Time Order Name Status Description    12/11/2020 1924 Inpatient Psychiatrist Consult Completed           Discharge Exam:  General Appearance:  Comfortable, well-appearing, in no acute distress and not in pain.    Vital signs: (most recent): Blood pressure 119/63, pulse 55, temperature 96.6 °F (35.9 °C), temperature source Oral, resp. rate 20, height 180.3 cm (71\"), weight 80 kg (176 lb 5.9 oz), SpO2 90 %.    Lungs:  Normal effort and normal respiratory rate.  Breath sounds clear to auscultation.  (O2 sats now between 90 and 92 at rest on room air.  When I set him up he does drop to the mid 80s with minimal exertion.)  Heart: Normal rate.  Regular rhythm.    Extremities: There is no dependent edema.    Neurological: Patient is alert and oriented to person, place and time.  (Still with a very flat affect but much more interactive today.  Speech still slow.).    Skin:  Warm and dry.       Disposition:  Home    Patient Instructions:      Discharge Medications      New Medications      Instructions Start Date   dexamethasone 6 MG tablet  Commonly known as: DECADRON   6 mg, Oral, Daily With Breakfast   Start Date: December 13, 2020     meclizine 12.5 MG tablet  Commonly known as: ANTIVERT   12.5 mg, Oral, 3 Times Daily PRN         Changes to Medications      Instructions Start Date   furosemide 40 MG tablet  Commonly known as: LASIX  What changed: how much to take   20 mg, Oral, Daily         Continue These Medications      Instructions Start Date   atorvastatin 40 MG tablet  Commonly known as: LIPITOR   TAKE 1 TABLET EVERY NIGHT      carvedilol 3.125 MG tablet  Commonly known as: COREG   TAKE 1 TABLET TWICE A DAY WITH MEALS      cyanocobalamin 2000 MCG tablet tablet  Commonly known as: VITAMIN B-12   2,000 mcg, Oral, Daily      Eliquis 2.5 MG tablet tablet  Generic drug: apixaban   TAKE 1 TABLET EVERY 12 " HOURS      escitalopram 20 MG tablet  Commonly known as: LEXAPRO   TAKE 1 TABLET DAILY      fenofibrate 145 MG tablet  Commonly known as: TRICOR   TAKE 1 TABLET DAILY      levothyroxine 88 MCG tablet  Commonly known as: SYNTHROID, LEVOTHROID   TAKE 1 TABLET DAILY      omeprazole 40 MG capsule  Commonly known as: priLOSEC   TAKE 1 CAPSULE DAILY      sotalol 80 MG tablet  Commonly known as: BETAPACE   80 mg, Oral, Daily           Diet Instructions     Diet: Regular      Discharge Diet: Regular        Future Appointments   Date Time Provider Department Center   2/8/2021 11:15 AM Katherine Nichole APRN [facility]     Additional Instructions for the Follow-ups that You Need to Schedule     Ambulatory Referral to Home Health   As directed      Face to Face Visit Date: 12/12/2020    Follow-up provider for Plan of Care?: I treated the patient in an acute care facility and will not continue treatment after discharge.    Follow-up provider: KATHERINE NICHOLE [[id number]]    Reason/Clinical Findings: COVID-19 pneumonia.  Hypoxemia, deconditioning.    Describe mobility limitations that make leaving home difficult: Please see above.    Nursing/Therapeutic Services Requested: Skilled Nursing Physical Therapy    Skilled nursing orders: Cardiopulmonary assessments    PT orders: Therapeutic exercise Gait Training Transfer training Strengthening Home safety assessment    Weight Bearing Status: As Tolerated    Frequency: 1 Week 1         Discharge Follow-up with PCP   As directed       Currently Documented PCP:    Katherine Nichole APRN    PCP Phone Number:    [phone]     Follow Up Details: 1 week            Contact information for follow-up providers     Katherine Nichole APRN .    Specialty: Internal Medicine  Why: 1 week  Contact information:  [facility] [place] [address]  [phone]                   Contact information for after-discharge care     Home Medical Care     [facility]  [place] .    Service: Home Health Services  Contact information:  [address]  [facility] [address] [phone]                           Discharge Order (From admission, onward)     Start     Ordered    12/12/20 1312  Discharge patient  Once     Expected Discharge Date: 12/12/20    Discharge Disposition: Home-Health Care [facility]    Physician of Record for Attribution - Please select from Treatment Team: JONNY MAYER [[id number]]    Review needed by CMO to determine Physician of Record: No       Question Answer Comment   Physician of Record for Attribution - Please select from Treatment Team JONNY MAYER    Review needed by CMO to determine Physician of Record No        12/12/20 1311                  Total time spent discharging patient including evaluation,post hospitalization follow up,  medication and post hospitalization instructions and education total time exceeds 30 minutes.    Signed:  Jonny Mayer MD  12/12/2020  13:12 EST    EMR Dragon/Transcription disclaimer:   Much of this encounter note is an electronic transcription/translation of spoken language to printed text. The electronic translation of spoken language may permit erroneous, or at times, nonsensical words or phrases to be inadvertently transcribed; Although I have reviewed the note for such errors, some may still exist.       Electronically signed by Jonny Mayer MD at 12/12/20 9821

## 2020-12-15 ENCOUNTER — READMISSION MANAGEMENT (OUTPATIENT)
Dept: CALL CENTER | Facility: HOSPITAL | Age: 85
End: 2020-12-15

## 2020-12-15 NOTE — OUTREACH NOTE
COVID-19 Week 1 Survey      Responses   [facility] patient discharged from?  [place]   Does the patient have one of the following disease processes/diagnoses(primary or secondary)?  COVID-19   COVID-19 underlying condition?  None   Call Number  Call 3   Week 1 Call successful?  No   Discharge diagnosis  DESHAUN Heard RN

## 2020-12-16 ENCOUNTER — TELEPHONE (OUTPATIENT)
Dept: INTERNAL MEDICINE | Age: 85
End: 2020-12-16

## 2020-12-16 RX ORDER — SOTALOL HYDROCHLORIDE 80 MG/1
TABLET ORAL
Qty: 90 TABLET | Refills: 3 | Status: SHIPPED | OUTPATIENT
Start: 2020-12-16 | End: 2021-12-20

## 2020-12-16 NOTE — TELEPHONE ENCOUNTER
The patient's blood pressure sounds like it may be on the lower side of acceptability but given that he is largely asymptomatic I would suggest that he for now continue treatment as prescribed but I would bring this to Katherine's attention tomorrow when she returns to the office

## 2020-12-16 NOTE — TELEPHONE ENCOUNTER
[facility] CALLED  PTS DAUGHTER REPORTED BPS OF 70S AND 50S   [facility] CHECKED AND HE /70 AND HE IS ASYMPTOMATIC.  PT SAID HE GETS DIZZY SOMETIMES AT NIGHT.   [facility] IS NOT SURE THE PTS WRIST CUFF IS GOOD HE SUGGESTED HE USES THE WIFES  ARM ONE. AND THE DAUGHTER WOULD LIKE US TO CALL HER IF WE WANT TO MAKE ANY ADJUSTMENTS.  Home health is [facility] [phone]  Daughter is  Reina [phone]

## 2020-12-16 NOTE — TELEPHONE ENCOUNTER
Spoke with Reina instructed to continue all meds monitor bp and hr and we will call wayne with further instructions on if pt needs med changes or what to do for low bp and dizziness

## 2020-12-17 NOTE — TELEPHONE ENCOUNTER
Contact daughter.     It looks like his furosemide dose was decreased at discharge to 20mg.   I would recommend she contact his cardiologist to see if they want to either reduce or discontinue one of his beta blockers (sotalol or carvedilol) due to his BP running low at home.

## 2020-12-21 ENCOUNTER — READMISSION MANAGEMENT (OUTPATIENT)
Dept: CALL CENTER | Facility: HOSPITAL | Age: 85
End: 2020-12-21

## 2020-12-21 NOTE — OUTREACH NOTE
COVID-19 Week 2 Survey      Responses   [facility] patient discharged from?  [place]   Does the patient have one of the following disease processes/diagnoses(primary or secondary)?  COVID-19   COVID-19 underlying condition?  None   Call Number  Call 1   COVID-19 Week 2: Call 1 attempt successful?  Yes   Call start time  1234   Call end time  1240   General alerts for this patient  Please call Malka as patient doesn't hear his phone.   Discharge diagnosis  COVID    Is patient permission given to speak with other caregiver?  Yes   List who call center can speak with  Daughter   Person spoke with today (if not patient) and relationship  Malka-daughter   Is the patient taking all medications as directed (includes completed medication regime)?  Yes   Has the patient kept scheduled appointments due by today?  N/A   DME comments  off oxygen    Psychosocial issues?  No   Pulse Ox monitoring  Intermittent   Pulse Ox device source  Patient   O2 Sat comments  95% on RA    O2 Sat: education provided  Sat levels, When to seek care   O2 Sat education comments  keep sats above 92%, come to ED if you find they are staying below 90%   Is the patient/caregiver able to teach back the hierarchy of who to call/visit for symptoms/problems? PCP, Specialist, Home health nurse, Urgent Care, ED, 911  Yes   Wrap up additional comments  Daughter tested + for COVID on 12/17          Leigh Heard RN

## 2020-12-29 ENCOUNTER — READMISSION MANAGEMENT (OUTPATIENT)
Dept: CALL CENTER | Facility: HOSPITAL | Age: 85
End: 2020-12-29

## 2020-12-29 NOTE — OUTREACH NOTE
COVID-19 Week 3 Survey      Responses   [facility] patient discharged from?  [place]   Does the patient have one of the following disease processes/diagnoses(primary or secondary)?  COVID-19   COVID-19 underlying condition?  None   Call Number  Call 1   COVID-19 Week 3: Call 1 attempt successful?  Yes   Call start time  1119   Call end time  1120   General alerts for this patient  Please call Malka as patient doesn't hear his phone.   Discharge diagnosis  COVID    Person spoke with today (if not patient) and relationship  Malka-daughter   Meds reviewed with patient/caregiver?  Yes   Is the patient taking all medications as directed (includes completed medication regime)?  Yes   Has the patient kept scheduled appointments due by today?  N/A   What is the Home health agency?   [facility]   What is the patient's perception of their health status since discharge?  Improving   Does the patient have any of the following symptoms?  None   Nursing Interventions  Nurse provided patient education   Pulse Ox monitoring  Intermittent   Pulse Ox device source  Patient   O2 Sat comments  93% this morning on RA    Is the patient/caregiver able to teach back steps to recovery at home?  Rest and rebuild strength, gradually increase activity, Eat a well-balance diet   COVID-19 call completed?  Yes          Noreen Hernandez RN

## 2021-01-05 ENCOUNTER — READMISSION MANAGEMENT (OUTPATIENT)
Dept: CALL CENTER | Facility: HOSPITAL | Age: 86
End: 2021-01-05

## 2021-01-05 NOTE — OUTREACH NOTE
COVID-19 Week 4 Survey      Responses   [facility] patient discharged from?  [place]   Does the patient have one of the following disease processes/diagnoses(primary or secondary)?  COVID-19   COVID-19 underlying condition?  None   Call Number  Call 1   COVID-19 Week 4: Call 1 attempt successful?  Yes   Call start time  1546   Call end time  1551   Discharge diagnosis  COVID    Is patient permission given to speak with other caregiver?  Yes   Person spoke with today (if not patient) and relationship  Donna Trivedi reviewed with patient/caregiver?  Yes   Is the patient having any side effects they believe may be caused by any medication additions or changes?  No   Does the patient have all medications ordered at discharge?  Yes   Is the patient taking all medications as directed (includes completed medication regime)?  Yes   Has the patient kept scheduled appointments due by today?  N/A   Is the patient interested in additional calls from an ambulatory ?  NOTE:  applies to high risk patients requiring additional follow-up.  Yes   Did the patient feel the follow up calls were helpful during their recovery period?  Yes   Was the number of calls appropriate?  Yes   Interested in COVID-19 Plasma Donation?  Uncertain   Wrap up additional comments  Donna says he is SOA.          Francine Carmona, RN

## 2021-01-06 ENCOUNTER — PATIENT OUTREACH (OUTPATIENT)
Dept: CASE MANAGEMENT | Facility: OTHER | Age: 86
End: 2021-01-06

## 2021-01-06 ENCOUNTER — EPISODE CHANGES (OUTPATIENT)
Dept: CASE MANAGEMENT | Facility: OTHER | Age: 86
End: 2021-01-06

## 2021-01-06 NOTE — OUTREACH NOTE
Patient Outreach Note    Spoke with patient's daughter, mp. Patient is doing much better. No needs or concerns at this time. Daughter agreeable to a follow up call in 2 weeks for further assessment of needs or concerns. Follow up scheduled.     Tonya Dodson RN  Ambulatory     1/6/2021, 15:44 EST

## 2021-01-20 ENCOUNTER — PATIENT OUTREACH (OUTPATIENT)
Dept: CASE MANAGEMENT | Facility: OTHER | Age: 86
End: 2021-01-20

## 2021-01-20 NOTE — OUTREACH NOTE
Patient Outreach Note    Spoke with patient's daughter briefly regarding patient's health and wellness. Introduced self, explained ACM RN role and provided contact information.  Patient is reported to be getting stronger and better every day. No problems or concerns at this time. ACM reviewed patient's BP and daily weight. Daughter states there were no problems with either but did not provide weight or B/P to review. AWV reviewed with daughter. Daughter states patient has an appointment scheduled for 2/8 and declines need for AWV information. Patient transitioned to monitoring at this time.     Tonya Dodson RN  Ambulatory     1/20/2021, 13:57 EST

## 2021-02-08 ENCOUNTER — HOSPITAL ENCOUNTER (OUTPATIENT)
Dept: CT IMAGING | Facility: HOSPITAL | Age: 86
Discharge: HOME | End: 2021-02-08
Admitting: NURSE PRACTITIONER

## 2021-02-08 ENCOUNTER — OFFICE VISIT (OUTPATIENT)
Dept: INTERNAL MEDICINE | Age: 86
End: 2021-02-08

## 2021-02-08 VITALS
HEART RATE: 78 BPM | TEMPERATURE: 97.1 F | HEIGHT: 71 IN | SYSTOLIC BLOOD PRESSURE: 122 MMHG | WEIGHT: 182 LBS | OXYGEN SATURATION: 98 % | DIASTOLIC BLOOD PRESSURE: 74 MMHG | BODY MASS INDEX: 25.48 KG/M2

## 2021-02-08 DIAGNOSIS — I71.21 ASCENDING AORTIC ANEURYSM (HCC): ICD-10-CM

## 2021-02-08 DIAGNOSIS — I48.0 PAROXYSMAL ATRIAL FIBRILLATION (HCC): ICD-10-CM

## 2021-02-08 DIAGNOSIS — I10 HTN (HYPERTENSION), BENIGN: Primary | ICD-10-CM

## 2021-02-08 DIAGNOSIS — E03.4 HYPOTHYROIDISM DUE TO ACQUIRED ATROPHY OF THYROID: ICD-10-CM

## 2021-02-08 DIAGNOSIS — E78.2 MIXED HYPERLIPIDEMIA: ICD-10-CM

## 2021-02-08 PROCEDURE — 71250 CT THORAX DX C-: CPT

## 2021-02-08 PROCEDURE — 99214 OFFICE O/P EST MOD 30 MIN: CPT | Performed by: NURSE PRACTITIONER

## 2021-02-08 RX ORDER — DEXAMETHASONE 4 MG/1
TABLET ORAL
COMMUNITY
Start: 2020-12-14 | End: 2021-02-08

## 2021-02-08 NOTE — PROGRESS NOTES
"Chief Complaint  Hypertension and Hyperlipidemia    Subjective          Bhaskar Ibarra presents to [facility] for   Hypertension  This is a chronic problem. The problem is controlled. Pertinent negatives include no chest pain, peripheral edema or shortness of breath. Current antihypertension treatment includes beta blockers and diuretics. There are no compliance problems.    Hyperlipidemia  This is a chronic problem. The problem is controlled. Exacerbating diseases include hypothyroidism. Pertinent negatives include no chest pain or shortness of breath. Current antihyperlipidemic treatment includes statins. There are no compliance problems.  Risk factors for coronary artery disease include hypertension and dyslipidemia.   Hypothyroidism  This is a chronic problem. The problem has been unchanged. Pertinent negatives include no chest pain.     Patient recently hospitalized for 2 weeks in December for complications from COVID pneumonia. Reports feeling better at this time.   Wife recently placed into home hospice care for progressive dementia.     Objective   Vital Signs:   /74   Pulse 78   Temp 97.1 °F (36.2 °C) (Temporal)   Ht 180.3 cm (70.98\")   Wt 82.6 kg (182 lb)   SpO2 98%   BMI 25.40 kg/m²     Physical Exam  Vitals signs and nursing note reviewed.   Constitutional:       General: He is not in acute distress.     Appearance: He is well-developed. He is not ill-appearing.   Cardiovascular:      Rate and Rhythm: Normal rate and regular rhythm.      Heart sounds: Normal heart sounds, S1 normal and S2 normal. No murmur.   Pulmonary:      Effort: Pulmonary effort is normal.      Breath sounds: Normal breath sounds. No decreased breath sounds, wheezing, rhonchi or rales.   Skin:     General: Skin is warm and dry.   Neurological:      Mental Status: He is alert and oriented to person, place, and time.   Psychiatric:         Speech: Speech normal.         Behavior: Behavior " normal. Behavior is cooperative.         Thought Content: Thought content normal.         Judgment: Judgment normal.        Result Review :   The following data was reviewed by: GEOFF Becker on 02/08/2021:  Common labs    Common Labsle 12/10/20 12/10/20 12/10/20 12/11/20 12/11/20 12/12/20    0720 0720 0720 0641 [id number]    BUN   26 (A) 32 (A)  30 (A)   Creatinine   0.98 1.14  0.99   eGFR Non African Am   73 61  72   Sodium   135 (A) 141  140   Potassium   3.8 4.1  4.4   Chloride   104 108 (A)  109 (A)   Calcium   8.1 (A) 8.2 (A)  8.2 (A)   Albumin  3.10 (A)   2.70 (A)    Total Bilirubin  0.6   0.6    Alkaline Phosphatase  74   81    AST (SGOT)  35   32    ALT (SGPT)  33   35    WBC 9.54        Hemoglobin 12.8 (A)        Hematocrit 37.0 (A)        Platelets 311        (A) Abnormal value            Data reviewed: Recent hospitalization notes December 2020, COVID [facility]          Assessment and Plan    Problem List Items Addressed This Visit        Cardiac and Vasculature    Paroxysmal atrial fibrillation (CMS/HCC)    Hyperlipidemia    Relevant Orders    Comprehensive Metabolic Panel    Lipid Panel With / Chol / HDL Ratio    HTN (hypertension), benign - Primary    Relevant Orders    Comprehensive Metabolic Panel       Endocrine and Metabolic    Hypothyroidism        1. HTN (hypertension), benign  Blood pressure stable on current therapy, continue same.  Check labs today and adjust dosage of medication as necessary.  Follow-up 6 months.    - Comprehensive Metabolic Panel    2. Mixed hyperlipidemia  Check fasting lipid panel today.  Adjustment of medication as necessary.  Continue to follow and improve on low-fat, low carbohydrate diet.     - Comprehensive Metabolic Panel  - Lipid Panel With / Chol / HDL Ratio    3. Hypothyroidism due to acquired atrophy of thyroid  Lab Results   Component Value Date    TSH 1.250 12/11/2020         4. Paroxysmal atrial fibrillation (CMS/HCC)    Follow Up   Return in about 6 months  (around 8/8/2021) for Next scheduled follow up.  Patient was given instructions and counseling regarding his condition or for health maintenance advice. Please see specific information pulled into the AVS if appropriate.

## 2021-02-09 LAB
ALBUMIN SERPL-MCNC: 3.7 G/DL (ref 3.5–5.2)
ALBUMIN/GLOB SERPL: 1.1 G/DL
ALP SERPL-CCNC: 72 U/L (ref 39–117)
ALT SERPL-CCNC: 21 U/L (ref 1–41)
AST SERPL-CCNC: 30 U/L (ref 1–40)
BILIRUB SERPL-MCNC: 0.3 MG/DL (ref 0–1.2)
BUN SERPL-MCNC: 20 MG/DL (ref 8–23)
BUN/CREAT SERPL: 14 (ref 7–25)
CALCIUM SERPL-MCNC: 9.2 MG/DL (ref 8.6–10.5)
CHLORIDE SERPL-SCNC: 105 MMOL/L (ref 98–107)
CHOLEST SERPL-MCNC: 159 MG/DL (ref 0–200)
CHOLEST/HDLC SERPL: 5.3 {RATIO}
CO2 SERPL-SCNC: 26.1 MMOL/L (ref 22–29)
CREAT SERPL-MCNC: 1.43 MG/DL (ref 0.76–1.27)
GLOBULIN SER CALC-MCNC: 3.3 GM/DL
GLUCOSE SERPL-MCNC: 134 MG/DL (ref 65–99)
HDLC SERPL-MCNC: 30 MG/DL (ref 40–60)
LDLC SERPL CALC-MCNC: 97 MG/DL (ref 0–100)
POTASSIUM SERPL-SCNC: 4.7 MMOL/L (ref 3.5–5.2)
PROT SERPL-MCNC: 7 G/DL (ref 6–8.5)
SODIUM SERPL-SCNC: 139 MMOL/L (ref 136–145)
TRIGL SERPL-MCNC: 181 MG/DL (ref 0–150)
VLDLC SERPL CALC-MCNC: 32 MG/DL (ref 5–40)

## 2021-02-23 ENCOUNTER — TELEPHONE (OUTPATIENT)
Dept: INTERNAL MEDICINE | Age: 86
End: 2021-02-23

## 2021-02-23 ENCOUNTER — OFFICE VISIT (OUTPATIENT)
Dept: SURGERY | Facility: CLINIC | Age: 86
End: 2021-02-23

## 2021-02-23 DIAGNOSIS — R91.1 LUNG NODULE: ICD-10-CM

## 2021-02-23 DIAGNOSIS — C34.91 ADENOCARCINOMA OF RIGHT LUNG (HCC): Primary | ICD-10-CM

## 2021-02-23 PROCEDURE — 99441 PR PHYS/QHP TELEPHONE EVALUATION 5-10 MIN: CPT | Performed by: THORACIC SURGERY (CARDIOTHORACIC VASCULAR SURGERY)

## 2021-02-23 NOTE — TELEPHONE ENCOUNTER
DAUGHTER CECIL REQUESTED A CALL  TO DISCUSS WHEN IT IS SAFE FOR JUAN C TO RECEIVE HIS COVID VACCINATION. HE WAS HOSPITALIZED IN December AND WAS GIVEN REMDESIVIR.    PLEASE ADVISE [phone]

## 2021-02-23 NOTE — PROGRESS NOTES
Subjective   Patient ID: Bhaskar Ibarra is a 85 y.o. male is here today for follow-up.    History of Present Illness  Dear Colleague,  Bhaskar Ibarra was contacted by telephone today February 23, 2021 and consented to a telemedicine visit.  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  Treatments were in April 2017.  We have been doing serial CT scans.  Scan in July showed a new nodule in the right lower lobe near the previous area treated with stereotactic radiation.  He reports no new problems.  He has no cough or hemoptysis.  He has no hoarseness or change in his voice.  He has had no pleuritic pain.  He does have some shortness of breath with exertion but this is unchanged.    The following portions of the patient's history were reviewed and updated as appropriate: allergies, current medications, past family history, past medical history, past social history, past surgical history and problem list.  Review of Systems   Constitution: Negative.   HENT: Negative.    Eyes: Negative.    Cardiovascular: Negative.    Respiratory: Positive for shortness of breath.    Endocrine: Negative.    Hematologic/Lymphatic: Negative.    Skin: Negative.    Musculoskeletal: Negative.    Gastrointestinal: Negative.    Genitourinary: Negative.    Neurological: Negative.    Psychiatric/Behavioral: Negative.      Patient Active Problem List   Diagnosis   • Coronary arteriosclerosis in native artery   • Paroxysmal atrial fibrillation (CMS/HCC)   • Acute non-Q wave ST elevation myocardial infarction (STEMI) involving left anterior descending (LAD) coronary artery   • Mitral valve insufficiency   • S/P CABG (coronary artery bypass graft)   • S/P mitral valve repair   • Depression   • Hypothyroidism   • Hyperlipidemia   • Pulmonary nodule   • Adenocarcinoma of right lung (CMS/HCC)   • Benign nodular prostatic hyperplasia with lower urinary tract symptoms   •  Confusion   • Post concussion syndrome   • Vitamin D deficiency   • Well adult health check   • Impaired fasting glucose   • AAA (abdominal aortic aneurysm) (CMS/HCC)   • Ascending aortic aneurysm (CMS/HCC)   • Macrocytic anemia   • Other heart failure (CMS/HCC)   • HTN (hypertension), benign   • Pneumonia due to COVID-19 virus   • ALEXANDRU (acute kidney injury) (CMS/HCC)   • Chronic diastolic CHF (congestive heart failure) (CMS/HCC)   • Acute respiratory failure with hypoxia (CMS/HCC)     Past Medical History:   Diagnosis Date   • Abdominal aortic aneurysm (CMS/HCC)    • Acute myocardial infarction (CMS/HCC)    • Acute renal failure (CMS/HCC)    • Anemia    • Aneurysm (CMS/HCC)    • Atrial fibrillation (CMS/HCC)    • Cardiomyopathy (CMS/HCC)    • Chest pain    • Coronary artery disease     July 2015-    • Depression    • GERD (gastroesophageal reflux disease)    • Hyperlipidemia    • Hypertension    • Hypothyroidism    • Lung cancer (CMS/HCC) 2016    radiation    • Mitral regurgitation    • Myocardial infarction (CMS/HCC)    • Pleural effusion    • Pleural effusion, bilateral    • RLS (restless legs syndrome)    • Sleep apnea    • Ulcerative colitis (CMS/HCC)    • Ulcerative colitis (CMS/HCC)      Past Surgical History:   Procedure Laterality Date   • BACK SURGERY      lumbar   • CARDIAC CATHETERIZATION     • CORONARY ANGIOPLASTY     • CORONARY ARTERY BYPASS GRAFT  07/27/2015    X 5  Dr Louis   • HERNIA REPAIR      inguinal - left   • MITRAL VALVE REPAIR/REPLACEMENT  07/27/2015    Dr Louis   • NASAL SEPTUM SURGERY      benign growth removed   • OTHER SURGICAL HISTORY      Cardiovasc Endosc W/ Video-Assist Harv Veins Coron Art Byp   • PROSTATE SURGERY     • SKIN BIOPSY      benign     Family History   Problem Relation Age of Onset   • Coronary artery disease Brother    • Heart attack Brother    • Cancer Mother    • Lung cancer Father      Social History     Socioeconomic History   • Marital status:      Spouse  name: Not on file   • Number of children: Not on file   • Years of education: Not on file   • Highest education level: Not on file   Tobacco Use   • Smoking status: Former Smoker     Years: 5.00     Types: Cigarettes   • Smokeless tobacco: Never Used   • Tobacco comment: 1 cig day x 5 years   Substance and Sexual Activity   • Alcohol use: No   • Drug use: No   • Sexual activity: Defer       Current Outpatient Medications:   •  atorvastatin (LIPITOR) 40 MG tablet, TAKE 1 TABLET EVERY NIGHT, Disp: 90 tablet, Rfl: 3  •  carvedilol (COREG) 3.125 MG tablet, TAKE 1 TABLET TWICE A DAY WITH MEALS, Disp: 180 tablet, Rfl: 3  •  cyanocobalamin (VITAMIN B-12) 2000 MCG tablet tablet, Take 1 tablet by mouth Daily., Disp: 30 tablet, Rfl:   •  Eliquis 2.5 MG tablet tablet, TAKE 1 TABLET EVERY 12 HOURS, Disp: 180 tablet, Rfl: 1  •  escitalopram (LEXAPRO) 20 MG tablet, TAKE 1 TABLET DAILY, Disp: 90 tablet, Rfl: 3  •  fenofibrate (TRICOR) 145 MG tablet, TAKE 1 TABLET DAILY, Disp: 90 tablet, Rfl: 3  •  furosemide (LASIX) 40 MG tablet, Take 0.5 tablets by mouth Daily., Disp: 90 tablet, Rfl: 3  •  levothyroxine (SYNTHROID, LEVOTHROID) 88 MCG tablet, TAKE 1 TABLET DAILY, Disp: 90 tablet, Rfl: 3  •  meclizine (ANTIVERT) 12.5 MG tablet, Take 1 tablet by mouth 3 (Three) Times a Day As Needed for Dizziness., Disp: 18 tablet, Rfl: 0  •  omeprazole (priLOSEC) 40 MG capsule, TAKE 1 CAPSULE DAILY, Disp: 90 capsule, Rfl: 3  •  sotalol (BETAPACE) 80 MG tablet, TAKE 1 TABLET DAILY, Disp: 90 tablet, Rfl: 3  Allergies   Allergen Reactions   • Latex Itching        Objective   There were no vitals filed for this visit.  Physical Exam     Telemedicine visit    Independent Review of Radiographic Studies:    CT scan of the chest performed February 8, 2021 was independently reviewed and compared to previous scans.  On the scan in July the right lower lobe nodule measured 17 x 27 mm.  This had groundglass opacity in addition to a nodular component.  This  area now measures 21 x 32 mm.  The groundglass opacity appears to have enlarged as well.  No other infiltrates or nodules.  No pleural effusion.      Assessment/Plan   Assessment:  Concerned that there may be recurrence of the adenocarcinoma.  Have recommended a CT PET scan to further characterize this area.  Once the CT PET scan is completed his case will be discussed in our multidisciplinary thoracic oncology conference.    Plan:  CT PET scan to evaluate right lower lobe nodule.  Case to be presented at the multidisciplinary thoracic oncology conference.  I will keep you informed of his progress.  Thank you for allowing us to participate in the care of Mr. Ibarra.      I spent 8 minutes talking with Mr. Ibarra by telephone today.    Diagnoses and all orders for this visit:    Adenocarcinoma of right lung (CMS/HCC)  -     NM Pet Skull Base To Mid Thigh; Future    Lung nodule  -     NM Pet Skull Base To Mid Thigh; Future

## 2021-02-24 ENCOUNTER — TELEPHONE (OUTPATIENT)
Dept: INTERNAL MEDICINE | Age: 86
End: 2021-02-24

## 2021-02-24 NOTE — TELEPHONE ENCOUNTER
PATIENT WAS GIVEN OXYGEN AFTER A HOSPITAL STAY FOR COVID. THE PATIENT NO LONGER NEEDS THE OXYGEN AND WOULD LIKE FOR IT TO BE PICKED UP, HOWEVER, [facility] WILL NOT PICK IT UP UNLESS HIS PCP GIVES THE ORDER TO [facility] SO.    PLEASE ADVISE  [phone]

## 2021-03-02 ENCOUNTER — HOSPITAL ENCOUNTER (OUTPATIENT)
Dept: PET IMAGING | Facility: HOSPITAL | Age: 86
Discharge: HOME | End: 2021-03-02

## 2021-03-02 DIAGNOSIS — C34.91 ADENOCARCINOMA OF RIGHT LUNG (HCC): ICD-10-CM

## 2021-03-02 DIAGNOSIS — R91.1 LUNG NODULE: ICD-10-CM

## 2021-03-02 LAB — GLUCOSE BLDC GLUCOMTR-MCNC: 123 MG/DL (ref 70–130)

## 2021-03-02 PROCEDURE — 78815 PET IMAGE W/CT SKULL-THIGH: CPT

## 2021-03-02 PROCEDURE — 0 FLUDEOXYGLUCOSE F18 SOLUTION: Performed by: THORACIC SURGERY (CARDIOTHORACIC VASCULAR SURGERY)

## 2021-03-02 PROCEDURE — 82962 GLUCOSE BLOOD TEST: CPT

## 2021-03-02 PROCEDURE — A9552 F18 FDG: HCPCS | Performed by: THORACIC SURGERY (CARDIOTHORACIC VASCULAR SURGERY)

## 2021-03-02 RX ADMIN — FLUDEOXYGLUCOSE F18 1 DOSE: 300 INJECTION INTRAVENOUS at 09:17

## 2021-03-04 ENCOUNTER — OFFICE VISIT (OUTPATIENT)
Dept: OTHER | Facility: HOSPITAL | Age: 86
End: 2021-03-04

## 2021-03-04 DIAGNOSIS — K11.1 PAROTID GLAND ENLARGEMENT: ICD-10-CM

## 2021-03-04 DIAGNOSIS — C34.91 ADENOCARCINOMA OF RIGHT LUNG (HCC): Primary | ICD-10-CM

## 2021-03-04 DIAGNOSIS — Z48.3 AFTERCARE FOLLOWING SURGERY FOR NEOPLASM: ICD-10-CM

## 2021-03-04 PROCEDURE — G0463 HOSPITAL OUTPT CLINIC VISIT: HCPCS

## 2021-03-04 PROCEDURE — 99214 OFFICE O/P EST MOD 30 MIN: CPT | Performed by: THORACIC SURGERY (CARDIOTHORACIC VASCULAR SURGERY)

## 2021-03-04 RX ORDER — ALBUTEROL SULFATE 90 UG/1
1 AEROSOL, METERED RESPIRATORY (INHALATION) EVERY 6 HOURS PRN
Qty: 108 G | Refills: 1 | Status: SHIPPED | OUTPATIENT
Start: 2021-03-04

## 2021-03-04 NOTE — PROGRESS NOTES
Subjective   Patient ID: Bhaskar Ibarra is a 85 y.o. male is here today for follow-up.    History of Present Illness  Dear Colleague,  Bhaskar Ibarra was seen in the lung care center at [facility] today March 4, 2021 as part of her multidisciplinary thoracic oncology clinic.  Since his last visit he is undergone a CT PET scan to evaluate nodular enlargement of the right lower lobe area that has been irradiated for carcinoma.  His daughter has noticed some increase in shortness of breath.  He has no wheezing.  He has no cough.  He has no hemoptysis.  He is also noticed swelling in the left mandible area.  He was seen by his dentist and was placed on 2 weeks of antibiotics.  He is finished the antibiotics but the swelling persists.  There is no tenderness.  He is having no difficulty chewing his food.  CT PET scan did show increased hypermetabolism in the left parotid gland but no mass lesion.    The following portions of the patient's history were reviewed and updated as appropriate: allergies, current medications, past family history, past medical history, past social history, past surgical history and problem list.  Review of Systems   Constitution: Negative.   HENT: Negative.    Eyes: Negative.    Cardiovascular: Negative.    Respiratory: Positive for shortness of breath.    Endocrine: Negative.    Hematologic/Lymphatic: Negative.    Skin: Negative.    Musculoskeletal: Negative.    Gastrointestinal: Negative.    Genitourinary: Negative.    Neurological: Negative.    Psychiatric/Behavioral: Negative.    All other systems reviewed and are negative.    Patient Active Problem List   Diagnosis   • Coronary arteriosclerosis in native artery   • Paroxysmal atrial fibrillation (CMS/HCC)   • Acute non-Q wave ST elevation myocardial infarction (STEMI) involving left anterior descending (LAD) coronary artery   • Mitral valve insufficiency   • S/P CABG (coronary artery bypass graft)   • S/P mitral valve  repair   • Depression   • Hypothyroidism   • Hyperlipidemia   • Pulmonary nodule   • Adenocarcinoma of right lung (CMS/HCC)   • Benign nodular prostatic hyperplasia with lower urinary tract symptoms   • Confusion   • Post concussion syndrome   • Vitamin D deficiency   • Well adult health check   • Impaired fasting glucose   • AAA (abdominal aortic aneurysm) (CMS/HCC)   • Ascending aortic aneurysm (CMS/HCC)   • Macrocytic anemia   • Other heart failure (CMS/HCC)   • HTN (hypertension), benign   • Pneumonia due to COVID-19 virus   • ALEXANDRU (acute kidney injury) (CMS/HCC)   • Chronic diastolic CHF (congestive heart failure) (CMS/HCC)   • Acute respiratory failure with hypoxia (CMS/HCC)     Past Medical History:   Diagnosis Date   • Abdominal aortic aneurysm (CMS/HCC)    • Acute myocardial infarction (CMS/HCC)    • Acute renal failure (CMS/HCC)    • Anemia    • Aneurysm (CMS/HCC)    • Atrial fibrillation (CMS/HCC)    • Cardiomyopathy (CMS/HCC)    • Chest pain    • Coronary artery disease     July 2015-    • Depression    • GERD (gastroesophageal reflux disease)    • Hyperlipidemia    • Hypertension    • Hypothyroidism    • Lung cancer (CMS/HCC) 2016    radiation    • Mitral regurgitation    • Myocardial infarction (CMS/HCC)    • Pleural effusion    • Pleural effusion, bilateral    • RLS (restless legs syndrome)    • Sleep apnea    • Ulcerative colitis (CMS/HCC)    • Ulcerative colitis (CMS/HCC)      Past Surgical History:   Procedure Laterality Date   • BACK SURGERY      lumbar   • CARDIAC CATHETERIZATION     • CORONARY ANGIOPLASTY     • CORONARY ARTERY BYPASS GRAFT  07/27/2015    X 5  Dr Louis   • HERNIA REPAIR      inguinal - left   • MITRAL VALVE REPAIR/REPLACEMENT  07/27/2015    Dr Louis   • NASAL SEPTUM SURGERY      benign growth removed   • OTHER SURGICAL HISTORY      Cardiovasc Endosc W/ Video-Assist Harv Veins Coron Art Byp   • PROSTATE SURGERY     • SKIN BIOPSY      benign     Family History   Problem Relation  Age of Onset   • Coronary artery disease Brother    • Heart attack Brother    • Cancer Mother    • Lung cancer Father      Social History     Socioeconomic History   • Marital status:      Spouse name: Not on file   • Number of children: Not on file   • Years of education: Not on file   • Highest education level: Not on file   Tobacco Use   • Smoking status: Former Smoker     Years: 5.00     Types: Cigarettes   • Smokeless tobacco: Never Used   • Tobacco comment: 1 cig day x 5 years   Substance and Sexual Activity   • Alcohol use: No   • Drug use: No   • Sexual activity: Defer       Current Outpatient Medications:   •  atorvastatin (LIPITOR) 40 MG tablet, TAKE 1 TABLET EVERY NIGHT, Disp: 90 tablet, Rfl: 3  •  carvedilol (COREG) 3.125 MG tablet, TAKE 1 TABLET TWICE A DAY WITH MEALS, Disp: 180 tablet, Rfl: 3  •  cyanocobalamin (VITAMIN B-12) 2000 MCG tablet tablet, Take 1 tablet by mouth Daily., Disp: 30 tablet, Rfl:   •  Eliquis 2.5 MG tablet tablet, TAKE 1 TABLET EVERY 12 HOURS, Disp: 180 tablet, Rfl: 1  •  escitalopram (LEXAPRO) 20 MG tablet, TAKE 1 TABLET DAILY, Disp: 90 tablet, Rfl: 3  •  fenofibrate (TRICOR) 145 MG tablet, TAKE 1 TABLET DAILY, Disp: 90 tablet, Rfl: 3  •  furosemide (LASIX) 40 MG tablet, Take 0.5 tablets by mouth Daily., Disp: 90 tablet, Rfl: 3  •  levothyroxine (SYNTHROID, LEVOTHROID) 88 MCG tablet, TAKE 1 TABLET DAILY, Disp: 90 tablet, Rfl: 3  •  meclizine (ANTIVERT) 12.5 MG tablet, Take 1 tablet by mouth 3 (Three) Times a Day As Needed for Dizziness., Disp: 18 tablet, Rfl: 0  •  omeprazole (priLOSEC) 40 MG capsule, TAKE 1 CAPSULE DAILY, Disp: 90 capsule, Rfl: 3  •  sotalol (BETAPACE) 80 MG tablet, TAKE 1 TABLET DAILY, Disp: 90 tablet, Rfl: 3  •  albuterol sulfate  (90 Base) MCG/ACT inhaler, Inhale 1 puff Every 6 (Six) Hours As Needed for Shortness of Air., Disp: 108 g, Rfl: 1  Allergies   Allergen Reactions   • Latex Itching        Objective   There were no vitals filed for this  visit.  Physical Exam  Constitutional:       Appearance: Normal appearance. He is well-developed.   HENT:      Head: Normocephalic.   Eyes:      General: Lids are normal.      Conjunctiva/sclera: Conjunctivae normal.      Pupils: Pupils are equal, round, and reactive to light.   Neck:      Musculoskeletal: Normal range of motion and neck supple.      Thyroid: No thyroid mass or thyromegaly.      Vascular: No carotid bruit, hepatojugular reflux or JVD.      Trachea: Trachea normal.      Comments: There is fullness in the left jaw over the mandible.  No tenderness.  Full range of motion to the mandible.  Cardiovascular:      Rate and Rhythm: Normal rate and regular rhythm.  No extrasystoles are present.     Chest Wall: PMI is not displaced.      Pulses: Normal pulses.      Heart sounds: Normal heart sounds, S1 normal and S2 normal.   Pulmonary:      Effort: Pulmonary effort is normal.      Breath sounds: Examination of the right-lower field reveals decreased breath sounds. Decreased breath sounds present. No wheezing.   Abdominal:      General: Bowel sounds are normal.      Palpations: Abdomen is soft. There is no mass.      Tenderness: There is no abdominal tenderness.      Hernia: No hernia is present.   Musculoskeletal: Normal range of motion.   Skin:     General: Skin is warm and dry.   Neurological:      Mental Status: He is alert and oriented to person, place, and time.      Cranial Nerves: No cranial nerve deficit.      Sensory: No sensory deficit.      Deep Tendon Reflexes: Reflexes are normal and symmetric.   Psychiatric:         Speech: Speech normal.         Behavior: Behavior normal.         Thought Content: Thought content normal.         Judgment: Judgment normal.       Independent Review of Radiographic Studies:    CT PET scan performed March 2, 2021 was independently reviewed.  The area of scarring in the right lower lobe has blood pool level activity.  No suspicious hypermetabolic activity within the  chest.  Increased uptake between the right aspect of the C1 and C2 is most likely degenerative change.  There is asymmetric hypermetabolic activity of the left parotid with no mass lesion identified.    Assessment/Plan   Assessment:  Reassuring that there is no uptake in the area of the right lower lobe to suggest recurrent cancer.  Shortness of breath and occasional cough may be related to the after effects of the radiation therapy.  We will try rescue inhaler to see if this improves his shortness of breath.  Since the parotid gland has not improved with 2 weeks of antibiotics I believe that he should be evaluated by ENT.  We will continue our surveillance in 4 months with a repeat CT of the chest.    Plan:  Rescue inhaler for shortness of breath.  Referred to ENT for evaluation of left parotid swelling.  Return to clinic in 4 months.  CT of the chest without contrast in 4 months.  I will keep you informed of his progress.  Thank you for allowing us to participate in the care of Mr. Ibarra.    Diagnoses and all orders for this visit:    Adenocarcinoma of right lung (CMS/HCC)  -     CT Chest Without Contrast; Future    Aftercare following surgery for neoplasm  -     CT Chest Without Contrast; Future    Parotid gland enlargement  -     Ambulatory Referral to ENT (Otolaryngology)    Other orders  -     albuterol sulfate  (90 Base) MCG/ACT inhaler; Inhale 1 puff Every 6 (Six) Hours As Needed for Shortness of Air.

## 2021-03-12 NOTE — PATIENT INSTRUCTIONS
Pt seen by Dr. Lucero and will be scheduled with Dr. Nixon and a CT chest in 4 mos. Pt instructed to call nurse navigator with any questions or concerns. Pt given contact cards for Dr. Lucero and nurse navigator.

## 2021-04-06 RX ORDER — LEVOTHYROXINE SODIUM 88 UG/1
TABLET ORAL
Qty: 90 TABLET | Refills: 3 | Status: SHIPPED | OUTPATIENT
Start: 2021-04-06

## 2021-04-19 RX ORDER — ESCITALOPRAM OXALATE 20 MG/1
TABLET ORAL
Qty: 90 TABLET | Refills: 3 | Status: SHIPPED | OUTPATIENT
Start: 2021-04-19

## 2021-04-26 ENCOUNTER — DOCUMENTATION (OUTPATIENT)
Dept: CARDIAC SURGERY | Facility: CLINIC | Age: 86
End: 2021-04-26

## 2021-04-26 NOTE — PROGRESS NOTES
Gio Maher Ashley D, RN; Mark Huber RN    I called pt's daughter to remind her of pt's appointment tomorrow and she cancelled the appointment. She did not want to reschedule at this time.

## 2021-05-15 DIAGNOSIS — I25.10 CORONARY ARTERIOSCLEROSIS IN NATIVE ARTERY: ICD-10-CM

## 2021-05-17 RX ORDER — APIXABAN 2.5 MG/1
TABLET, FILM COATED ORAL
Qty: 180 TABLET | Refills: 3 | Status: SHIPPED | OUTPATIENT
Start: 2021-05-17

## 2021-06-14 RX ORDER — ATORVASTATIN CALCIUM 40 MG/1
TABLET, FILM COATED ORAL
Qty: 90 TABLET | Refills: 3 | Status: SHIPPED | OUTPATIENT
Start: 2021-06-14

## 2021-07-07 RX ORDER — FUROSEMIDE 40 MG/1
TABLET ORAL
Qty: 90 TABLET | Refills: 3 | Status: SHIPPED | OUTPATIENT
Start: 2021-07-07 | End: 2023-02-17 | Stop reason: ALTCHOICE

## 2021-07-19 RX ORDER — FENOFIBRATE 145 MG/1
TABLET, COATED ORAL
Qty: 90 TABLET | Refills: 3 | Status: SHIPPED | OUTPATIENT
Start: 2021-07-19 | End: 2023-02-17

## 2021-08-09 ENCOUNTER — OFFICE VISIT (OUTPATIENT)
Dept: INTERNAL MEDICINE | Age: 86
End: 2021-08-09

## 2021-08-09 VITALS
WEIGHT: 185 LBS | HEART RATE: 74 BPM | SYSTOLIC BLOOD PRESSURE: 126 MMHG | OXYGEN SATURATION: 99 % | DIASTOLIC BLOOD PRESSURE: 74 MMHG | HEIGHT: 71 IN | BODY MASS INDEX: 25.9 KG/M2 | TEMPERATURE: 97.6 F

## 2021-08-09 DIAGNOSIS — I71.21 ASCENDING AORTIC ANEURYSM (HCC): ICD-10-CM

## 2021-08-09 DIAGNOSIS — C34.91 ADENOCARCINOMA OF RIGHT LUNG (HCC): ICD-10-CM

## 2021-08-09 DIAGNOSIS — E78.2 MIXED HYPERLIPIDEMIA: ICD-10-CM

## 2021-08-09 DIAGNOSIS — I10 HTN (HYPERTENSION), BENIGN: Primary | ICD-10-CM

## 2021-08-09 DIAGNOSIS — R73.01 IMPAIRED FASTING GLUCOSE: ICD-10-CM

## 2021-08-09 DIAGNOSIS — E03.4 HYPOTHYROIDISM DUE TO ACQUIRED ATROPHY OF THYROID: ICD-10-CM

## 2021-08-09 DIAGNOSIS — E55.9 VITAMIN D DEFICIENCY: ICD-10-CM

## 2021-08-09 DIAGNOSIS — Z79.01 ANTICOAGULATED: ICD-10-CM

## 2021-08-09 PROBLEM — Z00.00 WELL ADULT HEALTH CHECK: Status: RESOLVED | Noted: 2018-04-10 | Resolved: 2021-08-09

## 2021-08-09 PROCEDURE — 99214 OFFICE O/P EST MOD 30 MIN: CPT | Performed by: NURSE PRACTITIONER

## 2021-08-09 NOTE — PROGRESS NOTES
"Chief Complaint  Hypertension and Hyperlipidemia    Subjective          Bhaskar Ibarra presents to [facility]  Hypertension  This is a chronic problem. The problem is controlled. Pertinent negatives include no anxiety, blurred vision, chest pain, headaches, peripheral edema, PND, shortness of breath or sweats. Current antihypertension treatment includes beta blockers and diuretics. Hypertensive end-organ damage includes kidney disease, CAD/MI and PVD.   Hyperlipidemia  This is a chronic problem. The current episode started more than 1 year ago. The problem is controlled. Pertinent negatives include no chest pain or shortness of breath. Current antihyperlipidemic treatment includes statins. There are no compliance problems.        Objective   Vital Signs:   /74   Pulse 74   Temp 97.6 °F (36.4 °C) (Temporal)   Ht 180.3 cm (70.98\")   Wt 83.9 kg (185 lb)   SpO2 99%   BMI 25.81 kg/m²     Physical Exam  Vitals and nursing note reviewed.   Constitutional:       General: He is not in acute distress.     Appearance: He is well-developed. He is not ill-appearing.   Cardiovascular:      Rate and Rhythm: Normal rate and regular rhythm.      Heart sounds: Normal heart sounds, S1 normal and S2 normal. No murmur heard.     Pulmonary:      Effort: Pulmonary effort is normal.      Breath sounds: Normal breath sounds. No decreased breath sounds, wheezing, rhonchi or rales.   Skin:     General: Skin is warm and dry.   Neurological:      Mental Status: He is alert and oriented to person, place, and time.   Psychiatric:         Speech: Speech normal.         Behavior: Behavior normal. Behavior is cooperative.         Thought Content: Thought content normal.         Judgment: Judgment normal.        Result Review :   The following data was reviewed by: GEOFF Becker on 08/09/2021:  Common labs    Common Labsle 12/11/20 12/11/20 12/12/20 2/8/21 2/8/21    0641 [id number]  1129 1129   Glucose " 133 (A)  122 (A)     Glucose    134 (A)    BUN 32 (A)  30 (A) 20    Creatinine 1.14  0.99 1.43 (A)    eGFR Non  Am 61  72 47 (A)    eGFR  Am    57 (A)    Sodium 141  140 139    Potassium 4.1  4.4 4.7    Chloride 108 (A)  109 (A) 105    Calcium 8.2 (A)  8.2 (A) 9.2    Total Protein    7.0    Albumin  2.70 (A)  3.70    Total Bilirubin  0.6  0.3    Alkaline Phosphatase  81  72    AST (SGOT)  32  30    ALT (SGPT)  35  21    Total Cholesterol     159   Triglycerides     181 (A)   HDL Cholesterol     30 (A)   LDL Cholesterol      97   (A) Abnormal value       Comments are available for some flowsheets but are not being displayed.           Data reviewed: Radiologic studies      F-18 FDG PET FROM SKULL BASE TO MID THIGH WITH PET/CT FUSION     HISTORY: 85-year-old male with a pulmonary nodule. History of right  lower lobe lung cancer in the past treated with radiation. Restaging.     TECHNIQUE: Radiation dose reduction techniques were utilized, including  automated exposure control and exposure modulation based on body size.   Blood glucose level at time of injection was 123 mg/dL.  6.0 mCi of F-18  FDG were injected and PET was performed from skull base to mid thigh. CT  was obtained for localization and attenuation correction. Time at  injection 9:08 AM. PET start time 10:30 AM. Compared with chest CT from  02/18/2021 and PET/CT from 07/05/2015.     FINDINGS: There is no suspicious hypermetabolic activity at the right  lower lobe. The bandlike scar extending from the superior segment of the  right lower lobe to the inferior aspect of the lower lobe has blood pool  level activity with maximal SUV of 3.1. There is no suspicious  hypermetabolic activity at the right hilum or within the mediastinum.  There is significant crowding of lung markings throughout both lung  fields and there is asymmetric ground-glass density at the right upper  lobe and superior segment of the right lower lobe. There is no  abnormal  metabolic activity within these ground-glass regions. Within the neck,  there is more prominent metabolic activity throughout the left parotid  gland than the right and this was present to a lesser degree on the  previous PET/CT. There is no abnormality on the corresponding CT  sequence.     There is focal hypermetabolic activity between the right aspect of the  C1 ring and the right aspect of the C2 dens. This is new since the  previous PET/CT, but there is also significantly more prominent  degenerative change on the corresponding CT sequence. There is a  speckled pattern of activity throughout the liver. No definite  suspicious foci are seen within the liver or adrenal glands. No  suspicious hypermetabolic activity seen within the abdomen or pelvis.     IMPRESSION:  1. There is blood pool level activity along the right lower lobe  bandlike scar. There is no suspicious hypermetabolic activity within the  chest. Conservative surveillance is recommended with a chest CT in 3  months.  2. The focal hypermetabolic activity between the right aspect of the C1  ring and the dens of C2 is likely secondary to degenerative change.  3. The asymmetric hypermetabolic activity at the left parotid gland is  of uncertain clinical significance. No mass is seen on the corresponding  CT sequence. The appearance can be seen with sialoadenitis.     This report was finalized on 3/4/2021 9:03 AM by Dr. Kendra Woods M.D.              Assessment and Plan    Diagnoses and all orders for this visit:    1. HTN (hypertension), benign (Primary)  -     Comprehensive Metabolic Panel    2. Ascending aortic aneurysm (CMS/HCC)  4.7 cm measured February 2021.     3. Mixed hyperlipidemia  Stable on current therapy. Fasting lipid panel UTD and values within acceptable ranges.   Continue to follow and improve on low-fat, low carbohydrate diet.     Lab Results   Component Value Date    CHLPL 159 02/08/2021    TRIG 181 (H) 02/08/2021    HDL 30  (L) 02/08/2021    LDL 97 02/08/2021         4. Impaired fasting glucose  -     Comprehensive Metabolic Panel  -     Hemoglobin A1c    5. Vitamin D deficiency  -     Vitamin D 25 Hydroxy    6. Hypothyroidism due to acquired atrophy of thyroid  -     TSH+Free T4    7. Anticoagulated  -     CBC & Differential    8. Adenocarcinoma of right lung (CMS/HCC)  Advised patient overdue for repeat CT chest as ordered by Dr. Lucero. Recommended patient contact scheduling to make appt for CT scan, forward notes to Dr. Lucero's office to reschedule appt once CT done.       Follow Up   Return in about 6 months (around 2/9/2022) for Next scheduled follow up.  Patient was given instructions and counseling regarding his condition or for health maintenance advice. Please see specific information pulled into the AVS if appropriate.

## 2021-08-10 LAB
25(OH)D3+25(OH)D2 SERPL-MCNC: 26.6 NG/ML (ref 30–100)
ALBUMIN SERPL-MCNC: 4.2 G/DL (ref 3.5–5.2)
ALBUMIN/GLOB SERPL: 1.4 G/DL
ALP SERPL-CCNC: 69 U/L (ref 39–117)
ALT SERPL-CCNC: 20 U/L (ref 1–41)
AST SERPL-CCNC: 22 U/L (ref 1–40)
BASOPHILS # BLD AUTO: 0.04 10*3/MM3 (ref 0–0.2)
BASOPHILS NFR BLD AUTO: 0.5 % (ref 0–1.5)
BILIRUB SERPL-MCNC: 0.4 MG/DL (ref 0–1.2)
BUN SERPL-MCNC: 24 MG/DL (ref 8–23)
BUN/CREAT SERPL: 16.8 (ref 7–25)
CALCIUM SERPL-MCNC: 9.5 MG/DL (ref 8.6–10.5)
CHLORIDE SERPL-SCNC: 103 MMOL/L (ref 98–107)
CO2 SERPL-SCNC: 26.1 MMOL/L (ref 22–29)
CREAT SERPL-MCNC: 1.43 MG/DL (ref 0.76–1.27)
EOSINOPHIL # BLD AUTO: 0.15 10*3/MM3 (ref 0–0.4)
EOSINOPHIL NFR BLD AUTO: 1.7 % (ref 0.3–6.2)
ERYTHROCYTE [DISTWIDTH] IN BLOOD BY AUTOMATED COUNT: 13.2 % (ref 12.3–15.4)
GLOBULIN SER CALC-MCNC: 2.9 GM/DL
GLUCOSE SERPL-MCNC: 119 MG/DL (ref 65–99)
HBA1C MFR BLD: 5.9 % (ref 4.8–5.6)
HCT VFR BLD AUTO: 40.8 % (ref 37.5–51)
HGB BLD-MCNC: 13.3 G/DL (ref 13–17.7)
IMM GRANULOCYTES # BLD AUTO: 0.03 10*3/MM3 (ref 0–0.05)
IMM GRANULOCYTES NFR BLD AUTO: 0.3 % (ref 0–0.5)
LYMPHOCYTES # BLD AUTO: 3.33 10*3/MM3 (ref 0.7–3.1)
LYMPHOCYTES NFR BLD AUTO: 37.6 % (ref 19.6–45.3)
MCH RBC QN AUTO: 33.6 PG (ref 26.6–33)
MCHC RBC AUTO-ENTMCNC: 32.6 G/DL (ref 31.5–35.7)
MCV RBC AUTO: 103 FL (ref 79–97)
MONOCYTES # BLD AUTO: 0.89 10*3/MM3 (ref 0.1–0.9)
MONOCYTES NFR BLD AUTO: 10 % (ref 5–12)
NEUTROPHILS # BLD AUTO: 4.42 10*3/MM3 (ref 1.7–7)
NEUTROPHILS NFR BLD AUTO: 49.9 % (ref 42.7–76)
NRBC BLD AUTO-RTO: 0 /100 WBC (ref 0–0.2)
PLATELET # BLD AUTO: 279 10*3/MM3 (ref 140–450)
POTASSIUM SERPL-SCNC: 4.7 MMOL/L (ref 3.5–5.2)
PROT SERPL-MCNC: 7.1 G/DL (ref 6–8.5)
RBC # BLD AUTO: 3.96 10*6/MM3 (ref 4.14–5.8)
SODIUM SERPL-SCNC: 140 MMOL/L (ref 136–145)
T4 FREE SERPL-MCNC: 1.74 NG/DL (ref 0.93–1.7)
TSH SERPL DL<=0.005 MIU/L-ACNC: 3.63 UIU/ML (ref 0.27–4.2)
WBC # BLD AUTO: 8.86 10*3/MM3 (ref 3.4–10.8)

## 2021-09-01 ENCOUNTER — HOSPITAL ENCOUNTER (OUTPATIENT)
Dept: CT IMAGING | Facility: HOSPITAL | Age: 86
Discharge: HOME | End: 2021-09-01
Admitting: THORACIC SURGERY (CARDIOTHORACIC VASCULAR SURGERY)

## 2021-09-01 DIAGNOSIS — Z48.3 AFTERCARE FOLLOWING SURGERY FOR NEOPLASM: ICD-10-CM

## 2021-09-01 DIAGNOSIS — C34.91 ADENOCARCINOMA OF RIGHT LUNG (HCC): ICD-10-CM

## 2021-09-01 PROCEDURE — 71250 CT THORAX DX C-: CPT

## 2021-09-07 RX ORDER — OMEPRAZOLE 40 MG/1
CAPSULE, DELAYED RELEASE ORAL
Qty: 90 CAPSULE | Refills: 3 | Status: SHIPPED | OUTPATIENT
Start: 2021-09-07

## 2021-09-10 RX ORDER — CARVEDILOL 3.12 MG/1
TABLET ORAL
Qty: 180 TABLET | Refills: 3 | Status: SHIPPED | OUTPATIENT
Start: 2021-09-10 | End: 2023-02-17

## 2021-09-14 ENCOUNTER — OFFICE VISIT (OUTPATIENT)
Dept: SURGERY | Facility: CLINIC | Age: 86
End: 2021-09-14

## 2021-09-14 VITALS
BODY MASS INDEX: 25.9 KG/M2 | WEIGHT: 184.97 LBS | SYSTOLIC BLOOD PRESSURE: 149 MMHG | TEMPERATURE: 96.6 F | HEIGHT: 71 IN | DIASTOLIC BLOOD PRESSURE: 74 MMHG | HEART RATE: 67 BPM | RESPIRATION RATE: 17 BRPM | OXYGEN SATURATION: 98 %

## 2021-09-14 DIAGNOSIS — C34.91 ADENOCARCINOMA OF RIGHT LUNG (HCC): Primary | ICD-10-CM

## 2021-09-14 DIAGNOSIS — Z48.3 AFTERCARE FOLLOWING SURGERY FOR NEOPLASM: ICD-10-CM

## 2021-09-14 DIAGNOSIS — R91.1 LUNG NODULE: ICD-10-CM

## 2021-09-14 PROCEDURE — 99213 OFFICE O/P EST LOW 20 MIN: CPT | Performed by: THORACIC SURGERY (CARDIOTHORACIC VASCULAR SURGERY)

## 2021-09-14 NOTE — PROGRESS NOTES
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  History of Present Illness     Mr. 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  Did not feel that patient was a candidate for surgical resection and referred him for stereotactic radiation.  He has tolerated that procedure well and has been doing well.  He returns today for further follow-up.  On his last visit he had some chronic swelling of his parotid gland.  We referred him to ENT who felt that he had chronic parotiditis and started medical treatment.  He is improved.  He has been doing quite well.  He reports no shortness of breath or wheezing.  He has no cough or hemoptysis.  He has no hoarseness or change in his voice.  He has no pleuritic pain.  He has had no unexplained weight loss.    Objective   Vital Signs:   /74 (BP Location: Right arm, Patient Position: Sitting, Cuff Size: Adult)   Pulse 67   Temp 96.6 °F (35.9 °C) (Temporal)   Resp 17   Ht 180.3 cm (70.98\")   Wt 83.9 kg (184 lb 15.5 oz)   SpO2 98%   BMI 25.81 kg/m²     Physical Exam     Neck: There is no cervical or supraclavicular adenopathy    Pulmonary: Lungs are clear to auscultation with equal breath sounds bilaterally    Cardiac: Regular rate and rhythm.  No murmur or gallop.  No dependent edema.    Abdomen: Soft and nontender.  No masses organomegaly.  Good bowel sounds in all quadrants.    Result Review :   The following data was reviewed by: Bhaskar Lucero III, MD on 09/14/2021:    CT scan of the chest performed September 1, 2021 was independently reviewed and compared to previous exams.  Postradiation changes in the right lower lobe are unchanged.  Subpleural groundglass density at the left lower " lobe most likely represents inflammatory infiltrate no nodular component.  No other infiltrates nodules or masses.  No suspicious hilar or mediastinal adenopathy.  No pleural effusions.         Assessment and Plan    Diagnoses and all orders for this visit:    1. Adenocarcinoma of right lung (CMS/HCC) (Primary)  -     CT Chest Without Contrast; Future    2. Lung nodule  -     CT Chest Without Contrast; Future    3. Aftercare following surgery for neoplasm  -     CT Chest Without Contrast; Future      I spent 26 minutes caring for Bhaskar on this date of service. This time includes time spent by me in the following activities:preparing for the visit, reviewing tests, performing a medically appropriate examination and/or evaluation , counseling and educating the patient/family/caregiver, ordering medications, tests, or procedures, referring and communicating with other health care professionals  and documenting information in the medical record     Patient continues to do well.  He shows no evidence of local recurrence or distant metastases.  We will plan to see him back in the office in 1 year with a repeat CT of the chest without contrast.  I will be glad to see him sooner if you feel it is necessary.  Thank you for allowing us to participate in the care of Mr. Ibarra.    Follow Up   Return in about 1 year (around 9/14/2022) for Recheck.  Patient was given instructions and counseling regarding his condition or for health maintenance advice. Please see specific information pulled into the AVS if appropriate.

## 2021-12-20 RX ORDER — SOTALOL HYDROCHLORIDE 80 MG/1
TABLET ORAL
Qty: 90 TABLET | Refills: 1 | Status: SHIPPED | OUTPATIENT
Start: 2021-12-20

## 2022-02-21 ENCOUNTER — TELEPHONE (OUTPATIENT)
Dept: CARDIOLOGY | Facility: CLINIC | Age: 87
End: 2022-02-21

## 2022-02-21 NOTE — TELEPHONE ENCOUNTER
Patient calling for an appt. I tried to call no answer  Please call patient [phone]  Will have to be with one of the NP

## 2022-02-25 ENCOUNTER — OFFICE VISIT (OUTPATIENT)
Dept: CARDIOLOGY | Facility: CLINIC | Age: 87
End: 2022-02-25

## 2022-02-25 VITALS
SYSTOLIC BLOOD PRESSURE: 138 MMHG | HEIGHT: 71 IN | BODY MASS INDEX: 26.82 KG/M2 | DIASTOLIC BLOOD PRESSURE: 86 MMHG | HEART RATE: 60 BPM | WEIGHT: 191.6 LBS

## 2022-02-25 DIAGNOSIS — Z98.890 S/P MITRAL VALVE REPAIR: ICD-10-CM

## 2022-02-25 DIAGNOSIS — Z95.1 S/P CABG (CORONARY ARTERY BYPASS GRAFT): Primary | ICD-10-CM

## 2022-02-25 PROCEDURE — 99214 OFFICE O/P EST MOD 30 MIN: CPT | Performed by: INTERNAL MEDICINE

## 2022-02-25 PROCEDURE — 93000 ELECTROCARDIOGRAM COMPLETE: CPT | Performed by: INTERNAL MEDICINE

## 2022-02-25 NOTE — PROGRESS NOTES
Date of Office Visit: 22  Encounter Provider: Efrain Yuan MD  Place of Service: [facility] CARDIOLOGY  Patient Name: Bhaskar Ibarra  :1935  [id number]    Chief Complaint   Patient presents with   • Coronary Artery Disease   :     HPI: Bhaskar Ibarra is a 86 y.o. male  has had a prior five-vessel coronary artery bypass grafting in  and he had a mitral valve repair at that time.  He had a prior infarct, a non-ST-elevation myocardial infarction, and had severely reduced left ventricular function and improved.  He was noted to have asymptomatic paroxysmal atrial fibrillation and had been placed on sotalol and Eliquis since then.  He is a former [place] [employer] .  He was in the [employer].      He is here for follow-up is not been a good last year for him.  His wife of 66 years passed away she did have Alzheimer's.  And it was really tough for him and he misses her a lot.  He also had Covid even got intubated but then survived.  He actually from a heart standpoint been doing well but he would like to come off some of his medications.    Past Medical History:   Diagnosis Date   • Abdominal aortic aneurysm (HCC)    • Acute myocardial infarction (HCC)    • Acute renal failure (HCC)    • Anemia    • Aneurysm (HCC)    • Atrial fibrillation (HCC)    • Cardiomyopathy (HCC)    • Chest pain    • Coronary artery disease     2015-    • Depression    • GERD (gastroesophageal reflux disease)    • Hyperlipidemia    • Hypertension    • Hypothyroidism    • Lung cancer (HCC) 2016    radiation    • Mitral regurgitation    • Myocardial infarction (HCC)    • Pleural effusion    • Pleural effusion, bilateral    • RLS (restless legs syndrome)    • Sleep apnea    • Ulcerative colitis (HCC)    • Ulcerative colitis (HCC)        Past Surgical History:   Procedure Laterality Date   • BACK SURGERY      lumbar   • CARDIAC CATHETERIZATION     • CORONARY ANGIOPLASTY     • CORONARY ARTERY  BYPASS GRAFT  07/27/2015    X 5  Dr Louis   • HERNIA REPAIR      inguinal - left   • MITRAL VALVE REPAIR/REPLACEMENT  07/27/2015    Dr Louis   • NASAL SEPTUM SURGERY      benign growth removed   • OTHER SURGICAL HISTORY      Cardiovasc Endosc W/ Video-Assist Harv Veins Coron Art Byp   • PROSTATE SURGERY     • SKIN BIOPSY      benign       Social History     Socioeconomic History   • Marital status:    Tobacco Use   • Smoking status: Former Smoker     Years: 5.00     Types: Cigarettes   • Smokeless tobacco: Never Used   • Tobacco comment: 1 cig day x 5 years   Vaping Use   • Vaping Use: Never used   Substance and Sexual Activity   • Alcohol use: No   • Drug use: No   • Sexual activity: Defer       Family History   Problem Relation Age of Onset   • Coronary artery disease Brother    • Heart attack Brother    • Cancer Mother    • Lung cancer Father        Review of Systems   Constitutional: Negative for decreased appetite, fever, malaise/fatigue and weight loss.   HENT: Negative for nosebleeds.    Eyes: Negative for double vision.   Cardiovascular: Negative for chest pain, claudication, cyanosis, dyspnea on exertion, irregular heartbeat, leg swelling, near-syncope, orthopnea, palpitations, paroxysmal nocturnal dyspnea and syncope.   Respiratory: Negative for cough, hemoptysis and shortness of breath.    Hematologic/Lymphatic: Negative for bleeding problem.   Skin: Negative for rash.   Musculoskeletal: Negative for falls and myalgias.   Gastrointestinal: Negative for hematochezia, jaundice, melena, nausea and vomiting.   Genitourinary: Negative for hematuria.   Neurological: Negative for dizziness and seizures.   Psychiatric/Behavioral: Negative for altered mental status and memory loss.       Allergies   Allergen Reactions   • Latex Itching         Current Outpatient Medications:   •  atorvastatin (LIPITOR) 40 MG tablet, TAKE 1 TABLET EVERY NIGHT, Disp: 90 tablet, Rfl: 3  •  carvedilol (COREG) 3.125 MG  "tablet, TAKE 1 TABLET TWICE A DAY WITH MEALS, Disp: 180 tablet, Rfl: 3  •  Eliquis 2.5 MG tablet tablet, TAKE 1 TABLET EVERY 12 HOURS, Disp: 180 tablet, Rfl: 3  •  escitalopram (LEXAPRO) 20 MG tablet, TAKE 1 TABLET DAILY, Disp: 90 tablet, Rfl: 3  •  fenofibrate (TRICOR) 145 MG tablet, TAKE 1 TABLET DAILY, Disp: 90 tablet, Rfl: 3  •  furosemide (LASIX) 40 MG tablet, TAKE 1 TABLET DAILY, Disp: 90 tablet, Rfl: 3  •  levothyroxine (SYNTHROID, LEVOTHROID) 88 MCG tablet, TAKE 1 TABLET DAILY, Disp: 90 tablet, Rfl: 3  •  omeprazole (priLOSEC) 40 MG capsule, TAKE 1 CAPSULE DAILY, Disp: 90 capsule, Rfl: 3  •  sotalol (BETAPACE) 80 MG tablet, TAKE 1 TABLET DAILY, Disp: 90 tablet, Rfl: 1  •  albuterol sulfate  (90 Base) MCG/ACT inhaler, Inhale 1 puff Every 6 (Six) Hours As Needed for Shortness of Air., Disp: 108 g, Rfl: 1  •  cyanocobalamin (VITAMIN B-12) 2000 MCG tablet tablet, Take 1 tablet by mouth Daily., Disp: 30 tablet, Rfl:   •  meclizine (ANTIVERT) 12.5 MG tablet, Take 1 tablet by mouth 3 (Three) Times a Day As Needed for Dizziness., Disp: 18 tablet, Rfl: 0      Objective:     Vitals:    02/25/22 1103   BP: 138/86   Pulse: 60   Weight: 86.9 kg (191 lb 9.6 oz)   Height: 180.3 cm (71\")     Body mass index is 26.72 kg/m².    Constitutional:       Appearance: Well-developed.   Eyes:      General: No scleral icterus.  HENT:      Head: Normocephalic.   Neck:      Thyroid: No thyromegaly.      Vascular: No JVD.      Lymphadenopathy: No cervical adenopathy.   Pulmonary:      Effort: Pulmonary effort is normal.      Breath sounds: Normal breath sounds. No wheezing. No rales.   Cardiovascular:      Normal rate. Regular rhythm.      No gallop.   Edema:     Peripheral edema absent.   Abdominal:      Palpations: Abdomen is soft.      Tenderness: There is no abdominal tenderness.   Musculoskeletal: Normal range of motion. Skin:     General: Skin is warm and dry.      Findings: No rash.   Neurological:      Mental Status: Alert " and oriented to person, place, and time.           ECG 12 Lead    Date/Time: 2/25/2022 11:34 AM  Performed by: Efrain Yuan MD  Authorized by: Efrain Yuan MD   Comparison: compared with previous ECG   Similar to previous ECG  Rhythm: sinus rhythm    Clinical impression: normal ECG             Assessment:       Diagnosis Plan   1. S/P CABG (coronary artery bypass graft)     2. S/P mitral valve repair            Plan:       From a cardiac standpoint he is doing pretty well he would like to come off of some of his medicines and I will get a great idea.  We will stop his TriCor.  There is no longevity data with that drug anyway.  We will get a stop his carvedilol and see how his blood pressure is off that he is on sotalol.  And we will cut his Lasix in half and then if he is okay we may stop it all together.  Strongly encouraged him to go on on her flight and have a discussion with his kids because he has some regrets about some things with his wife.  I will have him come back and see me in a year    Return in about 1 year (around 2/25/2023).     As always, it has been a pleasure to participate in your patient's care.      Sincerely,       Efrain Yuan MD

## 2022-06-27 NOTE — TELEPHONE ENCOUNTER
Rx sent to pt pharmacy

## 2022-09-14 ENCOUNTER — HOSPITAL ENCOUNTER (OUTPATIENT)
Dept: CT IMAGING | Facility: HOSPITAL | Age: 87
Discharge: HOME | End: 2022-09-14
Admitting: THORACIC SURGERY (CARDIOTHORACIC VASCULAR SURGERY)

## 2022-09-14 DIAGNOSIS — R91.1 LUNG NODULE: ICD-10-CM

## 2022-09-14 DIAGNOSIS — Z48.3 AFTERCARE FOLLOWING SURGERY FOR NEOPLASM: ICD-10-CM

## 2022-09-14 DIAGNOSIS — C34.91 ADENOCARCINOMA OF RIGHT LUNG: ICD-10-CM

## 2022-09-14 PROCEDURE — 71250 CT THORAX DX C-: CPT

## 2022-09-21 ENCOUNTER — OFFICE VISIT (OUTPATIENT)
Dept: SURGERY | Facility: CLINIC | Age: 87
End: 2022-09-21

## 2022-09-21 VITALS
OXYGEN SATURATION: 97 % | HEART RATE: 56 BPM | DIASTOLIC BLOOD PRESSURE: 72 MMHG | SYSTOLIC BLOOD PRESSURE: 122 MMHG | WEIGHT: 184 LBS | BODY MASS INDEX: 25.76 KG/M2 | HEIGHT: 71 IN

## 2022-09-21 DIAGNOSIS — R91.1 LUNG NODULE: Primary | ICD-10-CM

## 2022-09-21 DIAGNOSIS — C34.91 ADENOCARCINOMA OF RIGHT LUNG: ICD-10-CM

## 2022-09-21 DIAGNOSIS — C34.91 ADENOCARCINOMA OF RIGHT LUNG: Primary | ICD-10-CM

## 2022-09-21 PROCEDURE — 99213 OFFICE O/P EST LOW 20 MIN: CPT | Performed by: NURSE PRACTITIONER

## 2022-09-21 NOTE — PROGRESS NOTES
"Chief Complaint  Manera nodules, history of lung cancer, follow-up    Subjective        Bhaskar Ibarra presents to [facility] THORACIC SURGERY continued follow-up and surveillance.    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  Mr. Ibarra has no pulmonary complaints today.  He has had no unintentional weight loss, pleuritic chest pain, dyspnea with exertion or voice changes.  He denies fever, chills, cough or hemoptysis.  He presents today with a CT of the chest for follow-up.    Objective   Vital Signs:  /72 (BP Location: Left arm, Patient Position: Sitting, Cuff Size: Adult)   Pulse 56   Ht 180.3 cm (71\")   Wt 83.5 kg (184 lb)   SpO2 97%   BMI 25.66 kg/m²   Estimated body mass index is 25.66 kg/m² as calculated from the following:    Height as of this encounter: 180.3 cm (71\").    Weight as of this encounter: 83.5 kg (184 lb).          Physical Exam  Vitals and nursing note reviewed.   Constitutional:       Appearance: Normal appearance.   HENT:      Head: Normocephalic and atraumatic.   Cardiovascular:      Rate and Rhythm: Normal rate and regular rhythm.      Pulses: Normal pulses.      Heart sounds: Normal heart sounds.   Pulmonary:      Effort: Pulmonary effort is normal.      Breath sounds: Normal breath sounds. No wheezing, rhonchi or rales.   Chest:      Chest wall: No tenderness.   Abdominal:      General: Bowel sounds are normal.      Palpations: Abdomen is soft.   Musculoskeletal:      Cervical back: Neck supple.   Lymphadenopathy:      Cervical: No cervical adenopathy.   Skin:     General: Skin is warm and dry.   Neurological:      General: No focal deficit present.      Mental Status: He is alert. Mental status is at baseline.        Result Review :  The " following data was reviewed by: GEOFF Gonzales on 09/21/2022:    Data reviewed: Radiologic studies CT of the chest performed earlier this month was independently reviewed.  There is stable scarring along the right lower lobe which is unchanged, likely secondary to radiation therapy.  There are some small nodular densities within the lung.       Assessment and Plan   Diagnoses and all orders for this visit:    1. Lung nodule (Primary)  -     CT Chest Without Contrast Diagnostic; Future    2. Adenocarcinoma of right lung (HCC)  -     CT Chest Without Contrast Diagnostic; Future    Stable appearance of CT of the chest which was performed earlier this month.  Patient continues to do well.  Recommend continued follow-up with CT of the chest for surveillance in 1 year.  Of course we are happy to see him sooner, should the need arise.  Thank you for allowing us to participate in the care of Bhaskar Ibarra.  We will continue to keep you informed of his progress.       I spent 21 minutes caring for Bhaskar on this date of service. This time includes time spent by me in the following activities:preparing for the visit, reviewing tests, obtaining and/or reviewing a separately obtained history, performing a medically appropriate examination and/or evaluation , counseling and educating the patient/family/caregiver, ordering medications, tests, or procedures, referring and communicating with other health care professionals , documenting information in the medical record, independently interpreting results and communicating that information with the patient/family/caregiver and care coordination  Follow Up   Return in about 1 year (around 9/21/2023) for Next scheduled follow up with CT chest.  Patient was given instructions and counseling regarding his condition or for health maintenance advice. Please see specific information pulled into the AVS if appropriate.

## 2023-02-06 NOTE — TELEPHONE ENCOUNTER
LOV  02.08.21  NOV  08.09.21     Niacinamide Counseling: I recommended taking niacin or niacinamide, also know as vitamin B3, twice daily. Recent evidence suggests that taking vitamin B3 (500 mg twice daily) can reduce the risk of actinic keratoses and non-melanoma skin cancers. Side effects of vitamin B3 include flushing and headache.

## 2023-02-17 ENCOUNTER — OFFICE VISIT (OUTPATIENT)
Dept: CARDIOLOGY | Facility: CLINIC | Age: 88
End: 2023-02-17
Payer: MEDICARE

## 2023-02-17 VITALS
HEART RATE: 62 BPM | WEIGHT: 186.2 LBS | DIASTOLIC BLOOD PRESSURE: 80 MMHG | HEIGHT: 71 IN | BODY MASS INDEX: 26.07 KG/M2 | SYSTOLIC BLOOD PRESSURE: 128 MMHG

## 2023-02-17 DIAGNOSIS — I21.02: ICD-10-CM

## 2023-02-17 DIAGNOSIS — I10 HTN (HYPERTENSION), BENIGN: ICD-10-CM

## 2023-02-17 DIAGNOSIS — Z95.1 S/P CABG (CORONARY ARTERY BYPASS GRAFT): Primary | ICD-10-CM

## 2023-02-17 DIAGNOSIS — I48.0 PAROXYSMAL ATRIAL FIBRILLATION: ICD-10-CM

## 2023-02-17 PROCEDURE — 93000 ELECTROCARDIOGRAM COMPLETE: CPT | Performed by: INTERNAL MEDICINE

## 2023-02-17 PROCEDURE — 99214 OFFICE O/P EST MOD 30 MIN: CPT | Performed by: INTERNAL MEDICINE

## 2023-02-17 RX ORDER — MULTIPLE VITAMINS W/ MINERALS TAB 9MG-400MCG
1 TAB ORAL DAILY
COMMUNITY

## 2023-02-17 RX ORDER — FUROSEMIDE 20 MG/1
20 TABLET ORAL DAILY
COMMUNITY
End: 2023-02-17

## 2023-02-17 NOTE — PROGRESS NOTES
Date of Office Visit: 23  Encounter Provider: Efrain Yuan MD  Place of Service: [facility] CARDIOLOGY  Patient Name: Bhaskar Ibarra  :1935  [id number]    Chief Complaint   Patient presents with   • Coronary Artery Disease   :     HPI: Bhaskar Ibarra is a 87 y.o. male  has had a prior five-vessel coronary artery bypass grafting in  and he had a mitral valve repair at that time.  He had a prior infarct, a non-ST-elevation myocardial infarction, and had severely reduced left ventricular function and improved.  He was noted to have asymptomatic paroxysmal atrial fibrillation and had been placed on sotalol and Eliquis since then.  He is a former [place] [employer] .  He was in the [employer].      I think he is doing pretty well he has not had any chest pain shortness of breath PND orthopnea edema his wife passed away over a year ago and been pretty rough for him since then.    Past Medical History:   Diagnosis Date   • Abdominal aortic aneurysm    • Acute myocardial infarction (HCC)    • Acute renal failure (HCC)    • Anemia    • Aneurysm (HCC)    • Atrial fibrillation (HCC)    • Cardiomyopathy (HCC)    • Chest pain    • Congenital heart disease    • Coronary artery disease     2015-    • Depression    • GERD (gastroesophageal reflux disease)    • Hyperlipidemia    • Hypertension    • Hypothyroidism    • Lung cancer (HCC) 2016    radiation    • Mitral regurgitation    • Myocardial infarction (HCC)    • Pleural effusion    • Pleural effusion, bilateral    • RLS (restless legs syndrome)    • Sleep apnea    • Ulcerative colitis (HCC)    • Ulcerative colitis (HCC)        Past Surgical History:   Procedure Laterality Date   • AORTIC VALVE REPAIR/REPLACEMENT     • BACK SURGERY      lumbar   • CARDIAC CATHETERIZATION     • CARDIAC VALVE REPLACEMENT     • CORONARY ANGIOPLASTY     • CORONARY ARTERY BYPASS GRAFT  07/27/2015    X 5  Dr Louis   • HERNIA REPAIR       inguinal - left   • MITRAL VALVE REPAIR/REPLACEMENT  07/27/2015    Dr Louis   • MITRAL VALVE REPLACEMENT     • NASAL SEPTUM SURGERY      benign growth removed   • OTHER SURGICAL HISTORY      Cardiovasc Endosc W/ Video-Assist Harv Veins Coron Art Byp   • PROSTATE SURGERY     • SKIN BIOPSY      benign       Social History     Socioeconomic History   • Marital status:    Tobacco Use   • Smoking status: Former     Packs/day: 0.00     Years: 5.00     Pack years: 0.00     Types: Cigarettes   • Smokeless tobacco: Never   • Tobacco comments:     1 cig day x 5 years   Vaping Use   • Vaping Use: Never used   Substance and Sexual Activity   • Alcohol use: No   • Drug use: No   • Sexual activity: Not Currently     Partners: Female     Birth control/protection: None       Family History   Problem Relation Age of Onset   • Coronary artery disease Brother         Heart disease   • Heart attack Brother    • Cancer Mother    • Lung cancer Father        Review of Systems   Constitutional: Negative for decreased appetite, fever, malaise/fatigue and weight loss.   HENT: Negative for nosebleeds.    Eyes: Negative for double vision.   Cardiovascular: Negative for chest pain, claudication, cyanosis, dyspnea on exertion, irregular heartbeat, leg swelling, near-syncope, orthopnea, palpitations, paroxysmal nocturnal dyspnea and syncope.   Respiratory: Negative for cough, hemoptysis and shortness of breath.    Hematologic/Lymphatic: Negative for bleeding problem.   Skin: Negative for rash.   Musculoskeletal: Negative for falls and myalgias.   Gastrointestinal: Negative for hematochezia, jaundice, melena, nausea and vomiting.   Genitourinary: Negative for hematuria.   Neurological: Negative for dizziness and seizures.   Psychiatric/Behavioral: Negative for altered mental status and memory loss.       Allergies   Allergen Reactions   • Latex Itching         Current Outpatient Medications:   •  atorvastatin (LIPITOR) 40 MG tablet, TAKE  "1 TABLET EVERY NIGHT, Disp: 90 tablet, Rfl: 3  •  cyanocobalamin (VITAMIN B-12) 2000 MCG tablet tablet, Take 1 tablet by mouth Daily., Disp: 30 tablet, Rfl:   •  Eliquis 2.5 MG tablet tablet, TAKE 1 TABLET EVERY 12 HOURS, Disp: 180 tablet, Rfl: 3  •  escitalopram (LEXAPRO) 20 MG tablet, TAKE 1 TABLET DAILY, Disp: 90 tablet, Rfl: 3  •  furosemide (LASIX) 20 MG tablet, Take 20 mg by mouth Daily., Disp: , Rfl:   •  levothyroxine (SYNTHROID, LEVOTHROID) 88 MCG tablet, TAKE 1 TABLET DAILY, Disp: 90 tablet, Rfl: 3  •  multivitamin with minerals (VITAMIN D3 COMPLETE PO), Take 1 tablet by mouth Daily., Disp: , Rfl:   •  omeprazole (priLOSEC) 40 MG capsule, TAKE 1 CAPSULE DAILY, Disp: 90 capsule, Rfl: 3  •  sotalol (BETAPACE) 80 MG tablet, TAKE 1 TABLET DAILY, Disp: 90 tablet, Rfl: 1  •  albuterol sulfate  (90 Base) MCG/ACT inhaler, Inhale 1 puff Every 6 (Six) Hours As Needed for Shortness of Air., Disp: 108 g, Rfl: 1  •  carvedilol (COREG) 3.125 MG tablet, TAKE 1 TABLET TWICE A DAY WITH MEALS, Disp: 180 tablet, Rfl: 3  •  fenofibrate (TRICOR) 145 MG tablet, TAKE 1 TABLET DAILY, Disp: 90 tablet, Rfl: 3  •  furosemide (LASIX) 40 MG tablet, TAKE 1 TABLET DAILY, Disp: 90 tablet, Rfl: 3  •  meclizine (ANTIVERT) 12.5 MG tablet, Take 1 tablet by mouth 3 (Three) Times a Day As Needed for Dizziness., Disp: 18 tablet, Rfl: 0      Objective:     Vitals:    02/17/23 1051   BP: 128/80   Pulse: 62   Weight: 84.5 kg (186 lb 3.2 oz)   Height: 180.3 cm (71\")     Body mass index is 25.97 kg/m².    Constitutional:       Appearance: Well-developed.   Eyes:      General: No scleral icterus.  HENT:      Head: Normocephalic.   Neck:      Thyroid: No thyromegaly.      Vascular: No JVD.      Lymphadenopathy: No cervical adenopathy.   Pulmonary:      Effort: Pulmonary effort is normal.      Breath sounds: Normal breath sounds. No wheezing. No rales.   Cardiovascular:      Normal rate. Regular rhythm.      No gallop.   Edema:     Peripheral " edema absent.   Abdominal:      Palpations: Abdomen is soft.      Tenderness: There is no abdominal tenderness.   Musculoskeletal: Normal range of motion. Skin:     General: Skin is warm and dry.      Findings: No rash.   Neurological:      Mental Status: Alert and oriented to person, place, and time.           ECG 12 Lead    Date/Time: 2/17/2023 11:26 AM  Performed by: Efrain Yuan MD  Authorized by: Efrain Yuan MD   Comparison: compared with previous ECG   Similar to previous ECG  Rhythm: sinus rhythm  Ectopy: unifocal PVCs and atrial premature contractions    Clinical impression: abnormal EKG             Assessment:       Diagnosis Plan   1. S/P CABG (coronary artery bypass graft)        2. Acute non-Q wave ST elevation myocardial infarction (STEMI) involving left anterior descending (LAD) coronary artery        3. Paroxysmal atrial fibrillation (HCC)        4. HTN (hypertension), benign               Plan:       Think he is doing well he would like to get off of the Lasix altogether I think we can stop it and see how he does LV was good when we last looked and I will have him come back and see us in a year sooner if he has trouble at age 87 can probably be conservative in our management with him might even think about backing his atorvastatin down at his next visit    No follow-ups on file.     As always, it has been a pleasure to participate in your patient's care.      Sincerely,       Efrain Yuan MD

## 2023-03-10 NOTE — TELEPHONE ENCOUNTER
Recommend daughter take him to either UC or ER for evaluation including COVID testing, evaluation of hydration status, possible IV fluids if needed.          Cartilage Graft Text: The defect edges were debeveled with a #15 scalpel blade.  Given the location of the defect, shape, depth of the defect and proximity to a free margin a cartilage graft was deemed most appropriate.  The most precisely matching convex surface donor site was identified, cleansed, and anesthetized.  A semicircular incision was made in the ear skin and the flap elevated to expose the underlying cartilage. A thin, 2-4 mm, strip of cartilage measuring at least 2-3 mm longer than the defect was incised as atraumatically as possible and placed in sterile saline solution.  Meticulous  hemostasis was obtained.  The cartilage graft is trimmed to precisely fit the defect with 2-3 mm of additional length on each end. Two small \"stab incision pockets\" were created into which the cartilage graft was inserted to stabilize it and the graft was transferred to the defect, oriented appropriately and secured with sutures.  The donor site flap was inset and repaired after hemostasis obtained with a simple repair.

## 2023-09-01 ENCOUNTER — TELEPHONE (OUTPATIENT)
Dept: SURGERY | Facility: CLINIC | Age: 88
End: 2023-09-01
Payer: MEDICARE

## 2023-09-01 NOTE — TELEPHONE ENCOUNTER
Called and left a message about moving appointment to the afternoon due to a scheduled surgery that morning for Dr Blue on 9/27. To please call back if there were any conflicts.

## 2023-09-19 ENCOUNTER — HOSPITAL ENCOUNTER (OUTPATIENT)
Dept: CT IMAGING | Facility: HOSPITAL | Age: 88
Discharge: HOME | End: 2023-09-19
Admitting: NURSE PRACTITIONER
Payer: MEDICARE

## 2023-09-19 DIAGNOSIS — C34.91 ADENOCARCINOMA OF RIGHT LUNG: ICD-10-CM

## 2023-09-19 PROCEDURE — 71250 CT THORAX DX C-: CPT

## 2023-09-27 NOTE — PROGRESS NOTES
"Chief Complaint  Pulmonary nodules, history of lung cancer, follow-up    Subjective        Bhaskar Ibarra presents to [facility] THORACIC SURGERY continued follow-up and surveillance.    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  He has been established in our practice with Dr. Lucero since September 2018.  He smoked for a brief period in his life and has an approximate 5 pack year history.     Mr. Ibarra has no pulmonary complaints today.  He has had no unintentional weight loss, pleuritic chest pain, dyspnea with exertion or voice changes.  He denies fever, chills, cough or hemoptysis.  He presents today with a CT of the chest for follow-up.    Objective   Vital Signs:  /86 (BP Location: Left arm, Patient Position: Sitting, Cuff Size: Adult)   Pulse 60   Ht 180.3 cm (71\")   Wt 79.4 kg (175 lb)   SpO2 97%   BMI 24.41 kg/m²   Estimated body mass index is 24.41 kg/m² as calculated from the following:    Height as of this encounter: 180.3 cm (71\").    Weight as of this encounter: 79.4 kg (175 lb).    Physical Exam  Vitals and nursing note reviewed.   Constitutional:       General: He is not in acute distress.     Appearance: Normal appearance. He is not ill-appearing.   HENT:      Head: Normocephalic and atraumatic.      Comments: Hearing aid present  Cardiovascular:      Rate and Rhythm: Normal rate and regular rhythm.      Pulses: Normal pulses.      Heart sounds: Normal heart sounds.   Pulmonary:      Effort: Pulmonary effort is normal.      Breath sounds: Normal breath sounds. No wheezing, rhonchi or rales.   Chest:      Chest wall: No tenderness.   Abdominal:      General: Bowel sounds are normal.      Palpations: Abdomen is soft.   Musculoskeletal:         General: Normal " range of motion.      Cervical back: Neck supple.      Comments: Using cane to assist with ambulation   Lymphadenopathy:      Cervical: No cervical adenopathy.   Skin:     General: Skin is warm and dry.   Neurological:      General: No focal deficit present.      Mental Status: He is alert. Mental status is at baseline.   Psychiatric:         Mood and Affect: Mood normal.         Thought Content: Thought content normal.      Result Review :    CT chest 9/19/2023: Stable appearance of density in the right lower lobe measuring approximately 14 mm.  This area was PET negative on imaging performed in March 2021.  Tiny micronodules are without change.  Stable groundglass opacity in the right lower lobe.  Postoperative changes consistent with prior CABG.  No new or enlarging pulmonary nodules.  No pleural or pericardial effusion.    Imaging was independently reviewed      Assessment and Plan   Diagnoses and all orders for this visit:    1. Adenocarcinoma of right lung (Primary)  -     CT Chest Without Contrast; Future    2. Ground glass opacity present on imaging of lung  -     CT Chest Without Contrast; Future    3. Former smoker  -     CT Chest Without Contrast; Future    4. History of lung cancer  -     CT Chest Without Contrast; Future    Stable appearance of most recent CT chest status post SBRT in April 2017 to the right lower lobe secondary to adenocarcinoma. Patient continues to do very well.  Recommend continued follow-up with CT of the chest for surveillance in 1 year.  Follow up with cardiology as scheduled in February 2024.       I spent 31 minutes caring for Bhaskar on this date of service. This time includes time spent by me in the following activities:preparing for the visit, reviewing tests, obtaining and/or reviewing a separately obtained history, performing a medically appropriate examination and/or evaluation , counseling and educating the patient/family/caregiver, ordering medications, tests, or  procedures, referring and communicating with other health care professionals , documenting information in the medical record, independently interpreting results and communicating that information with the patient/family/caregiver and care coordination    Follow Up   Return in about 1 year (around 9/28/2024) for Next scheduled follow up.  Patient was given instructions and counseling regarding his condition or for health maintenance advice. Please see specific information pulled into the AVS if appropriate.

## 2023-09-28 ENCOUNTER — OFFICE VISIT (OUTPATIENT)
Dept: SURGERY | Facility: CLINIC | Age: 88
End: 2023-09-28
Payer: MEDICARE

## 2023-09-28 VITALS
BODY MASS INDEX: 24.5 KG/M2 | HEIGHT: 71 IN | HEART RATE: 60 BPM | WEIGHT: 175 LBS | SYSTOLIC BLOOD PRESSURE: 126 MMHG | DIASTOLIC BLOOD PRESSURE: 86 MMHG | OXYGEN SATURATION: 97 %

## 2023-09-28 DIAGNOSIS — C34.91 ADENOCARCINOMA OF RIGHT LUNG: Primary | ICD-10-CM

## 2023-09-28 DIAGNOSIS — Z87.891 FORMER SMOKER: ICD-10-CM

## 2023-09-28 DIAGNOSIS — R91.8 GROUND GLASS OPACITY PRESENT ON IMAGING OF LUNG: ICD-10-CM

## 2023-09-28 DIAGNOSIS — Z85.118 HISTORY OF LUNG CANCER: ICD-10-CM

## 2023-09-28 PROCEDURE — 99214 OFFICE O/P EST MOD 30 MIN: CPT | Performed by: NURSE PRACTITIONER

## 2023-09-28 RX ORDER — FUROSEMIDE 40 MG/1
40 TABLET ORAL DAILY
Qty: 30 TABLET | Refills: 2 | COMMUNITY
Start: 2023-09-19 | End: 2023-12-18

## 2024-03-04 ENCOUNTER — OFFICE VISIT (OUTPATIENT)
Dept: CARDIOLOGY | Facility: CLINIC | Age: 89
End: 2024-03-04
Payer: MEDICARE

## 2024-03-04 VITALS
HEIGHT: 71 IN | SYSTOLIC BLOOD PRESSURE: 130 MMHG | DIASTOLIC BLOOD PRESSURE: 78 MMHG | BODY MASS INDEX: 26.77 KG/M2 | WEIGHT: 191.2 LBS | HEART RATE: 50 BPM

## 2024-03-04 DIAGNOSIS — I34.0 NONRHEUMATIC MITRAL VALVE REGURGITATION: ICD-10-CM

## 2024-03-04 DIAGNOSIS — Z95.1 S/P CABG (CORONARY ARTERY BYPASS GRAFT): ICD-10-CM

## 2024-03-04 DIAGNOSIS — I25.10 CORONARY ARTERIOSCLEROSIS IN NATIVE ARTERY: Primary | ICD-10-CM

## 2024-03-04 DIAGNOSIS — Z98.890 S/P MITRAL VALVE REPAIR: ICD-10-CM

## 2024-03-04 DIAGNOSIS — I25.5 ISCHEMIC CARDIOMYOPATHY: ICD-10-CM

## 2024-03-04 DIAGNOSIS — I48.0 PAROXYSMAL ATRIAL FIBRILLATION: ICD-10-CM

## 2024-03-04 PROCEDURE — 99214 OFFICE O/P EST MOD 30 MIN: CPT | Performed by: NURSE PRACTITIONER

## 2024-03-04 PROCEDURE — 93000 ELECTROCARDIOGRAM COMPLETE: CPT | Performed by: NURSE PRACTITIONER

## 2024-03-04 PROCEDURE — 1159F MED LIST DOCD IN RCRD: CPT | Performed by: NURSE PRACTITIONER

## 2024-03-04 PROCEDURE — 1160F RVW MEDS BY RX/DR IN RCRD: CPT | Performed by: NURSE PRACTITIONER

## 2024-03-04 NOTE — PROGRESS NOTES
Date of Office Visit: 2024  Encounter Provider: GEOFF Haskins  Place of Service: [facility] CARDIOLOGY  Patient Name: Bhaskar Ibarra  :1935    Chief Complaint   Patient presents with    Atrial Fibrillation   :     HPI: Bhaskar Ibarra is a 88 y.o. male patient of [facility] with coronary artery disease, ischemic cardiomyopathy, and paroxysmal atrial fibrillation.  In , he had a CABG x 5 and mitral valve repair.    He was last seen in the office by Dr. Yuan in 2023 at which time he was doing well.  He was interested in discontinuing the Lasix which Dr. Yuan felt was reasonable.  Otherwise, he was advised to follow-up in 1 year.    He has been feeling well.  He denies any chest pain, shortness of breath, palpitations, edema, dizziness, or syncope.  He denies any bleeding difficulties or melena.    Past Medical History:   Diagnosis Date    Abdominal aortic aneurysm     Acute myocardial infarction     Acute renal failure     Anemia     Aneurysm     Atrial fibrillation     Cardiomyopathy     Chest pain     Congenital heart disease     Coronary artery disease     2015-     Depression     GERD (gastroesophageal reflux disease)     Hyperlipidemia     Hypertension     Hypothyroidism     Lung cancer 2016    radiation     Mitral regurgitation     Myocardial infarction     Pleural effusion     Pleural effusion, bilateral     RLS (restless legs syndrome)     Sleep apnea     Ulcerative colitis     Ulcerative colitis        Past Surgical History:   Procedure Laterality Date    AORTIC VALVE REPAIR/REPLACEMENT      BACK SURGERY      lumbar    CARDIAC CATHETERIZATION      CARDIAC VALVE REPLACEMENT      CORONARY ANGIOPLASTY      CORONARY ARTERY BYPASS GRAFT  07/27/2015    X 5  Dr Louis    HERNIA REPAIR      inguinal - left    MITRAL VALVE REPAIR/REPLACEMENT  2015    Dr Louis    MITRAL VALVE REPLACEMENT      NASAL SEPTUM SURGERY      benign growth  removed    OTHER SURGICAL HISTORY      Cardiovasc Endosc W/ Video-Assist Harv Veins Coron Art Byp    PROSTATE SURGERY      SKIN BIOPSY      benign       Social History     Socioeconomic History    Marital status:    Tobacco Use    Smoking status: Former     Current packs/day: 1.00     Average packs/day: 1 pack/day for 5.0 years (5.0 ttl pk-yrs)     Types: Cigarettes    Smokeless tobacco: Never    Tobacco comments:     1 cig day x 5 years   Vaping Use    Vaping status: Never Used   Substance and Sexual Activity    Alcohol use: No    Drug use: No    Sexual activity: Not Currently     Partners: Female     Birth control/protection: None       Family History   Problem Relation Age of Onset    Coronary artery disease Brother         Heart disease    Heart attack Brother     Cancer Mother     Lung cancer Father        Review of Systems   Constitutional: Negative.   Cardiovascular: Negative.  Negative for chest pain, dyspnea on exertion, leg swelling, orthopnea, paroxysmal nocturnal dyspnea and syncope.   Respiratory: Negative.     Hematologic/Lymphatic: Negative for bleeding problem.   Musculoskeletal:  Negative for falls.   Gastrointestinal:  Negative for melena.   Neurological:  Negative for dizziness and light-headedness.       Allergies   Allergen Reactions    Latex Itching         Current Outpatient Medications:     atorvastatin (LIPITOR) 40 MG tablet, TAKE 1 TABLET EVERY NIGHT, Disp: 90 tablet, Rfl: 3    Eliquis 2.5 MG tablet tablet, TAKE 1 TABLET EVERY 12 HOURS, Disp: 180 tablet, Rfl: 3    escitalopram (LEXAPRO) 20 MG tablet, TAKE 1 TABLET DAILY, Disp: 90 tablet, Rfl: 3    levothyroxine (SYNTHROID, LEVOTHROID) 88 MCG tablet, TAKE 1 TABLET DAILY, Disp: 90 tablet, Rfl: 3    multivitamin with minerals tablet tablet, Take 1 tablet by mouth Daily., Disp: , Rfl:     Nutritional Supplements (VITAMIN D BOOSTER PO), Take  by mouth., Disp: , Rfl:     omeprazole (priLOSEC) 40 MG capsule, TAKE 1 CAPSULE DAILY, Disp: 90  "capsule, Rfl: 3    sotalol (BETAPACE) 80 MG tablet, TAKE 1 TABLET DAILY, Disp: 90 tablet, Rfl: 1    albuterol sulfate  (90 Base) MCG/ACT inhaler, Inhale 1 puff Every 6 (Six) Hours As Needed for Shortness of Air. (Patient not taking: Reported on 3/4/2024), Disp: 108 g, Rfl: 1    cyanocobalamin (VITAMIN B-12) 2000 MCG tablet tablet, Take 1 tablet by mouth Daily. (Patient not taking: Reported on 3/4/2024), Disp: 30 tablet, Rfl:     meclizine (ANTIVERT) 12.5 MG tablet, Take 1 tablet by mouth 3 (Three) Times a Day As Needed for Dizziness. (Patient not taking: Reported on 3/4/2024), Disp: 18 tablet, Rfl: 0      Objective:     Vitals:    03/04/24 1228   Weight: 86.7 kg (191 lb 3.2 oz)   Height: 180.3 cm (71\")     Body mass index is 26.67 kg/m².    PHYSICAL EXAM:    Neck:      Vascular: No JVD.   Pulmonary:      Effort: Pulmonary effort is normal.      Breath sounds: Normal breath sounds.   Cardiovascular:      Normal rate. Regular rhythm.      Murmurs: There is no murmur.      No gallop.  No click. No rub.   Pulses:     Intact distal pulses.           ECG 12 Lead    Date/Time: 3/4/2024 12:40 PM  Performed by: Jazmin Tenorio APRN    Authorized by: Jazmin Tenorio APRN  Comparison: compared with previous ECG from 2/7/2023  Similar to previous ECG  Rhythm: sinus bradycardia  Rate: bradycardic  BPM: 50  Conduction: 1st degree AV block            Assessment:       Diagnosis Plan   1. Coronary arteriosclerosis in native artery        2. S/P CABG (coronary artery bypass graft)        3. Ischemic cardiomyopathy        4. Nonrheumatic mitral valve regurgitation        5. S/P mitral valve repair        6. Paroxysmal atrial fibrillation          No orders of the defined types were placed in this encounter.         Plan:       1.  Coronary artery disease.  History of CABG. he denies any symptoms of angina.  Continue atorvastatin.      2.  Ischemic cardiomyopathy.  Echocardiogram from 2019 demonstrated an EF of 51 to " 55%.  He appears euvolemic today.  Denies symptoms of CHF.      3.  Mitral regurgitation.  Status post mitral valve repair.  Echocardiogram from 2019 demonstrated a well-functioning mitral valve.      4.  Paroxysmal atrial fibrillation.  He is maintaining sinus rhythm with sotalol and anticoagulated with Eliquis.      I think he is stable.  I will make any changes, and he will follow-up with Dr. Yuan in 1 year.      As always, it has been a pleasure to participate in your patient's care.      Sincerely,         GEOFF Fraga

## 2024-04-02 NOTE — TELEPHONE ENCOUNTER
lov 2/8/21  Nov  8/9/21   Use - 'see Attached Documentation' Verbiage?: Yes - Will Include Text Below and Will Remove Other Portions of the Note Use Ssv Or Lsv: SSV See Attached Documentation Text: No evidence of Deep Vein Thrombosis.\\nSuccessful Closure of previously treated vein.\\nPlease refer to the attached ultrasound documentation for complete details of the procedure and the venous findings. Other Right Leg Doppler Findings: No evidence of Deep Vein Thrombosis. Successful closure of previously treated veins. Detail Level: Detailed

## 2024-04-17 ENCOUNTER — TREATMENT (OUTPATIENT)
Dept: PHYSICAL THERAPY | Facility: CLINIC | Age: 89
End: 2024-04-17
Payer: MEDICARE

## 2024-04-17 DIAGNOSIS — R26.89 BALANCE PROBLEM: Primary | ICD-10-CM

## 2024-04-17 DIAGNOSIS — R26.9 GAIT DISTURBANCE: ICD-10-CM

## 2024-04-17 PROCEDURE — 97110 THERAPEUTIC EXERCISES: CPT | Performed by: PHYSICAL THERAPIST

## 2024-04-17 PROCEDURE — 97161 PT EVAL LOW COMPLEX 20 MIN: CPT | Performed by: PHYSICAL THERAPIST

## 2024-04-17 NOTE — PROGRESS NOTES
Physical Therapy Initial Evaluation and Plan of Care    Patient: Bhaskar Ibarra   : 1935  Diagnosis/ICD-10 Code:  Balance problem [R26.89]  Referring practitioner: Radha Bowers DO  Past medical Hx reviewed: 2024    Subjective Evaluation    History of Present Illness  Mechanism of injury: I had yearly check up with my doctor and we had a conversation about starting PT.  He fell in the kitchen about 5-6 weeks ago.  He had a regular cane and said that he turned too quickly and loss his balance and fell onto the R shoulder.  The family was able to get him up and he said that he was okay and did not require any medical attention.      We have noticed an increase in his loss of balance and unsteadiness.  We just want to be safe.  There is a shower chair.  Still drives.  Still grocery shopping and taking in the house.  My daughter does the laundry.  Garage for parking.        Patient Occupation: N/A Pain  Current pain ratin  At best pain ratin  At worst pain rating: 3  Location: back pain: after prolonged standing.  Aggravating factors: ambulation and lifting    Social Support  Lives in: one-story house (2 stairs to enter home with railing.  Home has basement.  Occassionaly goes to basement.  Frequents everyother day.)  Lives with: alone    Patient Goals  Patient goals for therapy: increased strength, improved balance, decreased pain and independence with ADLs/IADLs  Patient goal: Return single post cane. Go to park with daughter and walk 15 min.  PLOF: Hx of lumbar surgeries x 2.  Hx CABG x 3.      Objective          Strength/Myotome Testing     Left Hip   Planes of Motion   Flexion: 4+  Extension: 4  Abduction: 3+  Adduction: 5  External rotation: 4+  Internal rotation: 4+    Right Hip   Planes of Motion   Flexion: 4+  Extension: 4  Abduction: 3+  Adduction: 5  External rotation: 4+  Internal rotation: 4+    Functional Assessment     Comments  5x Sit to stand:  CGA x 1, no UE assist.  Does  not fully extend hips and trunk: 22 sec.    TU. 13 sec with Quad cane and CGA x 1.               15.51 sec without cane and CGA x 1.      M-CTSIB:  Condition 1 met.  Feet together eyes open for 30 sec.  Condition 2: eyes closed: 6 sec.   Sternal nudge:  (-).  Posterior and lateral nudge stable with feet together.         Assessment & Plan       Assessment  Impairments: abnormal coordination, abnormal gait, activity intolerance, impaired balance, impaired physical strength, lacks appropriate home exercise program and safety issue   Functional limitations: carrying objects, lifting, walking, standing, stooping and reaching overhead   Assessment details: Pt presents to PT with symptoms consistent with balance deficit and community safety limitations.  Pt would benefit from skilled PT intervention to address the deficits noted.     Prognosis: good    Goals  Plan Goals: SHORT TERM GOALS:  4 weeks  1. Pt will be compliant with HEP.  2. Pt will have TUG score of 13 sec or less with SPC cane in order to decrease risk for falls.  3.  Pt to perform 5 x STS in <20 sec to demonstrate improve safety with positional changes.  4. Pt will be able to ambulate for 10 min with cane and without rest break due to improved endurance and strength.  5. Pt will demonstrate 4/5 B hip abuction strength in order to improve gait, transfers and stair climbing ability.      LONG TERM GOALS:  6-8 weeks  1.Pt will be able to ambulate for 15 min or greater with improve step length and heel strike in order to decrease fall risk and become safe community ambulator.  2. Pt will be able to climb 15 steps alternating with Railing due to improved strength.  3. Pt will be able to perform 5x sit-stand without use of UE < 20 sec due to improved strength.  4. Pt will be able to stand on foam for 15 sec/level surface 30 sec  with eyes closed or greater due to improved balance.  5. Pt will improve TUG score to <10 sec or less due to improved gait  mechanics, strength and balance.  6.  Pt to score at least 50/56 on ALEXANDRE assessment.        Plan  Therapy options: will be seen for skilled therapy services  Planned modality interventions: cryotherapy and thermotherapy (hydrocollator packs)  Planned therapy interventions: abdominal trunk stabilization, ADL retraining, body mechanics training, balance/weight-bearing training, flexibility, functional ROM exercises, gait training, home exercise program, neuromuscular re-education, motor coordination training, postural training, soft tissue mobilization, strengthening, stretching, therapeutic activities, transfer training and IADL retraining  Frequency: 2x week  Duration in visits: 14  Treatment plan discussed with: patient    Manual Therapy:    -     mins  83218;  Therapeutic Exercise:    8     mins  30539;     Neuromuscular Sujata:    -    mins  51143;    Therapeutic Activity:     12     mins  20118;   Pt and family education on cane selection, safety with stairs at home and outdoor terrain.    Gait Training:      -     mins  51167;     Ultrasound:     -     mins  82103;    Electrical Stimulation:    -     mins  11953 ( );  Dry Needling     -     mins self-pay    Timed Treatment:   20   mins   Total Treatment:     55   mins    PT SIGNATURE:  Servando Dean DPT, PT     DUSTIN Alejandro License #: [id number]    DATE TREATMENT INITIATED: 4/17/2024    Medicare Initial Certification  Certification Period: 7/16/2024  I certify that the therapy services are furnished while this patient is under my care.  The services outlined above are required by this patient, and will be reviewed every 90 days.     PHYSICIAN: Radha Bowers,       DATE:     Please sign and return via fax to [phone].. Thank you, [facility] Physical Therapy.

## 2024-04-19 ENCOUNTER — TREATMENT (OUTPATIENT)
Dept: PHYSICAL THERAPY | Facility: CLINIC | Age: 89
End: 2024-04-19
Payer: MEDICARE

## 2024-04-19 DIAGNOSIS — R26.89 BALANCE PROBLEM: Primary | ICD-10-CM

## 2024-04-19 DIAGNOSIS — R26.9 GAIT DISTURBANCE: ICD-10-CM

## 2024-04-19 PROCEDURE — 97110 THERAPEUTIC EXERCISES: CPT | Performed by: PHYSICAL THERAPIST

## 2024-04-19 PROCEDURE — 97112 NEUROMUSCULAR REEDUCATION: CPT | Performed by: PHYSICAL THERAPIST

## 2024-04-19 NOTE — PROGRESS NOTES
Physical Therapy Daily Treatment Note      Patient: Bhaskar Ibarra   : 1935  Referring practitioner: Radha Bowers DO  Date of Initial Visit: Type: THERAPY  Noted: 2024  Today's Date: 2024  Patient seen for 2 sessions       Visit Diagnoses:      ICD-10-CM ICD-9-CM   1. Balance problem  R26.89 781.99   2. Gait disturbance  R26.9 781.2     Subjective: No new complaints.  I like the exercise that you gave me last visit.  It seems to help.      Objective:     See Exercise, Manual, and Modality Logs for complete treatment.     Assessment/Plan: Pt tolerated treatment well today.  Our focus was on improving head position in standing with more forward gaze.  We also initiated eyes closed balance training for proprioceptive training.     Progress per Plan of Care and Progress strengthening /stabilization /functional activity    Timed:         Manual Therapy:    -     mins  86105;     Therapeutic Exercise:    20     mins  63625;     Neuromuscular Sujata:    15    mins  00500;    Therapeutic Activity:     -     mins  11836;     Gait Trainin     mins  64157;     Ultrasound:     -     mins  76198;    Electrical Stimulation:    -     mins  33223 ( );  Dry Needling     -     mins self-pay  Traction     -     mins 94861      Timed Treatment:   40  mins   Total Treatment:     40   mins    DUSTIN Alejandro License: [id number]

## 2024-04-24 ENCOUNTER — TREATMENT (OUTPATIENT)
Dept: PHYSICAL THERAPY | Facility: CLINIC | Age: 89
End: 2024-04-24
Payer: MEDICARE

## 2024-04-24 DIAGNOSIS — R26.9 GAIT DISTURBANCE: ICD-10-CM

## 2024-04-24 DIAGNOSIS — R26.89 BALANCE PROBLEM: Primary | ICD-10-CM

## 2024-04-24 PROCEDURE — 97110 THERAPEUTIC EXERCISES: CPT | Performed by: PHYSICAL THERAPIST

## 2024-04-24 PROCEDURE — 97112 NEUROMUSCULAR REEDUCATION: CPT | Performed by: PHYSICAL THERAPIST

## 2024-04-24 PROCEDURE — 97116 GAIT TRAINING THERAPY: CPT | Performed by: PHYSICAL THERAPIST

## 2024-04-24 NOTE — PROGRESS NOTES
Physical Therapy Daily Treatment Note      Patient: Bhaskar Ibarra   : 1935  Referring practitioner: Radha Bowers DO  Date of Initial Visit: Type: THERAPY  Noted: 2024  Today's Date: 2024  Patient seen for 3 sessions       Visit Diagnoses:      ICD-10-CM ICD-9-CM   1. Balance problem  R26.89 781.99   2. Gait disturbance  R26.9 781.2     Subjective: No new complaints.  I have been able to comfortably move to a single point cane.    Objective:     See Exercise, Manual, and Modality Logs for complete treatment.     Assessment/Plan: Pt tolerated treatment progression and demonstrated good tolerance to new TE.  He does tend to lean on the can on R side vs using it to steady himself.      Progress per Plan of Care and Progress strengthening /stabilization /functional activity    Timed:         Manual Therapy:    -     mins  38824;     Therapeutic Exercise:    18     mins  94348;     Neuromuscular Sujata:    8    mins  76067;    Therapeutic Activity:      5    mins  30934;     Gait Training:      10     mins  96276;     Ultrasound:     -     mins  84959;    Electrical Stimulation:    -     mins  91638 ( );  Dry Needling     -     mins self-pay  Traction     -     mins 82071      Timed Treatment:   41   mins   Total Treatment:     41   mins    Servando Dean PT  [place] License: [id number]

## 2024-04-26 ENCOUNTER — TREATMENT (OUTPATIENT)
Dept: PHYSICAL THERAPY | Facility: CLINIC | Age: 89
End: 2024-04-26
Payer: MEDICARE

## 2024-04-26 DIAGNOSIS — R26.9 GAIT DISTURBANCE: ICD-10-CM

## 2024-04-26 DIAGNOSIS — R26.89 BALANCE PROBLEM: Primary | ICD-10-CM

## 2024-04-26 PROCEDURE — 97110 THERAPEUTIC EXERCISES: CPT | Performed by: PHYSICAL THERAPIST

## 2024-04-26 PROCEDURE — 97116 GAIT TRAINING THERAPY: CPT | Performed by: PHYSICAL THERAPIST

## 2024-04-26 NOTE — PROGRESS NOTES
Physical Therapy Daily Treatment Note      Patient: Bhaskar Ibarra   : 1935  Diagnosis/ICD-10 Code:  Balance problem [R26.89]  Referring practitioner: Radha Bowers DO  Date of Initial Visit: Type: THERAPY  Noted: 2024  Today's Date: 2024  Patient seen for 4 sessions         Visit Diagnoses:    ICD-10-CM ICD-9-CM   1. Balance problem  R26.89 781.99   2. Gait disturbance  R26.9 781.2       Subjective   Bhaskar Ibarra reports: Pt reports he is sore and tired all over this afternoon. Pt reports he uses a tens unit at home to help his back.     Objective   See Exercise, Manual, and Modality Logs for complete treatment.       Assessment/Plan  Pt demonstrated good tolerance to progression of therapeutic exercise/therapeutic activities. Pt demonstrated improvement with 180 degree turns and balancing with his eyes closed. Pt does require frequent rest breaks. Pt will continue to benefit from skilled PT services in order to improve balance, strength, and functional activities.  Progress per Plan of Care and Progress strengthening /stabilization /functional activity           Manual Therapy:         mins  07426;  Therapeutic Exercise:    12     mins  58023;     Neuromuscular Sujata:    5    mins  44471;    Therapeutic Activity:     5     mins  53866;     Gait Trainin     mins  69897;     Ultrasound:          mins  04951;    Electrical Stimulation:         mins  79588 ( );  Dry Needling          mins self-pay    Timed Treatment:   30   mins   Total Treatment:     50   mins        Tomasa Gillis, Student PTA       I was present in the PT Department guiding the student by approving, concurring, and confirming the skilled judgement for all services rendered.     Wolf Sanchez PTA  [place] License #[id number]  Physical Therapist Assistant

## 2024-05-01 ENCOUNTER — TREATMENT (OUTPATIENT)
Dept: PHYSICAL THERAPY | Facility: CLINIC | Age: 89
End: 2024-05-01
Payer: MEDICARE

## 2024-05-01 DIAGNOSIS — R26.89 BALANCE PROBLEM: Primary | ICD-10-CM

## 2024-05-01 DIAGNOSIS — R26.9 GAIT DISTURBANCE: ICD-10-CM

## 2024-05-03 ENCOUNTER — TREATMENT (OUTPATIENT)
Dept: PHYSICAL THERAPY | Facility: CLINIC | Age: 89
End: 2024-05-03
Payer: MEDICARE

## 2024-05-03 DIAGNOSIS — R26.89 BALANCE PROBLEM: Primary | ICD-10-CM

## 2024-05-03 DIAGNOSIS — R26.9 GAIT DISTURBANCE: ICD-10-CM

## 2024-05-03 NOTE — PROGRESS NOTES
Physical Therapy Daily Treatment Note      Patient: Bhaskar Ibarra   : 1935  Diagnosis/ICD-10 Code:  Balance problem [R26.89]  Referring practitioner: Radha Bowers DO  Date of Initial Visit: Type: THERAPY  Noted: 2024  Today's Date: 5/3/2024  Patient seen for 6 sessions         Visit Diagnoses:    ICD-10-CM ICD-9-CM   1. Balance problem  R26.89 781.99   2. Gait disturbance  R26.9 781.2       Subjective   Bhaskar Ibarra reports:   My balance is getting better, I don't feel as wobbly when I'm up.      Objective   See Exercise, Manual, and Modality Logs for complete treatment.     Pt Education:  Exercise rationale/ pain free exercise performance  Posture/Postural awareness  Verbal/Tactile cues to ensure correct exercise performance/technique  Reviewed current HEP, progressed therex program with exercises as noted.  Added seated alt UE/LE march to HEP, written instructions issued.    Assessment/Plan  Pt found toy soldier walk/standing contralateral march very challenging, unable to safely maintain balance or to perform smoothly coordinated movements.  Modified to seated unsupported in chair, allowing concentration on coordination without balance concerns.      Pt will continue to benefit from skilled PT services in order to improve balance, gait/ambulation coordination, and functional activity.     Progress per Plan of Care and Progress strengthening /stabilization /functional activity           Manual Therapy:         mins  43822;  Therapeutic Exercise:         mins  32525;     Neuromuscular Sujata:   15    mins  81168;    Therapeutic Activity:     8     mins  15772;     Gait Training:           mins  15366;     Ultrasound:          mins  73269;    Electrical Stimulation:         mins  56949 ( );  Dry Needling          mins self-pay    Timed Treatment:   23   mins   Total Treatment:     35   mins        ASAD Kerr License #[id number]  Physical Therapist Assistant      See other encounter  Message was left for patient to call back

## 2024-05-08 ENCOUNTER — TREATMENT (OUTPATIENT)
Dept: PHYSICAL THERAPY | Facility: CLINIC | Age: 89
End: 2024-05-08
Payer: MEDICARE

## 2024-05-08 DIAGNOSIS — R26.9 GAIT DISTURBANCE: ICD-10-CM

## 2024-05-08 DIAGNOSIS — R26.89 BALANCE PROBLEM: Primary | ICD-10-CM

## 2024-05-08 PROCEDURE — 97112 NEUROMUSCULAR REEDUCATION: CPT | Performed by: PHYSICAL THERAPIST

## 2024-05-08 PROCEDURE — 97110 THERAPEUTIC EXERCISES: CPT | Performed by: PHYSICAL THERAPIST

## 2024-05-15 ENCOUNTER — TREATMENT (OUTPATIENT)
Dept: PHYSICAL THERAPY | Facility: CLINIC | Age: 89
End: 2024-05-15
Payer: MEDICARE

## 2024-05-15 DIAGNOSIS — R26.89 BALANCE PROBLEM: Primary | ICD-10-CM

## 2024-05-15 DIAGNOSIS — R26.9 GAIT DISTURBANCE: ICD-10-CM

## 2024-05-15 PROCEDURE — 97112 NEUROMUSCULAR REEDUCATION: CPT | Performed by: PHYSICAL THERAPIST

## 2024-05-15 PROCEDURE — 97116 GAIT TRAINING THERAPY: CPT | Performed by: PHYSICAL THERAPIST

## 2024-05-15 PROCEDURE — 97530 THERAPEUTIC ACTIVITIES: CPT | Performed by: PHYSICAL THERAPIST

## 2024-05-15 NOTE — PROGRESS NOTES
Physical Therapy Daily Treatment Note      Patient: Bhaskar Ibarra   : 1935  Referring practitioner: Radha Bowers DO  Date of Initial Visit: Type: THERAPY  Noted: 2024  Today's Date: 5/15/2024  Patient seen for 8 sessions       Visit Diagnoses:      ICD-10-CM ICD-9-CM   1. Balance problem  R26.89 781.99   2. Gait disturbance  R26.9 781.2     Subjective: I did bring my daughter today so she can see what we are working on.  Daughter reports:  He is moving much better at home and I feel that he is more safe in the home already.      Objective:     See Exercise, Manual, and Modality Logs for complete treatment.     Assessment/Plan: Pt continues to respond to PT well.  Strength improvements are evident by his ability to stand from chairs with less effort.  He safely ambulates with SPC independent and can manipulate heavy doors as well.  He can walk without AD but CGA x 1.      Progress per Plan of Care and Progress strengthening /stabilization /functional activity    Timed:         Manual Therapy:    -     mins  25338;     Therapeutic Exercise:    10     mins  10031;     Neuromuscular Sujata:    10    mins  80092;    Therapeutic Activity:     10     mins  65796;     Gait Training:      10     mins  59795;     Ultrasound:     -     mins  76605;    Electrical Stimulation:    -     mins  09299 ( );  Dry Needling     -     mins self-pay  Traction     -     mins 38038      Timed Treatment:   40   mins   Total Treatment:     40   mins    DUSTIN Alejandro License: [id number]

## 2024-05-17 ENCOUNTER — TREATMENT (OUTPATIENT)
Dept: PHYSICAL THERAPY | Facility: CLINIC | Age: 89
End: 2024-05-17
Payer: MEDICARE

## 2024-05-17 DIAGNOSIS — R26.9 GAIT DISTURBANCE: ICD-10-CM

## 2024-05-17 DIAGNOSIS — R26.89 BALANCE PROBLEM: Primary | ICD-10-CM

## 2024-05-17 PROCEDURE — 97116 GAIT TRAINING THERAPY: CPT | Performed by: PHYSICAL THERAPIST

## 2024-05-17 PROCEDURE — 97530 THERAPEUTIC ACTIVITIES: CPT | Performed by: PHYSICAL THERAPIST

## 2024-05-17 PROCEDURE — 97112 NEUROMUSCULAR REEDUCATION: CPT | Performed by: PHYSICAL THERAPIST

## 2024-05-17 NOTE — PROGRESS NOTES
Physical Therapy Daily Treatment Note      Patient: Bhaskar Ibarra   : 1935  Referring practitioner: Radha Bowers DO  Date of Initial Visit: Type: THERAPY  Noted: 2024  Today's Date: 2024  Patient seen for 9 sessions       Visit Diagnoses:      ICD-10-CM ICD-9-CM   1. Balance problem  R26.89 781.99   2. Gait disturbance  R26.9 781.2     Subjective: No new complaints.  Doing well.      Objective:     See Exercise, Manual, and Modality Logs for complete treatment.     Assessment/Plan: Pt tolerated treatment well and we continue to challenge him with balance and functional strengthening.       Progress per Plan of Care and Progress strengthening /stabilization /functional activity    Timed:         Manual Therapy:    -     mins  44796;     Therapeutic Exercise:    15     mins  35497;     Neuromuscular Sujata:    10    mins  70449;    Therapeutic Activity:     10     mins  32721;     Gait Training:      10     mins  79487;     Ultrasound:     -     mins  08886;    Electrical Stimulation:    -     mins  50483 ( );  Dry Needling     -     mins self-pay  Traction     -     mins 23276      Timed Treatment:   45   mins   Total Treatment:     45   mins    DUSTIN Alejandro License: [id number]

## 2024-05-20 ENCOUNTER — TREATMENT (OUTPATIENT)
Dept: PHYSICAL THERAPY | Facility: CLINIC | Age: 89
End: 2024-05-20
Payer: MEDICARE

## 2024-05-20 DIAGNOSIS — R26.9 GAIT DISTURBANCE: ICD-10-CM

## 2024-05-20 DIAGNOSIS — R26.89 BALANCE PROBLEM: Primary | ICD-10-CM

## 2024-05-20 PROCEDURE — 97112 NEUROMUSCULAR REEDUCATION: CPT | Performed by: PHYSICAL THERAPIST

## 2024-05-20 PROCEDURE — 97110 THERAPEUTIC EXERCISES: CPT | Performed by: PHYSICAL THERAPIST

## 2024-05-20 PROCEDURE — 97530 THERAPEUTIC ACTIVITIES: CPT | Performed by: PHYSICAL THERAPIST

## 2024-05-20 NOTE — PROGRESS NOTES
Physical Therapy Daily Treatment Note      Patient: Bhaskar Ibarra   : 1935  Referring practitioner: Radha Bowers DO  Date of Initial Visit: Type: THERAPY  Noted: 2024  Today's Date: 2024  Patient seen for 10 sessions       Visit Diagnoses:      ICD-10-CM ICD-9-CM   1. Balance problem  R26.89 781.99   2. Gait disturbance  R26.9 781.2     Subjective: No new complaints.  I can tell that I am getting stronger and more confident.     Objective:     See Exercise, Manual, and Modality Logs for complete treatment.     Assessment/Plan: Pt tolerated treatment well overall and continues to make progress with training.  Eyes closed standing performed with acceptable trunk sway and safety for up to 30 sec.      Progress per Plan of Care and Progress strengthening /stabilization /functional activity    Timed:         Manual Therapy:    -     mins  59802;     Therapeutic Exercise:    10     mins  18606;     Neuromuscular Sujata:    15    mins  26351;    Therapeutic Activity:     15     mins  16995;     Gait Training:      -     mins  64010;     Ultrasound:     -     mins  42198;    Electrical Stimulation:    -     mins  36515 ( );  Dry Needling     -     mins self-pay  Traction     -     mins 63424      Timed Treatment:   40   mins   Total Treatment:     40   mins    DUSTIN Alejandro License: [id number]

## 2024-05-22 ENCOUNTER — TREATMENT (OUTPATIENT)
Dept: PHYSICAL THERAPY | Facility: CLINIC | Age: 89
End: 2024-05-22
Payer: MEDICARE

## 2024-05-22 DIAGNOSIS — R26.9 GAIT DISTURBANCE: ICD-10-CM

## 2024-05-22 DIAGNOSIS — R26.89 BALANCE PROBLEM: Primary | ICD-10-CM

## 2024-05-22 PROCEDURE — 97112 NEUROMUSCULAR REEDUCATION: CPT | Performed by: PHYSICAL THERAPIST

## 2024-05-22 PROCEDURE — 97110 THERAPEUTIC EXERCISES: CPT | Performed by: PHYSICAL THERAPIST

## 2024-05-22 PROCEDURE — 97530 THERAPEUTIC ACTIVITIES: CPT | Performed by: PHYSICAL THERAPIST

## 2024-05-22 NOTE — PROGRESS NOTES
Physical Therapy Daily Treatment Note      Patient: Bhaskar Ibarra   : 1935  Referring practitioner: Radha Bowers DO  Date of Initial Visit: Type: THERAPY  Noted: 2024  Today's Date: 2024  Patient seen for 11 sessions       Visit Diagnoses:      ICD-10-CM ICD-9-CM   1. Balance problem  R26.89 781.99   2. Gait disturbance  R26.9 781.2     Subjective:   No new complaints at onset of today's session.      Objective:   See Exercise, Manual, and Modality Logs for complete treatment.     Pt Education:  Exercise rationale/ pain free exercise performance  Posture/Postural awareness  Lumbar support  Verbal/Tactile cues to ensure correct exercise performance/technique  Reviewed current HEP, progressed therex program with exercises as noted.  Added Romberg stance/narrow base balance to HEP, written instructions issued, reviewed for safety (countertop in front, chair behind.    Assessment/Plan:   Pt tolerated treatment well overall and continues to make progress with balance and stability training.  Found Nelson stance challenging, performed at end of session when fatigue may have been more of a factor.     Progress per Plan of Care and Progress strengthening /stabilization /functional activity    Timed:         Manual Therapy:    -     mins  64511;     Therapeutic Exercise:    10     mins  74084;     Neuromuscular Sujata:    15    mins  32101;    Therapeutic Activity:     15     mins  60964;     Gait Training:      -     mins  28681;     Ultrasound:     -     mins  63442;    Electrical Stimulation:    -     mins  35221 ( );  Dry Needling     -     mins self-pay  Traction     -     mins 39734      Timed Treatment:   40   mins   Total Treatment:     40   mins    ASAD Kerr License #[id number]  Physical Therapist Assistant

## 2024-05-29 ENCOUNTER — TREATMENT (OUTPATIENT)
Dept: PHYSICAL THERAPY | Facility: CLINIC | Age: 89
End: 2024-05-29
Payer: MEDICARE

## 2024-05-29 DIAGNOSIS — R26.9 GAIT DISTURBANCE: ICD-10-CM

## 2024-05-29 DIAGNOSIS — R26.89 BALANCE PROBLEM: Primary | ICD-10-CM

## 2024-05-29 PROCEDURE — 97112 NEUROMUSCULAR REEDUCATION: CPT | Performed by: PHYSICAL THERAPIST

## 2024-05-29 PROCEDURE — 97530 THERAPEUTIC ACTIVITIES: CPT | Performed by: PHYSICAL THERAPIST

## 2024-05-29 PROCEDURE — 97116 GAIT TRAINING THERAPY: CPT | Performed by: PHYSICAL THERAPIST

## 2024-05-29 NOTE — PROGRESS NOTES
"  Physical Therapy Daily Treatment Note      Patient: Bhaskar Ibarra   : 1935  Referring practitioner: Radha Bowers DO  Date of Initial Visit: Type: THERAPY  Noted: 2024  Today's Date: 2024  Patient seen for 12 sessions       Visit Diagnoses:      ICD-10-CM ICD-9-CM   1. Balance problem  R26.89 781.99   2. Gait disturbance  R26.9 781.2     Subjective: I'm doing pretty well.  I end up walking a little bit at home without my cane.  I have a small house and no big open spaces without being able to touch things.    Objective:     See Exercise, Manual, and Modality Logs for complete treatment.     Assessment/Plan: Pt tolerated treatment well and continues to benefit from randomized gait and balance training activity.  Of note, most difficulty with 6\" chanel step overs in series with ASHLEIGH Bucio.      Progress per Plan of Care and Progress strengthening /stabilization /functional activity    Timed:         Manual Therapy:    -     mins  74984;     Therapeutic Exercise:    15     mins  93475;     Neuromuscular Sujata:    15    mins  98834;    Therapeutic Activity:     10     mins  55380;     Gait Training:      10     mins  73936;     Ultrasound:     -     mins  81247;    Electrical Stimulation:    -     mins  51958 ( );  Dry Needling     -     mins self-pay  Traction     -     mins 70038      Timed Treatment:   50   mins   Total Treatment:     50   mins    DUSTIN Alejandro License: [id number]          "

## 2024-06-05 ENCOUNTER — TREATMENT (OUTPATIENT)
Dept: PHYSICAL THERAPY | Facility: CLINIC | Age: 89
End: 2024-06-05
Payer: MEDICARE

## 2024-06-05 DIAGNOSIS — R26.89 BALANCE PROBLEM: Primary | ICD-10-CM

## 2024-06-05 DIAGNOSIS — R26.9 GAIT DISTURBANCE: ICD-10-CM

## 2024-06-05 NOTE — PROGRESS NOTES
Physical Therapy Daily Treatment Note      Patient: Bhaskar Ibarra   : 1935  Referring practitioner: Radha Bowers DO  Date of Initial Visit: Type: THERAPY  Noted: 2024  Today's Date: 2024  Patient seen for 13 sessions       Visit Diagnoses:      ICD-10-CM ICD-9-CM   1. Balance problem  R26.89 781.99   2. Gait disturbance  R26.9 781.2     Subjective: I've had more confidence to walk more without needing the cane.  I was able to carry the cane when walking into PT today.      Objective:     See Exercise, Manual, and Modality Logs for complete treatment.     Assessment/Plan: Although patient has shown improved balance and gait reactions, he would still benefit from the use of SPC or walking stick for safety.  He does walk unassisted in clinic under supervision and SBA x 1.      Progress per Plan of Care and Progress strengthening /stabilization /functional activity    Timed:         Manual Therapy:    -     mins  65602;     Therapeutic Exercise:    35     mins  40575;     Neuromuscular Sujata:    10    mins  54364;    Therapeutic Activity:     -     mins  45262;     Gait Training:      -     mins  63578;     Ultrasound:     -     mins  45484;    Electrical Stimulation:    -     mins  03899 ( );  Dry Needling     -     mins self-pay  Traction     -     mins 99495      Timed Treatment:   45   mins   Total Treatment:     45   mins    DUSTIN Alejandro License: [id number]

## 2024-06-07 ENCOUNTER — TREATMENT (OUTPATIENT)
Dept: PHYSICAL THERAPY | Facility: CLINIC | Age: 89
End: 2024-06-07
Payer: MEDICARE

## 2024-06-07 DIAGNOSIS — R26.89 BALANCE PROBLEM: Primary | ICD-10-CM

## 2024-06-07 PROCEDURE — 97112 NEUROMUSCULAR REEDUCATION: CPT | Performed by: PHYSICAL THERAPIST

## 2024-06-07 PROCEDURE — 97116 GAIT TRAINING THERAPY: CPT | Performed by: PHYSICAL THERAPIST

## 2024-06-07 PROCEDURE — 97110 THERAPEUTIC EXERCISES: CPT | Performed by: PHYSICAL THERAPIST

## 2024-06-10 ENCOUNTER — TREATMENT (OUTPATIENT)
Dept: PHYSICAL THERAPY | Facility: CLINIC | Age: 89
End: 2024-06-10
Payer: MEDICARE

## 2024-06-10 DIAGNOSIS — R26.89 BALANCE PROBLEM: Primary | ICD-10-CM

## 2024-06-10 NOTE — PROGRESS NOTES
Physical Therapy Daily Treatment Note      Patient: Bhaskar Ibarra   : 1935  Referring practitioner: Radha Bowers DO  Date of Initial Visit: Type: THERAPY  Noted: 2024  Today's Date: 2024  Patient seen for 14 sessions       Visit Diagnoses:      ICD-10-CM ICD-9-CM   1. Balance problem  R26.89 781.99     Subjective: No new complaints today.  Feeling more confident with my walking.      Objective:     See Exercise, Manual, and Modality Logs for complete treatment.     Assessment/Plan: Pt tolerated treatment well today including some CGA x 1 outdoor walking on uneven terrain.  He did not have to place his cane down and no LOB episodes, only fatigue noted.      Progress per Plan of Care and Progress strengthening /stabilization /functional activity    Timed:         Manual Therapy:    -     mins  19929;     Therapeutic Exercise:    25     mins  89271;     Neuromuscular Sujata:    10    mins  34841;    Therapeutic Activity:     -     mins  81744;     Gait Training:      10     mins  25819;     Ultrasound:     -     mins  45096;    Electrical Stimulation:    -     mins  27659 ( );  Dry Needling     -     mins self-pay  Traction     -     mins 58370      Timed Treatment:   45   mins   Total Treatment:     45   mins    Servando Dean PT  [place] License: [id number]

## 2024-06-12 ENCOUNTER — TREATMENT (OUTPATIENT)
Dept: PHYSICAL THERAPY | Facility: CLINIC | Age: 89
End: 2024-06-12
Payer: MEDICARE

## 2024-06-12 DIAGNOSIS — R26.89 BALANCE PROBLEM: Primary | ICD-10-CM

## 2024-06-12 DIAGNOSIS — R26.9 GAIT DISTURBANCE: ICD-10-CM

## 2024-06-12 NOTE — PROGRESS NOTES
Physical Therapy Daily Treatment Note      Patient: Bhaskar Ibarra   : 1935  Referring practitioner: Radha Bowers DO  Date of Initial Visit: No linked episodes  Today's Date: 2024  Patient seen for Visit count could not be calculated. Make sure you are using a visit which is associated with an episode. sessions       Visit Diagnoses:      ICD-10-CM ICD-9-CM   1. Balance problem  R26.89 781.99   2. Gait disturbance  R26.9 781.2     Subjective: No new complaints today.  I still get pretty tired at PT but at home and other environments I don't usually do enough to get tired.      Objective:     See Exercise, Manual, and Modality Logs for complete treatment.     Assessment/Plan: Pt continues to tolerate TE and progressions very well.  We continue to challenge him in various capacities and functional tasks.  Requires less assist and demonstrates good functional weighted object  with CGA x 1.      Progress per Plan of Care and Progress strengthening /stabilization /functional activity    Timed:         Manual Therapy:    -     mins  31541;     Therapeutic Exercise:    25     mins  96848;     Neuromuscular Sujata:    8    mins  31321;    Therapeutic Activity:     10     mins  29402;     Gait Training:      -     mins  84141;     Ultrasound:     -     mins  38716;    Electrical Stimulation:    -     mins  20424 ( );  Dry Needling     -     mins self-pay  Traction     -     mins 55844      Timed Treatment:   43   mins   Total Treatment:     43   mins    DUSTIN Alejandro License: [id number]

## 2024-06-14 NOTE — PROGRESS NOTES
Physical Therapy Daily Treatment Note      Patient: Bhaskar Ibarra   : 1935  Referring practitioner: Radha Bowers DO  Date of Initial Visit: Type: THERAPY  Noted: 2024  Today's Date: 6/10/2024  Patient seen for 15 sessions       Visit Diagnoses:      ICD-10-CM ICD-9-CM   1. Balance problem  R26.89 781.99     Subjective: No complaints.  Therapy has been helpful and I don't have the episodes of near falls recently.      Objective:     See Exercise, Manual, and Modality Logs for complete treatment.     Assessment/Plan: Pt tolerated treatment well overall and we continue to challenge his balance both dynamically and static.      Progress per Plan of Care and Progress strengthening /stabilization /functional activity    Timed:         Manual Therapy:    -     mins  61909;     Therapeutic Exercise:    20     mins  50111;     Neuromuscular Sujata:    15    mins  12098;    Therapeutic Activity:     -     mins  67585;     Gait Training:      10     mins  96171;     Ultrasound:     -     mins  45623;    Electrical Stimulation:    -     mins  12487 ( );  Dry Needling     -     mins self-pay  Traction     -     mins 82621      Timed Treatment:   50   mins   Total Treatment:     50   mins    DUSTIN Alejandro License: [id number]

## 2024-06-17 ENCOUNTER — TREATMENT (OUTPATIENT)
Dept: PHYSICAL THERAPY | Facility: CLINIC | Age: 89
End: 2024-06-17
Payer: MEDICARE

## 2024-06-17 DIAGNOSIS — R26.89 BALANCE PROBLEM: Primary | ICD-10-CM

## 2024-06-17 DIAGNOSIS — R26.9 GAIT DISTURBANCE: ICD-10-CM

## 2024-06-17 NOTE — PROGRESS NOTES
Physical Therapy Progress Note      Patient: Bhaskar Ibarra   : 1935  Referring practitioner: Radha Bowers DO  Date of Initial Visit: Type: THERAPY  Noted: 2024  Today's Date: 2024  Patient seen for 17 sessions       Visit Diagnoses:      ICD-10-CM ICD-9-CM   1. Balance problem  R26.89 781.99   2. Gait disturbance  R26.9 781.2     Subjective: I do feel that therapy has really helped my balance.   I am more confiedent in my movements.  The L leg just still feels a little weak.  I had a procedure involving the L leg and I think that has left me with some ongoing weakness.       Objective:   Left Hip   Planes of Motion   Flexion: 4+ (5-/5)  Extension: 4 (4-/5 in s/l)   Abduction: 3+ (4+/5)  Adduction: 5  External rotation: 4+( in supine 90/90: 4/5)   Internal rotation: 4+ ( in supine 90/90: 4+/5)     L ankle: DF: 4-/5, PF: 3+/5     Right Hip   Planes of Motion   Flexion: 4+ (5/5)   Extension: 4 (4/5 in s/l)  Abduction: 3+ (4+/5 in s/l)  Adduction: 5  External rotation: 4+ (in supine 90/90: 4/5)   Internal rotation: 4+ ( in supine 90/90: 5-/5)      5x Sit to stand:  CGA x 1, no UE assist.  Does not fully extend hips and trunk: 22 sec.    2024: 22 sec first trial.  Second trial: 19.51 sec. Supervision x 1.    TU. 13 sec with Quad cane and CGA x 1.               15.51 sec without cane and CGA x 1.      24: TU.19 without cane, 10.80 sec without cane.  SBA x 1.       M-CTSIB:  Condition 1 met.  Feet together eyes open for 30 sec.  Condition 2: eyes closed: 6 sec.  2024: M-CTSIB:  Condition 3 met: Eyes closed 30 sec. Level surface.  On Foam, narrow Base: 30 sec    Sternal nudge:  (-).  Posterior and lateral nudge stable with feet together.    ALEXANDRE BALANCE TEST: 2024:    See Exercise, Manual, and Modality Logs for complete treatment.     Assessment/Plan: Bennett has done very well with PT intervention thus far.  He has made improvement in all areas but could benefit  "from ongoing care to further address hip strength to further progress his balance and gait reactions.  More emphasis to be placed on strengthening in his next few weeks of care.      Goals  Plan Goals: SHORT TERM GOALS:  4 weeks  1. Pt will be compliant with HEP.  (Reviewed)   2. Pt will have TUG score of 13 sec or less with SPC cane in order to decrease risk for falls. (MET)   3.  Pt to perform 5 x STS in <20 sec to demonstrate improve safety with positional changes.(MET)   4. Pt will be able to ambulate for 10 min with cane and without rest break due to improved endurance and strength.  (MET)   5. Pt will demonstrate 4/5 B hip abuction strength in order to improve gait, transfers and stair climbing ability.(MET)                             LONG TERM GOALS:  6-8 weeks  1.Pt will be able to ambulate for 15 min or greater with improve step length and heel strike in order to decrease fall risk and become safe community ambulator.  (Progress)   2. Pt will be able to climb 15 steps alternating with Railing due to improved strength.  (15 + steps at 6\" in height)  3. Pt will be able to perform 5x sit-stand without use of UE < 20 sec due to improved strength. (MET)  4. Pt will be able to stand on foam for 15 sec/level surface 30 sec  with eyes closed or greater due to improved balance.    5. Pt will improve TUG score to <10 sec or less due to improved gait mechanics, strength and balance. (Progress)   6.  Pt to score at least 50/56 on ALEXANDRE assessment.  (Not met, Progress)      Other    Timed:         Manual Therapy:         mins  23545;     Therapeutic Exercise:    -     mins  30201;     Neuromuscular Sujata:    -    mins  74157;    Therapeutic Activity:     25     mins  87470;   tests and measurements.    Gait Training:      -     mins  11863;     Ultrasound:     -     mins  60693;    Electrical Stimulation:    -     mins  35147 ( );  Dry Needling     -     mins self-pay  Traction     -     mins 96275      Timed " Treatment:  25   mins   Total Treatment:     25   mins    Servando Dean, PT  [place] License: [id number]

## 2024-06-19 ENCOUNTER — TREATMENT (OUTPATIENT)
Dept: PHYSICAL THERAPY | Facility: CLINIC | Age: 89
End: 2024-06-19
Payer: MEDICARE

## 2024-06-19 DIAGNOSIS — R26.9 GAIT DISTURBANCE: ICD-10-CM

## 2024-06-19 DIAGNOSIS — R26.89 BALANCE PROBLEM: Primary | ICD-10-CM

## 2024-06-19 NOTE — PROGRESS NOTES
Physical Therapy Daily Treatment Note    [facility] [place]  [address], [address]  [place] [address]    Visit # 18        Patient: Bhaskar Ibarra   : 1935  Referring practitioner: Radha Bowers DO  Date of Initial Evaluation:  Type: THERAPY  Noted: 2024  Today's Date: 2024           ICD-10-CM ICD-9-CM   1. Balance problem  R26.89 781.99   2. Gait disturbance  R26.9 781.2       Subjective  Bhaskar Ibarra reports:  Doing well overall today, no specific c/o from status at last PT visit through today.     Objective   See Exercise, Manual, and Modality Logs for complete treatment   Note: any exercises/interventions not performed today have been marked as deferred on flowsheets.     Pt Education:  HEP review  Exercise rationale/ pain free exercise performance  Anatomy and structure of affected musculature  Posture/Postural awareness  Alternate exercise positions  Verbal/Tactile cues to ensure correct exercise performance/technique    Assessment/Plan  Continued progression of therapeutic exercise with focus on strength movements today, tolerated well overall with no increased pain reported during or after exercises.   Able to add leg press with progression to 90# resistance for reps.    Would continue to benefit from skilled PT progressing with functional WB and strength.     Progress per Plan of Care and Progress strengthening /stabilization /functional activity           Timed:         Manual Therapy:         mins  71168     Therapeutic Exercise:     15    mins  80816     Neuromuscular Sujata:    10    mins  89232    Therapeutic Activity:      5    mins  08893     Gait Training:           mins  93655     Ultrasound:          mins  49890    Ionto                                   mins  00884  Self Care                            mins  20992    Un-Timed:  Electrical Stimulation:         mins 78198 ( )  Traction          mins 47077    Timed  Treatment:   30   mins   Total Treatment:     45   mins    ASAD Kerr License #[id number]  Physical Therapist Assistant

## 2024-06-26 ENCOUNTER — TREATMENT (OUTPATIENT)
Dept: PHYSICAL THERAPY | Facility: CLINIC | Age: 89
End: 2024-06-26
Payer: MEDICARE

## 2024-06-26 DIAGNOSIS — R26.9 GAIT DISTURBANCE: ICD-10-CM

## 2024-06-26 DIAGNOSIS — R26.89 BALANCE PROBLEM: Primary | ICD-10-CM

## 2024-06-26 NOTE — PROGRESS NOTES
Physical Therapy Daily Treatment Note    [facility] [place]  [address], [address]  [place] [address]    Visit # 19        Patient: Bhaskar Ibarra   : 1935  Referring practitioner: Radha Bowers DO  Date of Initial Evaluation:  Type: THERAPY  Noted: 2024  Today's Date: 2024           ICD-10-CM ICD-9-CM   1. Balance problem  R26.89 781.99   2. Gait disturbance  R26.9 781.2         Subjective  Bhaskar Ibarra reports:  I think that leg press really got me last time, I was sore for days.  It felt like I got hit with a sledgehammer!.     Objective   See Exercise, Manual, and Modality Logs for complete treatment   Note: any exercises/interventions not performed today have been marked as deferred on flowsheets.     Pt Education:  HEP review  Exercise rationale/ pain free exercise performance  Anatomy and structure of affected musculature  Posture/Postural awareness  Alternate exercise positions  Verbal/Tactile cues to ensure correct exercise performance/technique    Assessment/Plan  Continued progression of therapeutic exercise with continued focus on strength movements today.  We reduced leg press resistance to 60# for duration fo the exercise.       Would continue to benefit from skilled PT progressing with functional WB and strength.     Progress per Plan of Care and Progress strengthening /stabilization /functional activity           Timed:         Manual Therapy:         mins  78700     Therapeutic Exercise:         mins  38369     Neuromuscular Sujata:    15    mins  06952    Therapeutic Activity:      15    mins  19442     Gait Training:           mins  50254     Ultrasound:          mins  10159    Ionto                                   mins  80321  Self Care                            mins  02628    Un-Timed:  Electrical Stimulation:         mins 59060 ( )  Traction          mins 96519    Timed Treatment:   30   mins   Total Treatment:      45   mins    Wolf Sanchez, PTA  [place] License #[id number]  Physical Therapist Assistant

## 2024-06-28 ENCOUNTER — TREATMENT (OUTPATIENT)
Dept: PHYSICAL THERAPY | Facility: CLINIC | Age: 89
End: 2024-06-28
Payer: MEDICARE

## 2024-06-28 DIAGNOSIS — R26.9 GAIT DISTURBANCE: ICD-10-CM

## 2024-06-28 DIAGNOSIS — R26.89 BALANCE PROBLEM: Primary | ICD-10-CM

## 2024-07-05 NOTE — PROGRESS NOTES
Physical Therapy Daily Treatment Note    [facility] [place]  [address]  [place] [id number]    Visit # 20        Patient: Bhaskar Ibarra   : 1935  Referring practitioner: Radha Bowers DO  Date of Initial Evaluation:  Type: THERAPY  Noted: 2024  Today's Date: 2024           ICD-10-CM ICD-9-CM   1. Balance problem  R26.89 781.99   2. Gait disturbance  R26.9 781.2         Subjective  Bhaskar Ibarra reports:  No new c/o today vs last PT session.      Objective   See Exercise, Manual, and Modality Logs for complete treatment   Note: any exercises/interventions not performed today have been marked as deferred on flowsheets.     Pt Education:  HEP review  Exercise rationale/ pain free exercise performance  Anatomy and structure of affected musculature  Posture/Postural awareness  Alternate exercise positions  Verbal/Tactile cues to ensure correct exercise performance/technique    Assessment/Plan  Continued progression of therapeutic exercise with good overall tolerance to both strength activities and review/practice of balance/stability training.  .     Would continue to benefit from skilled PT progressing with functional WB and strength.     Progress per Plan of Care and Progress strengthening /stabilization /functional activity           Timed:         Manual Therapy:         mins  19164     Therapeutic Exercise:         mins  45823     Neuromuscular Sujata:    15    mins  05676    Therapeutic Activity:      15    mins  10625     Gait Training:           mins  32176     Ultrasound:          mins  38112    Ionto                                   mins  88014  Self Care                            mins  31438    Un-Timed:  Electrical Stimulation:         mins 54824 ([facility] )  Traction          mins 95098    Timed Treatment:   30   mins   Total Treatment:     45   mins    Wolf Sanchez PTA  [place] License #[id number]  Physical Therapist Assistant

## 2024-07-09 ENCOUNTER — TREATMENT (OUTPATIENT)
Dept: PHYSICAL THERAPY | Facility: CLINIC | Age: 89
End: 2024-07-09
Payer: MEDICARE

## 2024-07-09 DIAGNOSIS — R26.89 BALANCE PROBLEM: Primary | ICD-10-CM

## 2024-07-09 DIAGNOSIS — R26.9 GAIT DISTURBANCE: ICD-10-CM

## 2024-07-09 NOTE — PROGRESS NOTES
Physical Therapy Daily Treatment Note    [facility] Physical Therapy [place]  [address], [address]  [place] [address]    Visit # 21        Patient: Bhaskar Ibarra   : 1935  Referring practitioner: Radha Bowers DO  Date of Initial Evaluation:  Type: THERAPY  Noted: 2024  Today's Date: 2024           ICD-10-CM ICD-9-CM   1. Balance problem  R26.89 781.99   2. Gait disturbance  R26.9 781.2           Subjective  Bhaskar Ibarra reports:  Feeling tired today, but that is not unusual, reports he feels tired a lot of the time.     Objective   See Exercise, Manual, and Modality Logs for complete treatment   Note: any exercises/interventions not performed today have been marked as deferred on flowsheets.     Pt Education:  HEP review  Exercise rationale/ pain free exercise performance  Anatomy and structure of affected musculature  Posture/Postural awareness  Alternate exercise positions  Verbal/Tactile cues to ensure correct exercise performance/technique    Assessment/Plan  Continued progression of therapeutic exercise with good overall tolerance to both strength activities and review/practice of balance/stability training.  .     Would continue to benefit from skilled PT progressing with functional WB and strength.     Progress per Plan of Care and Progress strengthening /stabilization /functional activity           Timed:         Manual Therapy:         mins  71145     Therapeutic Exercise:     8    mins  94445     Neuromuscular Sujata:        mins  14644    Therapeutic Activity:      15    mins  46248     Gait Training:           mins  30923     Ultrasound:          mins  55144    Ionto                                   mins  69475  Self Care                            mins  60306    Un-Timed:  Electrical Stimulation:         mins 69104 ( )  Traction          mins 04108    Timed Treatment:   23   mins   Total Treatment:     45   mins    Wolf Sanchez  ASAD  [place] License #[id number]  Physical Therapist Assistant

## 2024-07-12 ENCOUNTER — TREATMENT (OUTPATIENT)
Dept: PHYSICAL THERAPY | Facility: CLINIC | Age: 89
End: 2024-07-12
Payer: MEDICARE

## 2024-07-12 DIAGNOSIS — R26.9 GAIT DISTURBANCE: ICD-10-CM

## 2024-07-12 DIAGNOSIS — R26.89 BALANCE PROBLEM: Primary | ICD-10-CM

## 2024-07-12 NOTE — PROGRESS NOTES
Physical Therapy Daily Treatment Note    [facility] Physical Therapy [place]  [address], [address]  [place] [address]    Visit # 22        Patient: Bhaskar Ibarra   : 1935  Referring practitioner: Radha Bowers DO  Date of Initial Evaluation:  Type: THERAPY  Noted: 2024  Today's Date: 2024           ICD-10-CM ICD-9-CM   1. Balance problem  R26.89 781.99   2. Gait disturbance  R26.9 781.2             Subjective  Bhaskar Ibarra reports:  No new c/o at onset of today's session.    Objective   See Exercise, Manual, and Modality Logs for complete treatment   Note: any exercises/interventions not performed today have been marked as deferred on flowsheets.     Pt Education:  Posture/Postural awareness  Alternate exercise positions  Verbal/Tactile cues to ensure correct exercise performance/technique    Assessment/Plan  Tolerated continued progression of therapeutic exercise/therapeutic activity/neuromuscular re-ed and balance well today, demonstrating higher-level ability with change of direction, turning 180 degrees while holding weight overhead and carrying heavier weights.    One instance of LOB posteriorly while performing deadlift squat motion needing assistance to correct.     Progress per Plan of Care and Progress strengthening /stabilization /functional activity           Timed:         Manual Therapy:         mins  17592     Therapeutic Exercise:         mins  04872     Neuromuscular Sujata:   15     mins  81223    Therapeutic Activity:      15    mins  12672     Gait Training:           mins  40620     Ultrasound:          mins  99317    Ionto                                   mins  42477  Self Care                            mins  80262    Un-Timed:  Electrical Stimulation:         mins 28009 ( )  Traction          mins 27569    Timed Treatment:   30   mins   Total Treatment:     45   mins    Wolf Sanchez [facility] License #[id number]  Physical  Therapist Assistant

## 2024-07-15 ENCOUNTER — TREATMENT (OUTPATIENT)
Dept: PHYSICAL THERAPY | Facility: CLINIC | Age: 89
End: 2024-07-15
Payer: MEDICARE

## 2024-07-15 DIAGNOSIS — R26.9 GAIT DISTURBANCE: ICD-10-CM

## 2024-07-15 DIAGNOSIS — R26.89 BALANCE PROBLEM: Primary | ICD-10-CM

## 2024-07-15 NOTE — PROGRESS NOTES
Physical Therapy Daily Treatment Note      Patient: Bhaskar Ibarra   : 1935  Referring practitioner: Radha Bowers DO  Date of Initial Visit: Type: THERAPY  Noted: 2024  Today's Date: 7/15/2024  Patient seen for 23 sessions       Visit Diagnoses:      ICD-10-CM ICD-9-CM   1. Balance problem  R26.89 781.99   2. Gait disturbance  R26.9 781.2     Subjective: I feel more confident with my walking and I like coming to PT.  It helps me stay strong and it's challenging.     Objective: Objective:     5x Sit to stand:  CGA x 1, no UE assist.  Does not fully extend hips and trunk: 22 sec.    2024: 22 sec first trial.  Second trial: 19.51 sec. Supervision x 1.  7-15-24: 18.50 sec with supervision      TU. 13 sec with Quad cane and CGA x 1.               15.51 sec without cane and CGA x 1.       24: TU.19 without cane, 10.80 sec without cane.  SBA x 1.    7/15/2024: TUG: 10.92 sec without cane, trial 2:  9.87 sec:  SBA x 1.       M-CTSIB:  Condition 1 met.  Feet together eyes open for 30 sec.  Condition 2: eyes closed: 6 sec.  2024: M-CTSIB:  Condition 3 met: Eyes closed 30 sec. Level surface.  On Foam, narrow Base: 30 sec     Sternal nudge:  (-).  Posterior and lateral nudge stable with feet together.     ALEXANDRE BALANCE TEST: 7/15/2024: 43/56     See Exercise, Manual, and Modality Logs for complete treatment.         Goals  Plan Goals: SHORT TERM GOALS:  4 weeks  1. Pt will be compliant with HEP.  (Reviewed)   2. Pt will have TUG score of 13 sec or less with SPC cane in order to decrease risk for falls. (MET)   3.  Pt to perform 5 x STS in <20 sec to demonstrate improve safety with positional changes.(MET)   4. Pt will be able to ambulate for 10 min with cane and without rest break due to improved endurance and strength.  (MET)   5. Pt will demonstrate 4/5 B hip abuction strength in order to improve gait, transfers and stair climbing ability.(MET)                              LONG TERM GOALS:  6-8 weeks  1.Pt will be able to ambulate for 15 min or greater with improve step length and heel strike in order to decrease fall risk and become safe community ambulator.  (MET, Reported)   2. Pt will be able to climb 15 steps alternating with Railing due to improved strength.  MET)  3. Pt will be able to perform 5x sit-stand without use of UE < 20 sec due to improved strength. (MET)  4. Pt will be able to stand on foam for 15 sec/level surface 30 sec  with eyes closed or greater due to improved balance.  (Not Met on foam, MET on level surface)  5. Pt will improve TUG score to <10 sec or less due to improved gait mechanics, strength and balance. (MET)  6.  Pt to score at least 50/56 on ALEXANDRE assessment.  (Not Met, 43/56)     See Exercise, Manual, and Modality Logs for complete treatment.     Assessment/Plan: Bennett has done very well with PT intervention thus far.  He has made improvement in all areas but could benefit from ongoing care to further address hip strength to further progress his balance and gait reactions.  More emphasis to be placed on Endurance, WBing activity and dynamic blance in his next few weeks of care.      Progress per Plan of Care and Progress strengthening /stabilization /functional activity    Timed:         Manual Therapy:    -     mins  46518;     Therapeutic Exercise:    10     mins  91080;     Neuromuscular Sujata:    -    mins  53805;    Therapeutic Activity:     25     mins  43419;   Tests and measures, assessments, education.   Gait Training:      -     mins  30705;     Ultrasound:     -     mins  09384;    Electrical Stimulation:    -     mins  23712 ( );  Dry Needling     -     mins self-pay  Traction     -     mins 29437      Timed Treatment:   35   mins   Total Treatment:     35   mins    Servando Dean PT  [place] License: [id number]

## 2024-07-17 ENCOUNTER — TREATMENT (OUTPATIENT)
Dept: PHYSICAL THERAPY | Facility: CLINIC | Age: 89
End: 2024-07-17
Payer: MEDICARE

## 2024-07-17 DIAGNOSIS — R26.9 GAIT DISTURBANCE: ICD-10-CM

## 2024-07-17 DIAGNOSIS — R26.89 BALANCE PROBLEM: Primary | ICD-10-CM

## 2024-07-24 ENCOUNTER — TREATMENT (OUTPATIENT)
Dept: PHYSICAL THERAPY | Facility: CLINIC | Age: 89
End: 2024-07-24
Payer: MEDICARE

## 2024-07-24 DIAGNOSIS — R26.89 BALANCE PROBLEM: Primary | ICD-10-CM

## 2024-07-24 DIAGNOSIS — R26.9 GAIT DISTURBANCE: ICD-10-CM

## 2024-07-24 NOTE — PROGRESS NOTES
Physical Therapy Daily Treatment Note    [facility] Physical Therapy [place]  [address], [address]  [place] [address]    Visit # 25        Patient: Bhaskar Ibarra   : 1935  Referring practitioner: Radha Bowers DO  Date of Initial Evaluation:  Type: THERAPY  Noted: 2024  Today's Date: 2024           ICD-10-CM ICD-9-CM   1. Balance problem  R26.89 781.99   2. Gait disturbance  R26.9 781.2             Subjective  Bhaskar Ibarra reports:  No new c/o at onset of today's session.  Overall can tell his balance and movement is better.  Noticing getting through the grocery store with less fatigue.     Objective   See Exercise, Manual, and Modality Logs for complete treatment   Note: any exercises/interventions not performed today have been marked as deferred on flowsheets.     Pt Education:  Posture/Postural awareness  Alternate exercise positions  Verbal/Tactile cues to ensure correct exercise performance/technique    Assessment/Plan  Tolerated continued progression of therapeutic exercise/therapeutic activity well today, we focused on dynamic balance and functional carrying, with pt demonstrating some challenges to balance but no assistance needed to maintain.      Progress per Plan of Care and Progress strengthening /stabilization /functional activity           Timed:         Manual Therapy:         mins  81126     Therapeutic Exercise:        mins  18464     Neuromuscular Sujata:   15     mins  64594    Therapeutic Activity:       15   mins  45018     Gait Training:           mins  25062     Ultrasound:          mins  04482    Ionto                                   mins  54717  Self Care                            mins  81065    Un-Timed:  Electrical Stimulation:         mins 37182 ( )  Traction          mins 88215    Timed Treatment:   30   mins   Total Treatment:     45   mins    Wolf Sanchez [facility] License #[id number]  Physical Therapist Assistant

## 2024-07-26 ENCOUNTER — TREATMENT (OUTPATIENT)
Dept: PHYSICAL THERAPY | Facility: CLINIC | Age: 89
End: 2024-07-26
Payer: MEDICARE

## 2024-07-26 DIAGNOSIS — R26.9 GAIT DISTURBANCE: ICD-10-CM

## 2024-07-26 DIAGNOSIS — R26.89 BALANCE PROBLEM: Primary | ICD-10-CM

## 2024-07-26 NOTE — PROGRESS NOTES
Physical Therapy Daily Treatment Note    [facility] Physical Therapy [place]  [address], [address]  [place] [id number]    Visit # 26        Patient: Bhaskar Ibarra   : 1935  Referring practitioner: Radha Bowers DO  Date of Initial Evaluation:  Type: THERAPY  Noted: 2024  Today's Date: 2024           ICD-10-CM ICD-9-CM   1. Balance problem  R26.89 781.99   2. Gait disturbance  R26.9 781.2     Subjective  Bhaskar Ibarra reports:I'm a little down in my back today and it has bothered me some over the last couple of days.  I have been doing some walking down my driveway to work more on my walking.  I don't think that irritated me.      Objective   See Exercise, Manual, and Modality Logs for complete treatment   Note: any exercises/interventions not performed today have been marked as deferred on flowsheets.     Pt Education:  Posture/Postural awareness  Alternate exercise positions  Verbal/Tactile cues to ensure correct exercise performance/technique    Assessment/Plan  Tolerated continued progression of therapeutic exercise/therapeutic activity fair today, we focused on seated balance and trunk mobility, with pt demonstrating some challenges to ambulation due to back pain this date.    Progress per Plan of Care and Progress strengthening /stabilization /functional activity           Timed:         Manual Therapy:         mins  19463     Therapeutic Exercise:    15    mins  75710     Neuromuscular Sujata:   10     mins  55347    Therapeutic Activity:       5   mins  31239     Gait Training:        10   mins  44761     Ultrasound:          mins  62174    Ionto                                   mins  23566  Self Care                            mins  33638    Un-Timed:  Electrical Stimulation:         mins 54201 ( )  Traction          mins 78546    Timed Treatment:   40   mins   Total Treatment:     40   mins    Servando Dean PT, DPT  [place] License  #[id number]  Physical Therapist

## 2024-07-29 ENCOUNTER — TREATMENT (OUTPATIENT)
Dept: PHYSICAL THERAPY | Facility: CLINIC | Age: 89
End: 2024-07-29
Payer: MEDICARE

## 2024-07-29 DIAGNOSIS — R26.89 BALANCE PROBLEM: Primary | ICD-10-CM

## 2024-07-29 DIAGNOSIS — R26.9 GAIT DISTURBANCE: ICD-10-CM

## 2024-07-29 NOTE — PROGRESS NOTES
Physical Therapy Daily Treatment Note    [facility] Physical Therapy [place]  [address], [address]  [place] [address]    Visit # 27        Patient: Bhaskar Ibarra   : 1935  Referring practitioner: Radha Bowers DO  Date of Initial Evaluation:  Type: THERAPY  Noted: 2024  Today's Date: 2024           ICD-10-CM ICD-9-CM   1. Balance problem  R26.89 781.99   2. Gait disturbance  R26.9 781.2       Subjective  Bhaskar Ibarra reports:   My back is flared up today, it just started about a day ago.  This happens to me sometimes.  I felt good after being in here last time, my back didn't bother me as much.      Objective   See Exercise, Manual, and Modality Logs for complete treatment   Note: any exercises/interventions not performed today have been marked as deferred on flowsheets.     Pt Education:  HEP review  Posture/Postural awareness  Lumbar support  Alternate exercise positions  Verbal/Tactile cues to ensure correct exercise performance/technique    Assessment/Plan  Tolerated continued progression of therapeutic exercise/therapeutic activity well today, no increased pain reported during or after session, with modulation of intensity to avoid aggravating persistent recurring LBP.      Progress per Plan of Care  Evaluating PT to reassess next visit.          Timed:         Manual Therapy:         mins  03281     Therapeutic Exercise:     15    mins  83925     Neuromuscular Sujata:    18    mins  71462    Therapeutic Activity:          mins  39449     Gait Trainin    mins  71951     Ultrasound:          mins  34439    Ionto                                   mins  35203  Self Care                            mins  77383    Un-Timed:  Electrical Stimulation:         mins 75856 ( )  Traction          mins 78651    Timed Treatment:   45   mins   Total Treatment:     55   mins    Wolf Sanchez PTA  [place] License #[id number]  Physical Therapist Assistant

## 2024-07-31 ENCOUNTER — TREATMENT (OUTPATIENT)
Dept: PHYSICAL THERAPY | Facility: CLINIC | Age: 89
End: 2024-07-31
Payer: MEDICARE

## 2024-07-31 DIAGNOSIS — R26.9 GAIT DISTURBANCE: ICD-10-CM

## 2024-07-31 DIAGNOSIS — R26.89 BALANCE PROBLEM: Primary | ICD-10-CM

## 2024-07-31 NOTE — PROGRESS NOTES
Physical Therapy Daily Treatment Note      Patient: Bhaksar Ibarra   : 1935  Referring practitioner: Radha Bowers DO  Date of Initial Visit: Type: THERAPY  Noted: 2024  Today's Date: 2024  Patient seen for 28 sessions       Visit Diagnoses:      ICD-10-CM ICD-9-CM   1. Balance problem  R26.89 781.99   2. Gait disturbance  R26.9 781.2     Subjective: The back is doing much better today.  I was able to get back into my exercises last visit.      Objective:     See Exercise, Manual, and Modality Logs for complete treatment.     Assessment/Plan: Pt did well today with balance focused treatment today.  The L leg continues to be most limited, especially with WBing activity.  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  He struggled with eyes closed balance tasks but did improve over the session with practice.      Progress per Plan of Care and Progress strengthening /stabilization /functional activity.  Pt would benefit from ongoing PT intervention to further address balance reactions and gait endurance.      Timed:         Manual Therapy:    -     mins  50635;     Therapeutic Exercise:    10     mins  18828;     Neuromuscular Sujata:    20    mins  57384;    Therapeutic Activity:     10     mins  07534;     Gait Training:      -     mins  36643;     Ultrasound:     -     mins  09072;    Electrical Stimulation:    -     mins  09460 ( );  Dry Needling     -     mins self-pay  Traction     -     mins 63320      Timed Treatment:   40   mins   Total Treatment:     40   mins    DUSTIN Alejandro License: [id number]

## 2024-08-05 ENCOUNTER — TREATMENT (OUTPATIENT)
Dept: PHYSICAL THERAPY | Facility: CLINIC | Age: 89
End: 2024-08-05
Payer: MEDICARE

## 2024-08-05 DIAGNOSIS — R26.9 GAIT DISTURBANCE: ICD-10-CM

## 2024-08-05 DIAGNOSIS — R26.89 BALANCE PROBLEM: Primary | ICD-10-CM

## 2024-08-05 NOTE — PROGRESS NOTES
Physical Therapy Daily Treatment Note  [facility] Physical Therapy  [place] - [address], [address]     [place] [address]   Phone: [phone]   Fax: [phone]      Patient: Bhaskar Ibarra   : 1935  Referring practitioner: Radha Bowers DO  Date of Initial Visit: Type: THERAPY  Noted: 2024  Today's Date: 2024  Patient seen for 29 sessions       Visit Diagnoses:    ICD-10-CM ICD-9-CM   1. Balance problem  R26.89 781.99   2. Gait disturbance  R26.9 781.2         Subjective:  Bhaskar Ibarra reports: he is doing well. Reports low back pain has greatly improved. No pain complaints today. Patient reports no falls since starting physical therapy, and feels he's made great improvements since IE. Patient ambulates with SPC, but carries it more than he uses it in the clinic today.      Objective   See Exercise, Manual, and Modality Logs for complete treatment.       Assessment:  Patient tolerates today's movement interventions well with mild increase in low back pain. Completed all standing exercises today, with need for 2 seated rest breaks. Patient demonstrates poor ankle strategy with balance training. Patient demonstrates significant truncal sway with eyes closed during static balance training. Re-education on the importance of night-light, or turning the lights on, before getting out of bed to reduce fall risk.       Plan:   Continue standing balance training as tolerated. Monitor low back symptoms        Timed:         Manual Therapy:         mins  37392;     Therapeutic Exercise:    15     mins  52222;     Neuromuscular Sujata:    15    mins  91455;    Therapeutic Activity:          mins  32789;     Gait Training:           mins  58891;     Ultrasound:          mins  61720;    Ionto                                   mins  05793  Self Care                            mins  21948  Traction          mins 29353      Un-Timed:  Canalith Repos         mins  94059  Electrical Stimulation:         mins  81470 ( );  Dry Needling          mins self-pay  Traction          mins 78354        Timed Treatment:   30   mins   Total Treatment:     40   mins    Charles Bradley PT  License Number: [place] [id number]# [id number]. [place] LIC# [id number]    Physical Therapist

## 2024-08-09 ENCOUNTER — TREATMENT (OUTPATIENT)
Dept: PHYSICAL THERAPY | Facility: CLINIC | Age: 89
End: 2024-08-09
Payer: MEDICARE

## 2024-08-09 DIAGNOSIS — R26.89 BALANCE PROBLEM: Primary | ICD-10-CM

## 2024-08-09 DIAGNOSIS — R26.9 GAIT DISTURBANCE: ICD-10-CM

## 2024-08-09 NOTE — PROGRESS NOTES
Physical Therapy Daily Treatment Note  [facility] Physical Therapy  [place] - [address], [address]     [place] [address]   Phone: [phone]   Fax: [phone]      Patient: Bhaskar Ibarra   : 1935  Referring practitioner: Radha Bowers DO  Date of Initial Visit: Type: THERAPY  Noted: 2024  Today's Date: 2024  Patient seen for 30 sessions       Visit Diagnoses:    ICD-10-CM ICD-9-CM   1. Balance problem  R26.89 781.99   2. Gait disturbance  R26.9 781.2         Subjective:  Bhaskar Ibarra reports:   Feeling good at onset of today's session.  Lingle he had a good challenge at his last visit.       Objective   See Exercise, Manual, and Modality Logs for complete treatment.       Assessment:  Tolerated continued progression of static and dynamic balance activities well overall , challenge noted with stabilization during dynamic activity with weight lifted out in front, with LOB several times but able to self-correct.  Gait belt SBA/CGA throughout session with challenged positions.   Also practiced [employer] weight shifting for ankle strategy work.  Reviewed discussion from last visit RE: importance of night-light, or turning the lights on, before getting out of bed to reduce fall risk.       Plan:   Continue standing balance training as tolerated. Monitor low back symptoms        Timed:         Manual Therapy:         mins  61461;     Therapeutic Exercise:    10     mins  83803;     Neuromuscular Sujata:   20    mins  08196;    Therapeutic Activity:          mins  90477;     Gait Training:           mins  05393;     Ultrasound:          mins  67927;    Ionto                                   mins  44896  Self Care                            mins  86765  Traction          mins 88303      Un-Timed:  Canalith Repos         mins 87602  Electrical Stimulation:         mins  00905 ( );  Dry Needling          mins self-pay  Traction          mins  80782        Timed Treatment:   30   mins   Total Treatment:     50   mins    Wolf Sanchez PTA  [place] License #[id number]  Physical Therapist Assistant

## 2024-08-12 ENCOUNTER — TREATMENT (OUTPATIENT)
Dept: PHYSICAL THERAPY | Facility: CLINIC | Age: 89
End: 2024-08-12
Payer: MEDICARE

## 2024-08-12 DIAGNOSIS — R26.9 GAIT DISTURBANCE: ICD-10-CM

## 2024-08-12 DIAGNOSIS — R26.89 BALANCE PROBLEM: Primary | ICD-10-CM

## 2024-08-12 PROCEDURE — 97112 NEUROMUSCULAR REEDUCATION: CPT

## 2024-08-12 PROCEDURE — 97530 THERAPEUTIC ACTIVITIES: CPT

## 2024-08-12 NOTE — PROGRESS NOTES
Physical Therapy Daily Treatment Note  [facility] Physical Therapy  [place] - [address], [address]     [place] [id number]   Phone: [phone]   Fax: [phone]      Patient: Bhaskar Ibarra   : 1935  Referring practitioner: Radha Bowers DO  Date of Initial Visit: Type: THERAPY  Noted: 2024  Today's Date: 2024  Patient seen for 31 sessions       Visit Diagnoses:    ICD-10-CM ICD-9-CM   1. Balance problem  R26.89 781.99   2. Gait disturbance  R26.9 781.2         Subjective:  Bhaskar Ibarra reports: no falls since most recent visit, and reports low levels of low back pain.       Objective   See Exercise, Manual, and Modality Logs for complete treatment.       Assessment:  Patient tolerates today's movement interventions well without adverse effects. Patient demonstrates decreased functional strength/balance of L lower extremity. These deficits are noticeable with taps on bosu ball with increased assistance from upper extremities and from therapist with reaching with R lower extremity, with body weight throughout L lower extremity. Patient demonstrates numerous loss of balances during training today, and continues to be an increased fall risk. Patient continues to benefit from skilled physical therapy to reduce potential for future fall.       Plan:   Continue providing functional strengthening, balance training, and fall prevention education        Timed:         Manual Therapy:         mins  84511;     Therapeutic Exercise:         mins  74121;     Neuromuscular Sujata:    22    mins  70975;    Therapeutic Activity:     10     mins  88250;     Gait Training:           mins  68644;     Ultrasound:          mins  33438;    Ionto                                   mins  64381  Self Care                            mins  70423  Traction          mins 74896      Un-Timed:  Canalith Repos         mins 34647  Electrical Stimulation:         mins  32680 ( );  Dry  Needling          mins self-pay  Traction          mins 25653        Timed Treatment:   32   mins   Total Treatment:     42   mins    Charles Bradley PT  License Number: [place] [id number]# [id number]. [place] LIC# [id number]    Physical Therapist

## 2024-08-14 ENCOUNTER — TREATMENT (OUTPATIENT)
Dept: PHYSICAL THERAPY | Facility: CLINIC | Age: 89
End: 2024-08-14
Payer: MEDICARE

## 2024-08-14 DIAGNOSIS — R26.9 GAIT DISTURBANCE: ICD-10-CM

## 2024-08-14 DIAGNOSIS — R26.89 BALANCE PROBLEM: Primary | ICD-10-CM

## 2024-08-14 NOTE — PROGRESS NOTES
Physical Therapy Daily Treatment Note    [facility] Physical Therapy [place]  [address]  [place] [id number]    Visit # 32        Patient: Bhaskar Ibarra   : 1935  Referring practitioner: Radha Bowers DO  Date of Initial Evaluation:  Type: THERAPY  Noted: 2024  Today's Date: 2024           ICD-10-CM ICD-9-CM   1. Balance problem  R26.89 781.99   2. Gait disturbance  R26.9 781.2       Subjective  Bhaskar Ibarra reports:   Feeling good today, no new c/o at onset of session.     Objective   See Exercise, Manual, and Modality Logs for complete treatment   Note: any exercises/interventions not performed today have been marked as deferred on flowsheets.     Pt Education:  HEP review - added 3-way hip vs tband to program, along with yellow band loop for HEP.   Exercise rationale/ pain free exercise performance  Posture/Postural awareness  Alternate exercise positions  Verbal/Tactile cues to ensure correct exercise performance/technique    Assessment/Plan  Tolerated continued progression of therapeutic exercise/therapeutic activity/neuromuscular re-ed and balance well today, no increased pain reported during or after session.      Progress per Plan of Care and Progress strengthening /stabilization /functional activity         Timed:         Manual Therapy:         mins  50314     Therapeutic Exercise:         mins  87806     Neuromuscular Sujata:    15    mins  92805    Therapeutic Activity:      15    mins  64902     Gait Training:           mins  93699     Ultrasound:          mins  83221    Ionto                                   mins  28917  Self Care                            mins  76473    Un-Timed:  Electrical Stimulation:         mins 32370 ( )  Traction          mins 42366    Timed Treatment:   30   mins   Total Treatment:     48   mins    Wolf Sanchez [facility] License #[id number]  Physical Therapist Assistant

## 2024-08-28 ENCOUNTER — TREATMENT (OUTPATIENT)
Dept: PHYSICAL THERAPY | Facility: CLINIC | Age: 89
End: 2024-08-28
Payer: MEDICARE

## 2024-08-28 DIAGNOSIS — R26.9 GAIT DISTURBANCE: ICD-10-CM

## 2024-08-28 DIAGNOSIS — R26.89 BALANCE PROBLEM: Primary | ICD-10-CM

## 2024-08-28 NOTE — PROGRESS NOTES
"Physical Therapy Daily Treatment Note  [facility] Physical Therapy  [place] - [address], [address]     [place] [address]   Phone: [phone]   Fax: [phone]      Patient: Bhaskar Ibarra   : 1935  Referring practitioner: Radha Bowers DO  Date of Initial Visit: Type: THERAPY  Noted: 2024  Today's Date: 2024  Patient seen for 33 sessions       Visit Diagnoses:    ICD-10-CM ICD-9-CM   1. Balance problem  R26.89 781.99   2. Gait disturbance  R26.9 781.2         Subjective:  Bhaskar Ibarra reports: doing well overall. Patient reports no falls over the past several months. Patient ambulates with SPC on level surfaces. Patient reports mild discomfort into L hip. \"I like coming to therapy, I feel like it's helping me.\"    Objective   See Exercise, Manual, and Modality Logs for complete treatment.     Left Hip   Planes of Motion   Flexion: 4+ (5/5)  Extension: 4 (4/5)   Abduction: 3+ (4+/5)  Adduction: 5  L ankle: DF: 4-/5, PF: 4-/5     Right Hip   Planes of Motion   Flexion: 4+ (5/5)   Extension: 4 (4/5 in s/l)  Abduction: 3+ (4+/5 in s/l)  Adduction: 5       5x Sit to stand:  CGA x 1, no UE assist.  Does not fully extend hips and trunk: 22 sec   2024: 17.35 sec first trial.  Second trial: 13.75 sec. Supervision x 1.     TU. 13 sec with Quad cane and CGA x 1.               15.51 sec without cane and CGA x 1.       24: TU.62 without cane, 10.54 sec without cane.  SBA x 1.       M-CTSIB:  Condition 1 met.  Feet together eyes open for 30 sec.  Condition 2: eyes closed: 6 sec.  2024: M-CTSIB:  Condition 3 met: Eyes closed 30 sec. Level surface.  On Foam, narrow Base: 30 sec  2024 M-CTSIB: condition 1 Met, 2 MET, 4 not met, 5 not met      Assessment:  Patient presents for his 34th physical therapy visit for diagnosis balance disorder and frequent falls. Patient reports no fall since the IE, ambulates with SPC, but is able to ambulate " "on level surfaces short distances without need for AD. Patient improved TUG to 11.62 seconds, improved 5x sit to stand to 17.35 seconds, and demonstrates mild improvements in LE strength with L<R in terms of strength. Patient demonstrates great progress towards established therapy goals, and additional goals created as many goals have been met. Patient is demonstrating great progress overall in functional mobility.     Plan:   Continue balance/stability training, standing/functional strengthening to improve activity tolerance.    Goals  Plan Goals: SHORT TERM GOALS:  4 weeks  1. Pt will be compliant with HEP.  (Reviewed)   2. Pt will have TUG score of 13 sec or less with SPC cane in order to decrease risk for falls. (MET)   3.  Pt to perform 5 x STS in <20 sec to demonstrate improve safety with positional changes.(MET)   4. Pt will be able to ambulate for 10 min with cane and without rest break due to improved endurance and strength.  (MET)   5. Pt will demonstrate 4/5 B hip abuction strength in order to improve gait, transfers and stair climbing ability.(MET)                             LONG TERM GOALS:  6-8 weeks  1.Pt will be able to ambulate for 15 min or greater with improve step length and heel strike in order to decrease fall risk and become safe community ambulator.  MET  2. Pt will be able to climb 15 steps alternating with Railing due to improved strength.  (15 + steps at 6\" in height) - MET  3. Pt will be able to perform 5x sit-stand without use of UE < 20 sec due to improved strength. (MET)  4. Pt will be able to stand on foam for 15 sec/level surface 30 sec  with eyes closed or greater due to improved balance.  - Progressing  5. Pt will improve TUG score to <10 sec or less due to improved gait mechanics, strength and balance. (Progress)   6.  Pt to score at least 50/56 on ALEXANDRE assessment.  (Not met, Progress)   7. Patient will improve 5x sit to stand to less than 15 seconds due to improved strength. " (New)  8. Patient will ambulate for 30 minutes or greater continuously with least resistive assistive device, demonstrating improvements in activity tolerance and ability to navigate the community. (New)      Timed:         Manual Therapy:         mins  48804;     Therapeutic Exercise:    15     mins  43745;     Neuromuscular Sujata:    15    mins  12419;    Therapeutic Activity:     10     mins  39861;     Gait Training:           mins  89074;     Ultrasound:          mins  27838;    Ionto                                   mins  82107  Self Care                            mins  86499  Traction          mins 21717      Un-Timed:  Canalith Repos         mins 65923  Electrical Stimulation:         mins  43890 ( );  Dry Needling          mins self-pay  Traction          mins 81995        Timed Treatment:   40   mins   Total Treatment:     40   mins    Charles Bradley PT  License Number: [place] [id number]# [id number]. [place] LIC# [id number]    Physical Therapist

## 2024-09-04 ENCOUNTER — TREATMENT (OUTPATIENT)
Dept: PHYSICAL THERAPY | Facility: CLINIC | Age: 89
End: 2024-09-04
Payer: MEDICARE

## 2024-09-04 DIAGNOSIS — R26.9 GAIT DISTURBANCE: ICD-10-CM

## 2024-09-04 DIAGNOSIS — R26.89 BALANCE PROBLEM: Primary | ICD-10-CM

## 2024-09-04 NOTE — PROGRESS NOTES
"Physical Therapy Daily Treatment Note  [facility] Physical Therapy  [place] - [address], [address]     [place] [address]   Phone: [phone]   Fax: [phone]      Patient: Bhaskar Ibarra   : 1935  Referring practitioner: Radha Bowers DO  Date of Initial Visit: Type: THERAPY  Noted: 2024  Today's Date: 2024  Patient seen for 34 sessions       Visit Diagnoses:    ICD-10-CM ICD-9-CM   1. Balance problem  R26.89 781.99   2. Gait disturbance  R26.9 781.2         Subjective:  Bhaskar Ibarra reports: doing okay. No falls since most recent visit. Reports having \"pain pretty much every day.\" Patient denies any recent falls.      Objective   See Exercise, Manual, and Modality Logs for complete treatment.       Assessment:  Patient tolerates today's movement interventions well without increase in pain. Patient with improving overall balance and functional mobility. Increased resistance with cable pull and increased weight with dumbbell press and with wrap around's. Patient requests occasional rest breaks, but completes all standing/functional movement interventions. Patient has enrolled in community exercise program through local [place] beginning at the end of September. Patient would benefit from physical therapy up until he begins with that program to prevent functional decline and to continue to progress functional mobility, balance, and reduce fall risk.     Plan:   Continue to provide interventions to improve functional strength, balance, and reduce fall risk. To continue with physical therapy until starting program with local [place].       Timed:         Manual Therapy:         mins  44424;     Therapeutic Exercise:    7     mins  27306;     Neuromuscular Sujata:    15    mins  70848;    Therapeutic Activity:     10     mins  48665;     Gait Training:           mins  51443;     Ultrasound:          mins  33144;    Ionto                                   " mins  64166  Self Care                            mins  02676  Traction          mins 43861      Un-Timed:  Canalith Repos         mins 52828  Electrical Stimulation:         mins  63096 ( );  Dry Needling          mins self-pay  Traction          mins 69449        Timed Treatment:   32   mins   Total Treatment:     45   mins    Charles Bradley PT  License Number: [place] [id number]# [id number]. [place] LIC# [id number]    Physical Therapist

## 2024-09-15 ENCOUNTER — HOSPITAL ENCOUNTER (INPATIENT)
Facility: HOSPITAL | Age: 89
LOS: 4 days | Discharge: HOME HEALTH | DRG: 871 | End: 2024-09-19
Attending: EMERGENCY MEDICINE | Admitting: INTERNAL MEDICINE
Payer: MEDICARE

## 2024-09-15 ENCOUNTER — APPOINTMENT (OUTPATIENT)
Dept: GENERAL RADIOLOGY | Facility: HOSPITAL | Age: 89
DRG: 871 | End: 2024-09-15
Payer: MEDICARE

## 2024-09-15 ENCOUNTER — APPOINTMENT (OUTPATIENT)
Dept: CT IMAGING | Facility: HOSPITAL | Age: 89
DRG: 871 | End: 2024-09-15
Payer: MEDICARE

## 2024-09-15 DIAGNOSIS — J18.9 PNEUMONIA DUE TO INFECTIOUS ORGANISM, UNSPECIFIED LATERALITY, UNSPECIFIED PART OF LUNG: Primary | ICD-10-CM

## 2024-09-15 DIAGNOSIS — R40.0 SOMNOLENCE: ICD-10-CM

## 2024-09-15 DIAGNOSIS — I50.32 CHRONIC DIASTOLIC CHF (CONGESTIVE HEART FAILURE): ICD-10-CM

## 2024-09-15 LAB
ALBUMIN SERPL-MCNC: 3.4 G/DL (ref 3.5–5.2)
ALBUMIN/GLOB SERPL: 0.9 G/DL
ALP SERPL-CCNC: 143 U/L (ref 39–117)
ALT SERPL W P-5'-P-CCNC: 53 U/L (ref 1–41)
AMPHET+METHAMPHET UR QL: NEGATIVE
ANION GAP SERPL CALCULATED.3IONS-SCNC: 15 MMOL/L (ref 5–15)
AST SERPL-CCNC: 51 U/L (ref 1–40)
B PARAPERT DNA SPEC QL NAA+PROBE: NOT DETECTED
B PERT DNA SPEC QL NAA+PROBE: NOT DETECTED
BACTERIA UR QL AUTO: ABNORMAL /HPF
BARBITURATES UR QL SCN: NEGATIVE
BASOPHILS # BLD MANUAL: 0 10*3/MM3 (ref 0–0.2)
BASOPHILS NFR BLD MANUAL: 0 % (ref 0–1.5)
BENZODIAZ UR QL SCN: NEGATIVE
BILIRUB SERPL-MCNC: 1.7 MG/DL (ref 0–1.2)
BILIRUB UR QL STRIP: NEGATIVE
BUN SERPL-MCNC: 24 MG/DL (ref 8–23)
BUN/CREAT SERPL: 18.6 (ref 7–25)
C PNEUM DNA NPH QL NAA+NON-PROBE: NOT DETECTED
CALCIUM SPEC-SCNC: 9.5 MG/DL (ref 8.6–10.5)
CANNABINOIDS SERPL QL: NEGATIVE
CHLORIDE SERPL-SCNC: 99 MMOL/L (ref 98–107)
CLARITY UR: CLEAR
CO2 SERPL-SCNC: 21 MMOL/L (ref 22–29)
COCAINE UR QL: NEGATIVE
COLOR UR: ABNORMAL
CREAT SERPL-MCNC: 1.29 MG/DL (ref 0.76–1.27)
D-LACTATE SERPL-SCNC: 1.7 MMOL/L (ref 0.5–2)
D-LACTATE SERPL-SCNC: 2.6 MMOL/L (ref 0.5–2)
DEPRECATED RDW RBC AUTO: 47.7 FL (ref 37–54)
EGFRCR SERPLBLD CKD-EPI 2021: 53.3 ML/MIN/1.73
EOSINOPHIL # BLD MANUAL: 0 10*3/MM3 (ref 0–0.4)
EOSINOPHIL NFR BLD MANUAL: 0 % (ref 0.3–6.2)
ERYTHROCYTE [DISTWIDTH] IN BLOOD BY AUTOMATED COUNT: 12.6 % (ref 12.3–15.4)
FENTANYL UR-MCNC: NEGATIVE NG/ML
FLUAV SUBTYP SPEC NAA+PROBE: NOT DETECTED
FLUBV RNA ISLT QL NAA+PROBE: NOT DETECTED
GEN 5 2HR TROPONIN T REFLEX: 37 NG/L
GLOBULIN UR ELPH-MCNC: 3.9 GM/DL
GLUCOSE SERPL-MCNC: 170 MG/DL (ref 65–99)
GLUCOSE UR STRIP-MCNC: ABNORMAL MG/DL
GRAN CASTS URNS QL MICRO: PRESENT /LPF
HADV DNA SPEC NAA+PROBE: NOT DETECTED
HCOV 229E RNA SPEC QL NAA+PROBE: NOT DETECTED
HCOV HKU1 RNA SPEC QL NAA+PROBE: NOT DETECTED
HCOV NL63 RNA SPEC QL NAA+PROBE: NOT DETECTED
HCOV OC43 RNA SPEC QL NAA+PROBE: NOT DETECTED
HCT VFR BLD AUTO: 41.2 % (ref 37.5–51)
HGB BLD-MCNC: 13.5 G/DL (ref 13–17.7)
HGB UR QL STRIP.AUTO: ABNORMAL
HMPV RNA NPH QL NAA+NON-PROBE: NOT DETECTED
HOLD SPECIMEN: NORMAL
HPIV1 RNA ISLT QL NAA+PROBE: NOT DETECTED
HPIV2 RNA SPEC QL NAA+PROBE: NOT DETECTED
HPIV3 RNA NPH QL NAA+PROBE: NOT DETECTED
HPIV4 P GENE NPH QL NAA+PROBE: NOT DETECTED
HYALINE CASTS UR QL AUTO: ABNORMAL /LPF
KETONES UR QL STRIP: ABNORMAL
LEUKOCYTE ESTERASE UR QL STRIP.AUTO: ABNORMAL
LYMPHOCYTES # BLD MANUAL: 0.88 10*3/MM3 (ref 0.7–3.1)
LYMPHOCYTES NFR BLD MANUAL: 4 % (ref 5–12)
M PNEUMO IGG SER IA-ACNC: NOT DETECTED
MCH RBC QN AUTO: 33.5 PG (ref 26.6–33)
MCHC RBC AUTO-ENTMCNC: 32.8 G/DL (ref 31.5–35.7)
MCV RBC AUTO: 102.2 FL (ref 79–97)
METHADONE UR QL SCN: NEGATIVE
MONOCYTES # BLD: 0.71 10*3/MM3 (ref 0.1–0.9)
NEUTROPHILS # BLD AUTO: 16.06 10*3/MM3 (ref 1.7–7)
NEUTROPHILS NFR BLD MANUAL: 91 % (ref 42.7–76)
NITRITE UR QL STRIP: NEGATIVE
NRBC BLD AUTO-RTO: 0 /100 WBC (ref 0–0.2)
NT-PROBNP SERPL-MCNC: 1514 PG/ML (ref 0–1800)
OPIATES UR QL: NEGATIVE
OXYCODONE UR QL SCN: NEGATIVE
PH UR STRIP.AUTO: 7.5 [PH] (ref 5–8)
PLAT MORPH BLD: NORMAL
PLATELET # BLD AUTO: 213 10*3/MM3 (ref 140–450)
PMV BLD AUTO: 10.7 FL (ref 6–12)
POTASSIUM SERPL-SCNC: 3.9 MMOL/L (ref 3.5–5.2)
PROCALCITONIN SERPL-MCNC: 0.49 NG/ML (ref 0–0.25)
PROT SERPL-MCNC: 7.3 G/DL (ref 6–8.5)
PROT UR QL STRIP: ABNORMAL
QT INTERVAL: 365 MS
QTC INTERVAL: 437 MS
RBC # BLD AUTO: 4.03 10*6/MM3 (ref 4.14–5.8)
RBC # UR STRIP: ABNORMAL /HPF
RBC MORPH BLD: NORMAL
REF LAB TEST METHOD: ABNORMAL
RHINOVIRUS RNA SPEC NAA+PROBE: NOT DETECTED
RSV RNA NPH QL NAA+NON-PROBE: NOT DETECTED
SARS-COV-2 RNA NPH QL NAA+NON-PROBE: NOT DETECTED
SODIUM SERPL-SCNC: 135 MMOL/L (ref 136–145)
SP GR UR STRIP: 1.03 (ref 1–1.03)
SQUAMOUS #/AREA URNS HPF: ABNORMAL /HPF
TROPONIN T DELTA: 2 NG/L
TROPONIN T SERPL HS-MCNC: 29 NG/L
TROPONIN T SERPL HS-MCNC: 35 NG/L
UROBILINOGEN UR QL STRIP: ABNORMAL
VARIANT LYMPHS NFR BLD MANUAL: 5 % (ref 19.6–45.3)
WBC # UR STRIP: ABNORMAL /HPF
WBC MORPH BLD: NORMAL
WBC NRBC COR # BLD AUTO: 17.65 10*3/MM3 (ref 3.4–10.8)
WHOLE BLOOD HOLD COAG: NORMAL
WHOLE BLOOD HOLD SPECIMEN: NORMAL

## 2024-09-15 PROCEDURE — 85007 BL SMEAR W/DIFF WBC COUNT: CPT | Performed by: EMERGENCY MEDICINE

## 2024-09-15 PROCEDURE — 80307 DRUG TEST PRSMV CHEM ANLYZR: CPT | Performed by: EMERGENCY MEDICINE

## 2024-09-15 PROCEDURE — 81001 URINALYSIS AUTO W/SCOPE: CPT | Performed by: EMERGENCY MEDICINE

## 2024-09-15 PROCEDURE — 84145 PROCALCITONIN (PCT): CPT | Performed by: EMERGENCY MEDICINE

## 2024-09-15 PROCEDURE — 36415 COLL VENOUS BLD VENIPUNCTURE: CPT | Performed by: EMERGENCY MEDICINE

## 2024-09-15 PROCEDURE — 99285 EMERGENCY DEPT VISIT HI MDM: CPT

## 2024-09-15 PROCEDURE — 71045 X-RAY EXAM CHEST 1 VIEW: CPT

## 2024-09-15 PROCEDURE — 83880 ASSAY OF NATRIURETIC PEPTIDE: CPT | Performed by: EMERGENCY MEDICINE

## 2024-09-15 PROCEDURE — 93010 ELECTROCARDIOGRAM REPORT: CPT | Performed by: INTERNAL MEDICINE

## 2024-09-15 PROCEDURE — 93005 ELECTROCARDIOGRAM TRACING: CPT | Performed by: EMERGENCY MEDICINE

## 2024-09-15 PROCEDURE — 85025 COMPLETE CBC W/AUTO DIFF WBC: CPT | Performed by: EMERGENCY MEDICINE

## 2024-09-15 PROCEDURE — 83605 ASSAY OF LACTIC ACID: CPT | Performed by: EMERGENCY MEDICINE

## 2024-09-15 PROCEDURE — 80053 COMPREHEN METABOLIC PANEL: CPT | Performed by: EMERGENCY MEDICINE

## 2024-09-15 PROCEDURE — 70450 CT HEAD/BRAIN W/O DYE: CPT

## 2024-09-15 PROCEDURE — 25010000002 PIPERACILLIN SOD-TAZOBACTAM PER 1 G: Performed by: EMERGENCY MEDICINE

## 2024-09-15 PROCEDURE — 87040 BLOOD CULTURE FOR BACTERIA: CPT | Performed by: EMERGENCY MEDICINE

## 2024-09-15 PROCEDURE — 0202U NFCT DS 22 TRGT SARS-COV-2: CPT | Performed by: EMERGENCY MEDICINE

## 2024-09-15 PROCEDURE — P9612 CATHETERIZE FOR URINE SPEC: HCPCS

## 2024-09-15 PROCEDURE — 84484 ASSAY OF TROPONIN QUANT: CPT | Performed by: EMERGENCY MEDICINE

## 2024-09-15 PROCEDURE — 25810000003 LACTATED RINGERS SOLUTION: Performed by: EMERGENCY MEDICINE

## 2024-09-15 RX ORDER — ACETAMINOPHEN 325 MG/1
650 TABLET ORAL EVERY 4 HOURS PRN
Status: DISCONTINUED | OUTPATIENT
Start: 2024-09-15 | End: 2024-09-19 | Stop reason: HOSPADM

## 2024-09-15 RX ORDER — ACETAMINOPHEN 160 MG/5ML
650 SOLUTION ORAL EVERY 4 HOURS PRN
Status: DISCONTINUED | OUTPATIENT
Start: 2024-09-15 | End: 2024-09-19 | Stop reason: HOSPADM

## 2024-09-15 RX ORDER — ONDANSETRON 4 MG/1
4 TABLET, ORALLY DISINTEGRATING ORAL EVERY 6 HOURS PRN
Status: DISCONTINUED | OUTPATIENT
Start: 2024-09-15 | End: 2024-09-19 | Stop reason: HOSPADM

## 2024-09-15 RX ORDER — BENZONATATE 100 MG/1
200 CAPSULE ORAL 3 TIMES DAILY PRN
Status: DISCONTINUED | OUTPATIENT
Start: 2024-09-15 | End: 2024-09-19 | Stop reason: HOSPADM

## 2024-09-15 RX ORDER — PANTOPRAZOLE SODIUM 40 MG/1
40 TABLET, DELAYED RELEASE ORAL
Status: DISCONTINUED | OUTPATIENT
Start: 2024-09-16 | End: 2024-09-19 | Stop reason: HOSPADM

## 2024-09-15 RX ORDER — MULTIPLE VITAMINS W/ MINERALS TAB 9MG-400MCG
1 TAB ORAL DAILY
Status: DISCONTINUED | OUTPATIENT
Start: 2024-09-16 | End: 2024-09-19 | Stop reason: HOSPADM

## 2024-09-15 RX ORDER — LEVOTHYROXINE SODIUM 88 UG/1
88 TABLET ORAL
Status: DISCONTINUED | OUTPATIENT
Start: 2024-09-16 | End: 2024-09-19 | Stop reason: HOSPADM

## 2024-09-15 RX ORDER — ATORVASTATIN CALCIUM 20 MG/1
40 TABLET, FILM COATED ORAL NIGHTLY
Status: DISCONTINUED | OUTPATIENT
Start: 2024-09-15 | End: 2024-09-19 | Stop reason: HOSPADM

## 2024-09-15 RX ORDER — PSYLLIUM HUSK 0.4 G
2 CAPSULE ORAL DAILY
COMMUNITY

## 2024-09-15 RX ORDER — FUROSEMIDE 20 MG
20 TABLET ORAL DAILY
Status: DISCONTINUED | OUTPATIENT
Start: 2024-09-16 | End: 2024-09-19 | Stop reason: HOSPADM

## 2024-09-15 RX ORDER — ACETAMINOPHEN 650 MG/1
650 SUPPOSITORY RECTAL EVERY 4 HOURS PRN
Status: DISCONTINUED | OUTPATIENT
Start: 2024-09-15 | End: 2024-09-19 | Stop reason: HOSPADM

## 2024-09-15 RX ORDER — CHOLECALCIFEROL (VITAMIN D3) 25 MCG
1000 TABLET ORAL DAILY
Status: DISCONTINUED | OUTPATIENT
Start: 2024-09-16 | End: 2024-09-19 | Stop reason: HOSPADM

## 2024-09-15 RX ORDER — FUROSEMIDE 20 MG
20 TABLET ORAL DAILY
COMMUNITY

## 2024-09-15 RX ORDER — BISACODYL 5 MG/1
5 TABLET, DELAYED RELEASE ORAL DAILY PRN
Status: DISCONTINUED | OUTPATIENT
Start: 2024-09-15 | End: 2024-09-19 | Stop reason: HOSPADM

## 2024-09-15 RX ORDER — DEXTROMETHORPHAN POLISTIREX 30 MG/5ML
60 SUSPENSION ORAL EVERY 12 HOURS PRN
Status: DISCONTINUED | OUTPATIENT
Start: 2024-09-15 | End: 2024-09-19 | Stop reason: HOSPADM

## 2024-09-15 RX ORDER — BISACODYL 10 MG
10 SUPPOSITORY, RECTAL RECTAL DAILY PRN
Status: DISCONTINUED | OUTPATIENT
Start: 2024-09-15 | End: 2024-09-19 | Stop reason: HOSPADM

## 2024-09-15 RX ORDER — ESCITALOPRAM OXALATE 20 MG/1
20 TABLET ORAL DAILY
Status: DISCONTINUED | OUTPATIENT
Start: 2024-09-16 | End: 2024-09-19 | Stop reason: HOSPADM

## 2024-09-15 RX ORDER — ACETAMINOPHEN 500 MG
1000 TABLET ORAL ONCE
Status: COMPLETED | OUTPATIENT
Start: 2024-09-15 | End: 2024-09-15

## 2024-09-15 RX ORDER — SOTALOL HYDROCHLORIDE 80 MG/1
80 TABLET ORAL DAILY
Status: DISCONTINUED | OUTPATIENT
Start: 2024-09-16 | End: 2024-09-19 | Stop reason: HOSPADM

## 2024-09-15 RX ORDER — SODIUM CHLORIDE 0.9 % (FLUSH) 0.9 %
10 SYRINGE (ML) INJECTION AS NEEDED
Status: DISCONTINUED | OUTPATIENT
Start: 2024-09-15 | End: 2024-09-19 | Stop reason: HOSPADM

## 2024-09-15 RX ORDER — POLYETHYLENE GLYCOL 3350 17 G/17G
17 POWDER, FOR SOLUTION ORAL DAILY PRN
Status: DISCONTINUED | OUTPATIENT
Start: 2024-09-15 | End: 2024-09-19 | Stop reason: HOSPADM

## 2024-09-15 RX ORDER — ONDANSETRON 2 MG/ML
4 INJECTION INTRAMUSCULAR; INTRAVENOUS EVERY 6 HOURS PRN
Status: DISCONTINUED | OUTPATIENT
Start: 2024-09-15 | End: 2024-09-19 | Stop reason: HOSPADM

## 2024-09-15 RX ORDER — AMOXICILLIN 250 MG
2 CAPSULE ORAL 2 TIMES DAILY PRN
Status: DISCONTINUED | OUTPATIENT
Start: 2024-09-15 | End: 2024-09-19 | Stop reason: HOSPADM

## 2024-09-15 RX ADMIN — APIXABAN 2.5 MG: 2.5 TABLET, FILM COATED ORAL at 22:15

## 2024-09-15 RX ADMIN — ATORVASTATIN CALCIUM 40 MG: 20 TABLET, FILM COATED ORAL at 22:15

## 2024-09-15 RX ADMIN — ACETAMINOPHEN 325MG 650 MG: 325 TABLET ORAL at 20:15

## 2024-09-15 RX ADMIN — Medication 10 ML: at 20:16

## 2024-09-15 RX ADMIN — BENZONATATE 200 MG: 100 CAPSULE ORAL at 20:15

## 2024-09-15 RX ADMIN — Medication 2.5 MG: at 20:15

## 2024-09-15 RX ADMIN — SODIUM CHLORIDE, POTASSIUM CHLORIDE, SODIUM LACTATE AND CALCIUM CHLORIDE 500 ML: 600; 310; 30; 20 INJECTION, SOLUTION INTRAVENOUS at 12:43

## 2024-09-15 RX ADMIN — PIPERACILLIN AND TAZOBACTAM 3.38 G: 3; .375 INJECTION, POWDER, FOR SOLUTION INTRAVENOUS at 16:12

## 2024-09-15 RX ADMIN — ACETAMINOPHEN 1000 MG: 500 TABLET ORAL at 12:46

## 2024-09-15 RX ADMIN — SODIUM CHLORIDE, POTASSIUM CHLORIDE, SODIUM LACTATE AND CALCIUM CHLORIDE 1000 ML: 600; 310; 30; 20 INJECTION, SOLUTION INTRAVENOUS at 16:09

## 2024-09-16 ENCOUNTER — APPOINTMENT (OUTPATIENT)
Dept: CT IMAGING | Facility: HOSPITAL | Age: 89
DRG: 871 | End: 2024-09-16
Payer: MEDICARE

## 2024-09-16 ENCOUNTER — APPOINTMENT (OUTPATIENT)
Dept: CARDIOLOGY | Facility: HOSPITAL | Age: 89
DRG: 871 | End: 2024-09-16
Payer: MEDICARE

## 2024-09-16 PROBLEM — A41.9 SEPSIS: Status: ACTIVE | Noted: 2024-09-16

## 2024-09-16 PROBLEM — R79.89 ELEVATED TROPONIN: Status: ACTIVE | Noted: 2024-09-16

## 2024-09-16 LAB
ANION GAP SERPL CALCULATED.3IONS-SCNC: 8 MMOL/L (ref 5–15)
ASCENDING AORTA: 3.6 CM
BH CV ECHO MEAS - ACS: 1.9 CM
BH CV ECHO MEAS - AI P1/2T: 364.7 MSEC
BH CV ECHO MEAS - AO MAX PG: 8.8 MMHG
BH CV ECHO MEAS - AO MEAN PG: 4 MMHG
BH CV ECHO MEAS - AO ROOT DIAM: 3.8 CM
BH CV ECHO MEAS - AO V2 MAX: 148 CM/SEC
BH CV ECHO MEAS - AO V2 VTI: 28.8 CM
BH CV ECHO MEAS - AVA(I,D): 2.7 CM2
BH CV ECHO MEAS - EDV(CUBED): 148.9 ML
BH CV ECHO MEAS - EDV(MOD-SP2): 141 ML
BH CV ECHO MEAS - EDV(MOD-SP4): 128 ML
BH CV ECHO MEAS - EF(MOD-BP): 40.7 %
BH CV ECHO MEAS - EF(MOD-SP2): 45.4 %
BH CV ECHO MEAS - EF(MOD-SP4): 37.5 %
BH CV ECHO MEAS - ESV(CUBED): 79.5 ML
BH CV ECHO MEAS - ESV(MOD-SP2): 77 ML
BH CV ECHO MEAS - ESV(MOD-SP4): 80 ML
BH CV ECHO MEAS - FS: 18.9 %
BH CV ECHO MEAS - IVS/LVPW: 1.25 CM
BH CV ECHO MEAS - IVSD: 1 CM
BH CV ECHO MEAS - LAT PEAK E' VEL: 5.1 CM/SEC
BH CV ECHO MEAS - LV DIASTOLIC VOL/BSA (35-75): 60.8 CM2
BH CV ECHO MEAS - LV MASS(C)D: 174.5 GRAMS
BH CV ECHO MEAS - LV MAX PG: 6.7 MMHG
BH CV ECHO MEAS - LV MEAN PG: 3 MMHG
BH CV ECHO MEAS - LV SYSTOLIC VOL/BSA (12-30): 38 CM2
BH CV ECHO MEAS - LV V1 MAX: 129 CM/SEC
BH CV ECHO MEAS - LV V1 VTI: 24.9 CM
BH CV ECHO MEAS - LVIDD: 5.3 CM
BH CV ECHO MEAS - LVIDS: 4.3 CM
BH CV ECHO MEAS - LVOT AREA: 3.1 CM2
BH CV ECHO MEAS - LVOT DIAM: 2 CM
BH CV ECHO MEAS - LVPWD: 0.8 CM
BH CV ECHO MEAS - MED PEAK E' VEL: 4.5 CM/SEC
BH CV ECHO MEAS - MR MAX PG: 106.5 MMHG
BH CV ECHO MEAS - MR MAX VEL: 516 CM/SEC
BH CV ECHO MEAS - MV A DUR: 0.13 SEC
BH CV ECHO MEAS - MV A MAX VEL: 136 CM/SEC
BH CV ECHO MEAS - MV DEC SLOPE: 651 CM/SEC2
BH CV ECHO MEAS - MV DEC TIME: 0.38 SEC
BH CV ECHO MEAS - MV E MAX VEL: 128 CM/SEC
BH CV ECHO MEAS - MV E/A: 0.94
BH CV ECHO MEAS - MV MAX PG: 18.3 MMHG
BH CV ECHO MEAS - MV MEAN PG: 7 MMHG
BH CV ECHO MEAS - MV P1/2T: 90 MSEC
BH CV ECHO MEAS - MV V2 VTI: 65 CM
BH CV ECHO MEAS - MVA(P1/2T): 2.44 CM2
BH CV ECHO MEAS - MVA(VTI): 1.2 CM2
BH CV ECHO MEAS - PA ACC TIME: 0.16 SEC
BH CV ECHO MEAS - PA V2 MAX: 82.8 CM/SEC
BH CV ECHO MEAS - PULM A REVS DUR: 0.1 SEC
BH CV ECHO MEAS - PULM A REVS VEL: 17.4 CM/SEC
BH CV ECHO MEAS - PULM DIAS VEL: 64.7 CM/SEC
BH CV ECHO MEAS - PULM S/D: 0.86
BH CV ECHO MEAS - PULM SYS VEL: 55.5 CM/SEC
BH CV ECHO MEAS - RAP SYSTOLE: 3 MMHG
BH CV ECHO MEAS - RV MAX PG: 1.14 MMHG
BH CV ECHO MEAS - RV V1 MAX: 53.5 CM/SEC
BH CV ECHO MEAS - RV V1 VTI: 11.9 CM
BH CV ECHO MEAS - RVSP: 47 MMHG
BH CV ECHO MEAS - SV(LVOT): 78.2 ML
BH CV ECHO MEAS - SV(MOD-SP2): 64 ML
BH CV ECHO MEAS - SV(MOD-SP4): 48 ML
BH CV ECHO MEAS - SVI(LVOT): 37.2 ML/M2
BH CV ECHO MEAS - SVI(MOD-SP2): 30.4 ML/M2
BH CV ECHO MEAS - SVI(MOD-SP4): 22.8 ML/M2
BH CV ECHO MEAS - TAPSE (>1.6): 2.04 CM
BH CV ECHO MEAS - TR MAX PG: 44.9 MMHG
BH CV ECHO MEAS - TR MAX VEL: 335 CM/SEC
BH CV ECHO MEASUREMENTS AVERAGE E/E' RATIO: 26.67
BH CV XLRA - RV BASE: 3.9 CM
BH CV XLRA - RV LENGTH: 7.1 CM
BH CV XLRA - RV MID: 3.7 CM
BH CV XLRA - TDI S': 10.1 CM/SEC
BUN SERPL-MCNC: 23 MG/DL (ref 8–23)
BUN/CREAT SERPL: 19.5 (ref 7–25)
CALCIUM SPEC-SCNC: 8.7 MG/DL (ref 8.6–10.5)
CHLORIDE SERPL-SCNC: 103 MMOL/L (ref 98–107)
CO2 SERPL-SCNC: 23 MMOL/L (ref 22–29)
CREAT SERPL-MCNC: 1.18 MG/DL (ref 0.76–1.27)
DEPRECATED RDW RBC AUTO: 45.5 FL (ref 37–54)
EGFRCR SERPLBLD CKD-EPI 2021: 59.4 ML/MIN/1.73
ERYTHROCYTE [DISTWIDTH] IN BLOOD BY AUTOMATED COUNT: 12.6 % (ref 12.3–15.4)
GLUCOSE SERPL-MCNC: 129 MG/DL (ref 65–99)
HCT VFR BLD AUTO: 34.8 % (ref 37.5–51)
HGB BLD-MCNC: 11.7 G/DL (ref 13–17.7)
LEFT ATRIUM VOLUME INDEX: 21.6 ML/M2
LV EF 2D ECHO EST: 50 %
MCH RBC QN AUTO: 33.1 PG (ref 26.6–33)
MCHC RBC AUTO-ENTMCNC: 33.6 G/DL (ref 31.5–35.7)
MCV RBC AUTO: 98.3 FL (ref 79–97)
PLATELET # BLD AUTO: 192 10*3/MM3 (ref 140–450)
PMV BLD AUTO: 11.1 FL (ref 6–12)
POTASSIUM SERPL-SCNC: 3.8 MMOL/L (ref 3.5–5.2)
RBC # BLD AUTO: 3.54 10*6/MM3 (ref 4.14–5.8)
SINUS: 3 CM
SODIUM SERPL-SCNC: 134 MMOL/L (ref 136–145)
STJ: 3 CM
WBC NRBC COR # BLD AUTO: 16.98 10*3/MM3 (ref 3.4–10.8)

## 2024-09-16 PROCEDURE — 93306 TTE W/DOPPLER COMPLETE: CPT

## 2024-09-16 PROCEDURE — 25010000002 PIPERACILLIN SOD-TAZOBACTAM PER 1 G: Performed by: INTERNAL MEDICINE

## 2024-09-16 PROCEDURE — 71275 CT ANGIOGRAPHY CHEST: CPT

## 2024-09-16 PROCEDURE — 80048 BASIC METABOLIC PNL TOTAL CA: CPT | Performed by: INTERNAL MEDICINE

## 2024-09-16 PROCEDURE — 93306 TTE W/DOPPLER COMPLETE: CPT | Performed by: INTERNAL MEDICINE

## 2024-09-16 PROCEDURE — 25510000001 PERFLUTREN 6.52 MG/ML SUSPENSION 2 ML VIAL: Performed by: STUDENT IN AN ORGANIZED HEALTH CARE EDUCATION/TRAINING PROGRAM

## 2024-09-16 PROCEDURE — 25510000001 IOPAMIDOL PER 1 ML: Performed by: STUDENT IN AN ORGANIZED HEALTH CARE EDUCATION/TRAINING PROGRAM

## 2024-09-16 PROCEDURE — 85027 COMPLETE CBC AUTOMATED: CPT | Performed by: INTERNAL MEDICINE

## 2024-09-16 RX ORDER — IOPAMIDOL 755 MG/ML
100 INJECTION, SOLUTION INTRAVASCULAR
Status: COMPLETED | OUTPATIENT
Start: 2024-09-16 | End: 2024-09-16

## 2024-09-16 RX ORDER — TRAMADOL HYDROCHLORIDE 50 MG/1
50 TABLET ORAL ONCE
Status: COMPLETED | OUTPATIENT
Start: 2024-09-16 | End: 2024-09-16

## 2024-09-16 RX ADMIN — ATORVASTATIN CALCIUM 40 MG: 20 TABLET, FILM COATED ORAL at 20:47

## 2024-09-16 RX ADMIN — PIPERACILLIN AND TAZOBACTAM 3.38 G: 3; .375 INJECTION, POWDER, FOR SOLUTION INTRAVENOUS at 21:07

## 2024-09-16 RX ADMIN — APIXABAN 2.5 MG: 2.5 TABLET, FILM COATED ORAL at 08:25

## 2024-09-16 RX ADMIN — SOTALOL HYDROCHLORIDE 80 MG: 80 TABLET ORAL at 08:23

## 2024-09-16 RX ADMIN — BENZONATATE 200 MG: 100 CAPSULE ORAL at 16:23

## 2024-09-16 RX ADMIN — Medication 1000 UNITS: at 08:23

## 2024-09-16 RX ADMIN — PANTOPRAZOLE SODIUM 40 MG: 40 TABLET, DELAYED RELEASE ORAL at 03:09

## 2024-09-16 RX ADMIN — PERFLUTREN 2 ML: 6.52 INJECTION, SUSPENSION INTRAVENOUS at 13:52

## 2024-09-16 RX ADMIN — DEXTROMETHORPHAN POLISTIREX 60 MG: 30 SUSPENSION ORAL at 14:32

## 2024-09-16 RX ADMIN — ACETAMINOPHEN 325MG 650 MG: 325 TABLET ORAL at 05:38

## 2024-09-16 RX ADMIN — FUROSEMIDE 20 MG: 20 TABLET ORAL at 08:23

## 2024-09-16 RX ADMIN — APIXABAN 2.5 MG: 2.5 TABLET, FILM COATED ORAL at 20:47

## 2024-09-16 RX ADMIN — DEXTROMETHORPHAN POLISTIREX 60 MG: 30 SUSPENSION ORAL at 02:21

## 2024-09-16 RX ADMIN — TRAMADOL HYDROCHLORIDE 50 MG: 50 TABLET ORAL at 03:10

## 2024-09-16 RX ADMIN — Medication 2.5 MG: at 20:47

## 2024-09-16 RX ADMIN — LEVOTHYROXINE SODIUM 88 MCG: 88 TABLET ORAL at 05:37

## 2024-09-16 RX ADMIN — BENZONATATE 200 MG: 100 CAPSULE ORAL at 05:38

## 2024-09-16 RX ADMIN — Medication 1 TABLET: at 08:23

## 2024-09-16 RX ADMIN — IOPAMIDOL 95 ML: 755 INJECTION, SOLUTION INTRAVENOUS at 18:02

## 2024-09-16 RX ADMIN — ESCITALOPRAM 20 MG: 20 TABLET, FILM COATED ORAL at 08:23

## 2024-09-17 ENCOUNTER — TELEPHONE (OUTPATIENT)
Dept: SURGERY | Facility: CLINIC | Age: 89
End: 2024-09-17
Payer: MEDICARE

## 2024-09-17 DIAGNOSIS — Z85.118 HISTORY OF LUNG CANCER: Primary | ICD-10-CM

## 2024-09-17 DIAGNOSIS — J69.0 ASPIRATION PNEUMONIA, UNSPECIFIED ASPIRATION PNEUMONIA TYPE, UNSPECIFIED LATERALITY, UNSPECIFIED PART OF LUNG: ICD-10-CM

## 2024-09-17 LAB
ANION GAP SERPL CALCULATED.3IONS-SCNC: 10 MMOL/L (ref 5–15)
BASOPHILS # BLD AUTO: 0.06 10*3/MM3 (ref 0–0.2)
BASOPHILS NFR BLD AUTO: 0.4 % (ref 0–1.5)
BUN SERPL-MCNC: 23 MG/DL (ref 8–23)
BUN/CREAT SERPL: 21.7 (ref 7–25)
CALCIUM SPEC-SCNC: 8.6 MG/DL (ref 8.6–10.5)
CHLORIDE SERPL-SCNC: 101 MMOL/L (ref 98–107)
CO2 SERPL-SCNC: 23 MMOL/L (ref 22–29)
CREAT SERPL-MCNC: 1.06 MG/DL (ref 0.76–1.27)
DEPRECATED RDW RBC AUTO: 46.6 FL (ref 37–54)
EGFRCR SERPLBLD CKD-EPI 2021: 67.5 ML/MIN/1.73
EOSINOPHIL # BLD AUTO: 0.17 10*3/MM3 (ref 0–0.4)
EOSINOPHIL NFR BLD AUTO: 1 % (ref 0.3–6.2)
ERYTHROCYTE [DISTWIDTH] IN BLOOD BY AUTOMATED COUNT: 12.9 % (ref 12.3–15.4)
GLUCOSE SERPL-MCNC: 174 MG/DL (ref 65–99)
HCT VFR BLD AUTO: 37.5 % (ref 37.5–51)
HGB BLD-MCNC: 12.6 G/DL (ref 13–17.7)
IMM GRANULOCYTES # BLD AUTO: 0.27 10*3/MM3 (ref 0–0.05)
IMM GRANULOCYTES NFR BLD AUTO: 1.6 % (ref 0–0.5)
LYMPHOCYTES # BLD AUTO: 1.77 10*3/MM3 (ref 0.7–3.1)
LYMPHOCYTES NFR BLD AUTO: 10.4 % (ref 19.6–45.3)
MCH RBC QN AUTO: 33.4 PG (ref 26.6–33)
MCHC RBC AUTO-ENTMCNC: 33.6 G/DL (ref 31.5–35.7)
MCV RBC AUTO: 99.5 FL (ref 79–97)
MONOCYTES # BLD AUTO: 1.25 10*3/MM3 (ref 0.1–0.9)
MONOCYTES NFR BLD AUTO: 7.4 % (ref 5–12)
MRSA DNA SPEC QL NAA+PROBE: NORMAL
NEUTROPHILS NFR BLD AUTO: 13.44 10*3/MM3 (ref 1.7–7)
NEUTROPHILS NFR BLD AUTO: 79.2 % (ref 42.7–76)
NRBC BLD AUTO-RTO: 0 /100 WBC (ref 0–0.2)
PLATELET # BLD AUTO: 228 10*3/MM3 (ref 140–450)
PMV BLD AUTO: 10.7 FL (ref 6–12)
POTASSIUM SERPL-SCNC: 3.8 MMOL/L (ref 3.5–5.2)
RBC # BLD AUTO: 3.77 10*6/MM3 (ref 4.14–5.8)
SODIUM SERPL-SCNC: 134 MMOL/L (ref 136–145)
WBC NRBC COR # BLD AUTO: 16.96 10*3/MM3 (ref 3.4–10.8)

## 2024-09-17 PROCEDURE — 97165 OT EVAL LOW COMPLEX 30 MIN: CPT

## 2024-09-17 PROCEDURE — 97535 SELF CARE MNGMENT TRAINING: CPT

## 2024-09-17 PROCEDURE — 80048 BASIC METABOLIC PNL TOTAL CA: CPT | Performed by: STUDENT IN AN ORGANIZED HEALTH CARE EDUCATION/TRAINING PROGRAM

## 2024-09-17 PROCEDURE — 97530 THERAPEUTIC ACTIVITIES: CPT

## 2024-09-17 PROCEDURE — 85025 COMPLETE CBC W/AUTO DIFF WBC: CPT | Performed by: STUDENT IN AN ORGANIZED HEALTH CARE EDUCATION/TRAINING PROGRAM

## 2024-09-17 PROCEDURE — 25010000002 PIPERACILLIN SOD-TAZOBACTAM PER 1 G: Performed by: INTERNAL MEDICINE

## 2024-09-17 PROCEDURE — 97161 PT EVAL LOW COMPLEX 20 MIN: CPT

## 2024-09-17 PROCEDURE — 87641 MR-STAPH DNA AMP PROBE: CPT | Performed by: STUDENT IN AN ORGANIZED HEALTH CARE EDUCATION/TRAINING PROGRAM

## 2024-09-17 RX ADMIN — PIPERACILLIN AND TAZOBACTAM 3.38 G: 3; .375 INJECTION, POWDER, FOR SOLUTION INTRAVENOUS at 06:43

## 2024-09-17 RX ADMIN — BENZONATATE 200 MG: 100 CAPSULE ORAL at 14:13

## 2024-09-17 RX ADMIN — DEXTROMETHORPHAN POLISTIREX 60 MG: 30 SUSPENSION ORAL at 17:42

## 2024-09-17 RX ADMIN — FUROSEMIDE 20 MG: 20 TABLET ORAL at 09:29

## 2024-09-17 RX ADMIN — LEVOTHYROXINE SODIUM 88 MCG: 88 TABLET ORAL at 06:42

## 2024-09-17 RX ADMIN — Medication 1 TABLET: at 09:28

## 2024-09-17 RX ADMIN — Medication 1000 UNITS: at 09:28

## 2024-09-17 RX ADMIN — PANTOPRAZOLE SODIUM 40 MG: 40 TABLET, DELAYED RELEASE ORAL at 06:42

## 2024-09-17 RX ADMIN — PIPERACILLIN AND TAZOBACTAM 3.38 G: 3; .375 INJECTION, POWDER, FOR SOLUTION INTRAVENOUS at 14:13

## 2024-09-17 RX ADMIN — PIPERACILLIN AND TAZOBACTAM 3.38 G: 3; .375 INJECTION, POWDER, FOR SOLUTION INTRAVENOUS at 22:00

## 2024-09-17 RX ADMIN — APIXABAN 2.5 MG: 2.5 TABLET, FILM COATED ORAL at 09:29

## 2024-09-17 RX ADMIN — ESCITALOPRAM 20 MG: 20 TABLET, FILM COATED ORAL at 09:29

## 2024-09-17 RX ADMIN — BENZONATATE 200 MG: 100 CAPSULE ORAL at 06:42

## 2024-09-17 RX ADMIN — DEXTROMETHORPHAN POLISTIREX 60 MG: 30 SUSPENSION ORAL at 02:00

## 2024-09-17 RX ADMIN — SOTALOL HYDROCHLORIDE 80 MG: 80 TABLET ORAL at 09:28

## 2024-09-17 RX ADMIN — ATORVASTATIN CALCIUM 40 MG: 20 TABLET, FILM COATED ORAL at 20:17

## 2024-09-17 RX ADMIN — APIXABAN 2.5 MG: 2.5 TABLET, FILM COATED ORAL at 20:17

## 2024-09-18 ENCOUNTER — TELEPHONE (OUTPATIENT)
Dept: SURGERY | Facility: CLINIC | Age: 89
End: 2024-09-18
Payer: MEDICARE

## 2024-09-18 LAB
ANION GAP SERPL CALCULATED.3IONS-SCNC: 9.8 MMOL/L (ref 5–15)
BASOPHILS # BLD AUTO: 0.06 10*3/MM3 (ref 0–0.2)
BASOPHILS NFR BLD AUTO: 0.4 % (ref 0–1.5)
BUN SERPL-MCNC: 21 MG/DL (ref 8–23)
BUN/CREAT SERPL: 16.7 (ref 7–25)
CALCIUM SPEC-SCNC: 8.5 MG/DL (ref 8.6–10.5)
CHLORIDE SERPL-SCNC: 103 MMOL/L (ref 98–107)
CO2 SERPL-SCNC: 26.2 MMOL/L (ref 22–29)
CREAT SERPL-MCNC: 1.26 MG/DL (ref 0.76–1.27)
DEPRECATED RDW RBC AUTO: 48.1 FL (ref 37–54)
EGFRCR SERPLBLD CKD-EPI 2021: 54.9 ML/MIN/1.73
EOSINOPHIL # BLD AUTO: 0.27 10*3/MM3 (ref 0–0.4)
EOSINOPHIL NFR BLD AUTO: 2 % (ref 0.3–6.2)
ERYTHROCYTE [DISTWIDTH] IN BLOOD BY AUTOMATED COUNT: 12.9 % (ref 12.3–15.4)
GLUCOSE SERPL-MCNC: 134 MG/DL (ref 65–99)
HCT VFR BLD AUTO: 36.9 % (ref 37.5–51)
HGB BLD-MCNC: 11.8 G/DL (ref 13–17.7)
IMM GRANULOCYTES # BLD AUTO: 0.57 10*3/MM3 (ref 0–0.05)
IMM GRANULOCYTES NFR BLD AUTO: 4.1 % (ref 0–0.5)
LYMPHOCYTES # BLD AUTO: 2.53 10*3/MM3 (ref 0.7–3.1)
LYMPHOCYTES NFR BLD AUTO: 18.4 % (ref 19.6–45.3)
MCH RBC QN AUTO: 32.6 PG (ref 26.6–33)
MCHC RBC AUTO-ENTMCNC: 32 G/DL (ref 31.5–35.7)
MCV RBC AUTO: 101.9 FL (ref 79–97)
MONOCYTES # BLD AUTO: 1.22 10*3/MM3 (ref 0.1–0.9)
MONOCYTES NFR BLD AUTO: 8.9 % (ref 5–12)
NEUTROPHILS NFR BLD AUTO: 66.2 % (ref 42.7–76)
NEUTROPHILS NFR BLD AUTO: 9.1 10*3/MM3 (ref 1.7–7)
NRBC BLD AUTO-RTO: 0.1 /100 WBC (ref 0–0.2)
PLATELET # BLD AUTO: 259 10*3/MM3 (ref 140–450)
PMV BLD AUTO: 10.5 FL (ref 6–12)
POTASSIUM SERPL-SCNC: 3.8 MMOL/L (ref 3.5–5.2)
RBC # BLD AUTO: 3.62 10*6/MM3 (ref 4.14–5.8)
SODIUM SERPL-SCNC: 139 MMOL/L (ref 136–145)
WBC NRBC COR # BLD AUTO: 13.75 10*3/MM3 (ref 3.4–10.8)

## 2024-09-18 PROCEDURE — 92610 EVALUATE SWALLOWING FUNCTION: CPT | Performed by: SPEECH-LANGUAGE PATHOLOGIST

## 2024-09-18 PROCEDURE — 25010000002 PIPERACILLIN SOD-TAZOBACTAM PER 1 G: Performed by: INTERNAL MEDICINE

## 2024-09-18 PROCEDURE — 80048 BASIC METABOLIC PNL TOTAL CA: CPT | Performed by: STUDENT IN AN ORGANIZED HEALTH CARE EDUCATION/TRAINING PROGRAM

## 2024-09-18 PROCEDURE — 85025 COMPLETE CBC W/AUTO DIFF WBC: CPT | Performed by: STUDENT IN AN ORGANIZED HEALTH CARE EDUCATION/TRAINING PROGRAM

## 2024-09-18 RX ORDER — GUAIFENESIN 600 MG/1
1200 TABLET, EXTENDED RELEASE ORAL EVERY 12 HOURS SCHEDULED
Status: DISCONTINUED | OUTPATIENT
Start: 2024-09-18 | End: 2024-09-19 | Stop reason: HOSPADM

## 2024-09-18 RX ADMIN — Medication 1 TABLET: at 07:27

## 2024-09-18 RX ADMIN — BENZONATATE 200 MG: 100 CAPSULE ORAL at 21:39

## 2024-09-18 RX ADMIN — PANTOPRAZOLE SODIUM 40 MG: 40 TABLET, DELAYED RELEASE ORAL at 06:02

## 2024-09-18 RX ADMIN — BENZONATATE 200 MG: 100 CAPSULE ORAL at 06:02

## 2024-09-18 RX ADMIN — PIPERACILLIN AND TAZOBACTAM 3.38 G: 3; .375 INJECTION, POWDER, FOR SOLUTION INTRAVENOUS at 14:33

## 2024-09-18 RX ADMIN — GUAIFENESIN 1200 MG: 600 TABLET, EXTENDED RELEASE ORAL at 20:24

## 2024-09-18 RX ADMIN — Medication 1000 UNITS: at 07:26

## 2024-09-18 RX ADMIN — DEXTROMETHORPHAN POLISTIREX 60 MG: 30 SUSPENSION ORAL at 12:59

## 2024-09-18 RX ADMIN — APIXABAN 2.5 MG: 2.5 TABLET, FILM COATED ORAL at 20:28

## 2024-09-18 RX ADMIN — ATORVASTATIN CALCIUM 40 MG: 20 TABLET, FILM COATED ORAL at 20:24

## 2024-09-18 RX ADMIN — FUROSEMIDE 20 MG: 20 TABLET ORAL at 07:27

## 2024-09-18 RX ADMIN — SOTALOL HYDROCHLORIDE 80 MG: 80 TABLET ORAL at 07:27

## 2024-09-18 RX ADMIN — LEVOTHYROXINE SODIUM 88 MCG: 88 TABLET ORAL at 06:02

## 2024-09-18 RX ADMIN — GUAIFENESIN 1200 MG: 600 TABLET, EXTENDED RELEASE ORAL at 12:59

## 2024-09-18 RX ADMIN — PIPERACILLIN AND TAZOBACTAM 3.38 G: 3; .375 INJECTION, POWDER, FOR SOLUTION INTRAVENOUS at 06:01

## 2024-09-18 RX ADMIN — PIPERACILLIN AND TAZOBACTAM 3.38 G: 3; .375 INJECTION, POWDER, FOR SOLUTION INTRAVENOUS at 21:39

## 2024-09-18 RX ADMIN — APIXABAN 2.5 MG: 2.5 TABLET, FILM COATED ORAL at 07:26

## 2024-09-18 RX ADMIN — ESCITALOPRAM 20 MG: 20 TABLET, FILM COATED ORAL at 07:27

## 2024-09-19 ENCOUNTER — READMISSION MANAGEMENT (OUTPATIENT)
Dept: CALL CENTER | Facility: HOSPITAL | Age: 89
End: 2024-09-19
Payer: MEDICARE

## 2024-09-19 VITALS
RESPIRATION RATE: 16 BRPM | HEIGHT: 71 IN | DIASTOLIC BLOOD PRESSURE: 61 MMHG | TEMPERATURE: 98 F | OXYGEN SATURATION: 93 % | HEART RATE: 65 BPM | SYSTOLIC BLOOD PRESSURE: 131 MMHG | BODY MASS INDEX: 27.5 KG/M2 | WEIGHT: 196.43 LBS

## 2024-09-19 PROBLEM — J15.69 PNEUMONIA DUE TO OTHER GRAM-NEGATIVE BACTERIA: Status: ACTIVE | Noted: 2024-09-19

## 2024-09-19 PROBLEM — R79.89 ELEVATED TROPONIN: Status: RESOLVED | Noted: 2024-09-16 | Resolved: 2024-09-19

## 2024-09-19 PROBLEM — A41.9 SEPSIS: Status: RESOLVED | Noted: 2024-09-16 | Resolved: 2024-09-19

## 2024-09-19 PROCEDURE — 97535 SELF CARE MNGMENT TRAINING: CPT

## 2024-09-19 PROCEDURE — 94618 PULMONARY STRESS TESTING: CPT

## 2024-09-19 PROCEDURE — 94761 N-INVAS EAR/PLS OXIMETRY MLT: CPT

## 2024-09-19 PROCEDURE — 97530 THERAPEUTIC ACTIVITIES: CPT

## 2024-09-19 PROCEDURE — 25010000002 PIPERACILLIN SOD-TAZOBACTAM PER 1 G: Performed by: INTERNAL MEDICINE

## 2024-09-19 RX ORDER — GUAIFENESIN 600 MG/1
1200 TABLET, EXTENDED RELEASE ORAL EVERY 12 HOURS SCHEDULED
Qty: 28 TABLET | Refills: 0 | Status: SHIPPED | OUTPATIENT
Start: 2024-09-19 | End: 2024-09-26

## 2024-09-19 RX ORDER — CHOLECALCIFEROL (VITAMIN D3) 50 MCG
2000 TABLET ORAL DAILY
Start: 2024-09-19

## 2024-09-19 RX ORDER — BENZONATATE 200 MG/1
200 CAPSULE ORAL 3 TIMES DAILY PRN
Qty: 30 CAPSULE | Refills: 0 | Status: SHIPPED | OUTPATIENT
Start: 2024-09-19

## 2024-09-19 RX ADMIN — Medication 1 TABLET: at 09:38

## 2024-09-19 RX ADMIN — ESCITALOPRAM 20 MG: 20 TABLET, FILM COATED ORAL at 09:38

## 2024-09-19 RX ADMIN — PIPERACILLIN AND TAZOBACTAM 3.38 G: 3; .375 INJECTION, POWDER, FOR SOLUTION INTRAVENOUS at 06:05

## 2024-09-19 RX ADMIN — PANTOPRAZOLE SODIUM 40 MG: 40 TABLET, DELAYED RELEASE ORAL at 06:05

## 2024-09-19 RX ADMIN — FUROSEMIDE 20 MG: 20 TABLET ORAL at 09:38

## 2024-09-19 RX ADMIN — Medication 1000 UNITS: at 09:38

## 2024-09-19 RX ADMIN — APIXABAN 2.5 MG: 2.5 TABLET, FILM COATED ORAL at 09:39

## 2024-09-19 RX ADMIN — GUAIFENESIN 1200 MG: 600 TABLET, EXTENDED RELEASE ORAL at 09:38

## 2024-09-19 RX ADMIN — DEXTROMETHORPHAN POLISTIREX 60 MG: 30 SUSPENSION ORAL at 01:52

## 2024-09-19 RX ADMIN — SOTALOL HYDROCHLORIDE 80 MG: 80 TABLET ORAL at 09:38

## 2024-09-19 RX ADMIN — LEVOTHYROXINE SODIUM 88 MCG: 88 TABLET ORAL at 06:05

## 2024-09-20 LAB
BACTERIA SPEC AEROBE CULT: NORMAL
BACTERIA SPEC AEROBE CULT: NORMAL

## 2024-09-24 ENCOUNTER — READMISSION MANAGEMENT (OUTPATIENT)
Dept: CALL CENTER | Facility: HOSPITAL | Age: 89
End: 2024-09-24
Payer: MEDICARE

## 2024-10-09 ENCOUNTER — TREATMENT (OUTPATIENT)
Dept: PHYSICAL THERAPY | Facility: CLINIC | Age: 89
End: 2024-10-09
Payer: MEDICARE

## 2024-10-09 DIAGNOSIS — R26.81 UNSTEADINESS: ICD-10-CM

## 2024-10-09 DIAGNOSIS — R53.81 PHYSICAL DECONDITIONING: ICD-10-CM

## 2024-10-09 DIAGNOSIS — J18.9 PNEUMONIA OF BOTH LUNGS DUE TO INFECTIOUS ORGANISM, UNSPECIFIED PART OF LUNG: ICD-10-CM

## 2024-10-09 DIAGNOSIS — R53.1 WEAKNESS: Primary | ICD-10-CM

## 2024-10-09 NOTE — PROGRESS NOTES
Physical Therapy Initial Evaluation and Plan of Care  [facility] Physical Therapy [address], [address]  [place] [address]     Patient: Bhaskar Ibarra   : 1935  Referring practitioner: Radha Bowers DO  Date of Initial Visit: 10/9/2024  Today's Date: 10/9/2024  Patient seen for 1 sessions  PT Clinic location: [address], [address] [place] [address].  [id number]          Visit Diagnoses:    ICD-10-CM ICD-9-CM   1. Weakness  R53.1 780.79   2. Physical deconditioning  R53.81 799.3   3. Unsteadiness  R26.81 781.2   4. Pneumonia of both lungs due to infectious organism, unspecified part of lung  J18.9 483.8       Subjective Questionnaire: LEFS: 37    Subjective Evaluation    History of Present Illness  Mechanism of injury: The patient is a 88 y.o. male presenting to today's evaluation with weakness. He states that he has been hospitalized with double pneumonia for a week. He states that really set him back in terms of his strength and he is slowly getting back to normal. He feels a little unsteady on his feet compared to where he was before getting pneumonia. He denies any shortness of breath. He states that he cannot think of anything in particular that is challenging for him, he just knows he is not where he used to be.     Occupation: retired  Prior level of function: independent without difficulty    Current Activity/Functional limitations: He states that he is slower with all of his tasks. He lives alone and does most household tasks on his own. His daughter comes by 1-2 times a week to help. He currently ambulates with a cane and states he did not need it much before he was sick. He has stairs going down to the basement and denies any difficulty with them. He is driving. He does not identify any specific tasks that are challenging, just states that he is not where he used to be and requires additional time for tasks.         Pain  No pain  reported    Patient Goals  Patient goals for therapy: improved balance and increased strength  Patient goal: improved endurance       Medical history: MI, a fib, acute renal failure, cardiomyopathy, chest pain, depression, GERD, hyperlipidemia, hypertension, lung cancer 2016, pleural effusion, ulcerative colitis. See chart for further detail.   Therapy Precautions: requires supervision in the clinic d/t weakness      Objective          Postural Observations    Additional Postural Observation Details  POSTURE: wide base of support, increased thoracic kyphosis, B knee flexion, trunk flexion;   GAIT: with straight cane, wide base of support, decreased anushka, trunk flexion, decreased dorsiflexion B     Strength/Myotome Testing     Left Hip   Planes of Motion   Flexion: 4+  External rotation: 3  Internal rotation: 3+    Right Hip   Planes of Motion   Flexion: 4  External rotation: 3+  Internal rotation: 3+    Left Knee   Flexion: 5  Extension: 5    Right Knee   Flexion: 5  Extension: 5    Left Ankle/Foot   Dorsiflexion: 3+  Plantar flexion: 5    Right Ankle/Foot   Dorsiflexion: 5  Plantar flexion: 5    Functional Assessment     Comments  30 SEC SIT TO STAND: 7 reps, hands on lap   TUG TEST: 17 sec, no AD    BALANCE:  Tandem L: 1 sec, CGA  Tandem R: 3 sec, CGA          Assessment & Plan       Assessment  Impairments: abnormal gait, abnormal or restricted ROM, impaired balance, impaired physical strength, lacks appropriate home exercise program and safety issue   Functional limitations: carrying objects, walking, standing and reaching overhead   Assessment details: The patient is a 88 y.o. male who presents to physical therapy today for weakness. Upon initial evaluation, the patient demonstrates the following impairments: LE weakness, impaired performance on the TUG, impaired performance on the 30 sec sit to stand, and impaired balance.     Due to these impairments, the patient states that he is slower that he used to be  and requires additional time for all ADLs. He also notes difficulty with walking, stating that he has to use a cane now. The patient would benefit from skilled PT services to address functional limitations and impairments and to improve patient quality of life.      Prognosis: good    Goals  Plan Goals: ST. Pt will be independent and compliant with initial HEP in 2 weeks.  2. Pt will improve TUG time to <15 sec in 3 weeks.  3. Pt will improve 30 sec sit to stand test to 8 reps in 3 weeks.   4. Pt will demonstrate good safety awareness & decision making to reduce falls risk in 3 weeks.  LT. Pt will be independent with final HEP for self-management of condition by DC.  2. Pt will improve TUG time to <13 sec to indicate decreased falls risk by DC.   3. Pt will improve 30 second sit to stand test to >9 reps by DC in order to indicate improved functional BLE strength.       Plan  Therapy options: will be seen for skilled therapy services  Planned modality interventions: cryotherapy and thermotherapy (hydrocollator packs)  Planned therapy interventions: abdominal trunk stabilization, ADL retraining, balance/weight-bearing training, body mechanics training, flexibility, functional ROM exercises, gait training, home exercise program, joint mobilization, manual therapy, motor coordination training, neuromuscular re-education, postural training, soft tissue mobilization, spinal/joint mobilization, strengthening, stretching, therapeutic activities and transfer training  Frequency: 2x week  Duration in weeks: 4  Treatment plan discussed with: patient      Access Code: [email or web address]  URL: [email or web address]  Date: 10/09/2024  Prepared by: Madhavi Segundo     See flowsheets for treatment detail.      History # of Personal Factors and/or Comorbidities: LOW (0)  Examination of Body System(s): # of elements: LOW (1-2)  Clinical Presentation: STABLE   Clinical Decision Making: LOW       Timed:         Manual  Therapy:         mins  56160;     Therapeutic Exercise:    10     mins  03251;     Neuromuscular Sujata:        mins  04108;    Therapeutic Activity:          mins  14651;     Gait Training:           mins  05433;     Ionto                                   mins   40351  Self Care                       15     mins   32089      Un-Timed:  Electrical Stimulation:         mins  01530 ( );  Dry Needling          mins self-pay  Traction          mins 40088  Low Eval     20     Mins  58734  Mod Eval          Mins  68933  High Eval                            Mins  97530  Re-Eval                               mins  54381      Timed Treatment:   25   mins   Total Treatment:     45   mins    PT SIGNATURE: Madhavi Segundo PT   [place] PT license #: [id number]  DATE TREATMENT INITIATED: 10/9/2024    Initial Certification  Certification Period: 1/6/2025  I certify that the therapy services are furnished while this patient is under my care.  The services outlined above are required by this patient, and will be reviewed every 90 days.    PHYSICIAN: Radha Bowers DO  NPI: [id number]                                      DATE:     Please sign and return via fax to [facility] - Fax #: [phone]. Thank you, [facility] Physical Therapy.

## 2024-10-09 NOTE — LETTER
Physical Therapy Initial Evaluation and Plan of Care  [facility] Physical Therapy [address], [address]  [place] [address]     Patient: Bhaskar Ibarra   : 1935  Referring practitioner: Radha Bowers DO  Date of Initial Visit: 10/9/2024  Today's Date: 10/9/2024  Patient seen for 1 sessions  PT Clinic location: [address], [address] [place] [address].  [address]          Visit Diagnoses:    ICD-10-CM ICD-9-CM   1. Weakness  R53.1 780.79   2. Physical deconditioning  R53.81 799.3   3. Unsteadiness  R26.81 781.2   4. Pneumonia of both lungs due to infectious organism, unspecified part of lung  J18.9 483.8       Subjective Questionnaire: LEFS: 37    Subjective Evaluation    History of Present Illness  Mechanism of injury: The patient is a 88 y.o. male presenting to today's evaluation with weakness. He states that he has been hospitalized with double pneumonia for a week. He states that really set him back in terms of his strength and he is slowly getting back to normal. He feels a little unsteady on his feet compared to where he was before getting pneumonia. He denies any shortness of breath. He states that he cannot think of anything in particular that is challenging for him, he just knows he is not where he used to be.     Occupation: retired  Prior level of function: independent without difficulty    Current Activity/Functional limitations: He states that he is slower with all of his tasks. He lives alone and does most household tasks on his own. His daughter comes by 1-2 times a week to help. He currently ambulates with a cane and states he did not need it much before he was sick. He has stairs going down to the basement and denies any difficulty with them. He is driving. He does not identify any specific tasks that are challenging, just states that he is not where he used to be and requires additional time for tasks.         Pain  No pain  reported    Patient Goals  Patient goals for therapy: improved balance and increased strength  Patient goal: improved endurance       Medical history: MI, a fib, acute renal failure, cardiomyopathy, chest pain, depression, GERD, hyperlipidemia, hypertension, lung cancer 2016, pleural effusion, ulcerative colitis. See chart for further detail.   Therapy Precautions: requires supervision in the clinic d/t weakness      Objective          Postural Observations    Additional Postural Observation Details  POSTURE: wide base of support, increased thoracic kyphosis, B knee flexion, trunk flexion;   GAIT: with straight cane, wide base of support, decreased anushka, trunk flexion, decreased dorsiflexion B     Strength/Myotome Testing     Left Hip   Planes of Motion   Flexion: 4+  External rotation: 3  Internal rotation: 3+    Right Hip   Planes of Motion   Flexion: 4  External rotation: 3+  Internal rotation: 3+    Left Knee   Flexion: 5  Extension: 5    Right Knee   Flexion: 5  Extension: 5    Left Ankle/Foot   Dorsiflexion: 3+  Plantar flexion: 5    Right Ankle/Foot   Dorsiflexion: 5  Plantar flexion: 5    Functional Assessment     Comments  30 SEC SIT TO STAND: 7 reps, hands on lap   TUG TEST: 17 sec, no AD    BALANCE:  Tandem L: 1 sec, CGA  Tandem R: 3 sec, CGA          Assessment & Plan       Assessment  Impairments: abnormal gait, abnormal or restricted ROM, impaired balance, impaired physical strength, lacks appropriate home exercise program and safety issue   Functional limitations: carrying objects, walking, standing and reaching overhead   Assessment details: The patient is a 88 y.o. male who presents to physical therapy today for weakness. Upon initial evaluation, the patient demonstrates the following impairments: LE weakness, impaired performance on the TUG, impaired performance on the 30 sec sit to stand, and impaired balance.     Due to these impairments, the patient states that he is slower that he used to be  and requires additional time for all ADLs. He also notes difficulty with walking, stating that he has to use a cane now. The patient would benefit from skilled PT services to address functional limitations and impairments and to improve patient quality of life.      Prognosis: good    Goals  Plan Goals: ST. Pt will be independent and compliant with initial HEP in 2 weeks.  2. Pt will improve TUG time to <15 sec in 3 weeks.  3. Pt will improve 30 sec sit to stand test to 8 reps in 3 weeks.   4. Pt will demonstrate good safety awareness & decision making to reduce falls risk in 3 weeks.  LT. Pt will be independent with final HEP for self-management of condition by DC.  2. Pt will improve TUG time to <13 sec to indicate decreased falls risk by DC.   3. Pt will improve 30 second sit to stand test to >9 reps by DC in order to indicate improved functional BLE strength.       Plan  Therapy options: will be seen for skilled therapy services  Planned modality interventions: cryotherapy and thermotherapy (hydrocollator packs)  Planned therapy interventions: abdominal trunk stabilization, ADL retraining, balance/weight-bearing training, body mechanics training, flexibility, functional ROM exercises, gait training, home exercise program, joint mobilization, manual therapy, motor coordination training, neuromuscular re-education, postural training, soft tissue mobilization, spinal/joint mobilization, strengthening, stretching, therapeutic activities and transfer training  Frequency: 2x week  Duration in weeks: 4  Treatment plan discussed with: patient      Access Code: [email or web address]  URL: [email or web address]  Date: 10/09/2024  Prepared by: Madhavi Segundo     See flowsheets for treatment detail.      History # of Personal Factors and/or Comorbidities: LOW (0)  Examination of Body System(s): # of elements: LOW (1-2)  Clinical Presentation: STABLE   Clinical Decision Making: LOW       Timed:         Manual  Therapy:         mins  66878;     Therapeutic Exercise:    10     mins  34670;     Neuromuscular Sujata:        mins  25076;    Therapeutic Activity:          mins  97770;     Gait Training:           mins  06521;     Ionto                                   mins   18376  Self Care                       15     mins   00687      Un-Timed:  Electrical Stimulation:         mins  66422 ( );  Dry Needling          mins self-pay  Traction          mins 45480  Low Eval     20     Mins  94644  Mod Eval          Mins  79538  High Eval                            Mins  09794  Re-Eval                               mins  44519      Timed Treatment:   25   mins   Total Treatment:     45   mins    PT SIGNATURE: Madhavi Segundo PT   [place] PT license #: [id number]  DATE TREATMENT INITIATED: 10/9/2024    Initial Certification  Certification Period: 1/6/2025  I certify that the therapy services are furnished while this patient is under my care.  The services outlined above are required by this patient, and will be reviewed every 90 days.    PHYSICIAN: Radha Bowers DO  NPI: [id number]                                      DATE:     Please sign and return via fax to [facility] - Fax #: [phone]. Thank you, [facility] Physical Therapy.

## 2024-10-11 ENCOUNTER — TREATMENT (OUTPATIENT)
Dept: PHYSICAL THERAPY | Facility: CLINIC | Age: 89
End: 2024-10-11
Payer: MEDICARE

## 2024-10-11 DIAGNOSIS — R53.1 WEAKNESS: Primary | ICD-10-CM

## 2024-10-11 DIAGNOSIS — R53.81 PHYSICAL DECONDITIONING: ICD-10-CM

## 2024-10-11 DIAGNOSIS — J18.9 PNEUMONIA OF BOTH LUNGS DUE TO INFECTIOUS ORGANISM, UNSPECIFIED PART OF LUNG: ICD-10-CM

## 2024-10-11 DIAGNOSIS — R26.81 UNSTEADINESS: ICD-10-CM

## 2024-10-11 NOTE — PROGRESS NOTES
Physical Therapy Daily Treatment Note    [facility] Physical Therapy [place]  [address], [address]  [place] [address]    Visit # 2        Patient: Bhaskar Ibarra   : 1935  Referring practitioner: Radha Bowers DO  Date of Initial Evaluation:  Type: THERAPY  Noted: 10/9/2024  Today's Date: 10/11/2024           ICD-10-CM ICD-9-CM   1. Weakness  R53.1 780.79   2. Unsteadiness  R26.81 781.2   3. Pneumonia of both lungs due to infectious organism, unspecified part of lung  J18.9 483.8   4. Physical deconditioning  R53.81 799.3       Subjective  Bhaskar Ibarra reports:   I feel really tired.  I'm wondering if I came back in here too soon.      Objective   See Exercise, Manual, and Modality Logs for complete treatment     Pt Education:  HEP review  Exercise rationale/ pain free exercise performance  Anatomy and structure of affected musculature  Posture/Postural awareness  Lumbar support  Sleeping positions with pillows  Alternate exercise positions  Verbal/Tactile cues to ensure correct exercise performance/technique    O2 sats:  post NuStep warmup: 96%, HR 68 bpm  Mid-TE program: 94%, HR 75 bpm  Post-session: 94%, HR 74 bpm    Assessment/Plan  Fair tolerance overall today, requiring several short rest breaks throughout session, low energy reserves currently. No specific areas of pain reported during or after session.      Would continue to benefit from skilled PT progressing with functional ROM, movement and strength progressing toward FWB activity and balance training.      Progress per Plan of Care and Progress strengthening /stabilization /functional activity             Timed:         Manual Therapy:         mins  67040     Therapeutic Exercise:     28    mins  44851     Neuromuscular Sujata:        mins  72121    Therapeutic Activity:      12    mins  15849     Gait Training:           mins  86715     Ultrasound:          mins  83793    Ionto                                    mins  26403  Self Care                            mins  31608    Un-Timed:  Electrical Stimulation:         mins 72551 ( )  Traction          mins 31622    Timed Treatment:   40   mins   Total Treatment:     51   mins    ASAD Kerr License #[id number]  Physical Therapist Assistant

## 2024-10-16 ENCOUNTER — TREATMENT (OUTPATIENT)
Dept: PHYSICAL THERAPY | Facility: CLINIC | Age: 89
End: 2024-10-16
Payer: MEDICARE

## 2024-10-16 DIAGNOSIS — R26.81 UNSTEADINESS: ICD-10-CM

## 2024-10-16 DIAGNOSIS — J18.9 PNEUMONIA OF BOTH LUNGS DUE TO INFECTIOUS ORGANISM, UNSPECIFIED PART OF LUNG: ICD-10-CM

## 2024-10-16 DIAGNOSIS — R53.1 WEAKNESS: Primary | ICD-10-CM

## 2024-10-16 DIAGNOSIS — R53.81 PHYSICAL DECONDITIONING: ICD-10-CM

## 2024-10-16 NOTE — PROGRESS NOTES
Physical Therapy Daily Treatment Note  [facility] Physical Therapy [place]  [address], [address]  [place] [address]     Patient: Bhaskar Ibarra  : 1935  Referring practitioner: Radha Bowers DO  Today's Date: 10/16/2024    VISIT#: 3    Visit Diagnoses:    ICD-10-CM ICD-9-CM   1. Weakness  R53.1 780.79   2. Unsteadiness  R26.81 781.2   3. Pneumonia of both lungs due to infectious organism, unspecified part of lung  J18.9 483.8   4. Physical deconditioning  R53.81 799.3       Subjective   Bhaskar Ibarra reports: that he was tired after the previous session. He feels okay today.       Objective       See Exercise, Manual, and Modality Logs for complete treatment.         Assessment/Plan  The patient tolerates the treatment session without increase in symptoms. He requires minimal verbal cueing for technique throughout the session and is able to correct his form. He requires occasional rest breaks throughout the session d/t fatigue. He requires B UE use during sit to stands. He would continue to benefit from skilled intervention with focus on LE strength.       Progress per Plan of Care          Timed:         Manual Therapy:         mins  80832;     Therapeutic Exercise:    15     mins  20511;     Neuromuscular Sujata:        mins  49634;    Therapeutic Activity:     15     mins  28606;     Gait Training:           mins  33564;      Ionto:                                   mins  58122  Self Care:                            mins  13655    Un-Timed:  Electrical Stimulation:         mins  58525 (MC );  Dry Needling          mins self-pay  Traction          mins 85322  Re-Eval                               mins  30081  Group Therapy           ___ mins 40942    Timed Treatment:   30   mins   Total Treatment:     48   mins    Madhavi Segundo PT  Physical Therapist  [place] [facility] license #: [id number]

## 2024-10-18 ENCOUNTER — TREATMENT (OUTPATIENT)
Dept: PHYSICAL THERAPY | Facility: CLINIC | Age: 89
End: 2024-10-18
Payer: MEDICARE

## 2024-10-18 DIAGNOSIS — J18.9 PNEUMONIA OF BOTH LUNGS DUE TO INFECTIOUS ORGANISM, UNSPECIFIED PART OF LUNG: ICD-10-CM

## 2024-10-18 DIAGNOSIS — R53.1 WEAKNESS: Primary | ICD-10-CM

## 2024-10-18 DIAGNOSIS — R26.81 UNSTEADINESS: ICD-10-CM

## 2024-10-18 DIAGNOSIS — R53.81 PHYSICAL DECONDITIONING: ICD-10-CM

## 2024-10-18 NOTE — PROGRESS NOTES
Physical Therapy Daily Treatment Note  [facility] Physical Therapy [place]  [address], [address]  [place] [address]     Patient: Bhaskar Ibarra  : 1935  Referring practitioner: Radha Bowers DO  Today's Date: 10/18/2024    VISIT#: 4    Visit Diagnoses:    ICD-10-CM ICD-9-CM   1. Weakness  R53.1 780.79   2. Unsteadiness  R26.81 781.2   3. Pneumonia of both lungs due to infectious organism, unspecified part of lung  J18.9 483.8   4. Physical deconditioning  R53.81 799.3       Subjective   Bhaskar Ibarra reports: that he feels okay today.       Objective       See Exercise, Manual, and Modality Logs for complete treatment.         Assessment/Plan  The patient tolerates the treatment session without increase in symptoms. He requires minimal verbal cueing for technique throughout the session and is able to correct his form. He requires occasional rest breaks throughout the session d/t fatigue. Step ups with a kettlebell are introduced to promote functional strength. He would continue to benefit from skilled intervention with focus on global strength,      Progress per Plan of Care          Timed:         Manual Therapy:         mins  09360;     Therapeutic Exercise:    15     mins  34842;     Neuromuscular Sujata:        mins  20471;    Therapeutic Activity:     15     mins  60453;     Gait Training:           mins  85512;      Ionto:                                   mins  02806  Self Care:                            mins  75574    Un-Timed:  Electrical Stimulation:         mins  50067 ( );  Dry Needling          mins self-pay  Traction          mins 82113  Re-Eval                               mins  62129  Group Therapy           ___ mins 36605    Timed Treatment:   30   mins   Total Treatment:     50   mins    Madhavi Segundo PT  Physical Therapist  [place] [facility] license #: [id number]

## 2024-10-23 ENCOUNTER — TREATMENT (OUTPATIENT)
Dept: PHYSICAL THERAPY | Facility: CLINIC | Age: 89
End: 2024-10-23
Payer: MEDICARE

## 2024-10-23 DIAGNOSIS — R53.81 PHYSICAL DECONDITIONING: ICD-10-CM

## 2024-10-23 DIAGNOSIS — R26.81 UNSTEADINESS: ICD-10-CM

## 2024-10-23 DIAGNOSIS — J18.9 PNEUMONIA OF BOTH LUNGS DUE TO INFECTIOUS ORGANISM, UNSPECIFIED PART OF LUNG: ICD-10-CM

## 2024-10-23 DIAGNOSIS — R53.1 WEAKNESS: Primary | ICD-10-CM

## 2024-10-23 NOTE — PROGRESS NOTES
Physical Therapy Daily Treatment Note  [facility] Physical Therapy [place]  [address], [address]  [place] [address]     Patient: Bhaskar Ibarra  : 1935  Referring practitioner: Radha Bowers DO  Today's Date: 10/23/2024    VISIT#: 5    Visit Diagnoses:    ICD-10-CM ICD-9-CM   1. Weakness  R53.1 780.79   2. Unsteadiness  R26.81 781.2   3. Pneumonia of both lungs due to infectious organism, unspecified part of lung  J18.9 483.8   4. Physical deconditioning  R53.81 799.3       Subjective   Bhaskar Ibarra reports: that he is okay today.       Objective       See Exercise, Manual, and Modality Logs for complete treatment.         Assessment/Plan  The patient tolerates the treatment session without increase in symptoms. He requires minimal verbal cueing for technique throughout the session and is able to correct his form. Wang carry is introduced to promote functional strength. He finds this challenging but denies pain. He would continue benefit from skilled intervention with focus on strength and balance.       Progress per Plan of Care          Timed:         Manual Therapy:         mins  44941;     Therapeutic Exercise:    15     mins  99167;     Neuromuscular Sujata:        mins  53054;    Therapeutic Activity:     15     mins  81074;     Gait Training:           mins  65885;      Ionto:                                   mins  05041  Self Care:                            mins  04214    Un-Timed:  Electrical Stimulation:         mins  76677 ( );  Dry Needling          mins self-pay  Traction          mins 68084  Re-Eval                               mins  12870  Group Therapy           ___ mins 73433    Timed Treatment:   30   mins   Total Treatment:     45   mins    Madhavi Segundo PT  Physical Therapist  [facility] license #: [id number]

## 2024-10-25 ENCOUNTER — TREATMENT (OUTPATIENT)
Dept: PHYSICAL THERAPY | Facility: CLINIC | Age: 89
End: 2024-10-25
Payer: MEDICARE

## 2024-10-25 DIAGNOSIS — R53.81 PHYSICAL DECONDITIONING: ICD-10-CM

## 2024-10-25 DIAGNOSIS — R53.1 WEAKNESS: Primary | ICD-10-CM

## 2024-10-25 DIAGNOSIS — J18.9 PNEUMONIA OF BOTH LUNGS DUE TO INFECTIOUS ORGANISM, UNSPECIFIED PART OF LUNG: ICD-10-CM

## 2024-10-25 DIAGNOSIS — R26.81 UNSTEADINESS: ICD-10-CM

## 2024-10-25 NOTE — PROGRESS NOTES
Physical Therapy Daily Treatment Note  [facility] Physical Therapy [place]  [address], [address]  [place] [id number]     Patient: Bhaskar Ibarra  : 1935  Referring practitioner: Radha Bowers DO  Today's Date: 10/25/2024    VISIT#: 6    Visit Diagnoses:    ICD-10-CM ICD-9-CM   1. Weakness  R53.1 780.79   2. Pneumonia of both lungs due to infectious organism, unspecified part of lung  J18.9 483.8   3. Unsteadiness  R26.81 781.2   4. Physical deconditioning  R53.81 799.3       Subjective   Bhaskar Ibarra reports: that he is feeling good today.       Objective       See Exercise, Manual, and Modality Logs for complete treatment.         Assessment/Plan  The patient tolerates the treatment session without increase in symptoms. He requires minimal verbal cueing for technique throughout the session and is able to correct his form. He is able to progress the intensity of SLR.   He finds this challenging but denies pain. He would continue to benefit from skilled intervention with focus on global strength and balance.     Progress per Plan of Care          Timed:         Manual Therapy:         mins  68946;     Therapeutic Exercise:    15     mins  44742;     Neuromuscular Sujata:        mins  89342;    Therapeutic Activity:     15     mins  66653;     Gait Training:           mins  02302;      Ionto:                                   mins  86071  Self Care:                            mins  83711    Un-Timed:  Electrical Stimulation:         mins  19576 ( );  Dry Needling          mins self-pay  Traction          mins 52761  Re-Eval                               mins  97240  Group Therapy           ___ mins 79772    Timed Treatment:   30   mins   Total Treatment:     47   mins    Madhavi Segundo PT  Physical Therapist  [facility] license #: [id number]

## 2024-10-30 ENCOUNTER — TREATMENT (OUTPATIENT)
Dept: PHYSICAL THERAPY | Facility: CLINIC | Age: 89
End: 2024-10-30
Payer: MEDICARE

## 2024-10-30 DIAGNOSIS — R53.1 WEAKNESS: Primary | ICD-10-CM

## 2024-10-30 DIAGNOSIS — J18.9 PNEUMONIA OF BOTH LUNGS DUE TO INFECTIOUS ORGANISM, UNSPECIFIED PART OF LUNG: ICD-10-CM

## 2024-10-30 DIAGNOSIS — R53.81 PHYSICAL DECONDITIONING: ICD-10-CM

## 2024-10-30 DIAGNOSIS — R26.81 UNSTEADINESS: ICD-10-CM

## 2024-10-30 NOTE — PROGRESS NOTES
Physical Therapy Daily Treatment Note  [facility] Physical Therapy [place]  [address], [address]  [place] [address]     Patient: Bhaskar Ibarra  : 1935  Referring practitioner: Radha Bowers DO  Today's Date: 10/30/2024    VISIT#: 7    Visit Diagnoses:    ICD-10-CM ICD-9-CM   1. Weakness  R53.1 780.79   2. Pneumonia of both lungs due to infectious organism, unspecified part of lung  J18.9 483.8   3. Unsteadiness  R26.81 781.2   4. Physical deconditioning  R53.81 799.3       Subjective   Bhaskar Ibarra reports:   Doing well today, hasn't come across anything in his last couple PT sessions or at home that he has found too difficult.  Hasn't been able to attend the balance clinic at [facility], found the distance was too far to travel regularly.     Objective   See Exercise, Manual, and Modality Logs for complete treatment.     Pt Education:  HEP review  Exercise rationale/ pain free exercise performance  Posture/Postural awareness  Verbal/Tactile cues to ensure correct exercise performance/technique    Assessment/Plan  Patient tolerates treatment session well overall, does demo rapid fatigue with resisted retro walk, one instance of (L) knee buckling needing therapist assist for maintaining balance.      Denies any lasting pain at conclusion of session.  Would continue to benefit from skilled intervention with focus on global strength and balance.     Progress per Plan of Care          Timed:         Manual Therapy:         mins  93501;     Therapeutic Exercise:    10     mins  57789;     Neuromuscular Sujata:    5    mins  28351;    Therapeutic Activity:     15     mins  51473;     Gait Training:           mins  29503;      Ionto:                                   mins  62550  Self Care:                            mins  46272    Un-Timed:  Electrical Stimulation:         mins  54846 ( );  Dry Needling          mins self-pay  Traction          mins 84980  Re-Eval                                mins  72125  Group Therapy           ___ mins 57278    Timed Treatment:   30   mins   Total Treatment:     45   mins      ASAD [facility] License #[id number]  Physical Therapist Assistant

## 2024-11-01 ENCOUNTER — TREATMENT (OUTPATIENT)
Dept: PHYSICAL THERAPY | Facility: CLINIC | Age: 89
End: 2024-11-01
Payer: MEDICARE

## 2024-11-01 DIAGNOSIS — R26.81 UNSTEADINESS: ICD-10-CM

## 2024-11-01 DIAGNOSIS — J18.9 PNEUMONIA OF BOTH LUNGS DUE TO INFECTIOUS ORGANISM, UNSPECIFIED PART OF LUNG: ICD-10-CM

## 2024-11-01 DIAGNOSIS — R53.1 WEAKNESS: Primary | ICD-10-CM

## 2024-11-01 DIAGNOSIS — R53.81 PHYSICAL DECONDITIONING: ICD-10-CM

## 2024-11-01 NOTE — PROGRESS NOTES
Physical Therapy Daily Treatment Note  [facility] Physical Therapy [place]  [address]  [place] [id number]     Patient: Bhaskar Ibarra  : 1935  Referring practitioner: Radha Bowers DO  Today's Date: 2024    VISIT#: 8    Visit Diagnoses:    ICD-10-CM ICD-9-CM   1. Weakness  R53.1 780.79   2. Pneumonia of both lungs due to infectious organism, unspecified part of lung  J18.9 483.8   3. Unsteadiness  R26.81 781.2   4. Physical deconditioning  R53.81 799.3       Subjective   Bhaskar Ibarra reports: that he is feeling good today.       Objective       See Exercise, Manual, and Modality Logs for complete treatment.         Assessment/Plan  The patient notes shoulder pain with sit to stand with KB swing. Activity is modified to sit to stand with upright which alleviates his shoulder pain. He requires min a for the second set of sit to stands d/t LE fatigue. He requires UE support for balance throughout the session. Tandem stance is introduced to promote balance. He finds this challenging and requires min A. He would continue to benefit from skilled intervention with focus on strength and balance.       Progress per Plan of Care          Timed:         Manual Therapy:         mins  93336;     Therapeutic Exercise:    14     mins  15279;     Neuromuscular Sujata:    10    mins  22067;    Therapeutic Activity:     30     mins  43153;     Gait Training:           mins  28280;      Ionto:                                   mins  55527  Self Care:                            mins  85789    Un-Timed:  Electrical Stimulation:         mins  83881 ( );  Dry Needling          mins self-pay  Traction          mins 15958  Re-Eval                               mins  18163  Group Therapy           ___ mins 38911    Timed Treatment:   54   mins   Total Treatment:     54   mins    Madhavi Segundo PT  Physical Therapist  [place] [facility] license #: [id number]

## 2024-11-06 ENCOUNTER — TREATMENT (OUTPATIENT)
Dept: PHYSICAL THERAPY | Facility: CLINIC | Age: 89
End: 2024-11-06
Payer: MEDICARE

## 2024-11-06 DIAGNOSIS — R53.81 PHYSICAL DECONDITIONING: ICD-10-CM

## 2024-11-06 DIAGNOSIS — J18.9 PNEUMONIA OF BOTH LUNGS DUE TO INFECTIOUS ORGANISM, UNSPECIFIED PART OF LUNG: ICD-10-CM

## 2024-11-06 DIAGNOSIS — R53.1 WEAKNESS: Primary | ICD-10-CM

## 2024-11-06 DIAGNOSIS — R26.81 UNSTEADINESS: ICD-10-CM

## 2024-11-06 NOTE — PROGRESS NOTES
Physical Therapy Daily Treatment Note and Progress Note  [facility] Physical Therapy [place]  [address]  [place] [address]     Patient: Bhaskar Ibarra  : 1935  Referring practitioner: Radha Bowers DO  Today's Date: 2024    VISIT#: 9    Visit Diagnoses:    ICD-10-CM ICD-9-CM   1. Weakness  R53.1 780.79   2. Pneumonia of both lungs due to infectious organism, unspecified part of lung  J18.9 483.8   3. Unsteadiness  R26.81 781.2   4. Physical deconditioning  R53.81 799.3     LEFS: 37 (same)    Subjective   Bhaskar Ibarra reports: that he is doing well today. Overall, he is not sure there is much difference from when he started therapy. He has noticed that it is getting easier to do his regular household tasks as he is not as slow. He continues to use a cane in the community but not at home. He denies any recent falls.       Objective   Strength/Myotome Testing      Left Hip   Planes of Motion   Flexion: 5 (improved)  External rotation: 3 (same)  Internal rotation: 3+ (same)     Right Hip   Planes of Motion   Flexion: 4+ (improved)  External rotation: 4- (improved)  Internal rotation: 4 (improved)     Left Knee   Flexion: 5 (same)  Extension: 5 (same)     Right Knee   Flexion: 5 (same)  Extension: 5 (same)     Left Ankle/Foot   Dorsiflexion: 3+ (same)  Plantar flexion: 5 (same)     Right Ankle/Foot   Dorsiflexion: 5 (same)  Plantar flexion: 5 (same)     Functional Assessment      Comments  30 SEC SIT TO STAND: 8 reps, B UE use (improved)  TUG TEST: 13 sec, no AD (improved)     BALANCE:  Tandem L: 3 sec, CGA (improved)  Tandem R: 1 sec, CGA (declined)    See Exercise, Manual, and Modality Logs for complete treatment.         Assessment/Plan  Subjectively, the patient reports some improvement with household chores, stating he does not need as much time. Otherwise he feels about the same.Objectively, the patient demonstrates improved R LE strength, improved 30  sec sit to stand, and improved performance on the TUG test. He demonstrates maintenance of function with tandem stance. The patient tolerates the treatment session without increase in symptoms. He requires verbal cueing for technique throughout the session and is able to correct his form. He notes fatigue with sit to stands and activity is modified to an airex in the chair as he is unable to complete a sit to stand from the chair this date. He notes fatigue at the end of the session. He would continue to benefit from skilled intervention with focus on strength and balance.     The patient was part of group therapy with 2 patients.  The patient performed the exercises and activities designated in the Flow Sheet of the patient's chart.  As the patient's therapist I provided the following: Physical Cues and Technique Correction.  The treatment will help restore the function of the patient with LE strength.  Group Therapy was performed for one time for 10 minutes.         Progress per Plan of Care          Timed:         Manual Therapy:         mins  85309;     Therapeutic Exercise:         mins  07948;     Neuromuscular Sujata:    15    mins  15233;    Therapeutic Activity:     15     mins  70170;     Gait Training:           mins  63732;      Ionto:                                   mins  60250  Self Care:                            mins  12576    Un-Timed:  Electrical Stimulation:         mins  16370 ( );  Dry Needling          mins self-pay  Traction          mins 50358  Re-Eval                               mins  47153  Group Therapy           _10__ mins 58980    Timed Treatment:   30   mins   Total Treatment:     55   mins    Madhavi Segundo PT  Physical Therapist  [facility] license #: [id number]

## 2024-11-06 NOTE — LETTER
Physical Therapy Daily Treatment Note and Progress Note  [facility] Physical Therapy [place]  [address], [address]  [place] [address]      Patient: Bhaskar Ibarra  : 1935  Referring practitioner: Radha Bowers DO  Today's Date: 2024     VISIT#: 9     Visit Diagnoses:  Visit Diagnosis       ICD-10-CM ICD-9-CM   1. Weakness  R53.1 780.79   2. Pneumonia of both lungs due to infectious organism, unspecified part of lung  J18.9 483.8   3. Unsteadiness  R26.81 781.2   4. Physical deconditioning  R53.81 799.3         LEFS:      Subjective   Bhaskar Ibarra reports: that he is doing well today. Overall, he is not sure there is much difference from when he started therapy. He has noticed that it is getting easier to do his regular household tasks as he is not as slow. He continues to use a cane in the community but not at home. He denies any recent falls.         Objective   Strength/Myotome Testing      Left Hip   Planes of Motion   Flexion: 5 (improved)  External rotation: 3 (same)  Internal rotation: 3+ (same)     Right Hip   Planes of Motion   Flexion: 4+ (improved)  External rotation: 4- (improved)  Internal rotation: 4 (improved)     Left Knee   Flexion: 5 (same)  Extension: 5 (same)     Right Knee   Flexion: 5 (same)  Extension: 5 (same)     Left Ankle/Foot   Dorsiflexion: 3+ (same)  Plantar flexion: 5 (same)     Right Ankle/Foot   Dorsiflexion: 5 (same)  Plantar flexion: 5 (same)     Functional Assessment      Comments  30 SEC SIT TO STAND: 8 reps, B UE use (improved)  TUG TEST: 13 sec, no AD (improved)     BALANCE:  Tandem L: 3 sec, CGA (improved)  Tandem R: 1 sec, CGA (declined)     See Exercise, Manual, and Modality Logs for complete treatment.            Assessment/Plan  Subjectively, the patient reports some improvement with household chores, stating he does not need as much time. Otherwise he feels about the same.Objectively, the patient demonstrates improved R  Subjective:    pat better, NPO for surgery.    Home Medications:  allopurinol 300 mg oral tablet: 1 tab(s) orally once a day (15 Nov 2022 09:50)  Apriso 0.375 g oral capsule, extended release: 4 cap(s) orally once a day (15 Nov 2022 09:50)  Cartia  mg/24 hours oral capsule, extended release: 1 cap(s) orally once a day (15 Nov 2022 09:50)  CoQ10: orally once a day (15 Nov 2022 09:50)  dicyclomine 10 mg oral capsule: 1 cap(s) orally 3 times a day, As Needed (15 Nov 2022 09:50)  Eliquis 5 mg oral tablet: 1 tab(s) orally 2 times a day  Resume at 11pm tonight (8/19/20) (15 Nov 2022 09:50)  furosemide 20 mg oral tablet: 1 tab(s) orally once a day (15 Nov 2022 09:50)  gabapentin 100 mg oral tablet: 1 tab(s) orally once a day (at bedtime) (15 Nov 2022 09:50)  metFORMIN 500 mg oral tablet: 2 tab(s) orally once a day (in the morning) (15 Nov 2022 09:50)  metFORMIN 500 mg oral tablet: 1 tab(s) orally once (at bedtime) (15 Nov 2022 09:50)  Metoprolol Succinate ER 25 mg oral tablet, extended release: 1 tab(s) orally once a day (15 Nov 2022 09:50)  pantoprazole 40 mg oral delayed release tablet: 1 tab(s) orally once a day (15 Nov 2022 09:50)  Probiotic Formula oral capsule: 1 cap(s) orally 2 times a day (15 Nov 2022 09:50)  Prolia 60 mg/mL subcutaneous solution: subcutaneous every 6 months (15 Nov 2022 09:50)  simvastatin 20 mg oral tablet: 1 tab(s) orally once a day (at bedtime) (15 Nov 2022 09:50)  Trelegy Ellipta 100 mcg-62.5 mcg-25 mcg/inh inhalation powder: 1 puff(s) inhaled once a day (15 Nov 2022 09:50)  Vitamin D3 25 mcg (1000 intl units) oral tablet: 1 tab(s) orally once a day (15 Nov 2022 09:50)  zolpidem 5 mg oral tablet: 1 tab(s) orally once a day (at bedtime), As Needed (15 Nov 2022 09:50)    MEDICATIONS  (STANDING):  allopurinol 300 milliGRAM(s) Oral daily  budesonide  80 MICROgram(s)/formoterol 4.5 MICROgram(s) Inhaler 2 Puff(s) Inhalation two times a day  dextrose 5%. 1000 milliLiter(s) (50 mL/Hr) IV Continuous <Continuous>  dextrose 5%. 1000 milliLiter(s) (100 mL/Hr) IV Continuous <Continuous>  dextrose 50% Injectable 25 Gram(s) IV Push once  dextrose 50% Injectable 12.5 Gram(s) IV Push once  dextrose 50% Injectable 25 Gram(s) IV Push once  diltiazem    milliGRAM(s) Oral daily  gabapentin 100 milliGRAM(s) Oral at bedtime  glucagon  Injectable 1 milliGRAM(s) IntraMuscular once  heparin   Injectable 5000 Unit(s) SubCutaneous every 12 hours  insulin lispro (ADMELOG) corrective regimen sliding scale   SubCutaneous three times a day before meals  mesalamine ER (24-Hour) Capsule 1.5 Gram(s) Oral daily  methylPREDNISolone sodium succinate Injectable 40 milliGRAM(s) IV Push daily  metoprolol succinate ER 25 milliGRAM(s) Oral daily  pantoprazole    Tablet 40 milliGRAM(s) Oral before breakfast  piperacillin/tazobactam IVPB.. 3.375 Gram(s) IV Intermittent every 8 hours  simvastatin 20 milliGRAM(s) Oral at bedtime  sodium chloride 0.9%. 1000 milliLiter(s) (75 mL/Hr) IV Continuous <Continuous>  sodium chloride 0.9%. 1000 milliLiter(s) (100 mL/Hr) IV Continuous <Continuous>  tiotropium 18 MICROgram(s) Capsule 1 Capsule(s) Inhalation daily    MEDICATIONS  (PRN):  acetaminophen     Tablet .. 325 milliGRAM(s) Oral every 4 hours PRN Temp greater or equal to 38C (100.4F), Mild Pain (1 - 3)  dextrose Oral Gel 15 Gram(s) Oral once PRN Blood Glucose LESS THAN 70 milliGRAM(s)/deciliter  fentaNYL    Injectable 50 MICROGram(s) IV Push every 10 minutes PRN Severe Pain (7 - 10)  melatonin 3 milliGRAM(s) Oral at bedtime PRN Insomnia  morphine  - Injectable 4 milliGRAM(s) IV Push every 4 hours PRN breakthrough pain  morphine  - Injectable 4 milliGRAM(s) IV Push every 4 hours PRN Severe Pain (7 - 10)  ondansetron Injectable 4 milliGRAM(s) IV Push once PRN Nausea and/or Vomiting  ondansetron Injectable 4 milliGRAM(s) IV Push every 6 hours PRN Nausea  oxycodone    5 mG/acetaminophen 325 mG 1 Tablet(s) Oral every 4 hours PRN Moderate Pain (4 - 6)      Allergies    aspirin (Stomach Upset)    Intolerances        Vital Signs Last 24 Hrs  T(C): 36.5 (17 Nov 2022 16:20), Max: 36.9 (17 Nov 2022 08:14)  T(F): 97.7 (17 Nov 2022 16:20), Max: 98.4 (17 Nov 2022 08:14)  HR: 71 (17 Nov 2022 17:45) (60 - 85)  BP: 138/54 (17 Nov 2022 17:45) (114/36 - 156/67)  BP(mean): --  RR: 17 (17 Nov 2022 17:45) (17 - 23)  SpO2: 90% (17 Nov 2022 17:45) (89% - 98%)    Parameters below as of 17 Nov 2022 17:45    O2 Flow (L/min): 15        PHYSICAL EXAMINATION:    NECK:  Supple. No lymphadenopathy. Jugular venous pressure not elevated. Carotids equal.   HEART:   The cardiac impulse has a normal quality. Reg., Nl S1 and S2.  There are no murmurs, rubs or gallops noted  CHEST:  Chest is clear to auscultation. Normal respiratory effort.  ABDOMEN:  Soft and nontender.   EXTREMITIES:  There is no edema.       LABS:                        8.8    12.78 )-----------( 237      ( 17 Nov 2022 08:15 )             28.0     11-17    141  |  111<H>  |  14  ----------------------------<  161<H>  3.8   |  26  |  0.84    Ca    8.1<L>      17 Nov 2022 08:15    TPro  5.9<L>  /  Alb  2.5<L>  /  TBili  0.3  /  DBili  x   /  AST  127<H>  /  ALT  344<H>  /  AlkPhos  138<H>  11-17            CT Chest No Cont (11.15.22 @ 13:09) >  IMPRESSION:    Mild interstitial pulmonary edema.    Mild distal airway impaction in the right lung.    Right upper lobe 6 mm nodule. Six-month follow-up is recommended.         LE strength, improved 30 sec sit to stand, and improved performance on the TUG test. He demonstrates maintenance of function with tandem stance. The patient tolerates the treatment session without increase in symptoms. He requires verbal cueing for technique throughout the session and is able to correct his form. He notes fatigue with sit to stands and activity is modified to an airex in the chair as he is unable to complete a sit to stand from the chair this date. He notes fatigue at the end of the session. He would continue to benefit from skilled intervention with focus on strength and balance.            Progress per Plan of Care        Madhavi Segundo PT  Physical Therapist  [place] [facility] license #: [id number]

## 2024-11-08 ENCOUNTER — TREATMENT (OUTPATIENT)
Dept: PHYSICAL THERAPY | Facility: CLINIC | Age: 89
End: 2024-11-08
Payer: MEDICARE

## 2024-11-08 DIAGNOSIS — R26.81 UNSTEADINESS: ICD-10-CM

## 2024-11-08 DIAGNOSIS — R53.81 PHYSICAL DECONDITIONING: ICD-10-CM

## 2024-11-08 DIAGNOSIS — J18.9 PNEUMONIA OF BOTH LUNGS DUE TO INFECTIOUS ORGANISM, UNSPECIFIED PART OF LUNG: ICD-10-CM

## 2024-11-08 DIAGNOSIS — R53.1 WEAKNESS: Primary | ICD-10-CM

## 2024-11-08 NOTE — PROGRESS NOTES
Physical Therapy Daily Treatment Note  [facility] [place]  [address], [address]  [place] [id number]     Patient: Bhaskar Ibarra  : 1935  Referring practitioner: Radha Bowers DO  Today's Date: 2024    VISIT#: 10    Visit Diagnoses:    ICD-10-CM ICD-9-CM   1. Weakness  R53.1 780.79   2. Pneumonia of both lungs due to infectious organism, unspecified part of lung  J18.9 483.8   3. Unsteadiness  R26.81 781.2   4. Physical deconditioning  R53.81 799.3       Subjective   Bhaskar Ibarra reports: that he is doing well today.      Objective       See Exercise, Manual, and Modality Logs for complete treatment.         Assessment/Plan  The patient tolerates the treatment session without increase in symptoms. He requires minimal verbal cueing for technique throughout the session and is able to correct his form. He finds farmer's carry and sit to stands to be difficult and requires occasional rest breaks. He would continue to benefit from skilled intervention with focus on LE strength and balance.       Progress per Plan of Care          Timed:         Manual Therapy:         mins  09855;     Therapeutic Exercise:    15     mins  79815;     Neuromuscular Sujata:    15    mins  83069;    Therapeutic Activity:     15     mins  28653;     Gait Training:           mins  58630;      Ionto:                                   mins  78111  Self Care:                            mins  84364    Un-Timed:  Electrical Stimulation:         mins  78675 ( );  Dry Needling          mins self-pay  Traction          mins 07730  Re-Eval                               mins  50796  Group Therapy           ___ mins 07744    Timed Treatment:   45   mins   Total Treatment:     50   mins    Madhavi Segundo PT  Physical Therapist  [place] license #: [id number]

## 2024-11-13 ENCOUNTER — TREATMENT (OUTPATIENT)
Dept: PHYSICAL THERAPY | Facility: CLINIC | Age: 89
End: 2024-11-13
Payer: MEDICARE

## 2024-11-13 DIAGNOSIS — R26.81 UNSTEADINESS: ICD-10-CM

## 2024-11-13 DIAGNOSIS — R53.1 WEAKNESS: Primary | ICD-10-CM

## 2024-11-13 DIAGNOSIS — R53.81 PHYSICAL DECONDITIONING: ICD-10-CM

## 2024-11-13 DIAGNOSIS — J18.9 PNEUMONIA OF BOTH LUNGS DUE TO INFECTIOUS ORGANISM, UNSPECIFIED PART OF LUNG: ICD-10-CM

## 2024-11-13 NOTE — PROGRESS NOTES
Physical Therapy Daily Treatment Note  [facility] [place]  [address], [address]  [place] [address]     Patient: Bhaskar Ibarra  : 1935  Referring practitioner: Radha Bowers DO  Today's Date: 2024    VISIT#: 11    Visit Diagnoses:    ICD-10-CM ICD-9-CM   1. Weakness  R53.1 780.79   2. Pneumonia of both lungs due to infectious organism, unspecified part of lung  J18.9 483.8   3. Unsteadiness  R26.81 781.2   4. Physical deconditioning  R53.81 799.3       Subjective   Bhaskar Ibarra reports: that he is doing well today. No recent falls.       Objective       See Exercise, Manual, and Modality Logs for complete treatment.         Assessment/Plan  The patient tolerates the treatment session without increase in symptoms. He requires minimal verbal cueing for technique throughout the session and is able to correct his form. Step outs with the blue band are introduced to promote core and LE strength. He finds this challenging and requires CGA. He would continue to benefit from skilled intervention with focus on LE strength and balance.     The patient was part of group therapy with 2 patients.  The patient performed the exercises and activities designated in the Flow Sheet of the patient's chart.  As the patient's therapist I provided the following: Physical Cues and Technique Correction.  The treatment will help restore the function of the patient with strength and balance.  Group Therapy was performed for one time for 8 minutes.       Progress per Plan of Care          Timed:         Manual Therapy:         mins  92540;     Therapeutic Exercise:    10     mins  39754;     Neuromuscular Sujata:    8    mins  63278;    Therapeutic Activity:     10     mins  76436;     Gait Training:           mins  32867;      Ionto:                                   mins  12282  Self Care:                            mins  34865    Un-Timed:  Electrical Stimulation:          mins  89171 ( );  Dry Needling          mins self-pay  Traction          mins 07011  Re-Eval                               mins  27340  Group Therapy           _8__ mins 56457    Timed Treatment:   28   mins   Total Treatment:     54   mins    Madhavi Segundo PT  Physical Therapist  [place] [facility] license #: [id number]

## 2024-11-20 ENCOUNTER — TREATMENT (OUTPATIENT)
Dept: PHYSICAL THERAPY | Facility: CLINIC | Age: 89
End: 2024-11-20
Payer: MEDICARE

## 2024-11-20 DIAGNOSIS — J18.9 PNEUMONIA OF BOTH LUNGS DUE TO INFECTIOUS ORGANISM, UNSPECIFIED PART OF LUNG: ICD-10-CM

## 2024-11-20 DIAGNOSIS — R53.81 PHYSICAL DECONDITIONING: ICD-10-CM

## 2024-11-20 DIAGNOSIS — R26.81 UNSTEADINESS: ICD-10-CM

## 2024-11-20 DIAGNOSIS — R53.1 WEAKNESS: Primary | ICD-10-CM

## 2024-11-20 NOTE — PROGRESS NOTES
Physical Therapy Daily Treatment Note  [facility] [place]  [address], [address]  [place] [address]     Patient: Bhaskar Ibarra  : 1935  Referring practitioner: Radha Bowers DO  Today's Date: 2024    VISIT#: 12    Visit Diagnoses:    ICD-10-CM ICD-9-CM   1. Weakness  R53.1 780.79   2. Physical deconditioning  R53.81 799.3   3. Pneumonia of both lungs due to infectious organism, unspecified part of lung  J18.9 483.8   4. Unsteadiness  R26.81 781.2       Subjective   Bhaskar Ibarra reports: that he is doing well today.      Objective       See Exercise, Manual, and Modality Logs for complete treatment.         Assessment/Plan  The patient tolerates the treatment session without increase in symptoms. He requires minimal verbal cueing for technique throughout the session and is able to correct his form. He finds sit to stands to be difficult and requires frequent rest breaks. Balance board is introduced. He finds this challenging and requires UE support. He would continue to benefit from skilled intervention with focus on strength and balance.     The patient was part of group therapy with 2 patients.  The patient performed the exercises and activities designated in the Flow Sheet of the patient's chart.  As the patient's therapist I provided the following: Physical Cues and Technique Correction.  The treatment will help restore the function of the patient with strength and balance.  Group Therapy was performed for one time for 8 minutes.         Progress per Plan of Care          Timed:         Manual Therapy:         mins  39940;     Therapeutic Exercise:         mins  83949;     Neuromuscular Sujata:    15    mins  25402;    Therapeutic Activity:     15     mins  09007;     Gait Training:           mins  75008;      Ionto:                                   mins  17625  Self Care:                            mins  86222    Un-Timed:  Electrical  Stimulation:         mins  39966 ( );  Dry Needling          mins self-pay  Traction          mins 66561  Re-Eval                               mins  52291  Group Therapy           _8__ mins 58423    Timed Treatment:   30   mins   Total Treatment:     42   mins    Madhavi Segundo PT  Physical Therapist  [place] license #: [id number]

## 2024-11-22 ENCOUNTER — TREATMENT (OUTPATIENT)
Dept: PHYSICAL THERAPY | Facility: CLINIC | Age: 89
End: 2024-11-22
Payer: MEDICARE

## 2024-11-22 DIAGNOSIS — J18.9 PNEUMONIA OF BOTH LUNGS DUE TO INFECTIOUS ORGANISM, UNSPECIFIED PART OF LUNG: ICD-10-CM

## 2024-11-22 DIAGNOSIS — R26.81 UNSTEADINESS: ICD-10-CM

## 2024-11-22 DIAGNOSIS — R53.1 WEAKNESS: Primary | ICD-10-CM

## 2024-11-22 DIAGNOSIS — R53.81 PHYSICAL DECONDITIONING: ICD-10-CM

## 2024-11-22 NOTE — PROGRESS NOTES
Physical Therapy Daily Treatment Note and Progress Note  [facility] [place]  [address], [address]  [place] [address]     Patient: Bhaskar Ibarra  : 1935  Referring practitioner: Radha Bowers DO  Today's Date: 2024    VISIT#: 13    Visit Diagnoses:    ICD-10-CM ICD-9-CM   1. Weakness  R53.1 780.79   2. Physical deconditioning  R53.81 799.3   3. Pneumonia of both lungs due to infectious organism, unspecified part of lung  J18.9 483.8   4. Unsteadiness  R26.81 781.2       Subjective   Bhaskar Ibarra reports: that he has been doing well. Overall, he reports continued improvement with therapy. He feels that his strength is returning. He continues to ambulate with his cane. He states that he feels the most steady on his feet in the middle of the day. He is very stiff in the morning and fatigued in the evenings resulting in some unsteadiness. Denies any difficulty with stairs. He continues to have difficulty with walking long distances. He is able to walk to his mailbox and back but can't do more than that.       Objective   Strength/Myotome Testing      Left Hip   Planes of Motion   Flexion: 5 (same)  External rotation: 3 (same)  Internal rotation: 3+ (same)     Right Hip   Planes of Motion   Flexion: 5 (improved)  External rotation: 4 (improved)  Internal rotation: 4 (same)     Left Knee   Flexion: 5 (same)  Extension: 5 (same)     Right Knee   Flexion: 5 (same)  Extension: 5 (same)     Left Ankle/Foot   Dorsiflexion: 4 (improved)  Plantar flexion: 5 (same)     Right Ankle/Foot   Dorsiflexion: 5 (same)  Plantar flexion: 5 (same)     Functional Assessment      Comments  30 SEC SIT TO STAND: 8 reps, B UE use (same)  TUG TEST: 12 sec, no AD (improved)     BALANCE:  Tandem L: 2 sec, CGA (declined)  Tandem R: 7 sec, CGA (improved)    See Exercise, Manual, and Modality Logs for complete treatment.         Assessment/Plan  Subjectively, the patient reports  continued improvement with therapy. He notes improved strength. He states that his balance depends on the time of day and his fatigue levels. He continues to walk with a cane. He notes continued difficulty with walking long distances. Objectively, he demonstrates slight improvement with LE strength. He demonstrates improved tandem balance on the R. He demonstrates maintenance of performance on the 30 sec STS. He demonstrates slight decline in tandem balance on the L. The patient tolerates the treatment session without increase symptoms. Step ups on the bosu are introduced to promote strength balance. He finds this challenging and requires UE support. He is progressing well and would continue to benefit from skilled intervention with focus on strength and balance.       Progress per Plan of Care     Goals  Plan Goals: ST. Pt will be independent and compliant with initial HEP in 2 weeks. (Met)  2. Pt will improve TUG time to <15 sec in 3 weeks. (Met)  3. Pt will improve 30 sec sit to stand test to 8 reps in 3 weeks. (Met)  4. Pt will demonstrate good safety awareness & decision making to reduce falls risk in 3 weeks.(Met)  LT. Pt will be independent with final HEP for self-management of condition by DC.  2. Pt will improve TUG time to <13 sec to indicate decreased falls risk by DC. (Met)  3. Pt will improve 30 second sit to stand test to >9 reps by DC in order to indicate improved functional BLE strength. (Not met)     Timed:         Manual Therapy:         mins  26926;     Therapeutic Exercise:    10     mins  94112;     Neuromuscular Sujata:    15    mins  58159;    Therapeutic Activity:     15     mins  14635;     Gait Training:           mins  39306;      Ionto:                                   mins  69659  Self Care:                            mins  80295    Un-Timed:  Electrical Stimulation:         mins  01053 ( );  Dry Needling          mins self-pay  Traction          mins 43361  Re-Eval                                mins  42600  Group Therapy           ___ mins 40097    Timed Treatment:   40   mins   Total Treatment:     55   mins    Madhavi Segundo PT  Physical Therapist  [place] license #: [id number]

## 2025-01-02 ENCOUNTER — HOSPITAL ENCOUNTER (OUTPATIENT)
Dept: CT IMAGING | Facility: HOSPITAL | Age: OVER 89
Discharge: HOME | End: 2025-01-02
Admitting: NURSE PRACTITIONER
Payer: MEDICARE

## 2025-01-02 DIAGNOSIS — Z85.118 HISTORY OF LUNG CANCER: ICD-10-CM

## 2025-01-02 DIAGNOSIS — J69.0 ASPIRATION PNEUMONIA, UNSPECIFIED ASPIRATION PNEUMONIA TYPE, UNSPECIFIED LATERALITY, UNSPECIFIED PART OF LUNG: ICD-10-CM

## 2025-01-02 PROCEDURE — 71250 CT THORAX DX C-: CPT

## 2025-01-09 ENCOUNTER — TELEPHONE (OUTPATIENT)
Dept: SURGERY | Facility: CLINIC | Age: OVER 89
End: 2025-01-09
Payer: MEDICARE

## 2025-01-09 ENCOUNTER — TELEPHONE (OUTPATIENT)
Dept: SURGERY | Facility: CLINIC | Age: OVER 89
End: 2025-01-09

## 2025-01-09 DIAGNOSIS — I71.40 ABDOMINAL AORTIC ANEURYSM (AAA) WITHOUT RUPTURE, UNSPECIFIED PART: ICD-10-CM

## 2025-01-09 DIAGNOSIS — Z85.118 HISTORY OF LUNG CANCER: Primary | ICD-10-CM

## 2025-01-09 NOTE — TELEPHONE ENCOUNTER
Spoke with daughter Malka, informed her that we did not have verbal release on file for our office. Spoke with patient and Mr. Ibarra did give a one time verbal ok to speak with his daughter Malka Iván. A verbal release has been mailed to the patient. Family is aware. They will fill form out and return to our office to have on file.

## 2025-01-09 NOTE — TELEPHONE ENCOUNTER
Caller: Malka Minor    Relationship to patient: Emergency Contact    Best call back number: [phone]     Chief complaint: PATIENT IS CONCERNED ABOUT RECENT TEST RESULTS.      Type of visit: FOLLOW UP     Requested date: SOONER APPOINTMENT      If rescheduling, when is the original appointment: 1/27/25     Additional notes:PATIENTS EMERGENCY CONTACT MALKA WOULD LIKE A CALL BACK TO DISCUSS RESULTS OR SCHEDULE A SOONER APPOINTMENT.

## 2025-01-09 NOTE — TELEPHONE ENCOUNTER
Spoke With patient's daughter regarding CT chest from 1/2/2025:    IMPRESSION:  1. Previously noted bilateral pneumonia resolved2. Stable likely posttreatment change right lower lobe3. Additional findings as described      Will plan to follow-up with him with CT chest in 1 year.    For the  5.5cm abdominal aortic aneurysm, will place referral to vascular surgery.  It appears that this was followed by cardiac surgery in the past, but has not been seen since 2021.    She agrees to the plan.    Diagnoses and all orders for this visit:    1. History of lung cancer (Primary)  -     CT Chest Without Contrast; Future    2. Abdominal aortic aneurysm (AAA) without rupture, unspecified part  -     Ambulatory Referral to Vascular Surgery

## 2025-01-20 ENCOUNTER — OFFICE VISIT (OUTPATIENT)
Age: OVER 89
End: 2025-01-20
Payer: MEDICARE

## 2025-01-20 VITALS
HEART RATE: 64 BPM | BODY MASS INDEX: 26.31 KG/M2 | HEIGHT: 71 IN | SYSTOLIC BLOOD PRESSURE: 157 MMHG | WEIGHT: 187.9 LBS | DIASTOLIC BLOOD PRESSURE: 92 MMHG

## 2025-01-20 DIAGNOSIS — I71.62 PARAVISCERAL ABDOMINAL AORTIC ANEURYSM (AAA) WITHOUT RUPTURE: ICD-10-CM

## 2025-01-20 DIAGNOSIS — I71.41 PARARENAL ABDOMINAL AORTIC ANEURYSM (AAA) WITHOUT RUPTURE: Primary | ICD-10-CM

## 2025-01-20 PROCEDURE — 1160F RVW MEDS BY RX/DR IN RCRD: CPT | Performed by: SURGERY

## 2025-01-20 PROCEDURE — 99204 OFFICE O/P NEW MOD 45 MIN: CPT | Performed by: SURGERY

## 2025-01-20 PROCEDURE — 1159F MED LIST DOCD IN RCRD: CPT | Performed by: SURGERY

## 2025-01-20 NOTE — PROGRESS NOTES
"Chief Complaint  Aortic Aneurysm    Subjective          HPI: Bhaskar Ibarra presents to [facility] who is referred to our office for an aortic aneurysm.  He was seen by my partner Dr. Reddy last in 2017 who noted a thoracoabdominal aneurysm and recommended that he follow-up with Dr. Jarek Corona if it enlarges to greater than 5.5 cm and only to see us as needed.  History of Present Illness  The patient is an 89-year-old male who presents for follow-up regarding an aortic aneurysm. He is accompanied by his daughter.    He has a history of lung cancer, which was incidentally discovered by Dr. Reddy during imaging studies. He underwent a biopsy and radiation therapy as part of his treatment regimen. He also had bypass surgery with Dr. Louis. In September 2024, he was hospitalized for a few days due to bilateral pneumonia, which temporarily affected his strength and mobility. However, after undergoing physical therapy, his condition improved significantly. He resides independently, with his daughter visiting several times a week and maintaining daily communication. He manages his own cooking and cleaning tasks. His memory is reported to be normal for his age. He has significant hearing loss, but recent acquisition of new hearing aids has improved his auditory function. He has no history of abdominal surgeries, including gallbladder, appendix, or colon surgery. He did have left inguinal hernia surgery many years ago.    FAMILY HISTORY  Both of his brothers had aneurysms and underwent surgery.    MEDICATIONS  Eliquis 2.5 mg    Review of Systems     History Review Reviewed Comments   Past Medical History:  [x]     Past Surgical History: []     Family History: [x]     Social History: [x]       Objective   Vital Signs:  /92   Pulse 64   Ht 180.3 cm (71\")   Wt 85.2 kg (187 lb 14.4 oz)   BMI 26.21 kg/m²   Estimated body mass index is 26.21 kg/m² as calculated from the " "following:    Height as of this encounter: 180.3 cm (71\").    Weight as of this encounter: 85.2 kg (187 lb 14.4 oz).  BMI is >= 25 and <30. (Overweight) The following options were offered after discussion;: weight loss educational material (shared in after visit summary)          Physical Exam  Physical Exam  Abdomen is soft nontender nondistended.  No significant abdominal incisions other than a very large left inguinal hernia incision.  Femoral pulses are palpable.  Popliteal pulses are not broad.  Pedal pulses are palpable.        Result Review :    Common labs          9/16/2024    04:41 9/17/2024    08:34 9/18/2024    15:40   Common Labs   Glucose 129  174  134    BUN 23  23  21    Creatinine 1.18  1.06  1.26    Sodium 134  134  139    Potassium 3.8  3.8  3.8    Chloride 103  101  103    Calcium 8.7  8.6  8.5    WBC 16.98  16.96  13.75    Hemoglobin 11.7  12.6  11.8    Hematocrit 34.8  37.5  36.9    Platelets 192  228  259      Results  Imaging  Ascending aorta measures about 4.5 cm. Aorta in the chest measures a little over 3 cm. Aorta in the abdomen measures 5.1 cm.     Most notable findings include: Glucose 134, creatinine 1.26, hemoglobin 11.8, platelet count 259    Images reviewed:  CT Chest Without Contrast Diagnostic (01/02/2025 11:35) looks like a perivisceral dissection with some aneurysmal degeneration measuring about 5-1/2 cm in size.  The actual descending thoracic aorta is not too dilated.  Ascending aorta is less than 5 cm.    External records reviewed:          Bhaskar Ibarra  reports that he has quit smoking. His smoking use included cigarettes. He has a 5 pack-year smoking history. He has been exposed to tobacco smoke. He has never used smokeless tobacco..        Patient or patient representative verbalized consent for the use of Ambient Listening during the visit with  Gareth Campa MD for chart documentation. 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  Paravisceral aortic aneurysm.  The patient has an ascending aortic aneurysm that measures 4.5 cm in size or so and was previously followed by Dr. Jarek Corona.  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    The patient is not a candidate for conventional aortic stent graft.  This is due to the visceral nature of his most disease segment.  Given his age, I would expect that he would be considered a poor candidate for an open thoracoabdominal approach and discussed this with the patient and the daughter.  Annual risk of rupture at this size would be somewhere in the 5% range.  We discussed the natural history of disease and risk of rupture which would almost certainly lead to death.  We also talked about surgery which would have significant risk of morbidity mortality and may actually overall decrease the number and quality of this gentleman's natural life.    Ultimately, they decided not to move forward with a surgical evaluation but would like to follow-up in 6 months with a repeat ultrasound and further discussion.       Follow Up     Return in about 6 months (around 7/20/2025).  Patient was given instructions and counseling regarding his condition or for health maintenance advice. Please see specific information pulled into the AVS if appropriate.

## 2025-03-18 ENCOUNTER — OFFICE VISIT (OUTPATIENT)
Dept: CARDIOLOGY | Facility: CLINIC | Age: OVER 89
End: 2025-03-18
Payer: MEDICARE

## 2025-03-18 VITALS
DIASTOLIC BLOOD PRESSURE: 76 MMHG | HEIGHT: 71 IN | HEART RATE: 57 BPM | SYSTOLIC BLOOD PRESSURE: 148 MMHG | WEIGHT: 186 LBS | BODY MASS INDEX: 26.04 KG/M2

## 2025-03-18 DIAGNOSIS — Z95.1 S/P CABG (CORONARY ARTERY BYPASS GRAFT): ICD-10-CM

## 2025-03-18 DIAGNOSIS — I21.02: Primary | ICD-10-CM

## 2025-03-18 DIAGNOSIS — I35.1 NONRHEUMATIC AORTIC VALVE INSUFFICIENCY: ICD-10-CM

## 2025-03-18 PROCEDURE — 99214 OFFICE O/P EST MOD 30 MIN: CPT | Performed by: INTERNAL MEDICINE

## 2025-03-18 PROCEDURE — 93000 ELECTROCARDIOGRAM COMPLETE: CPT | Performed by: INTERNAL MEDICINE

## 2025-03-18 NOTE — PROGRESS NOTES
Date of Office Visit: 25  Encounter Provider: Efrain Yuan MD  Place of Service: [facility] CARDIOLOGY  Patient Name: Bhaskar Ibarra  :1935  [id number]    Chief Complaint   Patient presents with    Coronary Artery Disease     1 year f/u   :     HPI: Bhaskar Ibarra is a 89 y.o. male  has had a prior five-vessel coronary artery bypass grafting in  and he had a mitral valve repair at that time.  He had a prior infarct, a non-ST-elevation myocardial infarction, and had severely reduced left ventricular function and improved.  He was noted to have asymptomatic paroxysmal atrial fibrillation and had been placed on sotalol and Eliquis since then.  He is a former [place] [employer] .  He was in the [employer].  He had an echo done in  EF was 45 to 50% as moderate to severe AI and severe mitral annular calcification without significant stenosis.    He is here for follow-up today.  He is doing well no chest pain shortness of breath PND orthopnea edema syncope or palpitations he has a large abdominal aortic aneurysm that has not been felt to be amenable to percutaneous exclusion and he is felt not to be an operative candidate he has not had any bleeding difficulty    Past Medical History:   Diagnosis Date    Abdominal aortic aneurysm     Acute myocardial infarction     Acute renal failure     Anemia     Aneurysm     Atrial fibrillation     Cardiomyopathy     Chest pain     Congenital heart disease     Coronary artery disease     2015-     Depression     GERD (gastroesophageal reflux disease)     Hyperlipidemia     Hypertension     Hypothyroidism     Lung cancer 2016    radiation     Mitral regurgitation     Myocardial infarction     Pleural effusion     Pleural effusion, bilateral     Pneumonia 9/15/2024    RLS (restless legs syndrome)     Sleep apnea     Ulcerative colitis     Ulcerative colitis        Past Surgical History:   Procedure Laterality Date    AORTIC  VALVE REPAIR/REPLACEMENT      BACK SURGERY      lumbar    CARDIAC CATHETERIZATION      CARDIAC VALVE REPLACEMENT      CORONARY ANGIOPLASTY      CORONARY ARTERY BYPASS GRAFT  07/27/2015    X 5  Dr Louis    HERNIA REPAIR      inguinal - left    MITRAL VALVE REPAIR/REPLACEMENT  07/27/2015    Dr Louis    MITRAL VALVE REPLACEMENT      NASAL SEPTUM SURGERY      benign growth removed    OTHER SURGICAL HISTORY      Cardiovasc Endosc W/ Video-Assist Harv Veins Coron Art Byp    PROSTATE SURGERY      SKIN BIOPSY      benign       Social History     Socioeconomic History    Marital status:    Tobacco Use    Smoking status: Former     Current packs/day: 1.00     Average packs/day: 1 pack/day for 5.0 years (5.0 ttl pk-yrs)     Types: Cigarettes     Passive exposure: Past    Smokeless tobacco: Never    Tobacco comments:     1 cig day x 5 years   Vaping Use    Vaping status: Never Used   Substance and Sexual Activity    Alcohol use: No    Drug use: No    Sexual activity: Not Currently     Partners: Female     Birth control/protection: None       Family History   Problem Relation Age of Onset    Coronary artery disease Brother         Heart disease    Heart attack Brother     Cancer Mother     Lung cancer Father        Review of Systems   Constitutional: Negative for decreased appetite, fever, malaise/fatigue and weight loss.   HENT:  Negative for nosebleeds.    Eyes:  Negative for double vision.   Cardiovascular:  Negative for chest pain, claudication, cyanosis, dyspnea on exertion, irregular heartbeat, leg swelling, near-syncope, orthopnea, palpitations, paroxysmal nocturnal dyspnea and syncope.   Respiratory:  Negative for cough, hemoptysis and shortness of breath.    Hematologic/Lymphatic: Negative for bleeding problem.   Skin:  Negative for rash.   Musculoskeletal:  Negative for falls and myalgias.   Gastrointestinal:  Negative for hematochezia, jaundice, melena, nausea and vomiting.   Genitourinary:  Negative for  "hematuria.   Neurological:  Negative for dizziness and seizures.   Psychiatric/Behavioral:  Negative for altered mental status and memory loss.        Allergies   Allergen Reactions    Latex Itching         Current Outpatient Medications:     atorvastatin (LIPITOR) 40 MG tablet, TAKE 1 TABLET EVERY NIGHT, Disp: 90 tablet, Rfl: 3    Cholecalciferol (Vitamin D3) 50 MCG (2000 UT) tablet, Take 1 tablet by mouth Daily., Disp: , Rfl:     Eliquis 2.5 MG tablet tablet, TAKE 1 TABLET EVERY 12 HOURS, Disp: 180 tablet, Rfl: 3    escitalopram (LEXAPRO) 20 MG tablet, TAKE 1 TABLET DAILY, Disp: 90 tablet, Rfl: 3    furosemide (Lasix) 20 MG tablet, Take 1 tablet by mouth Daily., Disp: , Rfl:     levothyroxine (SYNTHROID, LEVOTHROID) 88 MCG tablet, TAKE 1 TABLET DAILY, Disp: 90 tablet, Rfl: 3    multivitamin with minerals (ONE-A-DAY MENS 50+ PO), Take 1 tablet by mouth Daily., Disp: , Rfl:     Omega-3 Fatty Acids (Fish Oil) 360 MG capsule, Take 2 capsules by mouth Daily., Disp: , Rfl:     omeprazole (priLOSEC) 40 MG capsule, TAKE 1 CAPSULE DAILY, Disp: 90 capsule, Rfl: 3    sotalol (BETAPACE) 80 MG tablet, TAKE 1 TABLET DAILY, Disp: 90 tablet, Rfl: 1      Objective:     Vitals:    03/18/25 1237   BP: 148/76   BP Location: Left arm   Patient Position: Sitting   Pulse: 57   Weight: 84.4 kg (186 lb)   Height: 180.3 cm (71\")     Body mass index is 25.94 kg/m².    Constitutional:       Appearance: Well-developed.   Eyes:      General: No scleral icterus.  HENT:      Head: Normocephalic.   Neck:      Thyroid: No thyromegaly.      Vascular: No JVD.      Lymphadenopathy: No cervical adenopathy.   Pulmonary:      Effort: Pulmonary effort is normal.      Breath sounds: Normal breath sounds. No wheezing. No rales.   Cardiovascular:      Normal rate. Regular rhythm.      No gallop.    Edema:     Peripheral edema absent.   Abdominal:      Palpations: Abdomen is soft.      Tenderness: There is no abdominal tenderness.   Musculoskeletal: Normal " range of motion. Skin:     General: Skin is warm and dry.      Findings: No rash.   Neurological:      Mental Status: Alert and oriented to person, place, and time.           ECG 12 Lead    Date/Time: 3/18/2025 1:56 PM  Performed by: Efrain Yuan MD    Authorized by: Efrain Yuan MD  Comparison: compared with previous ECG   Similar to previous ECG  Rhythm: sinus rhythm  Other findings: non-specific ST-T wave changes           Assessment:       Diagnosis Plan   1. Acute non-Q wave ST elevation myocardial infarction (STEMI) involving left anterior descending (LAD) coronary artery        2. S/P CABG (coronary artery bypass graft)        3. Nonrheumatic aortic valve insufficiency                 Plan:       He seems to be asymptomatic from a cardiac standpoint which is great.  His last echo showed lower limits of normal LV function.  I think with him at age 89 we will get a be conservative and medical therapy I definitely do not think he should have elective aortic surgery.  I like his risk factor modification I am going to you now I am not increasing his statins anymore his last LDL was 84 at his age I think it is fine.  I am going to recommend that he stop his fish oil and I will have him come see Jazmin in a year sooner if he has trouble    No follow-ups on file.     As always, it has been a pleasure to participate in your patient's care.      Sincerely,       Efrain Yuan MD

## 2025-07-14 PROBLEM — I71.61: Status: ACTIVE | Noted: 2018-08-07

## 2025-08-08 ENCOUNTER — TELEPHONE (OUTPATIENT)
Age: OVER 89
End: 2025-08-08
Payer: MEDICARE

## 2025-08-12 DIAGNOSIS — R06.02 SOB (SHORTNESS OF BREATH): Primary | ICD-10-CM

## 2025-08-13 DIAGNOSIS — R06.09 DOE (DYSPNEA ON EXERTION): Primary | ICD-10-CM

## 2025-08-13 RX ORDER — FUROSEMIDE 40 MG/1
80 TABLET ORAL DAILY
Qty: 60 TABLET | Refills: 0 | Status: SHIPPED | OUTPATIENT
Start: 2025-08-13

## 2025-08-13 RX ORDER — FUROSEMIDE 40 MG/1
40 TABLET ORAL DAILY
Status: SHIPPED
Start: 2025-08-13 | End: 2025-08-13 | Stop reason: SDUPTHER

## 2025-08-18 ENCOUNTER — LAB (OUTPATIENT)
Dept: LAB | Facility: HOSPITAL | Age: OVER 89
End: 2025-08-18
Payer: MEDICARE

## 2025-08-18 DIAGNOSIS — R06.09 DOE (DYSPNEA ON EXERTION): ICD-10-CM

## 2025-08-18 DIAGNOSIS — R06.02 SOB (SHORTNESS OF BREATH): ICD-10-CM

## 2025-08-18 LAB
ALBUMIN SERPL-MCNC: 3.9 G/DL (ref 3.5–5.2)
ALBUMIN/GLOB SERPL: 1.3 G/DL
ALP SERPL-CCNC: 105 U/L (ref 39–117)
ALT SERPL W P-5'-P-CCNC: 16 U/L (ref 1–41)
ANION GAP SERPL CALCULATED.3IONS-SCNC: 13 MMOL/L (ref 5–15)
AST SERPL-CCNC: 24 U/L (ref 1–40)
BASOPHILS # BLD AUTO: 0.05 10*3/MM3 (ref 0–0.2)
BASOPHILS NFR BLD AUTO: 0.7 % (ref 0–1.5)
BILIRUB SERPL-MCNC: 0.5 MG/DL (ref 0–1.2)
BUN SERPL-MCNC: 23 MG/DL (ref 8–23)
BUN/CREAT SERPL: 16.8 (ref 7–25)
CALCIUM SPEC-SCNC: 9 MG/DL (ref 8.6–10.5)
CHLORIDE SERPL-SCNC: 101 MMOL/L (ref 98–107)
CO2 SERPL-SCNC: 24 MMOL/L (ref 22–29)
CREAT SERPL-MCNC: 1.37 MG/DL (ref 0.76–1.27)
DEPRECATED RDW RBC AUTO: 48.6 FL (ref 37–54)
EGFRCR SERPLBLD CKD-EPI 2021: 49.3 ML/MIN/1.73
EOSINOPHIL # BLD AUTO: 0.11 10*3/MM3 (ref 0–0.4)
EOSINOPHIL NFR BLD AUTO: 1.6 % (ref 0.3–6.2)
ERYTHROCYTE [DISTWIDTH] IN BLOOD BY AUTOMATED COUNT: 13 % (ref 12.3–15.4)
GLOBULIN UR ELPH-MCNC: 3.1 GM/DL
GLUCOSE SERPL-MCNC: 195 MG/DL (ref 65–99)
HCT VFR BLD AUTO: 39.8 % (ref 37.5–51)
HGB BLD-MCNC: 13.5 G/DL (ref 13–17.7)
IMM GRANULOCYTES # BLD AUTO: 0.02 10*3/MM3 (ref 0–0.05)
IMM GRANULOCYTES NFR BLD AUTO: 0.3 % (ref 0–0.5)
LYMPHOCYTES # BLD AUTO: 2.31 10*3/MM3 (ref 0.7–3.1)
LYMPHOCYTES NFR BLD AUTO: 32.9 % (ref 19.6–45.3)
MCH RBC QN AUTO: 34.7 PG (ref 26.6–33)
MCHC RBC AUTO-ENTMCNC: 33.9 G/DL (ref 31.5–35.7)
MCV RBC AUTO: 102.3 FL (ref 79–97)
MONOCYTES # BLD AUTO: 0.75 10*3/MM3 (ref 0.1–0.9)
MONOCYTES NFR BLD AUTO: 10.7 % (ref 5–12)
NEUTROPHILS NFR BLD AUTO: 3.79 10*3/MM3 (ref 1.7–7)
NEUTROPHILS NFR BLD AUTO: 53.8 % (ref 42.7–76)
NRBC BLD AUTO-RTO: 0 /100 WBC (ref 0–0.2)
NT-PROBNP SERPL-MCNC: 365 PG/ML (ref 0–1800)
PLATELET # BLD AUTO: 209 10*3/MM3 (ref 140–450)
PMV BLD AUTO: 11.3 FL (ref 6–12)
POTASSIUM SERPL-SCNC: 4 MMOL/L (ref 3.5–5.2)
PROT SERPL-MCNC: 7 G/DL (ref 6–8.5)
RBC # BLD AUTO: 3.89 10*6/MM3 (ref 4.14–5.8)
SODIUM SERPL-SCNC: 138 MMOL/L (ref 136–145)
WBC NRBC COR # BLD AUTO: 7.03 10*3/MM3 (ref 3.4–10.8)

## 2025-08-18 PROCEDURE — 36415 COLL VENOUS BLD VENIPUNCTURE: CPT

## 2025-08-18 PROCEDURE — 83880 ASSAY OF NATRIURETIC PEPTIDE: CPT

## 2025-08-18 PROCEDURE — 85025 COMPLETE CBC W/AUTO DIFF WBC: CPT

## 2025-08-18 PROCEDURE — 80053 COMPREHEN METABOLIC PANEL: CPT

## 2025-08-19 DIAGNOSIS — I50.33 ACUTE ON CHRONIC DIASTOLIC CHF (CONGESTIVE HEART FAILURE): Primary | ICD-10-CM

## 2025-08-19 RX ORDER — FUROSEMIDE 80 MG/1
TABLET ORAL
Qty: 90 TABLET | Refills: 3 | Status: SHIPPED | OUTPATIENT
Start: 2025-08-19